# Patient Record
Sex: FEMALE | Race: BLACK OR AFRICAN AMERICAN
[De-identification: names, ages, dates, MRNs, and addresses within clinical notes are randomized per-mention and may not be internally consistent; named-entity substitution may affect disease eponyms.]

---

## 2021-06-28 ENCOUNTER — HOSPITAL ENCOUNTER (OUTPATIENT)
Dept: HOSPITAL 97 - ER | Age: 71
Setting detail: OBSERVATION
LOS: 1 days | Discharge: HOME | End: 2021-06-29
Attending: HOSPITALIST
Payer: COMMERCIAL

## 2021-06-28 VITALS — BODY MASS INDEX: 38.4 KG/M2

## 2021-06-28 DIAGNOSIS — G47.33: ICD-10-CM

## 2021-06-28 DIAGNOSIS — I10: ICD-10-CM

## 2021-06-28 DIAGNOSIS — J45.909: ICD-10-CM

## 2021-06-28 DIAGNOSIS — R07.9: Primary | ICD-10-CM

## 2021-06-28 DIAGNOSIS — R79.89: ICD-10-CM

## 2021-06-28 DIAGNOSIS — I12.9: ICD-10-CM

## 2021-06-28 DIAGNOSIS — D18.03: ICD-10-CM

## 2021-06-28 DIAGNOSIS — R06.02: ICD-10-CM

## 2021-06-28 DIAGNOSIS — E66.9: ICD-10-CM

## 2021-06-28 DIAGNOSIS — E78.5: ICD-10-CM

## 2021-06-28 DIAGNOSIS — N18.9: ICD-10-CM

## 2021-06-28 LAB
ALBUMIN SERPL BCP-MCNC: 3.3 G/DL (ref 3.4–5)
ALP SERPL-CCNC: 115 U/L (ref 45–117)
ALT SERPL W P-5'-P-CCNC: 41 U/L (ref 12–78)
AST SERPL W P-5'-P-CCNC: 22 U/L (ref 15–37)
BUN BLD-MCNC: 40 MG/DL (ref 7–18)
GLUCOSE SERPLBLD-MCNC: 105 MG/DL (ref 74–106)
HCT VFR BLD CALC: 29.1 % (ref 36–45)
LYMPHOCYTES # SPEC AUTO: 1.5 K/UL (ref 0.7–4.9)
NT-PROBNP SERPL-MCNC: 319 PG/ML (ref ?–125)
PMV BLD: 11 FL (ref 7.6–11.3)
POTASSIUM SERPL-SCNC: 3.8 MMOL/L (ref 3.5–5.1)
RBC # BLD: 3.13 M/UL (ref 3.86–4.86)
TROPONIN (EMERG DEPT USE ONLY): < 0.02 NG/ML (ref 0–0.04)

## 2021-06-28 PROCEDURE — 83880 ASSAY OF NATRIURETIC PEPTIDE: CPT

## 2021-06-28 PROCEDURE — 80076 HEPATIC FUNCTION PANEL: CPT

## 2021-06-28 PROCEDURE — 85652 RBC SED RATE AUTOMATED: CPT

## 2021-06-28 PROCEDURE — 84145 PROCALCITONIN (PCT): CPT

## 2021-06-28 PROCEDURE — 71045 X-RAY EXAM CHEST 1 VIEW: CPT

## 2021-06-28 PROCEDURE — 94760 N-INVAS EAR/PLS OXIMETRY 1: CPT

## 2021-06-28 PROCEDURE — 80048 BASIC METABOLIC PNL TOTAL CA: CPT

## 2021-06-28 PROCEDURE — 36415 COLL VENOUS BLD VENIPUNCTURE: CPT

## 2021-06-28 PROCEDURE — 71250 CT THORAX DX C-: CPT

## 2021-06-28 PROCEDURE — 94660 CPAP INITIATION&MGMT: CPT

## 2021-06-28 PROCEDURE — 99285 EMERGENCY DEPT VISIT HI MDM: CPT

## 2021-06-28 PROCEDURE — 93005 ELECTROCARDIOGRAM TRACING: CPT

## 2021-06-28 PROCEDURE — 80053 COMPREHEN METABOLIC PANEL: CPT

## 2021-06-28 PROCEDURE — 84100 ASSAY OF PHOSPHORUS: CPT

## 2021-06-28 PROCEDURE — 85379 FIBRIN DEGRADATION QUANT: CPT

## 2021-06-28 PROCEDURE — 83735 ASSAY OF MAGNESIUM: CPT

## 2021-06-28 PROCEDURE — 97140 MANUAL THERAPY 1/> REGIONS: CPT

## 2021-06-28 PROCEDURE — 81003 URINALYSIS AUTO W/O SCOPE: CPT

## 2021-06-28 PROCEDURE — 84484 ASSAY OF TROPONIN QUANT: CPT

## 2021-06-28 PROCEDURE — 82550 ASSAY OF CK (CPK): CPT

## 2021-06-28 PROCEDURE — 85025 COMPLETE CBC W/AUTO DIFF WBC: CPT

## 2021-06-28 PROCEDURE — 76705 ECHO EXAM OF ABDOMEN: CPT

## 2021-06-28 PROCEDURE — 86140 C-REACTIVE PROTEIN: CPT

## 2021-06-28 PROCEDURE — 78582 LUNG VENTILAT&PERFUS IMAGING: CPT

## 2021-06-28 PROCEDURE — 97161 PT EVAL LOW COMPLEX 20 MIN: CPT

## 2021-06-28 RX ADMIN — TRAMADOL HYDROCHLORIDE PRN MG: 50 TABLET, COATED ORAL at 22:34

## 2021-06-28 RX ADMIN — ENOXAPARIN SODIUM SCH MG: 100 INJECTION SUBCUTANEOUS at 21:37

## 2021-06-28 RX ADMIN — Medication SCH ML: at 21:37

## 2021-06-28 NOTE — P.HP
Certification for Inpatient


Patient admitted to: Observation


With expected LOS: <2 Midnights


Practitioner: I am a practitioner with admitting privileges, knowledge of 

patient current condition, hospital course, and medical plan of care.


Services: Services provided to patient in accordance with Admission requirements

found in Title 42 Section 412.3 of the Code of Federal Regulations





Patient History


Date of Service: 06/28/21


Reason for admission: R subscapular back pain, r/o PE


History of Present Illness: 





69yo F, PMH: HTN, Asthma, CHAVO, CKD, h/o shingles. Presents to ED due to 

worsening R thoracic back pain. Pain began ~4 weeks ago, saw pcp and diagnosed 

with muscle cramps. She continued to stay very active, using some ice on the 

area with some alleviation of her symptoms. This continued until yesterday when 

she began to have more severe pain in the same area. Severe enough to limit what

she could do around the house. Tylenol and muscle relaxers were not helping - 

only making her sleepy.  Massage helps as well. Deep breathing and exertion can 

worsen the pain. She reports some mild SOB and cough over the last week or so.


In the ED, CXR was unremarkable, labs notable for Cr: 1.8, elevated d-dimer, 

negative troponin, EKG WNL.


Patient to be placed in obs due to intractable pain, elevated d-dimer, unable to

obtain CTA to r/o PE.





Allergies





codeine [Codeine] Allergy (Intermediate, Verified 03/13/13 06:24)


   Itching


Tetracyclines Allergy (Intermediate, Verified 03/13/13 06:24)


   Nausea/Vomiting


No Known Allergies Allergy (Uncoded 11/26/17 16:02)


   Unknown





Home Medications: 








Aspirin 81 mg PO DAILY 11/26/17 


Budesonide/Formoterol Fumarate [Symbicort 160-4.5 Mcg Inhaler] 1 puff IH BID 

11/26/17 


Carvedilol [Coreg] 25 mg PO BID 11/26/17 


Chlorthalidone 25 mg PO DAILY 11/26/17 


Gabapentin [Neurontin*] 100 mg PO BID 11/26/17 


Hydrochlorothiazide 50 mg PO DAILY 11/26/17 


Spironolactone 25 mg PO DAILY 11/26/17 


Valsartan 160 mg PO DAILY 11/26/17 


Cefixime [Suprax] 200 mg PO BID #28 tab.chew 11/29/17 


Oseltamivir Phosphate [Tamiflu Suspension] 5 ml PO DAILY #10 ml 11/29/17 








- Past Medical/Surgical History


Diabetic: No


-: HTN


-: HYPERLIPIDEMIA


-: ASTHMA


-: NEUROPATHY


-: SLEEP APNEA


-: CKD


-: R ROTATOR CUFF


-: HYSTERECTOMY


-: R ANKLE





- Family History


  ** Father


-: Hypertension, Cancer


Notes: prostate cancer





  ** Mother


-: Kidney disease


Notes: dialysis pt





- Social History


Smoking Status: Never smoker


Alcohol use: No


CD- Drugs: No


Caffeine use: Yes


Place of Residence: Home





Review of Systems


10-point ROS is otherwise unremarkable





Physical Examination





- Physical Exam


General: Alert, Oriented x3, Mild distress


HEENT: Normocephalic, PERRLA, Mucous membr. moist/pink


Neck: Supple


Respiratory: Clear to auscultation bilaterally, Normal air movement


Cardiovascular: Edema (Trace-1+, bilateral to knee)


Capillary refill: <2 Seconds


Gastrointestinal: Soft and benign, Non-distended, No tenderness


Musculoskeletal: Other (Tenderness and swelling of posterior chest between spine

and scapula; no skin changes, no abscess or induration palpated)


Integumentary: No rashes, No breakdown


Neurological: Normal speech, Normal strength at 5/5 x4 extr, Normal affect





- Studies


Laboratory Data (last 24 hrs)





06/28/21 14:01: WBC 7.10, Hgb 9.4 L, Hct 29.1 L, Plt Count 195


06/28/21 14:01: Sodium 142, Potassium 3.8, BUN 40 H, Creatinine 1.81 H, Glucose 

105, Total Bilirubin 0.2, AST 22, ALT 41, Alkaline Phosphatase 115








Assessment and Plan





- Advance Directives


Does patient have a Living Will: No


Does patient have a Durable POA for Healthcare: No


Physician Review Additional Text: 





Problem List


R Back pain, intractable


elevated D-dimer, SOB


HTN


asthma


CHAVO on CPAP


CKD


h/o shingles





-area of tenderness with noticeable swelling compared to left


-will obtain CT w/o IV contrast due to renal function


-PRN pain control


-VQ scan in AM to r/o PE, lovenox 1mg/kg ordered


-likely muscle spasm, pt has not rested and continued to agitate the area


-possible PE, possible zoster. no skin lesions on back, very tender / out of 

proportion to exam 


-ESR, CRP, cpk ordered





VTE: lovenox 1mg/kg


Code: full


Dispo: anticipate dc home tomorrow, pain control, r/o DVT, further eval











Time Spent Managing Pts Care (In Minutes): 60

## 2021-06-28 NOTE — XMS REPORT
Continuity of Care Document

                            Created on:2021



Patient:RADHA CAMACHO

Sex:Female

:1950

External Reference #:[id number]





Demographics







                          Address                   [address] [address]

 

                          Home Phone                [phone]

 

                          Mobile Phone              [phone]

 

                          Email Address             [email or web address]

 

                          Preferred Language        en

 

                          Marital Status            Unknown

 

                          Tenriism Affiliation     Unknown

 

                          Race                      Unknown

 

                          Additional Race(s)        Unavailable



                                                    Black or 

 

                          Ethnic Group              Unknown









Author







                          Organization              [facility]

t

 

                          Address                   [address] [place] [address] [address]



                                                    [place] [address]

 

                          Phone                     [phone]









Support







                Name            Relationship    Address         Phone

 

                Xu          Child           [address] [phone]



                                                [place] [address] 









Care Team Providers







                    Name                Role                Phone

 

                    Flavio Leung MD Attending Clinician [phone]









Problems

This patient has no known problems.



Allergies, Adverse Reactions, Alerts

This patient has no known allergies or adverse reactions.



Medications

This patient has no known medications.



Procedures

This patient has no known procedures.



Encounters







        Start   End     Encounter Admission Attending Care    Care    Encounter 

Source



        Date/Time Date/Time Type    Type    Clinicians Facility Department ID   

   

 

        2021 Office          TABATHA Leung    [phone] [id number]

[id number] 



        08:30:27 09:00:27 Visit           [facility]  [id number]         



                                        Flavio Staley [id number]         



                                                Heather [phone]         



                                                nal     [address]             



                                                Office                  



                                                Building                 



                                                One                     







Results

This patient has no known results.

## 2021-06-28 NOTE — EDPHYS
Physician Documentation                                                                           

 [facility]                                                                 

Name: Rosio Mcnair                                                                               

Age: 70 yrs                                                                                       

Sex: Female                                                                                       

: 1950                                                                                   

MRN: [id number]                                                                                   

Arrival Date: 2021                                                                          

Time: 11:08                                                                                       

Account#: [id number]                                                                            

Bed 18                                                                                            

Private MD: Lucio Leung ED Physician Roberto Carlos Orozco                                                                         

HPI:                                                                                              

                                                                                             

14:33 This 70 yrs old Black Female presents to ER via Wheelchair with complaints of Chest     rn  

      Pain.                                                                                       

14:34 The pain does not radiate. Associated signs and symptoms: Pertinent negatives:          rn  

      abdominal pain, diaphoresis, palpitations, shortness of breath, syncope, vomiting. The      

      chest pain is described as sharp, stabbing. Duration: The patient or guardian reports       

      multiple episodes, that are intermittent. Modifying factors: The symptoms are               

      alleviated by nothing. the symptoms are aggravated by deep breath. Severity of pain: At     

      its worst the pain was moderate in the emergency department the pain is unchanged. The      

      patient has experienced similar episodes in the past. The patient has been recently         

      seen by a physician:. Reports approx 4 weeks of right scapular region pain, no trauma,      

      no fever or cough, reports pain with deep breath, laying on right side, and movement.       

      Seen by multiple providers and told most likely muscle but she feels is something more      

      serious. .                                                                                  

                                                                                                  

Historical:                                                                                       

- Allergies:                                                                                      

19:37 TETRACYCLINES;                                                                          jb4 

19:37 Suprax;                                                                                 jb4 

19:37 Azithromycin;                                                                           jb4 

19:37 Codeine;                                                                                jb4 

19:37 Adhesives;                                                                              jb4 

- PMHx:                                                                                           

19:37 Asthma; Hyperlipidemia; Hypertension;                                                   jb4 

                                                                                                  

- Immunization history:: Adult Immunizations up to date, Client reports receiving the             

  2nd dose of the Covid vaccine.                                                                  

- Social history:: Smoking status: unknown.                                                       

- Family history:: not pertinent.                                                                 

- Hospitalizations: : No recent hospitalization is reported.                                      

                                                                                                  

                                                                                                  

ROS:                                                                                              

14:34 Constitutional: Negative for fever, chills, and weight loss, Eyes: Negative for injury, rn  

      pain, redness, and discharge, Neck: Negative for injury, pain, and swelling,                

      Cardiovascular: Negative for palpitations, and edema, Respiratory: Negative for             

      shortness of breath, cough, wheezing, and pleuritic chest pain, Abdomen/GI: Negative        

      for abdominal pain, nausea, vomiting, diarrhea, and constipation, Back: Negative for        

      injury  : Negative for injury, bleeding, discharge, and swelling, MS/Extremity:           

      Negative for injury and deformity, Skin: Negative for injury, rash, and discoloration,      

      Neuro: Negative for headache, weakness, numbness, tingling, and seizure.                    

                                                                                                  

Exam:                                                                                             

14:34 Constitutional:  This is a well developed, well nourished patient who is awake, alert,  rn  

      and in no acute distress. Head/Face:  Normocephalic, atraumatic. Eyes:  Periorbital         

      areas with no swelling, redness, or edema. Cardiovascular:  Regular rate and rhythm.        

      No pulse deficits. Respiratory:  No increased work of breathing, no retractions or          

      nasal flaring. Abdomen/GI:  soft, non-tender Skin:  Warm, dry MS/ Extremity:  Pulses        

      equal, no cyanosis.   Neuro:  Awake and alert, GCS 15                                       

                                                                                                  

Vital Signs:                                                                                      

11:16  / 63; Pulse 88; Resp 17 S; Temp 98(TE); Pulse Ox 98% on R/A; Weight 95.25 kg     jd3 

      (R); Height 5 ft. 2 in. (157.48 cm) (R); Pain 10/10;                                        

13:00  / 60; Pulse 75; Resp 16; Pulse Ox 98% ;                                          bp  

14:00  / 64; Pulse 87; Resp 22; Pulse Ox 100% ;                                         bp  

16:00  / 63; Pulse 84; Resp 17; Pulse Ox 100% ;                                         bp  

18:00  / 55; Pulse 93; Resp 18; Pulse Ox 99% on R/A;                                    bp  

19:00  / 59; Pulse 90; Resp 18; Pulse Ox 100% on R/A;                                   jb4 

20:00  / 54; Pulse 85; Resp 16; Pulse Ox 100% on R/A;                                   jb4 

11:16 Body Mass Index 38.41 (95.25 kg, 157.48 cm)                                             jd3 

                                                                                                  

MDM:                                                                                              

11:25 Patient medically screened.                                                             rn  

15:18 Differential diagnosis: acute myocardial infarction, acute pericarditis, coronary       rn  

      artery disease cholecystitis, Cholelithiasis costochondritis, esophagitis, gastritis,       

      gastroesophageal reflux disease (GERD), pleurisy, pneumothorax, pulmonary embolus,          

      stable angina. Data reviewed: vital signs, nurses notes, lab test result(s), EKG,           

      radiologic studies, plain films, and as a result, I will admit patient. Counseling: I       

      had a detailed discussion with the patient and/or guardian regarding: the historical        

      points, exam findings, and any diagnostic results supporting the discharge/admit            

      diagnosis, lab results, radiology results, the need for further work-up and treatment       

      in the hospital. Response to treatment: the patient's symptoms have mildly improved         

      after treatment, and as a result, I will admit patient. Admission orders: after a           

      detailed discussion of the patient's condition and case, the admit orders are written       

      by me. ED course: Pt with neg trop and NSR on ECG without ischemia, still having            

      scapular pain/chest pain, elevated d-dimer, unable to get IV access at this time and        

      nursing has tried with u/s, attempting midline right now for admission. Creatinine too      

      high for CT PE anyway, will admit to hospitalist service for chest pain rule out and        

      V/Q scan in AM. .                                                                           

                                                                                                  

                                                                                             

11:33 Order name: Basic Metabolic Panel; Complete Time: 15:10                                 rn  

                                                                                             

11:33 Order name: CBC with Diff; Complete Time: 14:25                                         rn  

                                                                                             

11:33 Order name: LFT's; Complete Time: 15:10                                                 rn  

                                                                                             

11:33 Order name: NT PRO-BNP; Complete Time: 15:10                                            rn  

                                                                                             

11:33 Order name: Troponin (emerg Dept Use Only); Complete Time: 15:10                        rn  

                                                                                             

11:33 Order name: D-Dimer; Complete Time: 15:10                                               rn  

                                                                                             

11:33 Order name: XRAY Chest (1 view); Complete Time: 12:39                                   rn  

                                                                                             

11:33 Order name: EKG; Complete Time: 11:35                                                   rn  

                                                                                             

14:28 Order name: Urine Dipstick-Ancillary; Complete Time: 14:34                              [facility]

                                                                                             

16:46 Order name: CT-CHEST WITHOUT CONTRAST                                                   [facility]

                                                                                             

16:46 Order name: Creatine Phosphokinase                                                      [facility]

                                                                                             

17:34 Order name: CT                                                                          [facility]

                                                                                             

17:47 Order name: C-Reactive Protein                                                          [facility]

                                                                                             

18:11 Order name: Sedimentation Rate, [place]                                              [facility]

                                                                                             

11:33 Order name: Cardiac monitoring; Complete Time: 11:39                                    rn  

                                                                                             

11:33 Order name: EKG - Nurse/Tech; Complete Time: 11:39                                      rn  

                                                                                             

11:33 Order name: IV Saline Lock; Complete Time: 17:45                                        rn  

                                                                                             

11:33 Order name: Labs collected and sent; Complete Time: 14:01                               rn  

                                                                                             

11:33 Order name: O2 Per Protocol; Complete Time: 14:00                                       rn  

                                                                                             

11:33 Order name: O2 Sat Monitoring; Complete Time: 14:00                                     rn  

                                                                                             

16:46 Order name: Heart Healthy                                                               [facility]

                                                                                                  

Administered Medications:                                                                         

No medications were administered                                                                  

                                                                                                  

                                                                                                  

Disposition:                                                                                      

21 15:20 Hospitalization ordered by Homar Orozco for Observation. Diagnosis is Chest      

  pain, unspecified.                                                                              

- Bed requested for Telemetry/MedSurg (observation).                                              

- Status is Observation.                                                                      jb4 

- Condition is Stable.                                                                            

- Problem is new.                                                                                 

- Symptoms are unchanged.                                                                         

                                                                                                  

                                                                                                  

                                                                                                  

Signatures:                                                                                       

Dispatcher [employer]                           EDMS                                                 

Sumi Gill Diana, RN                        Roberto Carlos Helms MD MD rn Bryson, James, RN RN   jb4                                                  

Dima Carballo RN                    BRONSON   jd3                                                  

                                                                                                  

Corrections: (The following items were deleted from the chart)                                    

18:04 15:20 Hospitalization Ordered by Homar Orozco MD for Observation. Preliminary          bd  

      diagnosis is Chest pain, unspecified. Bed requested for Telemetry/MedSurg                   

      (observation). Status is Observation. Condition is Stable. Problem is new. Symptoms are     

      unchanged. rn                                                                               

18:06 18:04 2021 15:20 Hospitalization Ordered by Homar Orozco MD for Observation.     dw  

      Preliminary diagnosis is Chest pain, unspecified. Bed requested for Telemetry/MedSurg       

      (observation). Status is Observation. Condition is Stable. Problem is new. Symptoms are     

      unchanged. bd                                                                               

21:04 18:2021 15:20 Hospitalization Ordered by Homar Orozco MD for Observation.     jb4 

      Preliminary diagnosis is Chest pain, unspecified. Bed requested for Telemetry/MedSurg       

      (observation). Status is Observation. Condition is Stable. Problem is new. Symptoms are     

      unchanged. dw                                                                               

                                                                                                  

**************************************************************************************************

## 2021-06-28 NOTE — RAD REPORT
EXAM DESCRIPTION:  CT - Thorax Wo Con - 6/28/2021 5:12 pm

 

CLINICAL HISTORY:  R subscapular pain/swelling

 

COMPARISON:  No comparisons

 

TECHNIQUE:  Axial 5 mm thick images of the chest were obtained without IV contrast.

 

All CT scans are performed using dose optimization technique as appropriate and may include automated
 exposure control or mA/KV adjustment according to patient size.

 

FINDINGS:  Clustered airspace opacities are present in the posterior aspect mid right chest within th
e right lower lobe. This is most likely a mild pneumonia and is potentially the source for the patien
t's right thoracic pain. No other lung parenchymal process identifiable. No pleural thickening or ple
ural effusion. No pneumothorax.

 

No abnormal mediastinal or hilar masses or lymphadenopathy seen. No gross aortic or pulmonary artery 
finding suspected.  Assessment is limited in the absence of IV contrast. No cardiomegaly or pericardi
al effusion.

 

No chest wall mass for acute rib finding seen. No abnormality of the right scapula and adjacent muscu
lature. Patient does have bilateral shoulder joint degenerative change and possible rotator cuff repa
ir on the right.

 

Multiple small nonspecific bilateral axillary lymph nodes.

 

Limited upper abdomen imaging shows a large lobulated 9 centimeter hypodense mass filling most of marianna
er segments 4, 8 and 5. Within this hypodense mass are numerous variably sized irregular calcificatio
ns. There is no relevant history or prior imaging to explain this mass. A 2 centimeter right adrenal 
mass shows fat attenuation consistent with adrenal adenoma. There is nodularity of the left adrenal g
land. Small low-density mass lateral upper pole left kidney is probably a cyst but is not fully carolyn
cterized.

 

IMPRESSION:  A mild or early pneumonia in the upper segment right lower lobe is present and could be 
a source for the patient's right-sided chest or shoulder blade pain. Patient also has degenerative ch
griselda at the right shoulder joint with what appears to be a remote rotator cuff tear procedure.

 

More significantly the limited imaging of the liver shows a 9 centimeter low-density mass. Coarse osvaldo
cifications are present within the mass.

 

No comparison imaging or pertinent history available that might explain this mass. Primary liver fabián
gnancy would be a primary consideration and can be further evaluated with follow-up outpatient contra
st CT abdomen and pelvis or contrast-enhanced MRI of the liver.

## 2021-06-28 NOTE — ER
Nurse's Notes                                                                                     

 [facility]                                                                 

Name: Rosio Mcnair                                                                               

Age: 70 yrs                                                                                       

Sex: Female                                                                                       

: 1950                                                                                   

MRN: [id number]                                                                                   

Arrival Date: 2021                                                                          

Time: 11:08                                                                                       

Account#: [id number]                                                                            

Bed 18                                                                                            

Private MD: Lucio Leung                                                                        

Diagnosis: Chest pain, unspecified                                                                

                                                                                                  

Presentation:                                                                                     

                                                                                             

11:14 Chief complaint: Patient states: "I am having pain under my right shoulder blade in my  jd3 

      chest. I saw my doctor and he thinks it was a torn muscle and he gave me something over     

      the weekend and it help a little and it seems like it just keeps getting worse.".           

      Coronavirus screen: At this time, the client does not indicate any symptoms associated      

      with coronavirus-19. Ebola Screen: Patient negative for fever greater than or equal to      

      101.5 degrees Fahrenheit, and additional compatible Ebola Virus Disease symptoms.           

      Initial Sepsis Screen: Does the patient meet any 2 criteria? No. Patient's initial          

      sepsis screen is negative. Does the patient have a suspected source of infection? No.       

      Patient's initial sepsis screen is negative. Risk Assessment: Do you want to hurt           

      yourself or someone else? Patient reports no desire to harm self or others. Onset of        

      symptoms was 2021.                                                                 

11:14 Method Of Arrival: Wheelchair                                                           jd3 

11:14 Acuity: ROMI 3                                                                           jd3 

                                                                                                  

Triage Assessment:                                                                                

11:15 General: Appears in no apparent distress. uncomfortable, obese, Behavior is             bp  

      cooperative, appropriate for age, anxious. Pain: Complains of pain in chest. EENT: No       

      deficits noted. Neuro: No deficits noted. Cardiovascular: Rhythm is sinus rhythm.           

      Respiratory: No deficits noted. GI: No signs and/or symptoms were reported involving        

      the gastrointestinal system. : No signs and/or symptoms were reported regarding the       

      genitourinary system. Derm: No deficits noted. Musculoskeletal: No deficits noted.          

                                                                                                  

Historical:                                                                                       

- Allergies:                                                                                      

19:37 TETRACYCLINES;                                                                          jb4 

19:37 Suprax;                                                                                 jb4 

19:37 Azithromycin;                                                                           jb4 

19:37 Codeine;                                                                                jb4 

19:37 Adhesives;                                                                              jb4 

- PMHx:                                                                                           

19:37 Asthma; Hyperlipidemia; Hypertension;                                                   jb4 

                                                                                                  

- Immunization history:: Adult Immunizations up to date, Client reports receiving the             

  2nd dose of the Covid vaccine.                                                                  

- Social history:: Smoking status: unknown.                                                       

- Family history:: not pertinent.                                                                 

- Hospitalizations: : No recent hospitalization is reported.                                      

                                                                                                  

                                                                                                  

Screenin:30 Abuse screen: Denies threats or abuse. Denies injuries from another. Nutritional        bp  

      screening: No deficits noted. Tuberculosis screening: No symptoms or risk factors           

      identified. Fall Risk None identified.                                                      

                                                                                                  

Assessment:                                                                                       

13:30 Reassessment: UNABLE TO OBTAIN PIV OR BLOOD DESPITE MX NURSE INTERVENTION AND U/S. MD   bp  

      NOTIFIED.                                                                                   

14:01 Reassessment: PHLEBOTOMY AT B/S. PIV ON HOLD PENDING FINAL RESULTS.                     bp  

16:00 Reassessment: No changes from previously documented assessment. Patient and/or family   bp  

      updated on plan of care and expected duration. Pain level reassessed. Patient is alert,     

      oriented x 3, equal unlabored respirations, skin warm/dry/pink. ADMIT INITIATED.            

18:00 Reassessment: No changes from previously documented assessment. Patient and/or family   bp  

      updated on plan of care and expected duration. Pain level reassessed. Patient is alert,     

      oriented x 3, equal unlabored respirations, skin warm/dry/pink. COPY OF COVID VAXX ON       

      CHART.                                                                                      

19:00 Reassessment: Patient appears in no apparent distress at this time. Patient and/or      jb4 

      family updated on plan of care and expected duration. Pain level reassessed. Patient is     

      alert, oriented x 3, equal unlabored respirations, skin warm/dry/pink.                      

19:38 Reassessment: Patient appears in no apparent distress at this time. Patient and/or      jb4 

      family updated on plan of care and expected duration. Pain level reassessed. Patient is     

      alert, oriented x 3, equal unlabored respirations, skin warm/dry/pink.                      

19:41 Reassessment: attempted to call report twice, no answer each time.                      jb4 

21:04 Reassessment: Patient appears in no apparent distress at this time. Patient and/or      jb4 

      family updated on plan of care and expected duration. Pain level reassessed. Patient is     

      alert, oriented x 3, equal unlabored respirations, skin warm/dry/pink.                      

                                                                                                  

Vital Signs:                                                                                      

11:16  / 63; Pulse 88; Resp 17 S; Temp 98(TE); Pulse Ox 98% on R/A; Weight 95.25 kg     jd3 

      (R); Height 5 ft. 2 in. (157.48 cm) (R); Pain 10/10;                                        

13:00  / 60; Pulse 75; Resp 16; Pulse Ox 98% ;                                          bp  

14:00  / 64; Pulse 87; Resp 22; Pulse Ox 100% ;                                         bp  

16:00  / 63; Pulse 84; Resp 17; Pulse Ox 100% ;                                         bp  

18:00  / 55; Pulse 93; Resp 18; Pulse Ox 99% on R/A;                                    bp  

19:00  / 59; Pulse 90; Resp 18; Pulse Ox 100% on R/A;                                   jb4 

20:00  / 54; Pulse 85; Resp 16; Pulse Ox 100% on R/A;                                   jb4 

11:16 Body Mass Index 38.41 (95.25 kg, 157.48 cm)                                             jd3 

                                                                                                  

ED Course:                                                                                        

11:08 Patient arrived in ED.                                                                  ds1 

11:09 Lucio Leung MD is Private Physician.                                                ds1 

11:16 Triage completed.                                                                       jd3 

11:17 Arm band placed on.                                                                     jd3 

11:19 Patient has correct armband on for positive identification. Placed in gown. Bed in low  mh5 

      position. Call light in reach. Side rails up X 1. Adult w/ patient. Warm blanket given.     

      Pillow given. Cardiac monitor on. Pulse ox on. NIBP on.                                     

11:19 EKG done, by ED staff, reviewed by Roberto Carlos Orozco MD.                                      mh5 

11:25 Roberto Carlos Orozco MD is Attending Physician.                                                rn  

11:26 Cal Barrett, BRONSON is Primary Nurse.                                                    bp  

11:30 Patient maintains SpO2 saturation greater than 95% on room air.                         bp  

11:39 EKG done.                                                                               mh5 

12:23 XRAY Chest (1 view) In Process Unspecified.                                             EDMS

15:20 Homar Orozco MD is Hospitalizing Provider.                                           rn  

17:00 Inserted saline lock: 22 gauge in left antecubital area, using aseptic technique.       ss  

      ,using aseptic technique. Insertion VIA Ultrasound guided by me. Pt tolerated well.         

18:26 No provider procedures requiring assistance completed.                                  ss  

18:26 Patient admitted, IV remains in place.                                                  ss  

                                                                                                  

Administered Medications:                                                                         

No medications were administered                                                                  

                                                                                                  

                                                                                                  

Outcome:                                                                                          

15:20 Decision to Hospitalize by Provider.                                                    rn  

20:06 Admitted to Tele accompanied by nurse, via wheelchair, room 406, with chart, Report     jb4 

      called to  BRONSON Martel                                                                        

20:06 Condition: stable                                                                           

20:06 Discharge instructions given to patient, Instructed on the need for admit, Demonstrated     

      understanding of instructions.                                                              

21:04 Patient left the ED.                                                                    jb4 

                                                                                                  

Signatures:                                                                                       

Dispatcher [employer]                           EDMS                                                 

Danica Gore                                ds1                                                  

Roberto Carlos Orozco MD MD rn Smirch, Shelby, RN                      RN   ss                                                   

Lucio Jernigan RN                       RN   jb4                                                  

Emily Smith Jonathon, RN                    RN   jCal Redd RN                      RN   bp                                                   

                                                                                                  

**************************************************************************************************

## 2021-06-29 VITALS — TEMPERATURE: 97.1 F

## 2021-06-29 VITALS — DIASTOLIC BLOOD PRESSURE: 74 MMHG | SYSTOLIC BLOOD PRESSURE: 136 MMHG

## 2021-06-29 VITALS — OXYGEN SATURATION: 96 %

## 2021-06-29 LAB
ALBUMIN SERPL BCP-MCNC: 3.1 G/DL (ref 3.4–5)
ALP SERPL-CCNC: 110 U/L (ref 45–117)
ALT SERPL W P-5'-P-CCNC: 38 U/L (ref 12–78)
AST SERPL W P-5'-P-CCNC: 21 U/L (ref 15–37)
BUN BLD-MCNC: 33 MG/DL (ref 7–18)
GLUCOSE SERPLBLD-MCNC: 136 MG/DL (ref 74–106)
HCT VFR BLD CALC: 27.5 % (ref 36–45)
LYMPHOCYTES # SPEC AUTO: 1.8 K/UL (ref 0.7–4.9)
MAGNESIUM SERPL-MCNC: 2.3 MG/DL (ref 1.8–2.4)
PMV BLD: 11.5 FL (ref 7.6–11.3)
POTASSIUM SERPL-SCNC: 4 MMOL/L (ref 3.5–5.1)
RBC # BLD: 2.96 M/UL (ref 3.86–4.86)

## 2021-06-29 RX ADMIN — TRAMADOL HYDROCHLORIDE PRN MG: 50 TABLET, COATED ORAL at 12:47

## 2021-06-29 RX ADMIN — TRAMADOL HYDROCHLORIDE PRN MG: 50 TABLET, COATED ORAL at 07:42

## 2021-06-29 RX ADMIN — ENOXAPARIN SODIUM SCH MG: 100 INJECTION SUBCUTANEOUS at 07:39

## 2021-06-29 RX ADMIN — Medication SCH ML: at 07:39

## 2021-06-29 NOTE — RAD REPORT
EXAM DESCRIPTION:  NM - Vent Perfusion VQ Scan - 6/29/2021 9:42 am

 

CLINICAL HISTORY:  r/o PE, shortness of breath

 

COMPARISON:  None.

 

TECHNIQUE:  The patient was administered 20.5 mCi Xenon 133 gas with posterior projection inspiration
, equilibrium, and washout views obtained. The patient was then administered 7.1 mCi Tc-99m MAA label
ed RBCs followed by standard 8 view protocol.

 

FINDINGS:  There is good distribution of the Xenon with no ventilation defects identified. Mild to mo
derate diffuse bilateral air trapping pattern seen.

 

Mild patchy diminished activity scattered in the lung parenchyma. No lobar or segmental sized defects
 identifiable. A small subsegmental size defect is present posterior mid right lung field.

 

IMPRESSION:  Low probability V/Q scan for pulmonary embolism.

 

No ventilation defects. Mild to moderate diffuse air trapping pattern.

## 2021-06-29 NOTE — RAD REPORT
EXAM DESCRIPTION:  US - Abdomen Exam Limited - 6/29/2021 9:01 am

 

CLINICAL HISTORY:  RUQ evaluation, right upper quadrant pain

 

COMPARISON:  No comparisons

 

FINDINGS:  Gallbladder size is normal. No gallstones, wall thickening or pericholecystic fluid. Commo
n bile duct is normal with no common duct stone identified.

 

Liver size is normal and shows increased echogenicity likely a mild fatty infiltration. No focal live
r lesion. The pancreas is not visualized.

 

No hydronephrosis or solid mass of the right kidney. An 11 millimeter cyst is present in the lower po
le.

 

IMPRESSION:  No gallbladder or biliary tree abnormality.

 

Suspected mild fatty infiltration of the liver with no focal liver lesion identifiable.

## 2021-06-29 NOTE — P.DS
Admission Date: 06/28/21


Discharge Date: 06/29/21


Disposition: ROUTINE DISCHARGE


Discharge Condition: FAIR


Reason for Admission: R subscapular back pain, r/o PE





- Problems


(1) Chest pain


Status: Acute   





(2) Hypertension


Status: Acute   





(3) Hepatic hemangioma


Status: Acute   





(4) Obesity (BMI 30.0-34.9)


Status: Acute   


Brief History of Present Illness: 


70-year-old woman with a history of hypertension, chronic kidney disease, 

obstructive sleep apnea, prior history of singles presented to the emergency 

department due to right thoracic back pain of 4 weeks duration.  The pain did 

not respond to Tylenol and muscle relaxants prescribed by his PCP.  She 

presented to the emergency department due to worsening pain.  Her chest x-ray in

the ED was unremarkable, D-dimer elevated but could not perform CTA to rule out 

pulmonary embolism due to elevated creatinine up to 1.8.  Patient was placed 

under observation for further evaluation.





Hospital Course: 


Patient placed under observation on the medical floor.  Troponin trended 

negative.  V/Q scan done showed low probability for pulmonary embolus.  CT chest

without contrast reported 9 cm hypodense lesion in the liver with central 

calcification.  I discussed the CT finding with the patient. Patient stated the 

liver finding is old and it is a hemangioma.  Liver ultrasound performed which 

showed normal gallbladder.  Liver finding on the CT discussed with radiology was

stated the lesion may be high up and may not be identify by the ultrasound.  No 

abscess, or lesion noted in the right flank where she has point tenderness.  

Abdominal ultrasound showed no hydronephrosis, it identified 11 mm cyst in the 

lower pole of the right kidney.


PE has been ruled out.  Her pain was managed with Flexeril and tramadol.  ACS 

has also been ruled out.  Patient is discharged to follow with the PCP.





Vital Signs/Physical Exam: 














Temp Pulse Resp BP Pulse Ox


 


 97.1 F   79   18   136/74   94 


 


 06/29/21 08:00  06/29/21 12:00  06/29/21 12:47  06/29/21 12:00  06/29/21 12:47








General: Alert, In no apparent distress, Oriented x3


HEENT: Mucous membr. moist/pink


Neck: JVD not distended


Respiratory: Clear to auscultation bilaterally, Normal air movement


Cardiovascular: No edema, Regular rate/rhythm, Normal S1 S2


Gastrointestinal: Normal bowel sounds, Soft and benign, No tenderness


Musculoskeletal: Tenderness (Right flank.)


Neurological: Normal speech, Normal strength at 5/5 x4 extr, Cranial nerves 3-12

intact


Lymphatics: No axilla or inguinal lymphadenopathy


Laboratory Data at Discharge: 














WBC  7.30 K/uL (4.3-10.9)   06/29/21  03:31    


 


Hgb  9.1 g/dL (12.0-15.0)  L  06/29/21  03:31    


 


Hct  27.5 % (36.0-45.0)  L  06/29/21  03:31    


 


Plt Count  169 K/uL (152-406)   06/29/21  03:31    


 


Sodium  143 mmol/L (136-145)   06/29/21  03:31    


 


Potassium  4.0 mmol/L (3.5-5.1)   06/29/21  03:31    


 


BUN  33 mg/dL (7-18)  H  06/29/21  03:31    


 


Creatinine  1.77 mg/dL (0.55-1.3)  H  06/29/21  03:31    


 


Glucose  136 mg/dL ()  H  06/29/21  03:31    


 


Phosphorus  3.7 mg/dL (2.5-4.9)   06/29/21  03:31    


 


Magnesium  2.3 mg/dL (1.8-2.4)   06/29/21  03:31    


 


Total Bilirubin  0.2 mg/dL (0.2-1.0)   06/29/21  03:31    


 


AST  21 U/L (15-37)   06/29/21  03:31    


 


ALT  38 U/L (12-78)   06/29/21  03:31    


 


Alkaline Phosphatase  110 U/L ()   06/29/21  03:31    


 


Troponin I  < 0.02 ng/mL (0.0-0.045)   06/29/21  12:12    








Home Medications: 








Albuterol Inhaler [Ventolin Inhaler*] 2 puff IH Q6H PRN 06/28/21 


Allopurinol 300 mg PO DAILY 06/28/21 


Aspirin [Aspirin EC] 81 mg PO DAILY 06/28/21 


Budesonide/Formoterol Fumarate [Budesonide-Formoterol 80-4.5] 1 puff IH BID 

06/28/21 


Carvedilol [Coreg] 25 mg PO BID 06/28/21 


Colchicine 0.6 mg PO DAILY PRN 06/28/21 


Cyclobenzaprine HCl [Flexeril] 5 mg PO TID PRN 06/28/21 


Furosemide [Lasix] 20 mg PO DAILY 06/28/21 


Gabapentin 300 mg PO BID 06/28/21 


Hydralazine HCl [Apresoline] 50 mg PO TID 06/28/21 


Nystatin 100,000 unit PO QID 06/28/21 


traMADol HCL [Ultram*] 50 mg PO Q6H PRN #30 tab 06/29/21 





New Medications: 


traMADol HCL [Ultram*] 50 mg PO Q6H PRN #30 tab


 PRN Reason: Pain Scale 5-7 (Moderate)


Diet: AHA


Activity: Ad angie


Followup: 


Lucio Leung MD [Primary Care Provider] - 1-2 Weeks ([facility] to schedule an 

appointment)

## 2022-06-26 NOTE — RAD REPORT
EXAM DESCRIPTION:  RAD - Chest Single View - 6/28/2021 12:23 pm

 

CLINICAL HISTORY:  right scapular pain, chest pain

 

COMPARISON:  November 2017

 

TECHNIQUE:  AP portable chest image was obtained 6/28/2021 12:23 pm .

 

FINDINGS:  Lung volumes are much lower than the comparison study. This accentuates heart, vasculature
 and lung markings. No peripheral mass or consolidations seen. Mild failure or volume overload could 
be masked in this setting. Heart size magnified by shallow inspiration. No measurable pleural effusio
n and no pneumothorax. No acute bony abnormality seen. No acute aortic findings suspected.

 

IMPRESSION:  No peripheral mass or consolidation.

 

Heart, vasculature and lung markings are accentuated by shallow inspiration potentially masking mild 
failure or volume overload. Normal for race

## 2024-09-04 ENCOUNTER — HOSPITAL ENCOUNTER (EMERGENCY)
Dept: HOSPITAL 97 - ER | Age: 74
Discharge: ANOTHER HOSPITAL | End: 2024-09-04
Payer: COMMERCIAL

## 2024-09-04 VITALS — OXYGEN SATURATION: 100 %

## 2024-09-04 VITALS — TEMPERATURE: 98.8 F | SYSTOLIC BLOOD PRESSURE: 179 MMHG | DIASTOLIC BLOOD PRESSURE: 97 MMHG

## 2024-09-04 DIAGNOSIS — I10: ICD-10-CM

## 2024-09-04 DIAGNOSIS — R65.21: ICD-10-CM

## 2024-09-04 DIAGNOSIS — A41.9: ICD-10-CM

## 2024-09-04 DIAGNOSIS — E78.00: ICD-10-CM

## 2024-09-04 DIAGNOSIS — G93.89: Primary | ICD-10-CM

## 2024-09-04 DIAGNOSIS — G91.9: ICD-10-CM

## 2024-09-04 DIAGNOSIS — G93.6: ICD-10-CM

## 2024-09-04 LAB
ALBUMIN SERPL BCP-MCNC: 3.6 G/DL (ref 3.4–5)
ALBUMIN/GLOB SERPL: 1.3 {RATIO} (ref 1.1–1.8)
ALP SERPL-CCNC: 103 U/L (ref 45–117)
ALT SERPL W P-5'-P-CCNC: 38 U/L (ref 13–56)
ANION GAP SERPL CALC-SCNC: 19 MEQ/L (ref 5–15)
ANISOCYTOSIS BLD QL: (no result)
APTT BLD: 25.8 SECONDS (ref 24.3–36.9)
AST SERPL W P-5'-P-CCNC: 84 U/L (ref 15–37)
BLD SMEAR INTERP: (no result)
BUN BLD-MCNC: 54 MG/DL (ref 7–18)
GLOBULIN SER CALC-MCNC: 2.8 G/DL (ref 2.3–3.5)
GLUCOSE SERPLBLD-MCNC: 110 MG/DL (ref 74–106)
HCT VFR BLD CALC: 35.7 % (ref 36–45)
HGB BLD-MCNC: 11.5 G/DL (ref 12–15)
INR BLD: 1.12
LYMPHOCYTES # SPEC AUTO: 1.3 K/UL (ref 0.7–4.9)
MCH RBC QN AUTO: 30.1 PG (ref 27–35)
MCHC RBC AUTO-ENTMCNC: 32.2 G/DL (ref 32–36)
MCV RBC: 93.6 FL (ref 80–100)
METHAMPHET UR QL SCN: NEGATIVE
MORPHOLOGY BLD-IMP: (no result)
NRBC # BLD: 0 10*3/UL (ref 0–0)
NRBC BLD AUTO-RTO: 0.1 % (ref 0–0)
PMV BLD: 10.7 FL (ref 7.6–11.3)
POTASSIUM SERPL-SCNC: 4 MEQ/L (ref 3.5–5.1)
PROTHROMBIN TIME: 12.5 SECONDS (ref 9.4–12.5)
RBC # BLD: 3.82 M/UL (ref 3.86–4.86)
SQUAMOUS URNS QL MICRO: (no result) /HPF
THC SERPL-MCNC: NEGATIVE NG/ML
UA COMPLETE W REFLEX CULTURE PNL UR: (no result)
UA DIPSTICK W REFLEX MICRO PNL UR: (no result)
WBC # BLD AUTO: 20.5 THOU/UL (ref 4.3–10.9)

## 2024-09-04 PROCEDURE — 85025 COMPLETE CBC W/AUTO DIFF WBC: CPT

## 2024-09-04 PROCEDURE — 72125 CT NECK SPINE W/O DYE: CPT

## 2024-09-04 PROCEDURE — 36415 COLL VENOUS BLD VENIPUNCTURE: CPT

## 2024-09-04 PROCEDURE — 71250 CT THORAX DX C-: CPT

## 2024-09-04 PROCEDURE — 82947 ASSAY GLUCOSE BLOOD QUANT: CPT

## 2024-09-04 PROCEDURE — 84145 PROCALCITONIN (PCT): CPT

## 2024-09-04 PROCEDURE — 85610 PROTHROMBIN TIME: CPT

## 2024-09-04 PROCEDURE — 70450 CT HEAD/BRAIN W/O DYE: CPT

## 2024-09-04 PROCEDURE — 74176 CT ABD & PELVIS W/O CONTRAST: CPT

## 2024-09-04 PROCEDURE — 80307 DRUG TEST PRSMV CHEM ANLYZR: CPT

## 2024-09-04 PROCEDURE — 43753 TX GASTRO INTUB W/ASP: CPT

## 2024-09-04 PROCEDURE — 81001 URINALYSIS AUTO W/SCOPE: CPT

## 2024-09-04 PROCEDURE — 93005 ELECTROCARDIOGRAM TRACING: CPT

## 2024-09-04 PROCEDURE — 71045 X-RAY EXAM CHEST 1 VIEW: CPT

## 2024-09-04 PROCEDURE — 87040 BLOOD CULTURE FOR BACTERIA: CPT

## 2024-09-04 PROCEDURE — 83605 ASSAY OF LACTIC ACID: CPT

## 2024-09-04 PROCEDURE — 82550 ASSAY OF CK (CPK): CPT

## 2024-09-04 PROCEDURE — 94002 VENT MGMT INPAT INIT DAY: CPT

## 2024-09-04 PROCEDURE — 80053 COMPREHEN METABOLIC PANEL: CPT

## 2024-09-04 PROCEDURE — 99292 CRITICAL CARE ADDL 30 MIN: CPT

## 2024-09-04 PROCEDURE — 51702 INSERT TEMP BLADDER CATH: CPT

## 2024-09-04 PROCEDURE — 99291 CRITICAL CARE FIRST HOUR: CPT

## 2024-09-04 PROCEDURE — 85730 THROMBOPLASTIN TIME PARTIAL: CPT

## 2024-09-04 NOTE — RAD REPORT
EXAM DESCRIPTION:  CT - C Spine Wo Con - 9/4/2024 3:43 pm

 

CLINICAL HISTORY:  Neck swelling

 

COMPARISON:  None

 

TECHNIQUE:  Computed axial tomography of the cervical spine were obtained with sagittal and coronal r
econstruction images generated and reviewed.

 

All CT scans are performed using dose optimization technique as appropriate and may include automated
 exposure control or mA/KV adjustment according to patient size.

 

FINDINGS:  A cervical fracture is not seen.

 

No dislocation.

 

Mild anterior subluxation C4 on C5. Mild posterior subluxation of C6 on C7.

 

Spondylosis most marked C6-7 resulting in moderate right and mild-to-moderate left foraminal stenosis
. Mild central spinal stenosis present

 

IMPRESSION:  A cervical fracture is not seen.

 

Spondylosis most marked C6-7 resulting in moderate right foraminal stenosis

 

If the patient continues have symptoms to suggest spinal cord/spinal canal pathology then MRI would b
e recommended.

## 2024-09-04 NOTE — RAD REPORT
EXAM DESCRIPTION:  G[place]ino Single View9/4/2024 3:08 pm

 

CLINICAL HISTORY:  Intubation

 

COMPARISON:  2021

 

FINDINGS:  Endotracheal tube with its tip 1 centimeter the bev

 

Nasogastric tube within stomach

 

Mild right basilar lung opacity may represent atelectasis or pneumonia

 

Heart is mildly enlarged

 

 

Due to technical issues the exam could not be dictated until now. A preliminary report was given to [facility] emergency room

## 2024-09-04 NOTE — RAD REPORT
EXAM DESCRIPTION:  CT - Chest Abd Pelvis Wo Con - 9/4/2024 3:43 pm

 

CLINICAL HISTORY:   Chest and abdominal pain status post fall

 

COMPARISON:  CT chest 2021

 

TECHNIQUE:  Computed axial tomography of the chest, abdomen and pelvis was obtained. Oral contrast wa
s given. IV contrast was not requested.

 

All CT scans are performed using dose optimization technique as appropriate and may include automated
 exposure control or mA/KV adjustment according to patient size.

 

FINDINGS:   The evaluation of mediastinum, gwyn, vessels and solid organs is limited secondary to the
 lack of IV contrast administration

 

Right lower lobe opacity.

 

No mediastinal hematoma.

 

A pleural effusion is not present. A pericardial effusion is not seen.

 

7 centimeter low-density mass within the liver containing calcification has decreased size and may se
quela of a hematoma.

 

Spleen, pancreas, adrenals and kidneys do not demonstrate an acute traumatic injury.

 

3.3 centimeter right adrenal mass Hounsfield unit 21 has increased size since the prior exam. 1.2 john
timeter low-density lesion right kidney probably a cyst.

 

Pedersen catheter within bladder. Gross bladder abnormality noted. No evidence of diverticulitis.

 

Endotracheal tube with its tip 1 centimeter above bev. Nasogastric tube stomach

 

11 millimeter sclerosis within the sacrum

 

IMPRESSION:  Right lower lobe opacity could represent atelectasis or pneumonia

 

Endotracheal tube with its tip 1 centimeter above the bev

 

No acute traumatic injury involving chest, abdomen/ pelvis

 

3.3 centimeter right adrenal mass has enlarged. I suspect it represents an adenoma. However, it is re
commended that the patient have a nonemergent MRI of the adrenal gland for further evaluation.

 

11 millimeter area sclerosis within the sacrum nonspecific. Follow-up x-ray in 3 months is recommende
d for re-evaluation

## 2024-09-04 NOTE — ER
Nurse's Notes                                                                                     

 [facility]                                                                 

Name: Rosio Mcnair                                                                               

Age: 74 yrs                                                                                       

Sex: Female                                                                                       

: 1950                                                                                   

MRN: [id number]                                                                                   

Arrival Date: 2024                                                                          

Time: 13:57                                                                                       

Account#: [id number]                                                                            

Bed 3                                                                                             

Private MD:                                                                                       

Diagnosis: Brain Mass;Hydrocephalus;Vasogenic edema;Septic shock                                  

                                                                                                  

Presentation:                                                                                     

                                                                                             

14:15 Chief complaint: EMS states: Pt brought in via EMS, INTUBATED. Per EMS, pt's neighbor   dd2 

      found her lying on the floor having seizure like activity. EMS state several seizure        

      activity on scene. Pt was hypotensive 60/40, O2 SAT 80%. Pt was intubated with a 7.5,       

      23 cm at the lip. Pt has IO Rt LE patent. RT facial/eye swelling noted. Coronavirus         

      screen: At this time, the client does not indicate any symptoms associated with             

      coronavirus-19. Ebola Screen: No symptoms or risks identified at this time. Initial         

      Sepsis Screen: Does the patient meet any 2 criteria? Systolic BP < 90 mmHg. Altered         

      Mental Status. Yes Does the patient have a suspected source of infection? No. Patient's     

      initial sepsis screen is negative. Risk Assessment: Do you want to hurt yourself or         

      someone else? Unable to obtain. Onset of symptoms is unknown. Care prior to arrival:        

      Oral intubation, Medication(s) given: Versed 5 MG x1, Ativan 2 mg x1, Ketamine 150 mg       

      x1, Rocuronium 75 mg x2, Levophed drip started at 10 mcg/min, Epinephrine 3 push dose       

      20 meq Glucose check: 80 Oxygen administered. via AMBU bag.                                 

14:15 Method Of Arrival: EMS: [facility]                                                dd2 

14:15 Acuity: ROMI 2                                                                           dd2 

14:37 Care prior to arrival: Rt LE IO 25mm flushes, patent. Lt LE 45mm not patent, removed on dd2 

      arrival.                                                                                    

                                                                                                  

Triage Assessment:                                                                                

14:37 General: Appears ill, Behavior is unresponsive. Pain: Unable to use pain scale. Patient dd2 

      is intubated. Neuro: Level of Consciousness is unresponsive, pt is intubated. Seizure       

      activity reported prior to arrival.                                                         

                                                                                                  

Historical:                                                                                       

- Allergies:                                                                                      

15:17 TETRACYCLINES;                                                                          ph  

15:17 Codeine;                                                                                ph  

15:17 CLAVULANIC ACID;                                                                        ph  

15:17 Azithromycin;                                                                           ph  

15:17 cefepime;                                                                               ph  

15:17 Amoxicillin;                                                                            ph  

- Home Meds:                                                                                      

17:34 gabapentin 300 mg oral capsule 1 cap 3 times per day [Active]; hydralazine 25 mg Oral   ph  

      tablet 1 tab 3 times per day [Active]; methocarbamol 500 mg Oral tablet 4 times per day     

      [Active]; telmisartan 20 mg oral tablet nightly [Active]; carvedilol 25 mg oral tablet      

      2 tab 2 times per day [Active]; torsemide 20 mg oral tablet 1 tab daily [Active];           

      ezetimibe 10 mg oral tablet 1 tab daily [Active]; furosemide 20 mg Oral tablet 1 tab        

      daily [Active]; allopurinol 300 mg Oral tablet 1 tab daily [Active];                        

- PMHx:                                                                                           

17:34 Hypercholesterolemia; Hypertensive disorder;                                            ph  

                                                                                                  

- Immunization history:: Adult Immunizations unknown.                                             

- Infectious Disease History:: unable to obtain.                                                  

- Social history:: Smoking status: unknown.                                                       

- History obtained from: EMS.                                                                     

- Unable to obtain history due to: obtunded state, patient is on ventilator.                      

                                                                                                  

                                                                                                  

Screening:                                                                                        

15:16 [facility] ED Fall Risk Assessment (Adult) History of falling in the last 3 months,       ph  

      including since admission Yes- fall prone (multiple falls) (3 pts) Confusion or             

      Disorientation No (0 pts) Intoxicated or Sedated Yes (3 pts) Impaired Gait No (0 pts)       

      Mobility Assist Device Used No (0 pt) Altered Elimination No (0 pt) Score/Fall Risk         

      Level 3 or more points = High Risk Oriented to surroundings, Maintained a safe              

      environment, Hourly rounding (assess needs \T\ fall precautionary measures) done. Abuse     

      screen: Denies threats or abuse. Denies injuries from another. Nutritional screening:       

      No deficits noted. Tuberculosis screening: No symptoms or risk factors identified.          

                                                                                                  

Assessment:                                                                                       

15:00 General: Behavior is unresponsive. Pain: Unable to use pain scale. Patient is           ph  

      unresponsive. Neuro: Level of Consciousness is unresponsive, Oriented to none.              

      Cardiovascular: Capillary refill < 3 seconds in bilateral fingers Patient's skin is         

      warm and dry. Respiratory: Airway via oral intubation Respiratory pattern is                

      symmetrical, Ventilator assessment: ET Tube: 7.5 23 cm at lip. Tidal Volume: 450            

      Respiratory Rate: 16 FiO2: 40% PEEP: 5 HOB > 30 degrees. GI: Abdomen is non-distended.      

      Derm: Skin is normal.                                                                       

15:36 Reassessment: Pt taken for CT, accompanied by Josefina RN, and RT.                         ph  

16:22 Reassessment: Patient appears in no apparent distress at this time. No changes from     ph  

      previously documented assessment. Patient and/or family updated on plan of care and         

      expected duration. Pain level reassessed.                                                   

18:54 Reassessment: Patient appears in no apparent distress at this time. No changes from     ph  

      previously documented assessment.                                                           

19:10 General: Behavior is unresponsive. Pain: Unable to use pain scale. Patient is           lg3 

      unresponsive. Neuro: Level of Consciousness is unresponsive, Oriented to none.              

      Cardiovascular: Capillary refill < 3 seconds Clubbing of nail beds is absent Patient's      

      skin is warm and dry. Respiratory: Airway via oral intubation Respiratory pattern is        

      symmetrical, Ventilator assessment: Tidal Volume: 450 Respiratory Rate: 16 FiO2: 40%        

      PEEP: 5 HOB > 30 degrees. GI: Abdomen is round non-distended. : Pedersen in place to         

      gravity drainage. Derm: Skin is intact, Skin is dry, Skin is normal, Skin temperature       

      is warm.                                                                                    

                                                                                                  

Vital Signs:                                                                                      

14:15  / 94; Pulse 105; Resp 16; Temp 98(A); Pulse Ox 100% ; FiO2 60 %; Weight 73.03    dd2 

      kg; Height 5 ft. 2 in. ;                                                                    

15:14 Temp 99;                                                                                ph  

15:18  / 100; Pulse 102; Resp 16 A; Temp 99(Ca); Pulse Ox 100% on ETT vent; FiO2 40 %;  ph  

16:00  / 105; Pulse 92; Resp 16; Temp 98.8; Pulse Ox 100% on ETT vent; FiO2 40 %;       ph  

16:22  / 78; Pulse 111; Resp 16; Temp 98.1; Pulse Ox 100% on ETT vent; FiO2 40 %;       ph  

17:01  / 89; Pulse 106; Resp 16; Pulse Ox 100% on ETT vent; FiO2 40 %;                  ph  

17:42  / 105; Pulse 128; Resp 16 A; Temp 98.3(Ca); Pulse Ox 100% on ETT vent; FiO2 40 %;ph  

18:00  / 73; Pulse 98; Resp 16; Temp 98.6; Pulse Ox 100% on ETT vent; FiO2 40 %;        ph  

18:30  / 92; Pulse 91; Resp 16; Pulse Ox 100% on ETT vent; FiO2 40 %;                   ph  

18:55  / 109; Pulse 107; Resp 16; Temp 99(Ca); Pulse Ox 100% on ETT vent; FiO2 40 %;    ph  

19:15  / 101; Pulse 101; Resp 16 A; Pulse Ox 100% on ETT vent; FiO2 40 %;               lg3 

20:15  / 97; Pulse 104; Resp 16 A; Temp 98.8(Ca); Pulse Ox 100% on ETT vent; FiO2 40 %; lg3 

14:15 Body Mass Index 29.45 (73.03 kg, 157.48 cm)                                             dd2 

                                                                                                  

Kirstie Coma Score:                                                                               

14:37 Eye Response: none(1). Modifying Factors: Intubated. Motor Response: none(1). Verbal    dd2 

      Response: none(1). Total: 3.                                                                

19:10 Eye Response: none(1). Motor Response: none(1). Verbal Response: none(1). Total: 3.     lg3 

                                                                                                  

ED Course:                                                                                        

14:10 Patient arrived in ED.                                                                  dd2 

14:14 Yana Ambriz MD is Attending Physician.                                           sd2 

14:15 Initial lab(s) drawn, by ED staff, sent to lab. Urine collected: Pedersen catheter         ph  

      specimen, cloudy, EKG done, by ED staff, reviewed by Yana Ambriz MD.                   

14:20 NGT: inserted 16 Fr. other via oral route verified placement of air over stomach.       ko1 

14:33 Pedersen cath inserted, using sterile technique, 16 Fr., by me, balloon inflated, to       ph  

      gravity drainage, urine specimen collected.                                                 

14:36 Triage completed.                                                                       dd2 

14:37 Arm band placed on left wrist. Patient placed in an exam room, on a stretcher, on       dd2 

      oxygen, on cardiac monitor, on pulse oximetry, ventilator.                                  

14:45 Inserted saline lock: 20 gauge in left EJ, using aseptic technique. Blood collected.    ko1 

      Flushed with 10 mL NS.                                                                      

15:03 Notified ED physician of a critical lab result(s). lactate 2.6.                         ll1 

15:10 Chest Single View XRAY In Process Unspecified.                                          EDMS

15:14 Jackelin Jean, RN is Primary Nurse.                                                    ph  

15:17 Patient has correct armband on for positive identification. Placed in gown. Bed in low  ph  

      position. Call light in reach. Side rails up X2. Client placed on continuous cardiac        

      and pulse oximetry monitoring. NIBP monitoring applied. Cardiac monitor on. Warm            

      blanket given.                                                                              

15:41 CT Head Brain wo Cont In Process Unspecified.                                           EDMS

15:45 CT C Spine In Process Unspecified.                                                      EDMS

15:45 CT Chest Abdomen Pelvis W/O Contrast In Process Unspecified.                            EDMS

16:26 Seizure precautions initiated.                                                          ph  

16:35 Zachary Casas contacted to initiate transfer, on hold for 28 minutes, spoke with    nazia Hernandez.                                                                                     

17:08 No provider procedures requiring assistance completed. Patient transferred, IV remains  ph  

      in place.                                                                                   

17:16 Was asked to please hold during transfer initiation, on hold for 7 minutes advised at   ty  

      capacity.                                                                                   

17:35 Accessed peripheral vein via ultrasound, utilizing dynamic ultrasound technique Blood   cm10

      collected. Clean \T\ dry. Dressing intact. Good blood return. Flushes easily. 20G left      

      upper arm/.                                                                                 

[facility] on hold for 17 minutes, spoke     ty  

      with Kelli.                                                                                

17:59 Notified ED physician of a critical lab result(s). lactate 2.6.                         ll1 

18:42 Called by [facility] only available facility with neuro is [facility], family of patient   ty  

      asked if facility is okay, family advised they wish to be in [facility].                

18:46 Dds1Lkm with [facility].                                                             ty  

18:50 Family advised [facility] is acceptable as to not postpone care.                     ty  

19:10 IV is patent, with good blood return, Flushed.                                          lg3 

19:10 Bed in low position. Client placed on continuous cardiac and pulse oximetry monitoring. lg3 

      NIBP monitoring applied. Cardiac monitor on. Door closed. Noise minimized. Warm blanket     

      given. Linen changed. Family accompanied patient.                                           

19:22 MOT and FS faxed to [facility].                                                               ty  

                                                                                                  

Administered Medications:                                                                         

15:00 Drug: Keppra IV 20 mg/kg IV at bolus once; not to exceed 2,500 milligrams administer    ko1 

      over 15 minutes Route: IV; Rate: bolus; Site: left jugular;                                 

15:30 Follow up: Response: No adverse reaction; IV Status: Completed infusion; IV Intake:     ko1 

      100ml                                                                                       

15:59 Drug: NS 0.9% IV (30 ml/kg) 30 ml/kg IV at bolus once; Sepsis Protocol Route: IV; Rate: ko1 

      bolus; Site: left jugular;                                                                  

17:00 Follow up: Response: No adverse reaction; IV Status: Completed infusion; IV Intake:     ph  

      1000ml ; received 1L NS from EMS                                                            

16:25 Not Given (Other Intervention Used): ertapenem1 grams IVPB once over 30 mins; (mix in   ph  

      100 mL NS)                                                                                  

16:25 Drug: Meropenem IV 1 grams IV at calculated rate once; (mix in  mL) Route: IV;    ph  

      Rate: calculated rate; Site: left jugular;                                                  

17:00 Follow up: Response: No adverse reaction; IV Status: Completed infusion; IV Intake:     ph  

      100ml                                                                                       

16:50 Drug: Decadron - Dexamethasone IVP 10 mg IVP once Route: IVP; Site: left jugular;       ph  

17:59 Follow up: Response: No adverse reaction                                                ph  

17:00 Drug: vancoMYCIN IVPB 1.5 grams IVPB at calculated rate once Route: IVPB; Rate:         ph  

      calculated rate; Site: left jugular;                                                        

19:13 Follow up: Response: No adverse reaction; IV Status: Completed infusion; IV Intake:     ph  

      500ml                                                                                       

17:59 Drug: Labetalol IV 10 mg IV at bolus once Route: IV; Rate: bolus; Site: left            ph  

      antecubital;                                                                                

20:46 Follow up: Response: Blood pressure is lowered; IV Status: Completed infusion           lg3 

                                                                                                  

                                                                                                  

Medication:                                                                                       

15:17 VIS not applicable for this client.                                                     ph  

                                                                                                  

Point of Care Testing:                                                                            

      Blood Glucose:                                                                              

14:37 Blood Glucose: 71 mg/dL;                                                                dd2 

      Ranges:                                                                                     

                                                                                                  

Intake:                                                                                           

15:30 IV: 100ml; Total: 100ml.                                                                ko1 

17:00 IV: 1000ml; Total: 1100ml.                                                              ph  

17:00 IV: 100ml; Total: 1200ml.                                                               ph  

19:13 IV: 500ml; Total: 1700ml.                                                               ph  

                                                                                                  

Outcome:                                                                                          

16:29 ER care complete, transfer ordered by MD.                                               sd2 

20:44 Transferred by ground EMS to [facility], Transfer form completed.             lg3 

20:44 critical                                                                                    

20:44 Discharge instructions given to family, Instructed on the need for transfer,                

20:45 Patient left the ED.                                                                    lg3 

                                                                                                  

Signatures:                                                                                       

Dispatcher [employer]                           EDJackelin Bal RN RN ph Able, Lacie, RN RN   lg3                                                  

Shivam Valero RN RN ll1 Dunlop, Stephanie, MD MD sd2                                                  

Josefina Haro RN RN   ko1                                                  

Dianne Smith RN RN   cmJuwan Ashford DIANA, RN                        RN   dd2                                                  

                                                                                                  

Corrections: (The following items were deleted from the chart)                                    

14:40 14:36 Allergies: Adhesives; dd2                                                         dd2 

14:40 14:36 Allergies: Azithromycin; dd2                                                      dd2 

14:40 14:36 Allergies: Codeine; dd2                                                           dd2 

14:40 14:36 Allergies: Suprax; dd2                                                            dd2 

14:40 14:36 Allergies: TETRACYCLINES; dd2                                                     dd2 

14:40 14:36 PMHx: Asthma; dd2                                                                 dd2 

14:40 14:36 PMHx: Hyperlipidemia; dd2                                                         dd2 

14:40 14:36 PMHx: Hypertension; dd2                                                           dd2 

14:46 14:25 NGT: inserted 16 Fr. other via oral route verified placement of air over stomach, ko1 

      ph                                                                                          

17:06 15:00 Respiratory: Airway via oral intubation Respiratory pattern is symmetrical,       ph  

      Ventilator assessment: Tidal Volume: 450 Respiratory Rate: 16 FiO2: 40% PEEP: 5 HOB >       

      30 degrees. ph                                                                              

17:11 15:18  / 100; Pulse 102bpm; Resp 16bpm; Assisted; Pulse Ox 100% ET / Ventilator   ph  

      FiO2 50%; Temp 99F Catheter; ph                                                             

17:11 16:00  / 105; Pulse 92bpm; Resp 16bpm; Pulse Ox 100% ET / Ventilator FiO2 50%;    ph  

      Temp 98.8F; ph                                                                              

17:11 16:22  / 78; Pulse 111bpm; Resp 16bpm; Pulse Ox 100% ET / Ventilator FiO2 50%;    ph  

      Temp 98.1F; ph                                                                              

17:11 17:01  / 89; Pulse 106bpm; Resp 16bpm; Pulse Ox 100% ET / Ventilator; ph          ph  

18:03 18:00 Pulse 98bpm; Resp 16bpm; Pulse Ox 100% ET / Ventilator FiO2 40%; Temp 98.6F; ph   ph  

18:18 17:57 [facility] called to initiate patient transfer, on hold for XXXX minutes,   ty  

      spoke with XXXX ty                                                                          

20:37 20:30 General: Pt taken to CT accompanied by Lani DOBSON, and RT. lg3                      lg3 

                                                                                                  

**************************************************************************************************

## 2024-09-04 NOTE — XMS REPORT
Continuity of Care Document



                          Created on: 2024





ROSIO MCNAIR

External Reference #: [id number]

: 1950

Sex: Female



Demographics





                                        Address             [address]

[address]



[place]  [address]

 

                                        Home Phone          [phone]

 

                                        Mobile Phone        [phone]

 

                                        Email Address       [email or web address]

 

                                        Preferred Language  en

 

                                        Marital Status      Unknown

 

                                        Christianity Affiliation Unknown

 

                                        Race                Unknown

 

                                        Additional Race(s)  Black or  Chelsey

rican

 

                                        Ethnic Group        Unknown





Author





                                        Name                Unknown

 

                                        Address             [address]

[place]  [address]

 

                                        [facility]

[facility]

 

                                        Address             [address]

[place]  [address]

 

                                        Phone               [phone]





Support





                          Name         Relationship Address      Phone

 

                                Rosio Mcnair  Personal Relationship [address] 

[place]  [address]                 [phone]

 

                                FRANCISCO DEMPSEY               [place]  [address]                 [phone]

 

                                Sofia Mcnair Child           [address]  [address]                     [phone]





Care Team Providers





                                Care Team Member Name Role            Phone

 

                                RUPA AYERS Primary Care Physician 

Unavailable

 

                                Rupa Ayers Attending Clinician Unava

ilCORINA Greenberg  Attending Clinician Unavailable

 

                                CORINA ALEJO  Attending Clinician Unavailable

 

                                CHRIS MALDONADO Attending Clinician Unavailab

AMELIA Valentine Attending Clinician Unavailable

 

                                AMELIA DUKES Attending Clinician Unavailable

 

                                KUSHAL [address] Attending Clinician Unavailable

 

                                KUSHAL [address] Attending Clinician Unavailable

 

                                RUPA AYERS Attending Clinician Beth

Rupa Man MD Attending Clinician 

[phone]

 

                                Pob, Ad[facility] Lab Main Attending Clinician UnavailRosina Neely DO Attending Clinician [phone] 

 

                                ROSINA GOODRCIH Attending Clinician Unavailable

 

                                YONI SIMMONS Attending Clinician Unavailable

 

                                NERY MONTGOMERY    Attending Clinician Unavailable

 

                                Nery Montgomery MD Attending Clinician [phone]

[phone]

 

                                Unknown, Attending Attending Clinician UnavailBilly Sainz MD Attending Clinician [phone] 

 

                                EVA DISLA Attending Clinician UnavailEVA Amador Attending Clinician Unavailabl

e

 

                                Doctor Unassigned, No Name Attending Clinician U

navailable

 

                                Lab, Ang - Db   Attending Clinician Unavailable

 

                                Demetris Baker PT, Benita Attending Clinician Un

available

 

                                Eva Disla MD Attending Clinician +[phone]

 

                                Provider, Urgent Care Day Attending Clinician Un

available

 

                                June Kaplan Attending Clinician +[phone]



 

                                Kamlesh DOBSON, Mari Attending Clinician UnavailCHIARA Baez   Attending Clinician Unavailable

 

                                Mary Garcia Attending Clinician +



 

                                MARY MORA Attending Clinician Unavailable

 

                                Pooja DOBSON, Renetta SILVESTRE Attending Clinician Beth

NATALIE West   Attending Clinician Unavailable

 

                                NATALIE BANG   Attending Clinician Unavailable

 

                                GUNJAN BELLA Attending Clinician Unavailab

Gunjan Alexis Attending Clinician [phone]

 

                                Cindy Galloway OT Attending Clinician Un

available

 

                                BILLY PORTILLO Attending Clinician Unavailable

 

                                Suzie King LMSW Attending Clinician Unava

ilYoni Rodgers MD Attending Clinician [phone]

[phone]

 

                                [facility] Attending Clinician Unavailab

wilfrid Moore LCSWJennifer Attending Clinician +[phone]



 

                                [facility] Attending Clinician Unav

JOHANA Reyes    Attending Clinician Unavailable

 

                                Johana Marin PA-C Attending Clinician +

[phone]

 

                                HematRebeca ferrer MD Attending Clinician +

[phone]

 

                                RADIOLOGY       Attending Clinician Unavailable

 

                                Luigi Villasenor Attending Clinician +[phone]



 

                                Keagan Maciel Attending Clinician +

 

                                JUDSON ALEJO    Attending Clinician Unavailable

 

                                KEAGAN HARMAN  Attending Clinician Unavailable

 

                                ELIANA DUNCAN Attending Clinician Unavailab

ELIANA Steinberg Attending Clinician Unavailab

Judson Dennis MD Attending Clinician [phone]

[phone]

 

                                DEBI ROBLERO   Attending Clinician Unavailable

 

                                KORTNEY CAREY Attending Clinician Unavailable

 

                                Kortney Quick Attending Clinician +[phone]



 

                                [facility] Attending Clinicia

n Unavailable

 

                                Brain Escamilla MD Attending Clinicia

n [phone]

 

                                Britton PRUITT, Apryl Attending Clinician +[phone] 

 

                                APRYL LOPEZ Attending Clinician Unavailable

 

                                Nurse, [facility]  Attending Clinician Unavailable

 

                                NELLIE WILDE Attending Clinician Unavailable

 

                                Anita PRUITT, James Attending Clinician [phone]

[phone]

 

                                Yordy PRUITT, Nellie Gonzales Attending Clinician +[phone] 

 

                                Imtiaz Bill MD Attending Clinician [phone]

 

                                IMTIAZ BILL Attending Clinician Unavail

Kathrine Pang MD Attending Clinician +

[phone]

 

                                KATHRINE MCCLOUD Attending Clinician Mili bailey

 

                                Nurse, [facility] Immunization Attending Clinician 

Unavailable

 

                                Christiano Loredo DO Attending Clinician +

[phone]

 

                                CHRISTIANO LOREDO Attending Clinician Unavail

sujit Maldonado MD, Chris LLOYD Attending Clinician [phone]

 

                                Only, [facility]  Attending Clinician Unavailable

 

                                Lab, [facility] Pob I Attending Clinician Unavailab

Ashley Sage Attending Clinician [phone]

[phone]

 

                                CORRY ROBERTO Attending Clinician Unavailable

 

                                CORINA ALEJO  Admitting Clinician Unavailable

 

                                CHRIS MALDONADO Admitting Clinician Unavailab

BILLY Little Admitting Clinician Unavailable

 

                                KATHRINE MCCLOUD Admitting Clinician Mili Maldonado MD, Chris LLOYD Admitting Clinician [phone]







Payers





                    Payer Name Policy Type Policy Number Effective Date Expirati

on Date Source

 

                                                    [facility]                                     [id number]           2022 

00:00:00                                            

 

                          [employer] O                [id number]    2021 

00:00:00                                            

 

                                                    [employer] 

[employer]                      [id number]           2020 

00:00:00                                            







Problems





                                                    Condition 

Name                                    Condition 

Details                                 Condition 

Category                  Status                    Onset 

Date                                    Resolution 

Date                                    Last 

Treatment 

Date                                    Treating 

Clinician                 Comments                  Source

 

                                                    Encounter 

for 

screening 

colonoscop

y                                       Encounter 

for 

screening 

colonoscop

y                   Disease             Active              

5-20 

00:00:

00                                                               [facility]

 

                                                    Metabolic 

syndrome                                Metabolic 

syndrome            Disease             Active              

2-22 

00:00:

00                                                               [facility]

 

                                                    Impaired 

mobility                                Impaired 

mobility            Disease             Active              2023

1-15 

00:00:

00                                                               [facility]

[facility]

 

                                                    Noncomplia

nce with 

treatment 

plan                                    Noncomplia

nce with 

treatment 

plan                Disease             Active              0

9-19 

00:00:

00                                                               [facility]

[facility]

 

                                                    Primary 

osteoarthr

itis of 

both knees                              Primary 

osteoarthr

itis of 

both knees          Disease             Active              0

9-13 

00:00:

00                                                               [facility]

[facility]

 

                                                    Recurrent 

falls                                   Recurrent 

falls               Disease             Active              0

9- 

00:00:

00                                                               [facility]

[facility]

 

                                                    Sciatica 

of left 

side                                    Sciatica 

of left 

side                Disease             Active              0

9-06 

00:00:

00                                                               [facility]

[facility]

 

                                                    Injury of 

back, 

sequela                                 Injury of 

back, 

sequela             Disease             Active              0

9- 

00:00:

00                                                               [facility]

[facility]

 

                                                    Fall, 

subsequent 

encounter                               Fall, 

subsequent 

encounter           Disease             Active              0

9- 

00:00:

00                                                               [facility]

[facility]

 

                                                    Sciatica 

of left 

side                                    Sciatica 

of left 

side                Disease             Active              0

9- 

00:00:

00                                                               [facility]

[facility]

 

                                                    Contusion 

of left 

thigh, 

initial 

encounter                               Contusion 

of left 

thigh, 

initial 

encounter           Disease             Active              0

9- 

00:00:

00                                                               [facility]

[facility]

 

                                                    Gait 

instabilit

y                                       Gait 

instabilit

y                   Disease             Active              0

9-06 

00:00:

00                                                               [facility]

[facility]

 

                      Liver mass Liver mass Disease    Active     0

8-24 

00:00:

00                                                               [facility]

[facility]

 

                                                    Bilateral 

renal 

cysts                                   Bilateral 

renal 

cysts               Disease             Active              0

8-24 

00:00:

00                                                               [facility]

[facility]

 

                                                    Adrenal 

adenoma, 

right                                   Adrenal 

adenoma, 

right               Disease             Active              0

8-24 

00:00:

00                                                               [facility]

[facility]

 

                                                    Dyslipidem

ia                                      Dyslipidem

ia                  Disease             Active              0

8-04 

00:00:

00                                                               [facility]

[facility]

 

                                                    Elevated 

liver 

enzymes                                 Elevated 

liver 

enzymes             Disease             Active              0

7-07 

00:00:

00                                                               [facility]

[facility]

 

                                                    Right hand 

pain                                    Right hand 

pain                Disease             Active              0

7-07 

00:00:

00                                                               [facility]

[facility]

 

                                                    Complex 

regional 

pain 

syndrome 

type 1 

affecting 

right hand                              Complex 

regional 

pain 

syndrome 

type 1 

affecting 

right hand          Disease             Active              0

7-07 

00:00:

00                                                               [facility]

[facility]

 

                                                    Chronic 

pain of 

both knees                              Chronic 

pain of 

both knees          Disease             Active              0

6-29 

00:00:

00                                                               [facility]

 

                                                    Chronic 

pain of 

both lower 

extremitie

s                                       Chronic 

pain of 

both lower 

extremitie

s                   Disease             Active              0

6-29 

00:00:

00                                                               [facility]

 

                                                    Idiopathic 

chronic 

gout 

without 

tophus, 

unspecifie

d site                                  Idiopathic 

chronic 

gout 

without 

tophus, 

unspecifie

d site              Disease             Active              0

4-18 

00:00:

00                                                               [facility]

 

                                                    Abdominal 

gas pain                                Abdominal 

gas pain            Disease             Active              0

8-05 

00:00:

00                                                               [facility]

 

                                                    Lumbar 

radiculopa

thy, 

chronic                                 Lumbar 

radiculopa

thy, 

chronic             Disease             Active              0

8-05 

00:00:

00                                                               [facility]

 

                                                    Arthritis, 

multiple 

joint 

involvemen

t                                       Arthritis, 

multiple 

joint 

involvemen

t                   Disease             Active              0

8-05 

00:00:

00                                                               [facility]

 

                                                    Senile 

osteoporos

is                                      Senile 

osteoporos

is                  Disease             Active              

8-05 

00:00:

00                                                               [facility]

 

                                                    Chronic 

midline 

thoracic 

back pain                               Chronic 

midline 

thoracic 

back pain           Disease             Active              0

5-05 

00:00:

00                                                               [facility]

 

                                                    Chronic 

midline 

low back 

pain with 

bilateral 

sciatica                                Chronic 

midline 

low back 

pain with 

bilateral 

sciatica            Disease             Active              0

5-05 

00:00:

00                                                               [facility]

 

                                                    Medicare 

annual 

wellness 

visit, 

subsequent                              Medicare 

annual 

wellness 

visit, 

subsequent          Disease             Active              0

5-05 

00:00:

00                                                               [facility]

 

                                                    Hepatic 

hemangioma                              Hepatic 

hemangioma          Disease             Active              0

5-05 

00:00:

00                                                               [facility]

 

                                                    Prediabete

s                                       Prediabete

s                   Disease             Active              0

5-05 

00:00:

00                                                               [facility]

 

                                                    BMI 

37.0-37.9, 

adult                                   BMI 

37.0-37.9, 

adult               Disease             Active              0

5-05 

00:00:

00                                                               [facility]

 

                                                    Need for 

hepatitis 

C 

screening 

test                                    Need for 

hepatitis 

C 

screening 

test                Disease             Active              0

5-05 

00:00:

00                                                               [facility]

 

                                                    Stage 3a 

chronic 

kidney 

disease                                 Stage 3a 

chronic 

kidney 

disease             Disease             Active              0

5-05 

00:00:

00                                                               [facility]

 

                                                    Edema of 

both lower 

extremitie

s                                       Edema of 

both lower 

extremitie

s                   Disease             Active              

5-05 

00:00:

00                                                               [facility]

 

                                                    Spasm of 

paraspinal 

muscle                                  Spasm of 

paraspinal 

muscle              Disease             Active              

5-05 

00:00:

00                                                               [facility]

 

                                                    Chronic 

kidney 

disease-mi

neral and 

bone 

disorder                                Chronic 

kidney 

disease-mi

neral and 

bone 

disorder            Disease             Active              2021

2-30 

00:00:

00                                                               [facility]

 

                                                    Secondary 

hyperparat

hyroidism                               Secondary 

hyperparat

hyroidism           Disease             Active              

3-08 

00:00:

00                                                               [facility]

 

                                                    Insomnia 

due to 

medical 

condition                               Insomnia 

due to 

medical 

condition           Disease             Active              

6- 

00:00:

00                                                               [facility]

 

                                                    Anemia of 

chronic 

disease                                 Anemia of 

chronic 

disease             Disease             Active              2019 

00:00:

00                                                               [facility]

 

                                                    Chronic 

diastolic 

heart 

failure                                 Chronic 

diastolic 

heart 

failure             Disease             Active              2019 

00:00:

00                                                               [facility]

 

                                                    Chronic 

kidney 

disease, 

stage 4 

(severe)                                Chronic 

kidney 

disease, 

stage 4 

(severe)            Disease             Active              2019 

00:00:

00                                                              Overview: 

Formattin

g of this 

note 

might be 

different 

from the 

original.

Formattin

g of this 

note 

might be 

different 

from the 

original.

Update 

for 

Diagnosis 

Load                                    [facility]

 

                                                    Chronic 

metabolic 

acidosis                                Chronic 

metabolic 

acidosis            Disease             Active              2019 

00:00:

00                                                               [facility]

 

                                                    Inflammato

ry 

neuropathy                              Inflammato

ry 

neuropathy          Disease             Active              2019 

00:00:

00                                                               [facility]

 

                                                    Iron 

deficiency                              Iron 

deficiency          Disease             Active              2019 

00:00:

00                                                               [facility]

 

                                                    Mixed 

hyperlipid

emia                                    Mixed 

hyperlipid

emia                Disease             Active              2019 

00:00:

00                                                               [facility]

 

                                                    Nephrogeno

us 

proteinuri

a                                       Nephrogeno

us 

proteinuri

a                   Disease             Active              2019

0 

00:00:

00                                                               [facility]

 

                                                    Vitamin D3 

deficiency                              Vitamin D3 

deficiency          Disease             Active              2019 

00:00:

00                                                               [facility]

 

                                                    CHAVO on 

CPAP                                    CHAVO on 

CPAP                Disease             Active              2016 

00:00:

00                                                               [facility]

 

                      Asthma     Asthma     Disease    Active     2016

1-14 

00:00:

00                                                               [facility]

 

                                                    Essential 

hypertensi

on                                      Essential 

hypertensi

on                  Disease             Active              2015

0-05 

00:00:

00                                                               [facility]

 

                                                    Biceps 

tendinopat

hy                                      Biceps 

tendinopat

hy                  Disease             Active              2012

2- 

00:00:

00                                                              Overview: 

Formattin

g of this 

note 

might be 

different 

from the 

original.

Formattin

g of this 

note 

might be 

different 

from the 

original.

left                                    [facility]

 

                                                    Chronic 

pain 

syndrome                                Chronic 

pain 

syndrome Problem                                                 [facility]

 

                                        [id number]            Sacroiliit

is      Problem                                                 [facility]

 

                                                    Benign 

hypertensi

ve heart 

disease 

with 

chronic 

kidney 

disease                                 Benign 

hypertensi

ve heart 

disease 

with 

chronic 

kidney 

disease                   Disease                   Resolve

d                                       

5-05 

00:00:

00                                      2023-10-12 

00:00:00                                2023-10-12 

13:13:25                                                    [facility]

 

                                                    Oral 

thrush                                  Oral 

thrush                    Disease                   Resolve

d                                       

6-29 

00:00:

00                                      2023 

00:00:00                                2023 

15:35:49                                                    [facility]







Allergies, Adverse Reactions, Alerts





                                                    Allergy 

Name                                    Allergy 

Type            Status          Severity        Reaction(s)     Onset 

Date                                    Inactive 

Date                                    Treating 

Clinician                 Comments                  Source

 

                                                    IODINATE

D 

CONTRAST 

MEDIA                                   Drug 

Class           Active                          Other-Cmnt      2021-0

3-08 

00:00:

00                                                              [facility]

 

                                                    Iodinate

d 

Contrast 

Media                                   Drug 

Allergy             Active                                  Other - See 

comments                                2021-0

3-08 

00:00:

00                                                              [facility]

 

                                                    ERYTHROM

YCIN         DRUG         Active                    Other-Cmnt   2019 

00:00:

00                                                              [facility]

 

                                                    Erythrom

ycin                                    Drug 

Allergy             Active                                  Other - See 

comments                                2019 

00:00:

00                                                              [facility]

 

                                                    AZITHROM

YCIN                                    DRUG 

INGREDI         Active                          Other-Cmnt      2018 

00:00:

00                                                              [facility]

 

                                                    Azithrom

ycin                                    Propensi

ty to 

adverse 

reaction

s                   Active                                  Other - See 

comments                                2018 

00:00:

00                                                          Tingling 

of hands 

and 

fingers                                 [facility]

 

                                                    AMOXICIL

DINORA-POT 

CLAVULAN

ATE          DRUG         Active                    Other-Cmnt   201718 

00:00:

00                                                              [facility]

 

                                        CEFIXIME            DRUG 

INGREDI         Active          Low             Anxiety         2017 

00:00:

00                                                              [facility]

 

                                                    Amoxicil

dinora-Pot 

Clavulan

ate                                     Propensi

ty to 

adverse 

reaction

s                   Active                                  Other - See 

comments                                2017 

00:00:

00                                                          Severe 

headache                                [facility]

 

                                        Cefixime            Propensi

ty to 

adverse 

reaction

s               Active                          Anxiety         2017 

00:00:

00                                                              [facility]

 

                                        CODEINE             DRUG 

INGREDI         Active                          Hallucinates    2015

0 

00:00:

00                                                              [facility]

 

                                                    TETRACYC

LINE                                    DRUG 

INGREDI         Active                          N/V             2015

0 

00:00:

00                                                              [facility]

 

                                        Codeine             Propensi

ty to 

adverse 

reaction

s                   Active                                  Hallucinatio

ns                                      2015

0 

00:00:

00                                                              [facility]

 

                                                    Tetracyc

line                                    Propensi

ty to 

adverse 

reaction

s                   Active                                  Nausea 

and/or 

Vomiting                                2015

0 

00:00:

00                                                              [facility]

 

                                        CODEINE             DRUG 

INGREDI         Active                          Hallucinates    2012 

00:00:

00                                                              [facility]

 

                                        Codeine             Propensi

ty to 

adverse 

reaction

s                   Active                                  Other - See 

comments                                2012 

00:00:

00                                                              [facility]

 

            codeine codeine Active       Unknown                         [facility]

 

                                                    tetracai

ne                                      tetracai

ne      Active          Unknown                                 [facility]

 

                                                    [id number]

85                                      Drug 

allergy Active          Unknown                                 [facility]







Social History





                    Social Habit Start Date Stop Date Quantity  Comments  Source

 

                                                    Sex Assigned At 

Birth                                                            [facility]

 

                                                    History of Tobacco 

Use                                                              [facility]

 

                    Gender identity                                         [facility]

[facility]

 

                    [facility]ual [facility]                                         [facility]

 

                                                    Alcoholic beverage 

intake                                  2024 

00:00:00                                2024 

00:00:00            0 /d                                    [facility]

 

                                                    History of Social 

function                                2024 

00:00:00                                2024 

00:00:00                                                    [facility]

 

                                        Alcohol intake      2024 

00:00:00                                2024 

00:00:00            0 /d                                    [facility]

 

                                                    Exposure to 

SARS-CoV-2 (event)                      2023 

00:00:00                                2023 

14:41:00            Not sure                                [facility]

 

                                                    Tobacco use and 

exposure                                2022 

00:00:00                                2022 

00:00:00                                Smokeless 

tobacco non-user                                    [facility]







                          Smoking Status Start Date   Stop Date    Source

 

                          Never smoked tobacco                           [facility]







Medications





                                                    Ordered 

Medication 

Name                                    Filled 

Medication 

Name                                    Start 

Date                                    Stop 

Date                                    Current 

Medication?                             Ordering 

Clinician       Indication      Dosage          Frequency       Signature 

(SIG)               Comments            Components          Source

 

                                                    traMADol 

HCl 50 MG                               traMADol 

HCl 50 MG                                

00:00:

00                        No                                     1{table

t_as_ne

eded}                     QD                        traMADol 

HCl 50 MG                                                   

 

                                                    ofloxacin 

0.3 % 

ophthalmic 

solution                                             

00:00:

00                                       

04:59

:00                 Yes                                     [id number]

[id number]              1[drp]                                  Place 1 

Drop in 

both eyes 

4 (four) 

times 

daily for 

7 days.                                                     [facility]

 

                                                    tiZANidine 

2 mg tablet                                          

00:00:

00                  Yes                 [id number] 2mg                 Take 1 

tablet by 

mouth 

every 6 

(six) 

hours as 

needed for 

Pain 

(scale 

7-10) 

(muscle 

spasm).                                                     [facility]

 

                                                    HYDROcodone

-Acetaminop

hen 7.5-325 

MG                                      HYDROcodone

-Acetaminop

hen 7.5-325 

MG                                       

00:00:

00                        No                                     1{table

t_as_ne

eded}                     QD                        HYDROcodon

e-Acetamin

ophen 

7.5-325 MG                                                  

 

                                                    ciprofloxac

in HCl 500 

mg tablet                                            

00:00:

00                                       

00:00

:00        No                    [id number]   500mg                 Take 1 

tablet by 

mouth in 

the 

morning.                                                    [facility]

 

                                                    ergocalcife

rol, 

vitamin d2, 

1,250 mcg 

(50,000 

unit) 

capsule                                              

00:00:

00                  Yes                 [id number]  53230N              Take 1 

capsule by 

mouth 

weekly.                                                     [facility]

 

                                                    diclofenac 

dodium 

(VOLTAREN) 

1 % gel                                              

00:00:

00                  Yes                 [id number]  4g                  Apply 4 g 

to area(s) 

4 (four) 

times 

daily as 

needed for 

Pain. 

Apply 4 g 

qidApply 

to area(s)                                                  [facility]

 

                                                    water for 

injection, 

sterile 

injection 

10 mL                                                

00:00:

00                                       

22:55

:00        No                    [id number]  10mL                  10 mL, 

Intravenou

s, ONCE, 1 

dose, On 

24 at 

1900, 

Routine                                                     [facility]

 

                                                    lidocaine 

PF 2% 

(XYLOCAINE-

MPF) 

injection 

20 mL                                                

23:45:

00                                       

22:53

:00        No                    [id number]  20mL                  20 mL, 

Injection, 

ONCE NOW, 

1 dose, On 

24 at 

1845, 

Routine                                                     [facility]

 

                                                    sodium 

chloride 

(NS) 

injection 

10 mL                                                

23:45:

00                                       

22:55

:07     No              [id number] 10mL                                    [facility]

s

[facility]

 

                                                    bacteriosta

tic saline 

0.9 % 

injection 

10 mL                                                

23:45:

00                                       

19:00

:00        No                    [id number]  10mL                  10 mL, 

Injection, 

ONCE, 1 

dose, On 

24 at 

1845, 

Routine                                                     [facility]

 

                                                    clostridium 

botulinum 

toxin 

(BOTOX) 

injection 

100 Units                                            

22:45:

00                                       

21:46

:00        No                    [id number]   100U                  100 Units, 

Intramuscu

lar, ONCE, 

1 dose, On 

24 at 

1745, 

Routine, 

Faculty 

member 

approving 

Restricted 

medication

: KUSHAL HOLLINGSWORTH                                                      [facility]

 

                                                    lidocaine 

(XYLOCAINE) 

2 % jelly 

URO-JET 20 

mL                                                   

22:45:

00                                       

21:55

:00        No                    [id number]   20mL                  20 mL, 

Urethral, 

ONCE, 1 

dose, On 

24 at 

1745, 

Routine                                                     [facility]

 

                                                    peg-electro

lyte soln 

(GOLYTELY) 

236-22.74-6

.74 -5.86 

gram 

solution 

4,000 mL                                             

22:45:

00                                       

10:44

:00     No                      4000mL                                  [facility]

 

                                                    methylPREDN

ISolone 

(MEDROL, 

AGA,) 4 mg 

tablets                                              

00:00:

00                              Yes                             [id number]

9100                                                        Take by 

mouth 

SEE-INSTRU

CTIONS. 

follow 

package 

directions                                                  [facility]

 

                                                    cephALEXin 

500 mg 

capsule                                              

00:00:

00                                       

00:00

:00        No                    [id number]  500mg                 Take 1 

capsule by 

mouth at 

bedtime 

for 6 

days. 3 

days prior 

to 

procedure 

and 3 days 

after                                                       [facility]

 

                                                    cyclobenzap

rine 5 mg 

tablet                                              2024-0

3-27 

14:41:

12                                       

00:00

:00        No                               5mg                   Take 1 

tablet by 

mouth in 

the 

morning 

and 1 

tablet at 

noon and 1 

tablet in 

the 

evening.                                                    [facility]

 

                                                    KCL 20 mEq 

tablet                                              2024-0

3-27 

14:39:

38                                       

00:00

:00        No                               20meq                 Take 1 

tablet by 

mouth in 

the 

morning.                                                    [facility]

 

                                                    valsartan 

160 mg 

tablet                                              2024-0

3-27 

14:38:

45                                       

00:00

:00        No                               160mg                 Take 1 

tablet by 

mouth in 

the 

morning.                                                    [facility]

 

                                                    chlorthalid

one 25 mg 

tablet                                              2024-0

3-27 

14:36:

20                                       

00:00

:00        No                               25mg                  Take 1 

tablet by 

mouth in 

the 

morning.                                                    [facility]

 

                                                    furosemide 

80 mg 

tablet                                              2024-0

3-27 

14:36:

17                                       

00:00

:00        No                               80mg                  Take 1 

tablet by 

mouth 

every 

morning.                                                    [facility]

 

                                                    spironolact

one 50 mg 

tablet                                              2024-0

3-27 

14:36:

14                                       

00:00

:00        No                               50mg                  Take 1 

tablet by 

mouth in 

the 

morning.                                                    [facility]

 

                                                    tiZANidine 

2 mg tablet                                         2024-0

3-27 

00:00:

00                                       

00:00

:00        No                    [id number]  2mg                   Take 1 

tablet by 

mouth 

every 6 

(six) 

hours as 

needed for 

Pain 

(scale 

7-10) 

(muscle 

spasm).                                                     [facility]

 

                                                    ALLOPURINOL 

100 mg 

tablet                                              2024-0

3-27 

00:00:

00                                       

00:00

:00        No                    [id number]  100mg                 TAKE 1 

TABLET BY 

MOUTH 

EVERY 

MORNING                                                     [facility]

 

                                                    valsartan 

160 mg 

tablet                                              2024-0

3-25 

12:02:

41                  Yes                           160mg               Take 1 

tablet by 

mouth in 

the 

morning.                                                    [facility]

 

                                                    spironolact

one 50 mg 

tablet                                              2024-0

3-25 

12:02:

41                  Yes                           50mg                Take 1 

tablet by 

mouth in 

the 

morning.                                                    [facility]

 

                                                    KCL 20 mEq 

tablet                                              2024-0

3-25 

12:02:

41                  Yes                           20meq               Take 1 

tablet by 

mouth in 

the 

morning.                                                    [facility]

 

                                                    chlorthalid

one 25 mg 

tablet                                              2024-0

3-25 

12:02:

41                  Yes                           25mg                Take 1 

tablet by 

mouth in 

the 

morning.                                                    [facility]

 

                                                    cyclobenzap

rine 5 mg 

tablet                                               

12:12:

08                  Yes                           5mg                 Take 1 

tablet by 

mouth in 

the 

morning 

and 1 

tablet at 

noon and 1 

tablet in 

the 

evening.                                                    [facility]

 

                                                    Trospium 60 

mg capsule                                           

00:00:

00                                       

00:00

:00        No                    [id number]   60mg                  Take 1 

capsule by 

mouth in 

the 

morning.                                                    [facility]

 

                                                    Nitrofurant

oin&Nit. 

Macrocryst 

(MACROBID) 

100 mg 

capsule                                             2023 

00:00:

00                                       

05:59

:00        No                    [id number]  100mg                 Take 1 

capsule by 

mouth in 

the 

morning 

and 1 

capsule in 

the 

evening. 

Do all 

this for 5 

days.                                                       [facility]

 

                                                    traMADoL 50 

mg tablet                                           2023 

14:28:

58                                       

00:00

:00        No                               50mg                  Take 1 

tablet by 

mouth 

every 6 

(six) 

hours as 

needed.                                                     [facility]

 

                                                    methocarbam

oL 500 mg 

tablet                                              2023 

00:00:

00                                       

00:00

:00        No                    [id number]  500mg                 Take 1 

tablet by 

mouth 4 

(four) 

times 

daily.                                                      [facility]

 

                                                    tiZANidine 

2 mg tablet                                         2023 

00:00:

00                                       

00:00

:00        No                               2mg                   Take 1 

tablet by 

mouth 

every 6 

(six) 

hours as 

needed.                                                     [facility]

 

                                                    traMADoL 50 

mg tablet                                           2023 

13:45:

14                  Yes                           50mg                Take 1 

tablet by 

mouth 

every 6 

(six) 

hours as 

needed.                                                     [facility]

 

                                                    furosemide 

80 mg 

tablet                                              2023 

13:15:

48                  Yes                           80mg                Take 1 

tablet by 

mouth 

every 

morning.                                                    [facility]

 

                                                    allopurinoL 

100 mg 

tablet                                              2023 

00:00:

00                                       

00:00

:00        No                    [id number]  100mg                 Take 1 

tablet by 

mouth 

every 

morning.                                                    [facility]

 

                                                    furosemide 

80 mg 

tablet                                              2023 

08:46:

35                  Yes                           80mg                Take 1 

tablet by 

mouth 

every 

morning.                                                    [facility]

 

                                                    lidocaine-p

rilocaine 

2.5-2.5 % 

cream                                                

00:00:

00                  Yes                                               Apply 2 g 

to area(s) 

2 (two) 

times 

daily as 

needed for 

Pain 

(scale 

4-6)                                                        [facility]

 

                                                    lidocaine 

1.8 % PtMd                                           

00:00:

00                                      2023-

10-12 

00:00

:00             No                              [id number]        1{patch

}                                                   Apply 1 

Patch to 

area(s) 

every 48 

(forty-eig

ht) hours.                                                  [facility]

 

                                                    Walker 

(ULTRA-LIGH

T ROLLATOR) 

Misc                                                 

00:00:

00                  Yes                 [id number]                      R55/W19.XX

XS/M79.604

/R06.02/I5

0.32/I51.7

/E11.3511: 

Use daily 

for 

ambulation

/fall 

precaution 

(brand 

pending 

insurance 

approval)                                                   [facility]

 

                                                    methocarbam

oL 500 mg 

tablet                                               

00:00:

00                                       

00:00

:00        No                    [id number]   500mg                 Take 1 

tablet by 

mouth 4 

(four) 

times 

daily.                                                      [facility]

 

                                                    Capsaicin 

0.15 % Liqd                                          

00:00:

00                                      2023-

10-12 

00:00

:00                 No                                      [id number]

[id number]                                                      Apply to 

area(s) 2 

(two) 

times 

daily as 

needed for 

Pain 

(scale 

4-6).                                                       [facility]

 

                                                    methocarbam

oL 500 mg 

tablet                                               

00:00:

00                                       

00:00

:00                 No                                      [id number]

[id number]              500mg                                   Take 1 

tablet by 

mouth 3 

(three) 

times 

daily as 

needed 

(Chronic 

Pain and 

muscle 

spasms).                                                    [facility]

 

                                                    cyclobenzap

rine 5 mg 

tablet                                               

00:00:

00                                       

00:00

:00        No                    [id number]   5mg                   Take 1 

tablet by 

mouth in 

the 

morning 

and 1 

tablet at 

noon and 1 

tablet in 

the 

evening.                                                    [facility]

 

                                                    tens unit 

electrodes 

(TENS UNITS 

ELECTRODES) 

2X2 " Pads                                           

00:00:

00                                      2023-

10-12 

00:00

:00        No                    [id number]                        Use as 

directed                                                    [facility]

 

                                                    TENS unit 

and 

electrodes 

Cmpk                                                 

00:00:

00                                      2023-

10-12 

00:00

:00        No                    [id number]  1U                    Take 1 

Units in 

the 

morning. 

M54.41. 

use daily 

on 

affected 

areas. 

Brand as 

covered by 

insurance                                                   [facility]

 

                                                    tiZANidine 

2 mg tablet                                          

00:00:

00                                       

00:00

:00     No                                                              [facility]

 

                                                    traMADoL 50 

mg tablet                                            

00:00:

00                                      2023-

10-12 

00:00

:00        No                                                     Take 1 

tablet (50 

mg total) 

by mouth 

every 6 

(six) 

hours if 

needed for 

moderate 

pain                                                        [facility]

 

                                                    lidocaine-p

rilocaine 

5-2.5-2.5 % 

PaCr                                                 

00:00:

00                  Yes                 [id number] 2g                  Apply 2 g 

to area(s) 

2 (two) 

times 

daily as 

needed for 

Pain 

(scale 

4-6).                                                       [facility]

 

                                                    Diclofenac 

Sodium 

(VOLTAREN) 

1 % gel                                              

00:00:

00                                       

00:00

:00        No                    [id number]                         Apply to 

area(s) 4 

(four) 

times 

daily. 

Apply 4 g 

qid                                                         [facility]

 

                                                    lidocaine-p

rilocaine 

5-2.5-2.5 % 

PaCr                                                 

00:00:

00                                      2023-

10-09 

00:00

:00        No                    [id number]  2g                    Apply 2 g 

to area(s) 

2 (two) 

times 

daily as 

needed for 

Pain 

(scale 

4-6).                                                       [facility]

 

                                                    TENS unit 

and 

electrodes 

Cmpk                                                 

00:00:

00                                       

00:00

:00        No                    [id number]  1U                    Take 1 

Units in 

the 

morning. 

M54.41. 

use daily 

on 

affected 

areas. 

Brand as 

covered by 

insurance                                                   [facility]

 

                                                    methocarbam

oL 500 mg 

tablet                                               

00:00:

00                                       

00:00

:00        No                    [id number]   500mg                 Take 1 

tablet by 

mouth 4 

(four) 

times 

daily.                                                      [facility]

 

                                                    nystatin/ma

alox/diphen

hydrAMINE/l

idocaine 2 

% viscous 

1:1:1:1 

Susp 

suspension                                           

00:00:

00                                       

00:00

:00        No                    [id number]   5mL                   Take 5 mL 

by mouth 4 

(four) 

times 

daily as 

needed 

(Sore 

throat). 

Gargle and 

spit, do 

not 

swallow                                                     [facility]

 

                                                    TENS unit 

and 

electrodes 

[facility]                                                 

00:00:

00                                       

00:00

:00        No                    [id number]  1U                    Take 1 

Units in 

the 

morning. 

M54.41. 

use daily 

on 

affected 

areas. 

Brand as 

covered by 

insurance                                                   [facility]

 

                                                    tiZANidine 

2 mg tablet                                          

00:00:

00                                      2023-

10-12 

00:00

:00        No                               2mg                   Take 1 

tablet by 

mouth in 

the 

morning 

and 1 

tablet in 

the 

evening.                                                    [facility]

 

                                                    traMADoL 50 

mg tablet                                            

00:00:

00                                       

00:00

:00        No                    [id number]       50mg                  Take 1 

tablet by 

mouth 

every 4 

(four) 

hours as 

needed for 

Pain 

(scale 

7-10). 

Indication

s: acute 

pain                                                        [facility]

 

                                                    vibegron 

(GEMTESA) 

75 mg Tab                                            

00:00:

00                                       

00:00

:00        No                    [id number]   75mg                  Take 75 mg 

by mouth 

in the 

morning.                                                    [facility]

 

                                                    Trospium 60 

mg capsule                                           

00:00:

00                                      2023-

10-12 

00:00

:00        No                    [id number]   60mg                  Take 1 

capsule by 

mouth in 

the 

morning.                                                    [facility]

 

                                                    Trospium 60 

mg capsule                                          

6-16 

00:00:

00                                       

00:00

:00        No                    [id number]   60mg                  Take 1 

capsule by 

mouth in 

the 

morning.                                                    [facility]

 

                                                    methylPREDN

ISolone 

(MEDROL, 

AGA,) 4 mg 

tablets                                              

00:00:

00                                       

00:00

:00        No                    [id number]  84mg                  Take 21 

tablets by 

mouth 

SEE-INSTRU

CTIONS. 

follow 

package 

directions                                                  [facility]

 

                                                    diclofenac 

75 mg EC 

tablet                                               

00:00:

00                                       

00:00

:00        No                    [id number]  75mg                  Take 1 

tablet by 

mouth in 

the 

morning 

and 1 

tablet in 

the 

evening. 

Take with 

meals. Do 

all this 

for 30 

days.                                                       [facility]

 

                                                    ergocalcife

rol, 

vitamin d2, 

1,250 mcg 

(50,000 

unit) 

capsule                                              

00:00:

00                                       

00:00

:00        No                    [id number]   [id number]                Take 1 

capsule by 

mouth 

weekly.                                                     [facility]

 

                                                    hydrocortis

one 

(PROCTOSOL 

HC) 2.5 % 

rectal 

cream                                                

00:00:

00                  Yes                 [id number]                      Insert 

into 

rectum 2 

(two) 

times 

daily.                                                      [facility]

 

                                                    aspirin 81 

mg EC 

tablet                                              18 

16:47:

15                  Yes                 [id number]  81mg                Take 1 

tablet by 

mouth in 

the 

morning.                                                    [facility]

 

                                                    budesonide/

formoterol 

fumarate 

(BUDESONIDE

-FORMOTEROL 

INHALE)                                             18 

16:47:

15                  Yes                                               Inhale 2 

(two) 

times 

daily.                                                      [facility]

 

                                                    Diclofenac 

Sodium 

(VOLTAREN) 

1 % gel                                             18 

00:00:

00                                       

00:00

:00        No                    [id number]                        Apply to 

area(s) 4 

(four) 

times 

daily. 

Apply 4 g 

qid                                                         [facility]

 

                                                    tiZANidine 

2 mg tablet                                         18 

00:00:

00                                       

00:00

:00        No                    [id number]  1mg                   Take 0.5 

tablets by 

mouth 

every 8 

(eight) 

hours as 

needed 

(muscle 

spasms).                                                    [facility]

 

                                                    furosemide 

20 mg 

tablet                                               

00:00:

00                                       

00:00

:00                 No                                      [id number]

[id number]                20mg                                    Take 1 

tablet by 

mouth in 

the 

morning.                                                    [facility]

 

                                                    hydrALAZINE 

25 mg 

tablet                                              2023-0

3-10 

00:00:

00                  Yes                 [id number]  25mg                Take 1 

tablet by 

mouth in 

the 

morning 

and 1 

tablet at 

noon and 1 

tablet in 

the 

evening.                                                    [facility]

 

                                                    vibegron 

(GEMTESA) 

75 mg Tab                                           2023-0

3-03 

00:00:

00                                       

00:00

:00        No                    [id number]   75mg                  Take 75 mg 

by mouth 

in the 

morning.                                                    [facility]

 

                                                    hydrALAZINE 

25 mg 

tablet                                              2023-0

3-02 

16:19:

43                                       

00:00

:00        No                               25mg                  Take 1 

tablet by 

mouth in 

the 

morning 

and 1 

tablet in 

the 

evening.                                                    [facility]

 

                                                    hydrALAZINE 

25 mg 

tablet                                              2023-0

3-02 

00:00:

00                                      2023-

03-10 

00:00

:00        No                    [id number]   25mg                  Take 1 

tablet by 

mouth in 

the 

morning 

and 1 

tablet at 

noon and 1 

tablet in 

the 

evening.                                                    [facility]

 

                                                    vibegron 

(GEMTESA) 

75 mg Tab                                           

1- 

00:00:

00                                       

00:00

:00        No                    [id number]   75mg                  Take 75 mg 

by mouth 

in the 

morning.                                                    [facility]

 

                                                    estradioL 

(ESTRACE) 

0.01 % (0.1 

mg/gram) 

vaginal 

cream                                                

00:00:

00                  Yes                 [id number]                      Apply 1g 

vaginally 

at bedtime 

every 

night for 

2 weeks 

and then 

2-3 times 

per week                                                    [facility]

 

                                                    Iron-Vitami

n C 100-250 

mg Tab                                              

1-05 

00:00:

00                  Yes                           1{tbl}              Take 1 

tablet by 

mouth in 

the 

morning.                                                    [facility]

 

                                                    estradioL 

(ESTRACE) 

0.01 % (0.1 

mg/gram) 

vaginal 

cream                                               2022 

00:00:

00                                       

00:00

:00        No                    [id number]                         Apply 1g 

vaginally 

at bedtime 

every 

night for 

2 weeks 

and then 

apply 1g 

vaginally 

at bedtime 

2-3 times 

per week                                                    [facility]

 

                                                    estradioL 

(ESTRACE) 

0.01 % (0.1 

mg/gram) 

vaginal 

cream                                               2022 

00:00:

00                                       

00:00

:00        No                    [id number]                         Apply 1g 

vaginally 

at bedtime 

every 

night for 

2 weeks 

and then 

apply 1g 

vaginally 

at bedtime 

2-3 times 

per week                                                    [facility]

 

                                                    traMADoL 50 

mg tablet                                           2022 

00:00:

00                                       

00:00

:00        No                    [id number]       50mg                  Take 1 

tablet by 

mouth 

every 6 

(six) 

hours as 

needed.                                                     [facility]

 

                                                    methylPREDN

ISolone 

(MEDROL, 

AGA,) 4 mg 

tablets                                             2022 

00:00:

00                                       

00:00

:00        No                    [id number]  84mg                  Take 21 

tablets by 

mouth 

SEE-INSTRU

CTIONS. 

follow 

package 

directions                                                  [facility]

 

                                                    tiZANidine 

2 mg tablet                                         2022 

00:00:

00                                       

00:00

:00        No                    [id number]  1mg                   Take 0.5 

tablets by 

mouth 

every 8 

(eight) 

hours as 

needed 

(muscle 

spasms).                                                    [facility]

 

                                                    furosemide 

20 mg 

tablet                                              2022 

00:00:

00                                       

00:00

:00                 No                                      [id number]

[id number]                10mg                                    Take 0.5 

tablets by 

mouth in 

the 

morning.                                                    [facility]

 

                                                    vibegron 

(GEMTESA) 

75 mg Tab                                           2022 

00:00:

00                                       

00:00

:00        No                    [id number]   75mg                  Take 75 mg 

by mouth 

daily.                                                      [facility]

 

                                                    FUROSEMIDE 

20 mg 

tablet                                              2022 

00:00:

00                                       

00:00

:00                 No                                      [id number]

4102                                                        TAKE 1/2 

TABLET BY 

MOUTH 

DAILY                                                       [facility]

 

                                                    lidocaine-p

rilocaine 

5-2.5-2.5 % 

PaCr                                                2022

0- 

00:00:

00                                       

00:00

:00        No                    [id number]  2g                    Apply 2 g 

to area(s) 

2 (two) 

times 

daily as 

needed for 

Pain 

(scale 

4-6).                                                       [facility]

 

                                                    lidocaine-p

rilocaine 

5-2.5-2.5 % 

PaCr                                                2022 

00:00:

00                                       

00:00

:00        No                    [id number]  2g                    Apply 2 g 

to area(s) 

2 (two) 

times 

daily as 

needed for 

Pain 

(scale 

4-6).                                                       [facility]

 

                                                    ergocalcife

rol, 

vitamin d2, 

1,250 mcg 

(50,000 

unit) 

capsule                                             2022 

00:00:

00                                       

00:00

:00        No                    [id number]   [id number]                Take 1 

capsule by 

mouth 

weekly.                                                     [facility]

 

                                                    amitriptyli

ne 10 mg 

tablet                                              2022 

00:00:

00                                       

00:00

:00        No                    [id number]   10mg                  Take 1 

tablet by 

mouth at 

bedtime.                                                    [facility]

 

                                                    tuberculin 

ppd 

(TUBERSOL) 

injection 5 

Units                                               

9-14 

22:00:

00                                       

10:00

:00     No              [id number] 5U                                      [facility]

 

                                                    Simethicone 

(GAS-X) 125 

mg                                                  -05 

00:00:

00                                       

00:00

:00        No                    [id number]  125mg                 Take 1 

capsule by 

mouth 

after 

meals and 

at bedtime 

as needed 

for Gas.                                                    [facility]

 

                                                    Diclofenac 

Sodium 

(VOLTAREN) 

1 % gel                                              

00:00:

00                                       

00:00

:00                 No                                      [id number]

103                                                         Apply to 

area(s) 4 

(four) 

times 

daily. 

Apply 4 g 

qid                                                         [facility]

 

                                                    Lidocaine 5 

% cream                                             

8 

00:00:

00                                      2022-

10-17 

00:00

:00                 No                                      [id number]

103                                                         Apply to 

area(s) 2 

(two) 

times 

daily as 

needed for 

Pain 

(scale 

4-6). 

Apply 5g 

to 

affected 

areas BID 

PRN                                                         [facility]

 

                                                    acetaminoph

en 650 mg 

CR tablet                                            

00:00:

00                                       

00:00

:00        No                    [id number]  650mg                 Take 1 

tablet by 

mouth 

every 8 

(eight) 

hours as 

needed for 

Pain or 

Fever.                                                      [facility]

 

                                                    tiZANidine 

2 mg tablet                                          

00:00:

00                                       

00:00

:00        No                    [id number]  1mg                   Take 0.5 

tablets by 

mouth 

every 8 

(eight) 

hours as 

needed 

(muscle 

spasms).                                                    [facility]

 

                                                    furosemide 

(LASIX) 20 

mg tablet                                            

00:00:

00                                      2022-

10-26 

00:00

:00                 No                                      [id number]

4102                10mg                                    Take 0.5 

tablets by 

mouth 

daily.                                                      [facility]

 

                                                    hydrocortis

one 

(PROCTOSOL 

HC) 2.5 % 

rectal 

cream                                               2022-0

3-11 

00:00:

00                                       

00:00

:00        No                    [id number]                         Insert 

into 

rectum 2 

(two) 

times 

daily.                                                      [facility]

 

                                                    aspirin 

(ASPIRIN 

LOW DOSE) 

81 mg EC 

tablet                                              2021 

15:45:

28                  Yes                 [id number]  81mg                Take 81 mg

 

by mouth 

daily.                                                      [facility]

 

                                                    budesonide/

formoterol 

fumarate 

(BUDESONIDE

-FORMOTEROL 

INHALE)                                             2021 

15:45:

28                  Yes                                               Inhale 2 

(two) 

times 

daily.                                                      [facility]

 

                                                    hydrALAZINE 

25 mg 

tablet                                               

11:06:

45                  Yes                           25mg                Take 25 mg

 

by mouth 2 

(two) 

times 

daily.                                                      [facility]

 

                                                    albuterol 

90 

mcg/actuati

on inhaler                                          2018 

00:00:

00                  Yes                 [id number] 2{puff}             Inhale 2 

Puffs 

every 6 

(six) 

hours as 

needed for 

Wheezing 

or 

Shortness 

of Breath.                                                  [facility]

 

                                                    Colchicine 

0.6 MG                                  Colchicine 

0.6 MG                  No                                      Colchicine 

0.6 MG                                                      

 

                                                    predniSONE 

5 MG                                    predniSONE 

5 MG                    No                                      predniSONE 

5 MG                                                        

 

                                                    Gabapentin 

300 MG                                  Gabapentin 

300 MG                  No                                      Gabapentin 

300 MG                                                      

 

                                                    Ezetimibe 

10 MG                                   Ezetimibe 

10 MG                   No                                      Ezetimibe 

10 MG                                                       

 

                                                    Telmisartan 

20 MG                                   Telmisartan 

20 MG                   No                                      Telmisarta

n 20 MG                                                     

 

                                                    Torsemide 

20 MG                                   Torsemide 

20 MG                   No                                      Torsemide 

20 MG                                                       

 

                                                    Methocarbam

ol 500 MG                               Methocarbam

ol 500 MG                        No                               1{table

ts}                       6xD                       Methocarba

mol 500 MG                                                  

 

                                                    Allopurinol 

300 MG                                  Allopurinol 

300 MG                  No                                      Allopurino

l 300 MG                                                    

 

                                                    Carvedilol 

25 MG                                   Carvedilol 

25 MG                   No                                      Carvedilol 

25 MG                                                       

 

                                                    Trimethopri

m 100 MG                                Trimethopri

m 100 MG                 No                                      Trimethopr

im 100 MG                                                   







Immunizations





                                                    Ordered 

Immunization Name                       Filled 

Immunization Name Date            Status          Comments        Source

 

                                                    SARS-COV-2 COVID-19 

[facility] 

VACCINE 12 YRS+, 

BIVALENT 0.3ML, IM, 

(PFIZER GRAY TOP)                                   2022 

00:00:00            Completed                               [facility]

 

                                                    SARS-COV-2 COVID-19 

[facility]APARRO-SUCROSE 

VACCINE 12 YRS+, 

BIVALENT 0.3ML, IM, 

(PFIZER GRAY TOP)                                   2022 

00:00:00            Completed                               [facility]

 

                                                    SARS-COV-2 COVID-19 

[facility]ARRO-SUCROSE 

VACCINE 12 YRS+, 

BIVALENT 0.3ML, IM, 

(PFIZER GRAY TOP)                                   2022 

00:00:00            Completed                               [facility]

 

                                                    SARS-COV-2 COVID-19 

[facility]ARRO-SUCROSE 

VACCINE 12 YRS+, 

BIVALENT 0.3ML, IM, 

(PFIZER GRAY TOP)                                   2022 

00:00:00            Completed                               [facility]

 

                                                    SARS-COV-2 COVID-19 

[facility]O-SUCROSE 

VACCINE 12 YRS+, 

BIVALENT 0.3ML, IM, 

(PFIZER GRAY TOP)                                   2022 

00:00:00            Completed                               [facility]

 

                                                    SARS-COV-2 COVID-19 

[facility]O-SUCROSE 

VACCINE 12 YRS+, 

BIVALENT 0.3ML, IM, 

(PFIZER GRAY TOP)                                   2022 

00:00:00            Completed                               [facility]

 

                                                    SARS-COV-2 COVID-19 

[facility]O-SUCROSE 

VACCINE 12 YRS+, 

BIVALENT 0.3ML, IM, 

(PFIZER GRAY TOP)                                   2022 

00:00:00            Completed                               [facility]

 

                                                    SARS-COV-2 COVID-19 

[facility]O-SUCROSE 

VACCINE 12 YRS+, 

BIVALENT 0.3ML, IM, 

(PFIZER GRAY TOP)                                   2022 

00:00:00            Completed                               [facility]

 

                                                    SARS-COV-2 COVID-19 

[facility]ARRO-SUCROSE 

VACCINE 12 YRS+, 

BIVALENT 0.3ML, IM, 

(PFIZER GRAY TOP)                                   2022 

00:00:00            Completed                               [facility]

 

                                                    SARS-COV-2 COVID-19 

[facility]ARRO-SUCROSE 

VACCINE 12 YRS+, 

BIVALENT 0.3ML, IM, 

(PFIZER GRAY TOP)                                   2022 

00:00:00            Completed                               [facility]

 

                                                    SARS-COV-2 COVID-19 

[facility]O-SUCROSE 

VACCINE 12 YRS+, 

BIVALENT 0.3ML, IM, 

(PFIZER GRAY TOP)                                   2022 

00:00:00            Completed                               [facility]

 

                                                    SARS-COV-2 COVID-19 

[facility]O-SUCROSE 

VACCINE 12 YRS+, 

BIVALENT 0.3ML, IM, 

(PFIZER GRAY TOP)                                   2022 

00:00:00            Completed                               [facility]

 

                                                    SARS-COV-2 COVID-19 

[facility]O-SUCROSE 

VACCINE 12 YRS+, 

BIVALENT 0.3ML, IM, 

(PFIZER GRAY TOP)                                   2022 

00:00:00            Completed                               [facility]

 

                                                    SARS-COV-2 COVID-19 

[facility]O-SUCROSE 

VACCINE 12 YRS+, 

BIVALENT 0.3ML, IM, 

(PFIZER GRAY TOP)                                   2022 

00:00:00            Completed                               [facility]

 

                                                    SARS-COV-2 COVID-19 

[facility]O-SUCROSE 

VACCINE 12 YRS+, 

BIVALENT 0.3ML, IM, 

(PFIZER GRAY TOP)                                   2022 

00:00:00            Completed                               [facility]

 

                                                    SARS-COV-2 COVID-19 

[facility]O-SUCROSE 

VACCINE 12 YRS+, 

BIVALENT 0.3ML, IM, 

(PFIZER GRAY TOP)                                   2022 

00:00:00            Completed                               [facility]

 

                                                    SARS-COV-2 COVID-19 

[facility]O-SUCROSE 

VACCINE 12 YRS+, 

BIVALENT 0.3ML, IM, 

(PFIZER GRAY TOP)                                   2022 

00:00:00            Completed                               [facility]

 

                                                    SARS-COV-2 COVID-19 

[facility]O-SUCROSE 

VACCINE 12 YRS+, 

BIVALENT 0.3ML, IM, 

(PFIZER GRAY TOP)                                   2022 

00:00:00            Completed                               [facility]

 

                                                    SARS-COV-2 COVID-19 

[facility]O-SUCROSE 

VACCINE 12 YRS+, 

BIVALENT 0.3ML, IM, 

(PFIZER GRAY TOP 

BOOSTER)                                            2022 

00:00:00            Completed                               [facility]

 

                                                    SARS-COV-2 COVID-19 

[facility]O-SUCROSE 

VACCINE 12 YRS+, 

BIVALENT 0.3ML, IM, 

(PFIZER GRAY TOP 

BOOSTER)                                            2022 

00:00:00            Completed                               [facility]

 

                                                    SARS-COV-2 COVID-19 

[facility]ARRO-SUCROSE 

VACCINE 12 YRS+, 

BIVALENT 0.3ML, IM, 

(PFIZER GRAY TOP 

BOOSTER)                                            2022 

00:00:00            Completed                               [facility]

 

                                                    SARS-COV-2 COVID-19 

[facility]O-SUCROSE 

VACCINE 12 YRS+, 

BIVALENT 0.3ML, IM, 

(PFIZER GRAY TOP 

BOOSTER)                                            2022 

00:00:00            Completed                               [facility]

 

                                                    SARS-COV-2 COVID-19 

[facility]O-SUCROSE 

VACCINE 12 YRS+, 

BIVALENT 0.3ML, IM, 

(PFIZER GRAY TOP 

BOOSTER)                                            2022 

00:00:00            Completed                               [facility]

 

                                                    SARS-COV-2 COVID-19 

[facility]O-SUCROSE 

VACCINE 12 YRS+, 

BIVALENT 0.3ML, IM, 

(PFIZER GRAY TOP)                                   2022 

00:00:00            Completed                               [facility]

 

                                                    SARS-COV-2 COVID-19 

[facility]O-SUCROSE 

VACCINE 12 YRS+, 

BIVALENT 0.3ML, IM, 

(PFIZER GRAY TOP)                                   2022 

00:00:00            Completed                               [facility]

 

                                                    SARS-COV-2 COVID-19 

[facility]O-SUCROSE 

VACCINE 12 YRS+, 

BIVALENT 0.3ML, IM, 

(PFIZER GRAY TOP)                                   2022 

00:00:00            Completed                               [facility]

 

                                                    SARS-COV-2 COVID-19 

[facility]O-SUCROSE 

VACCINE 12 YRS+, 

BIVALENT 0.3ML, IM, 

(PFIZER GRAY TOP)                                   2022 

00:00:00            Completed                               [facility]

 

                                                    SARS-COV-2 COVID-19 

[facility]ARRO-SUCROSE 

VACCINE 12 YRS+, 

BIVALENT 0.3ML, IM, 

(PFIZER GRAY TOP)                                   2022 

00:00:00            Completed                               [facility]

 

                                                    SARS-COV-2 COVID-19 

[facility]APARRO-SUCROSE 

VACCINE 12 YRS+, 

BIVALENT 0.3ML, IM, 

(PFIZER GRAY TOP)                                   2022 

00:00:00            Completed                               [facility]

 

                                                    SARS-COV-2 COVID-19 

CHAPARRO-SUCROSE 

VACCINE 12 YRS+, 

BIVALENT 0.3ML, IM, 

(PFIZER GRAY TOP)                                   2022 

00:00:00            Completed                               [facility]

 

                                                    SARS-COV-2 COVID-19 

[facility]APARRO-SUCROSE 

VACCINE 12 YRS+, 

BIVALENT 0.3ML, IM, 

(PFIZER GRAY TOP)                                   2022 

00:00:00            Completed                               [facility]

 

                                                    SARS-COV-2 COVID-19 

[facility]ARRO-SUCROSE 

VACCINE 12 YRS+, 

BIVALENT 0.3ML, IM, 

(PFIZER GRAY TOP)                                   2022 

00:00:00            Completed                               [facility]

 

                                                    SARS-COV-2 COVID-19 

[facility]ARRO-SUCROSE 

VACCINE 12 YRS+, 

BIVALENT 0.3ML, IM, 

(PFIZER GRAY TOP)                                   2022 

00:00:00            Completed                               [facility]

 

                                                    SARS-COV-2 COVID-19 

[facility]ARRO-SUCROSE 

VACCINE 12 YRS+, 

BIVALENT 0.3ML, IM, 

(PFIZER GRAY TOP)                                   2022 

00:00:00            Completed                               [facility]

 

                                                    SARS-COV-2 COVID-19 

[facility]ARRO-SUCROSE 

VACCINE 12 YRS+, 

BIVALENT 0.3ML, IM, 

(PFIZER GRAY TOP)                                   2022 

00:00:00            Completed                               [facility]

 

                                                    SARS-COV-2 COVID-19 

[facility]ARRO-SUCROSE 

VACCINE 12 YRS+, 

BIVALENT 0.3ML, IM, 

(PFIZER GRAY TOP)                                   2022 

00:00:00            Completed                               [facility]

 

                                                    SARS-COV-2 COVID-19 

[facility]APARRO-SUCROSE 

VACCINE 12 YRS+, 

BIVALENT 0.3ML, IM, 

(PFIZER GRAY TOP)                                   2022 

00:00:00            Completed                               [facility]

 

                                                    SARS-COV-2 COVID-19 

[facility]ARRO-SUCROSE 

VACCINE 12 YRS+, 

BIVALENT 0.3ML, IM, 

(PFIZER GRAY TOP)                                   2022 

00:00:00            Completed                               [facility]

 

                                                    SARS-COV-2 COVID-19 

CHAPARRO-SUCROSE 

VACCINE 12 YRS+, 

BIVALENT 0.3ML, IM, 

(PFIZER GRAY TOP)                                   2022 

00:00:00            Completed                               [facility]

 

                                                    SARS-COV-2 COVID-19 

CHAPARRO-SUCROSE 

VACCINE 12 YRS+, 

BIVALENT 0.3ML, IM, 

(PFIZER GRAY TOP)                                   2022 

00:00:00            Completed                               [facility]

 

                                                    SARS-COV-2 COVID-19 

CHAPARRO-SUCROSE 

VACCINE 12 YRS+, 

BIVALENT 0.3ML, IM, 

(PFIZER GRAY TOP)                                   2022 

00:00:00            Completed                               [facility]

 

                                                    SARS-COV-2 COVID-19 

CHAPARRO-SUCROSE 

VACCINE 12 YRS+, 

BIVALENT 0.3ML, IM, 

(PFIZER GRAY TOP)                                   2022 

00:00:00            Completed                               [facility]

 

                                                    SARS-COV-2 COVID-19 

CHAPARRO-SUCROSE 

VACCINE 12 YRS+, 

BIVALENT 0.3ML, IM, 

(PFIZER GRAY TOP)                                   2022 

00:00:00            Completed                               [facility]

 

                                                    SARS-COV-2 COVID-19 

CHAPARRO-SUCROSE 

VACCINE 12 YRS+, 

BIVALENT 0.3ML, IM, 

(PFIZER GRAY TOP)                                   2022 

00:00:00            Completed                               [facility]

 

                                                    SARS-COV-2 COVID-19 

[facility]APARRO-SUCROSE 

VACCINE 12 YRS+, 

BIVALENT 0.3ML, IM, 

(PFIZER GRAY TOP)                                   2022 

00:00:00            Completed                               [facility]

 

                                                    SARS-COV-2 COVID-19 

[facility]APARRO-SUCROSE 

VACCINE 12 YRS+, 

BIVALENT 0.3ML, IM, 

(PFIZER GRAY TOP)                                   2022 

00:00:00            Completed                               [facility]

 

                                                    SARS-COV-2 COVID-19 

[facility]ARRO-SUCROSE 

VACCINE 12 YRS+, 

BIVALENT 0.3ML, IM, 

(PFIZER GRAY TOP)                                   2022 

00:00:00            Completed                               [facility]

 

                                                    SARS-COV-2 COVID-19 

[facility]ARRO-SUCROSE 

VACCINE 12 YRS+, 

BIVALENT 0.3ML, IM, 

(PFIZER GRAY TOP)                                   2022 

00:00:00            Completed                               [facility]

 

                                                    SARS-COV-2 COVID-19 

CHAPARRO-SUCROSE 

VACCINE 12 YRS+, 

BIVALENT 0.3ML, IM, 

(PFIZER GRAY TOP)                                   2022 

00:00:00            Completed                               [facility]

 

                                                    SARS-COV-2 COVID-19 

CHAPARRO-SUCROSE 

VACCINE 12 YRS+, 

BIVALENT 0.3ML, IM, 

(PFIZER GRAY TOP)                                   2022 

00:00:00            Completed                               [facility]

 

                                                    SARS-COV-2 COVID-19 

CHAPARRO-SUCROSE 

VACCINE 12 YRS+, 

BIVALENT 0.3ML, IM, 

(PFIZER GRAY TOP)                                   2022 

00:00:00            Completed                               [facility]

 

                                                    SARS-COV-2 COVID-19 

CHAPARRO-SUCROSE 

VACCINE 12 YRS+, 

BIVALENT 0.3ML, IM, 

(PFIZER GRAY TOP)                                   2022 

00:00:00            Completed                               [facility]

 

                                                    SARS-COV-2 COVID-19 

CHAPARRO-SUCROSE 

VACCINE 12 YRS+, 

BIVALENT 0.3ML, IM, 

(PFIZER GRAY TOP)                                   2022 

00:00:00            Completed                               [facility]

 

                                                    SARS-COV-2 COVID-19 

CHAPARRO-SUCROSE 

VACCINE 12 YRS+, 

BIVALENT 0.3ML, IM, 

(PFIZER GRAY TOP)                                   2022 

00:00:00            Completed                               [facility]

 

                                                    SARS-COV-2 COVID-19 

CHAPARRO-SUCROSE 

VACCINE 12 YRS+, 

BIVALENT 0.3ML, IM, 

(PFIZER GRAY TOP)                                   2022 

00:00:00            Completed                               [facility]

 

                                                    SARS-COV-2 COVID-19 

CHAPARRO-SUCROSE 

VACCINE 12 YRS+, 

BIVALENT 0.3ML, IM, 

(PFIZER GRAY TOP)                                   2022 

00:00:00            Completed                               [facility]

 

                                                    SARS-COV-2 COVID-19 

CHAPARRO-SUCROSE 

VACCINE 12 YRS+, 

BIVALENT 0.3ML, IM, 

(PFIZER GRAY TOP)                                   2022 

00:00:00            Completed                               [facility]

 

                                                    SARS-COV-2 COVID-19 

CHAPARRO-SUCROSE 

VACCINE 12 YRS+, 

BIVALENT 0.3ML, IM, 

(PFIZER GRAY TOP)                                   2022 

00:00:00            Completed                               [facility]

 

                                                    SARS-COV-2 COVID-19 

CHAPARRO-SUCROSE 

VACCINE 12 YRS+, 

BIVALENT 0.3ML, IM, 

(PFIZER GRAY TOP)                                   2022 

00:00:00            Completed                               [facility]

 

                                                    SARS-COV-2 COVID-19 

CHAPARRO-SUCROSE 

VACCINE 12 YRS+, 

BIVALENT 0.3ML, IM, 

(PFIZER GRAY TOP)                                   2022 

00:00:00            Completed                               [facility]

 

                                                    SARS-COV-2 COVID-19 

CHAPARRO-SUCROSE 

VACCINE 12 YRS+, 

BIVALENT 0.3ML, IM, 

(PFIZER GRAY TOP)                                   2022 

00:00:00            Completed                               [facility]

 

                                                    SARS-COV-2 COVID-19 

CHAPARRO-SUCROSE 

VACCINE 12 YRS+, 

BIVALENT 0.3ML, IM, 

(PFIZER GRAY TOP)                                   2022 

00:00:00            Completed                               [facility]

 

                                                    SARS-COV-2 COVID-19 

CHAPARRO-SUCROSE 

VACCINE 12 YRS+, 

BIVALENT 0.3ML, IM, 

(PFIZER GRAY TOP)                                   2022 

00:00:00            Completed                               [facility]

 

                                                    SARS-COV-2 COVID-19 

CHAPARRO-SUCROSE 

VACCINE 12 YRS+, 

BIVALENT 0.3ML, IM, 

(PFIZER GRAY TOP)                                   2022 

00:00:00            Completed                               [facility]

 

                                                    SARS-COV-2 COVID-19 

CHAPARRO-SUCROSE 

VACCINE 12 YRS+, 

BIVALENT 0.3ML, IM, 

(PFIZER GRAY TOP)                                   2022 

00:00:00            Completed                               [facility]

 

                                                    SARS-COV-2 COVID-19 

CHAPARRO-SUCROSE 

VACCINE 12 YRS+, 

BIVALENT 0.3ML, IM, 

(PFIZER GRAY TOP)                                   2022 

00:00:00            Completed                               [facility]

 

                                                    SARS-COV-2 COVID-19 

CHAPARRO-SUCROSE 

VACCINE 12 YRS+, 

BIVALENT 0.3ML, IM, 

(PFIZER GRAY TOP)                                   2022 

00:00:00            Completed                               [facility]

 

                                                    SARS-COV-2 COVID-19 

CHAPARRO-SUCROSE 

VACCINE 12 YRS+, 

BIVALENT 0.3ML, IM, 

(PFIZER GRAY TOP)                                   2022 

00:00:00            Completed                               [facility]

 

                                                    SARS-COV-2 COVID-19 

CHAPARRO-SUCROSE 

VACCINE 12 YRS+, 

BIVALENT 0.3ML, IM, 

(PFIZER GRAY TOP)                                   2022 

00:00:00            Completed                               [facility]

 

                                                    SARS-COV-2 COVID-19 

CHAPARRO-SUCROSE 

VACCINE 12 YRS+, 

BIVALENT 0.3ML, IM, 

(PFIZER GRAY TOP)                                   2022 

00:00:00            Completed                               [facility]

 

                                                    SARS-COV-2 COVID-19 

CHAPARRO-SUCROSE 

VACCINE 12 YRS+, 

BIVALENT 0.3ML, IM, 

(PFIZER GRAY TOP)                                   2022 

00:00:00            Completed                               [facility]

 

                                                    SARS-COV-2 COVID-19 

CHAPARRO-SUCROSE 

VACCINE 12 YRS+, 

BIVALENT 0.3ML, IM, 

(PFIZER GRAY TOP)                                   2022 

00:00:00            Completed                               [facility]

 

                                                    SARS-COV-2 COVID-19 

CHAPARRO-SUCROSE 

VACCINE 12 YRS+, 

BIVALENT 0.3ML, IM, 

(PFIZER GRAY TOP)                                   2022 

00:00:00            Completed                               [facility]

 

                                                    SARS-COV-2 COVID-19 

CHAPARRO-SUCROSE 

VACCINE 12 YRS+, 

BIVALENT 0.3ML, IM, 

(PFIZER GRAY TOP)                                   2022 

00:00:00            Completed                               [facility]

 

                                                    SARS-COV-2 COVID-19 

CHAPARRO-SUCROSE 

VACCINE 12 YRS+, 

BIVALENT 0.3ML, IM, 

(PFIZER GRAY TOP)                                   2022 

00:00:00            Completed                               [facility]

 

                                                    SARS-COV-2 COVID-19 

CHAPARRO-SUCROSE 

VACCINE 12 YRS+, 

BIVALENT 0.3ML, IM, 

(PFIZER GRAY TOP)                                   2022 

00:00:00            Completed                               [facility]

 

                                                    SARS-COV-2 COVID-19 

CHAPARRO-SUCROSE 

VACCINE 12 YRS+, 

BIVALENT 0.3ML, IM, 

(PFIZER GRAY TOP)                                   2022 

00:00:00            Completed                               [facility]

 

                                                    SARS-COV-2 COVID-19 

CHAPARRO-SUCROSE 

VACCINE 12 YRS+, 

BIVALENT 0.3ML, IM, 

(PFIZER GRAY TOP)                                   2022 

00:00:00            Completed                               [facility]

 

                                                    SARS-COV-2 COVID-19 

CHAPARRO-SUCROSE 

VACCINE 12 YRS+, 

BIVALENT 0.3ML, IM, 

(PFIZER GRAY TOP)                                   2022 

00:00:00            Completed                               [facility]

 

                                                    SARS-COV-2 COVID-19 

CHAPARRO-SUCROSE 

VACCINE 12 YRS+, 

BIVALENT 0.3ML, IM, 

(PFIZER GRAY TOP)                                   2022 

00:00:00            Completed                               [facility]

 

                                                    SARS-COV-2 COVID-19 

CHAPARRO-SUCROSE 

VACCINE 12 YRS+, 

BIVALENT 0.3ML, IM, 

(PFIZER GRAY TOP)                                   2022 

00:00:00            Completed                               [facility]

 

                                                    SARS-COV-2 COVID-19 

CHAPARRO-SUCROSE 

VACCINE 12 YRS+, 

BIVALENT 0.3ML, IM, 

(PFIZER GRAY TOP)                                   2022 

00:00:00            Completed                               [facility]

 

                                                    SARS-COV-2 COVID-19 

CHAPARRO-SUCROSE 

VACCINE 12 YRS+, 

BIVALENT 0.3ML, IM, 

(PFIZER GRAY TOP)                                   2022 

00:00:00            Completed                               [facility]

 

                                                    SARS-COV-2 COVID-19 

CHAPARRO-SUCROSE 

VACCINE 12 YRS+, 

BIVALENT 0.3ML, IM, 

(PFIZER GRAY TOP)                                   2022 

00:00:00            Completed                               [facility]

 

                                                    SARS-COV-2 COVID-19 

CHAPARRO-SUCROSE 

VACCINE 12 YRS+, 

BIVALENT 0.3ML, IM, 

(PFIZER GRAY TOP)                                   2022 

00:00:00            Completed                               [facility]

 

                                                    SARS-COV-2 COVID-19 

CHAPARRO-SUCROSE 

VACCINE 12 YRS+, 

BIVALENT 0.3ML, IM, 

(PFIZER GRAY TOP)                                   2022 

00:00:00            Completed                               [facility]

 

                                                    SARS-COV-2 COVID-19 

CHAPARRO-SUCROSE 

VACCINE 12 YRS+, 

BIVALENT 0.3ML, IM, 

(PFIZER GRAY TOP)                                   2022 

00:00:00            Completed                               [facility]

 

                                                    SARS-COV-2 COVID-19 

CHAPARRO-SUCROSE 

VACCINE 12 YRS+, 

BIVALENT 0.3ML, IM, 

(PFIZER GRAY TOP)                                   2022 

00:00:00            Completed                               [facility]

 

                                                    SARS-COV-2 COVID-19 

CHAPARRO-SUCROSE 

VACCINE 12 YRS+, 

BIVALENT 0.3ML, IM, 

(PFIZER GRAY TOP)                                   2022 

00:00:00            Completed                               [facility]

 

                                                    SARS-COV-2 COVID-19 

CHAPARRO-SUCROSE 

VACCINE 12 YRS+, 

BIVALENT 0.3ML, IM, 

(PFIZER GRAY TOP)                                   2022 

00:00:00            Completed                               [facility]

 

                                                    SARS-COV-2 COVID-19 

CHAPARRO-SUCROSE 

VACCINE 12 YRS+, 

BIVALENT 0.3ML, IM, 

(PFIZER GRAY TOP)                                   2022 

00:00:00            Completed                               [facility]

 

                                                    SARS-COV-2 COVID-19 

CHAPARRO-SUCROSE 

VACCINE 12 YRS+, 

BIVALENT 0.3ML, IM, 

(PFIZER GRAY TOP)                                   2022 

00:00:00            Completed                               [facility]

 

                                                    SARS-COV-2 COVID-19 

CHAPARRO-SUCROSE 

VACCINE 12 YRS+, 

BIVALENT 0.3ML, IM, 

(PFIZER GRAY TOP)                                   2022 

00:00:00            Completed                               [facility]

 

                                                    SARS-COV-2 COVID-19 

CHAPARRO-SUCROSE 

VACCINE 12 YRS+, 

BIVALENT 0.3ML, IM, 

(PFIZER GRAY TOP)                                   2022 

00:00:00            Completed                               [facility]

 

                                                    SARS-COV-2 COVID-19 

CHAPARRO-SUCROSE 

VACCINE 12 YRS+, 

BIVALENT 0.3ML, IM, 

(PFIZER GRAY TOP)                                   2022 

00:00:00            Completed                               [facility]

 

                                                    SARS-COV-2 COVID-19 

CHAPARRO-SUCROSE 

VACCINE 12 YRS+, 

BIVALENT 0.3ML, IM, 

(PFIZER GRAY TOP)                                   2022 

00:00:00            Completed                               [facility]

 

                                                    SARS-COV-2 COVID-19 

CHAPARRO-SUCROSE 

VACCINE 12 YRS+, 

BIVALENT 0.3ML, IM, 

(PFIZER GRAY TOP)                                   2022 

00:00:00            Completed                               [facility]

 

                                                    SARS-COV-2 COVID-19 

CHAPARRO-SUCROSE 

VACCINE 12 YRS+, 

BIVALENT 0.3ML, IM, 

(PFIZER GRAY TOP)                                   2022 

00:00:00            Completed                               [facility]

 

                                                    SARS-COV-2 COVID-19 

CHAPARRO-SUCROSE 

VACCINE 12 YRS+, 

BIVALENT 0.3ML, IM, 

(PFIZER GRAY TOP)                                   2022 

00:00:00            Completed                               [facility]

 

                                                    SARS-COV-2 COVID-19 

CHAPARRO-SUCROSE 

VACCINE 12 YRS+, 

BIVALENT 0.3ML, IM, 

(PFIZER GRAY TOP)                                   2022 

00:00:00            Completed                               [facility]

 

                                                    SARS-COV-2 COVID-19 

CHAPARRO-SUCROSE 

VACCINE 12 YRS+, 

BIVALENT 0.3ML, IM, 

(PFIZER GRAY TOP)                                   2022 

00:00:00            Completed                               [facility]

 

                                                    SARS-COV-2 COVID-19 

CHAPARRO-SUCROSE 

VACCINE 12 YRS+, 

BIVALENT 0.3ML, IM, 

(PFIZER GRAY TOP)                                   2022 

00:00:00            Completed                               [facility]

 

                                                    SARS-COV-2 COVID-19 

CHAPARRO-SUCROSE 

VACCINE 12 YRS+, 

BIVALENT 0.3ML, IM, 

(PFIZER GRAY TOP)                                   2022 

00:00:00            Completed                               [facility]

 

                                                    SARS-COV-2 COVID-19 

CHAPARRO-SUCROSE 

VACCINE 12 YRS+, 

BIVALENT 0.3ML, IM, 

(PFIZER GRAY TOP)                                   2022 

00:00:00            Completed                               [facility]

 

                                                    SARS-COV-2 COVID-19 

CHAPARRO-SUCROSE 

VACCINE 12 YRS+, 

BIVALENT 0.3ML, IM, 

(PFIZER GRAY TOP)                                   2022 

00:00:00            Completed                               [facility]

 

                                                    SARS-COV-2 COVID-19 

CHAPARRO-SUCROSE 

VACCINE 12 YRS+, 

BIVALENT 0.3ML, IM, 

(PFIZER GRAY TOP)                                   2022 

00:00:00            Completed                               [facility]

 

                                                    SARS-COV-2 COVID-19 

CHAPARRO-SUCROSE 

VACCINE 12 YRS+, 

BIVALENT 0.3ML, IM, 

(PFIZER GRAY TOP)                                   2022 

00:00:00            Completed                               [facility]

 

                                                    SARS-COV-2 COVID-19 

CHAPARRO-SUCROSE 

VACCINE 12 YRS+, 

BIVALENT 0.3ML, IM, 

(PFIZER GRAY TOP)                                   2022 

00:00:00            Completed                               [facility]

 

                                                    SARS-COV-2 COVID-19 

CHAPARRO-SUCROSE 

VACCINE 12 YRS+, 

BIVALENT 0.3ML, IM, 

(PFIZER GRAY TOP)                                   2022 

00:00:00            Completed                               [facility]

 

                                                    SARS-COV-2 COVID-19 

CHAPARRO-SUCROSE 

VACCINE 12 YRS+, 

BIVALENT 0.3ML, IM, 

(PFIZER GRAY TOP)                                   2022 

00:00:00            Completed                               [facility]

 

                                                    SARS-COV-2 COVID-19 

CHAPARRO-SUCROSE 

VACCINE 12 YRS+, 

BIVALENT 0.3ML, IM, 

(PFIZER GRAY TOP)                                   2022 

00:00:00            Completed                               [facility]

 

                                                    SARS-COV-2 COVID-19 

CHAPARRO-SUCROSE 

VACCINE 12 YRS+, 

BIVALENT 0.3ML, IM, 

(PFIZER GRAY TOP)                                   2022 

00:00:00            Completed                               [facility]

 

                                                    SARS-COV-2 COVID-19 

CHAPARRO-SUCROSE 

VACCINE 12 YRS+, 

BIVALENT 0.3ML, IM, 

(PFIZER GRAY TOP)                                   2022 

00:00:00            Completed                               [facility]

 

                                                    SARS-COV-2 COVID-19 

CHAPARRO-SUCROSE 

VACCINE 12 YRS+, 

BIVALENT 0.3ML, IM, 

(PFIZER GRAY TOP)                                   2022 

00:00:00            Completed                               [facility]

 

                                                    SARS-COV-2 COVID-19 

CHAPARRO-SUCROSE 

VACCINE 12 YRS+, 

BIVALENT 0.3ML, IM, 

(PFIZER GRAY TOP)                                   2022 

00:00:00            Completed                               [facility]

 

                                                    SARS-COV-2 COVID-19 

CHAPARRO-SUCROSE 

VACCINE 12 YRS+, 

BIVALENT 0.3ML, IM, 

(PFIZER GRAY TOP)                                   2022 

00:00:00            Completed                               [facility]

 

                                                    SARS-COV-2 COVID-19 

CHAPARRO-SUCROSE 

VACCINE 12 YRS+, 

BIVALENT 0.3ML, IM, 

(PFIZER GRAY TOP)                                   2022 

00:00:00            Completed                               [facility]

 

                                                    SARS-COV-2 COVID-19 

CHAPARRO-SUCROSE 

VACCINE 12 YRS+, 

BIVALENT 0.3ML, IM, 

(PFIZER GRAY TOP)                                   2022 

00:00:00            Completed                               [facility]

 

                                                    SARS-COV-2 COVID-19 

CHAPARRO-SUCROSE 

VACCINE 12 YRS+, 

BIVALENT 0.3ML, IM, 

(PFIZER GRAY TOP)                                   2022 

00:00:00            Completed                               [facility]

 

                                                    SARS-COV-2 COVID-19 

CHAPARRO-SUCROSE 

VACCINE 12 YRS+, 

BIVALENT 0.3ML, IM, 

(PFIZER GRAY TOP)                                   2022 

00:00:00            Completed                               [facility]

 

                                                    SARS-COV-2 COVID-19 

CHAPARRO-SUCROSE 

VACCINE 12 YRS+, 

BIVALENT 0.3ML, IM, 

(PFIZER GRAY TOP)                                   2022 

00:00:00            Completed                               [facility]

 

                                                    SARS-COV-2 COVID-19 

CHAPARRO-SUCROSE 

VACCINE 12 YRS+, 

BIVALENT 0.3ML, IM, 

(PFIZER GRAY TOP)                                   2022 

00:00:00            Completed                               [facility]

 

                                                    SARS-COV-2 COVID-19 

CHAPARRO-SUCROSE 

VACCINE 12 YRS+, 

BIVALENT 0.3ML, IM, 

(PFIZER GRAY TOP)                                   2022 

00:00:00            Completed                               [facility]

 

                                                    SARS-COV-2 COVID-19 

CHAPARRO-SUCROSE 

VACCINE 12 YRS+, 

BIVALENT 0.3ML, IM, 

(PFIZER GRAY TOP)                                   2022 

00:00:00            Completed                               [facility]

 

                                                    SARS-COV-2 COVID-19 

CHAPARRO-SUCROSE 

VACCINE 12 YRS+, 

BIVALENT 0.3ML, IM, 

(PFIZER GRAY TOP)                                   2022 

00:00:00            Completed                               [facility]

 

                                                    SARS-COV-2 COVID-19 

CHAPARRO-SUCROSE 

VACCINE 12 YRS+, 

BIVALENT 0.3ML, IM, 

(PFIZER GRAY TOP)                                   2022 

00:00:00            Completed                               [facility]

 

                                PPD (TB)                        2022 

00:00:00            Completed                               [facility]

 

                                PPD (TB)                        2022 

00:00:00            Completed                               [facility]

 

                                PPD (TB)                        2022 

00:00:00            Completed                               [facility]

 

                                PPD (TB)                        2022 

00:00:00            Completed                               [facility]

 

                                PPD (TB)                        2022 

00:00:00            Completed                               [facility]

 

                                PPD (TB)                        2022 

00:00:00            Completed                               [facility] [place]

 

                                PPD (TB)                        2022 

00:00:00            Completed                               [facility] [place]

 

                                PPD (TB)                        2022 

00:00:00            Completed                               [facility] [place]

 

                                PPD (TB)                        2022 

00:00:00            Completed                               [facility] [place]

 

                                PPD (TB)                        2022 

00:00:00            Completed                               [facility] [place]

 

                                PPD (TB)                        2022 

00:00:00            Completed                               [facility] [place]

 

                                PPD (TB)                        2022 

00:00:00            Completed                               [facility] [place]

 

                                PPD (TB)                        2022 

00:00:00            Completed                               [facility] [place]

 

                                PPD (TB)                        2022 

00:00:00            Completed                               [facility]

 

                                PPD (TB)                        2022 

00:00:00            Completed                               [facility] [place]

 

                                PPD (TB)                        2022 

00:00:00            Completed                               [facility] [place]

 

                                PPD (TB)                        2022 

00:00:00            Completed                               [facility] [place]

 

                                PPD (TB)                        2022 

00:00:00            Completed                               [facility] [place]

 

                                PPD (TB)                        2022 

00:00:00            Completed                               [facility] [place]

 

                                PPD (TB)                        2022 

00:00:00            Completed                               [facility] [place]

 

                                PPD (TB)                        2022 

00:00:00            Completed                               [facility] [place]

 

                                PPD (TB)                        2022 

00:00:00            Completed                               [facility] [place]

 

                                PPD (TB)                        2022 

00:00:00            Completed                               [facility] [place]

 

                                PPD (TB)                        2022 

00:00:00            Completed                               [facility] [place]

 

                                PPD (TB)                        2022 

00:00:00            Completed                               [facility] [place]

 

                                PPD (TB)                        2022 

00:00:00            Completed                               [facility] [place]

 

                                PPD (TB)                        2022 

00:00:00            Completed                               [facility] [place]

 

                                PPD (TB)                        2022 

00:00:00            Completed                               [facility] [place]

 

                                PPD (TB)                        2022 

00:00:00            Completed                               [facility] [place]

 

                                PPD (TB)                        2022 

00:00:00            Completed                               [facility] [place]

 

                                PPD (TB)                        2022 

00:00:00            Completed                               [facility] [place]

 

                                PPD (TB)                        2022 

00:00:00            Completed                               [facility] [place]

 

                                PPD (TB)                        2022 

00:00:00            Completed                               [facility] [place]

 

                                PPD (TB)                        2022 

00:00:00            Completed                               [facility] [place]

 

                                PPD (TB)                        2022 

00:00:00            Completed                               [facility] [place]

 

                                PPD (TB)                        2022 

00:00:00            Completed                               [facility] [place]

 

                                PPD (TB)                        2022 

00:00:00            Completed                               [facility] [place]

 

                                PPD (TB)                        2022 

00:00:00            Completed                               [facility] [place]

 

                                PPD (TB)                        2022 

00:00:00            Completed                               [facility] [place]

 

                                PPD (TB)                        2022 

00:00:00            Completed                               [facility] [place]

 

                                PPD (TB)                        2022 

00:00:00            Completed                               [facility] [place]

 

                                PPD (TB)                        2022 

00:00:00            Completed                               [facility] [place]

 

                                PPD (TB)                        2022 

00:00:00            Completed                               [facility]

 

                                PPD (TB)                        2022 

00:00:00            Completed                               [facility] [place]

 

                                PPD (TB)                        2022 

00:00:00            Completed                               [facility] [place]

 

                                PPD (TB)                        2022 

00:00:00            Completed                               [facility] [place]

 

                                PPD (TB)                        2022 

00:00:00            Completed                               [facility] [place]

 

                                PPD (TB)                        2022 

00:00:00            Completed                               [facility] [place]

 

                                PPD (TB)                        2022 

00:00:00            Completed                               [facility] [place]

 

                                PPD (TB)                        2022 

00:00:00            Completed                               [facility] [place]

 

                                PPD (TB)                        2022 

00:00:00            Completed                               [facility] [place]

 

                                PPD (TB)                        2022 

00:00:00            Completed                               [facility] [place]

 

                                PPD (TB)                        2022 

00:00:00            Completed                               [facility] [place]

 

                                PPD (TB)                        2022 

00:00:00            Completed                               [facility] [place]

 

                                PPD (TB)                        2022 

00:00:00            Completed                               [facility] [place]

 

                                PPD (TB)                        2022 

00:00:00            Completed                               [facility] [place]

 

                                PPD (TB)                        2022 

00:00:00            Completed                               [facility] [place]

 

                                PPD (TB)                        2022 

00:00:00            Completed                               [facility] [place]

 

                                PPD (TB)                        2022 

00:00:00            Completed                               [facility] [place]

 

                                PPD (TB)                        2022 

00:00:00            Completed                               [facility] [place]

 

                                PPD (TB)                        2022 

00:00:00            Completed                               [facility] [place]

 

                                PPD (TB)                        2022 

00:00:00            Completed                               [facility] [place]

 

                                PPD (TB)                        2022 

00:00:00            Completed                               [facility] [place]

 

                                PPD (TB)                        2022 

00:00:00            Completed                               [facility] [place]

 

                                PPD (TB)                        2022 

00:00:00            Completed                               [facility] [place]

 

                                PPD (TB)                        2022 

00:00:00            Completed                               [facility] [place]

 

                                PPD (TB)                        2022 

00:00:00            Completed                               [facility] [place]

 

                                PPD (TB)                        2022 

00:00:00            Completed                               [facility] [place]

 

                                PPD (TB)                        2022 

00:00:00            Completed                               [facility] [place]

 

                                PPD (TB)                        2022 

00:00:00            Completed                               [facility] [place]

 

                                PPD (TB)                        2022 

00:00:00            Completed                               [facility] [place]

 

                                PPD (TB)                        2022 

00:00:00            Completed                               [facility] [place]

 

                                PPD (TB)                        2022 

00:00:00            Completed                               [facility]

 

                                PPD (TB)                        2022 

00:00:00            Completed                               [facility] [place]

 

                                PPD (TB)                        2022 

00:00:00            Completed                               [facility] [place]

 

                                PPD (TB)                        2022 

00:00:00            Completed                               [facility] [place]

 

                                PPD (TB)                        2022 

00:00:00            Completed                               [facility]

 

                                PPD (TB)                        2022 

00:00:00            Completed                               [facility] [place]

 

                                PPD (TB)                        2022 

00:00:00            Completed                               [facility]

 

                                PPD (TB)                        2022 

00:00:00            Completed                               [facility] [place]

 

                                PPD (TB)                        2022 

00:00:00            Completed                               [facility]

 

                                PPD (TB)                        2022 

00:00:00            Completed                               [facility] [place]

 

                                PPD (TB)                        2022 

00:00:00            Completed                               [facility] [place]

 

                                PPD (TB)                        2022 

00:00:00            Completed                               [facility] [place]

 

                                PPD (TB)                        2022 

00:00:00            Completed                               [facility]

 

                                PPD (TB)                        2022 

00:00:00            Completed                               [facility] [place]

 

                                PPD (TB)                        2022 

00:00:00            Completed                               [facility] [place]

 

                                PPD (TB)                        2022 

00:00:00            Completed                               [facility] [place]

 

                                PPD (TB)                        2022 

00:00:00            Completed                               [facility] [place]

 

                                PPD (TB)                        2022 

00:00:00            Completed                               [facility]

 

                                PPD (TB)                        2022 

00:00:00            Completed                               [facility]

 

                                PPD (TB)                        2022 

00:00:00            Completed                               [facility] [place]

 

                                PPD (TB)                        2022 

00:00:00            Completed                               [facility] [place]

 

                                PPD (TB)                        2022 

00:00:00            Completed                               [facility] [place]

 

                                PPD (TB)                        2022 

00:00:00            Completed                               [facility]

 

                                PPD (TB)                        2022 

00:00:00            Completed                               [facility]

 

                                PPD (TB)                        2022 

00:00:00            Completed                               [facility]

 

                                PPD (TB)                        2022 

00:00:00            Completed                               [facility] [place]

 

                                PPD (TB)                        2022 

00:00:00            Completed                               [facility]

 

                                PPD (TB)                        2022 

00:00:00            Completed                               [facility] [place]

 

                                PPD (TB)                        2022 

00:00:00            Completed                               [facility] [place]

 

                                PPD (TB)                        2022 

00:00:00            Completed                               [facility]

 

                                PPD (TB)                        2022 

00:00:00            Completed                               [facility]

 

                                PPD (TB)                        2022 

00:00:00            Completed                               [facility]

 

                                PPD (TB)                        2022 

00:00:00            Completed                               [facility]

 

                                PPD (TB)                        2022 

00:00:00            Completed                               [facility]

 

                                PPD (TB)                        2022 

00:00:00            Completed                               [facility]

 

                                PPD (TB)                        2022 

00:00:00            Completed                               [facility]

 

                                PPD (TB)                        2022 

00:00:00            Completed                               [facility]

 

                                PPD (TB)                        2022 

00:00:00            Completed                               [facility]

 

                                PPD (TB)                        2022 

00:00:00            Completed                               [facility]

 

                                PPD (TB)                        2022 

00:00:00            Completed                               [facility]

 

                                PPD (TB)                        2022 

00:00:00            Completed                               [facility]

 

                                PPD (TB)                        2022 

00:00:00            Completed                               [facility]

 

                                PPD (TB)                        2022 

00:00:00            Completed                               [facility]

 

                                PPD (TB)                        2022 

00:00:00            Completed                               [facility]

 

                                PPD (TB)                        2022 

00:00:00            Completed                               [facility]

 

                                PPD (TB)                        2022 

00:00:00            Completed                               [facility]

 

                                PPD (TB)                        2022 

00:00:00            Completed                               [facility]

 

                                PPD (TB)                        2022 

00:00:00            Completed                               [facility]

 

                                PPD (TB)                        2022 

00:00:00            Completed                               [facility]

 

                                PPD (TB)                        2022 

00:00:00            Completed                               [facility]

 

                                PPD (TB)                        2022 

00:00:00            Completed                               [facility]

 

                                PPD (TB)                        2022 

00:00:00            Completed                               [facility]

 

                                PPD (TB)                        2022 

00:00:00            Completed                               [facility]

 

                                PPD (TB)                        2022 

00:00:00            Completed                               [facility]

 

                                PPD (TB)                        2022 

00:00:00            Completed                               [facility]

 

                                PPD (TB)                        2022 

00:00:00            Completed                               [facility]

 

                                PPD (TB)                        2022 

00:00:00            Completed                               [facility]

 

                                PPD (TB)                        2022 

00:00:00            Completed                               [facility]

 

                                PPD (TB)                        2022 

00:00:00            Completed                               [facility]

 

                                PPD (TB)                        2022 

00:00:00            Completed                               [facility]

 

                                PPD (TB)                        2022 

00:00:00            Completed                               [facility]

 

                                PPD (TB)                        2022 

00:00:00            Completed                               [facility] [place]

 

                                PPD (TB)                        2022 

00:00:00            Completed                               [facility] [place]

 

                                PPD (TB)                        2022 

00:00:00            Completed                               [facility] [place]

 

                                PPD (TB)                        2022 

00:00:00            Completed                               [facility] [place]

 

                                PPD (TB)                        2022 

00:00:00            Completed                               [facility] [place]

 

                                PPD (TB)                        2022 

00:00:00            Completed                               [facility] [place]

 

                                PPD (TB)                        2022 

00:00:00            Completed                               [facility] [place]

 

                                PPD (TB)                        2022 

00:00:00            Completed                               [facility] [place]

 

                                PPD (TB)                        2022 

00:00:00            Completed                               [facility] [place]

 

                                PPD (TB)                        2022 

00:00:00            Completed                               [facility] [place]

 

                                PPD (TB)                        2022 

00:00:00            Completed                               [facility] [place]

 

                                PPD (TB)                        2022 

00:00:00            Completed                               [facility] [place]

 

                                PPD (TB)                        2022 

00:00:00            Completed                               [facility]

 

                                PPD (TB)                        2022 

00:00:00            Completed                               [facility]

 

                                PPD (TB)                        2022 

00:00:00            Completed                               [facility]

 

                                PPD (TB)                        2022 

00:00:00            Completed                               [facility]

 

                                PPD (TB)                        2022 

00:00:00            Completed                               [facility]

 

                                PPD (TB)                        2022 

00:00:00            Completed                               [facility]

 

                                PPD (TB)                        2022 

00:00:00            Completed                               [facility]

 

                                PPD (TB)                        2022 

00:00:00            Completed                               [facility]

 

                                PPD (TB)                        2022 

00:00:00            Completed                               [facility]

 

                                PPD (TB)                        2022 

00:00:00            Completed                               [facility]

 

                                PPD (TB)                        2022 

00:00:00            Completed                               [facility]

 

                                PPD (TB)                        2022 

00:00:00            Completed                               [facility]

 

                                PPD (TB)                        2022 

00:00:00            Completed                               [facility]

 

                                PPD (TB)                        2022 

00:00:00            Completed                               [facility]

 

                                PPD (TB)                        2022 

00:00:00            Completed                               [facility]

 

                                PPD (TB)                        2022 

00:00:00            Completed                               [facility]

 

                                PPD (TB)                        2022 

00:00:00            Completed                               [facility]

 

                                PPD (TB)                        2022 

00:00:00            Completed                               [facility]

 

                                PPD (TB)                        2022 

00:00:00            Completed                               [facility]

 

                                PPD (TB)                        2022 

00:00:00            Completed                               [facility]

 

                                PPD (TB)                        2022 

00:00:00            Completed                               [facility]

 

                                PPD (TB)                        2022 

00:00:00            Completed                               [facility]

 

                                PPD (TB)                        2022 

00:00:00            Completed                               [facility]

 

                                PPD (TB)                        2022 

00:00:00            Completed                               [facility]

 

                                PPD (TB)                        2022 

00:00:00            Completed                               [facility]

 

                                PPD (TB)                        2022 

00:00:00            Completed                               [facility]

 

                                PPD (TB)                        2022 

00:00:00            Completed                               [facility]

 

                                PPD (TB)                        2022 

00:00:00            Completed                               [facility]

 

                                PPD (TB)                        2022 

00:00:00            Completed                               [facility]

 

                                PPD (TB)                        2022 

00:00:00            Completed                               [facility]

 

                                PPD (TB)                        2022 

00:00:00            Completed                               [facility]

 

                                PPD (TB)                        2022 

00:00:00            Completed                               [facility]

 

                                PPD (TB)                        2022 

00:00:00            Completed                               [facility]

 

                                PPD (TB)                        2022 

00:00:00            Completed                               [facility]

 

                                PPD (TB)                        2022 

00:00:00            Completed                               [facility]

 

                                PPD (TB)                        2022 

00:00:00            Completed                               [facility]

 

                                PPD (TB)                        2022 

00:00:00            Completed                               [facility]

 

                                PPD (TB)                        2022 

00:00:00            Completed                               [facility]

 

                                PPD (TB)                        2022 

00:00:00            Completed                               [facility]

 

                                PPD (TB)                        2022 

00:00:00            Completed                               [facility]

 

                                PPD (TB)                        2022 

00:00:00            Completed                               [facility]

 

                                PPD (TB)                        2022 

00:00:00            Completed                               [facility]

 

                                PPD (TB)                        2022 

00:00:00            Completed                               [facility]

 

                                PPD (TB)                        2022 

00:00:00            Completed                               [facility]

 

                                PPD (TB)                        2022 

00:00:00            Completed                               [facility]

 

                                PPD (TB)                        2022 

00:00:00            Completed                               [facility]

 

                                PPD (TB)                        2022 

00:00:00            Completed                               [facility]

 

                                PPD (TB)                        2022 

00:00:00            Completed                               [facility]

 

                                PPD (TB)                        2022 

00:00:00            Completed                               [facility]

 

                                PPD (TB)                        2022 

00:00:00            Completed                               [facility]

 

                                PPD (TB)                        2022 

00:00:00            Completed                               [facility]

 

                                PPD (TB)                        2022 

00:00:00            Completed                               [facility]

 

                                PPD (TB)                        2022 

00:00:00            Completed                               [facility]

 

                                PPD (TB)                        2022 

00:00:00            Completed                               [facility]

 

                                PPD (TB)                        2022 

00:00:00            Completed                               [facility]

 

                                PPD (TB)                        2022 

00:00:00            Completed                               [facility]

 

                                PPD (TB)                        2022 

00:00:00            Completed                               [facility]

 

                                                    SARS-COV-2 COVID-19 

PFIZER VACCINE                                      2022 

00:00:00            Completed                               [facility]

 

                                                    SARS-COV-2 COVID-19 

PFIZER VACCINE                                      2022 

00:00:00            Completed                               [facility]

 

                                                    SARS-COV-2 COVID-19 

PFIZER VACCINE                                      2022 

00:00:00            Completed                               [facility]

 

                                                    SARS-COV-2 COVID-19 

PFIZER VACCINE                                      2022 

00:00:00            Completed                               [facility]

 

                                                    SARS-COV-2 COVID-19 

PFIZER VACCINE                                      2022 

00:00:00            Completed                               [facility]

 

                                                    SARS-COV-2 COVID-19 

PFIZER VACCINE                                      2022 

00:00:00            Completed                               [facility]

 

                                                    SARS-COV-2 COVID-19 

PFIZER VACCINE                                      2022 

00:00:00            Completed                               [facility]

 

                                                    SARS-COV-2 COVID-19 

PFIZER VACCINE                                      2022 

00:00:00            Completed                               [facility]

 

                                                    SARS-COV-2 COVID-19 

PFIZER VACCINE                                      2022 

00:00:00            Completed                               [facility]

 

                                                    SARS-COV-2 COVID-19 

PFIZER VACCINE                                      2022 

00:00:00            Completed                               [facility]

 

                                                    SARS-COV-2 COVID-19 

PFIZER VACCINE                                      2022 

00:00:00            Completed                               [facility]

 

                                                    SARS-COV-2 COVID-19 

PFIZER VACCINE                                      2022 

00:00:00            Completed                               [facility]

 

                                                    SARS-COV-2 COVID-19 

PFIZER VACCINE                                      2022 

00:00:00            Completed                               [facility]

 

                                                    SARS-COV-2 COVID-19 

PFIZER VACCINE                                      2022 

00:00:00            Completed                               [facility]

 

                                                    SARS-COV-2 COVID-19 

PFIZER VACCINE                                      2022 

00:00:00            Completed                               [facility]

 

                                                    SARS-COV-2 COVID-19 

PFIZER VACCINE                                      2022 

00:00:00            Completed                               [facility]

 

                                                    SARS-COV-2 COVID-19 

PFIZER VACCINE                                      2022 

00:00:00            Completed                               [facility]

 

                                                    SARS-COV-2 COVID-19 

PFIZER VACCINE                                      2022 

00:00:00            Completed                               [facility]

 

                                                    SARS-COV-2 COVID-19 

PFIZER VACCINE                                      2022 

00:00:00            Completed                               [facility]

 

                                                    SARS-COV-2 COVID-19 

PFIZER VACCINE                                      2022 

00:00:00            Completed                               [facility]

 

                                                    SARS-COV-2 COVID-19 

PFIZER VACCINE                                      2022 

00:00:00            Completed                               [facility]

 

                                                    SARS-COV-2 COVID-19 

PFIZER VACCINE                                      2022 

00:00:00            Completed                               [facility]

 

                                                    SARS-COV-2 COVID-19 

PFIZER VACCINE                                      2022 

00:00:00            Completed                               [facility]

 

                                                    SARS-COV-2 COVID-19 

PFIZER VACCINE                                      2022 

00:00:00            Completed                               [facility]

 

                                                    SARS-COV-2 COVID-19 

PFIZER VACCINE                                      2022 

00:00:00            Completed                               [facility]

 

                                                    SARS-COV-2 COVID-19 

PFIZER VACCINE                                      2022 

00:00:00            Completed                               [facility]

 

                                                    SARS-COV-2 COVID-19 

PFIZER VACCINE                                      2022 

00:00:00            Completed                               [facility]

 

                                                    SARS-COV-2 COVID-19 

PFIZER VACCINE                                      2022 

00:00:00            Completed                               [facility]

 

                                                    SARS-COV-2 COVID-19 

PFIZER VACCINE                                      2022 

00:00:00            Completed                               [facility]

 

                                                    SARS-COV-2 COVID-19 

PFIZER VACCINE                                      2022 

00:00:00            Completed                               [facility]

 

                                                    SARS-COV-2 COVID-19 

PFIZER VACCINE                                      2022 

00:00:00            Completed                               [facility]

 

                                                    SARS-COV-2 COVID-19 

PFIZER VACCINE                                      2022 

00:00:00            Completed                               [facility]

 

                                                    SARS-COV-2 COVID-19 

PFIZER VACCINE                                      2022 

00:00:00            Completed                               [facility]

 

                                                    SARS-COV-2 COVID-19 

PFIZER VACCINE                                      2022 

00:00:00            Completed                               [facility]

 

                                                    SARS-COV-2 COVID-19 

PFIZER VACCINE                                      2022 

00:00:00            Completed                               [facility]

 

                                                    SARS-COV-2 COVID-19 

PFIZER VACCINE                                      2022 

00:00:00            Completed                               [facility]

 

                                                    SARS-COV-2 COVID-19 

PFIZER VACCINE                                      2022 

00:00:00            Completed                               [facility]

 

                                                    SARS-COV-2 COVID-19 

PFIZER VACCINE                                      2022 

00:00:00            Completed                               [facility]

 

                                                    SARS-COV-2 COVID-19 

PFIZER VACCINE                                      2022 

00:00:00            Completed                               [facility]

 

                                                    SARS-COV-2 COVID-19 

PFIZER VACCINE                                      2022 

00:00:00            Completed                               [facility]

 

                                                    SARS-COV-2 COVID-19 

PFIZER VACCINE                                      2022 

00:00:00            Completed                               [facility]

 

                                                    SARS-COV-2 COVID-19 

PFIZER VACCINE                                      2022 

00:00:00            Completed                               [facility]

 

                                                    SARS-COV-2 COVID-19 

PFIZER VACCINE                                      2022 

00:00:00            Completed                               [facility]

 

                                                    SARS-COV-2 COVID-19 

PFIZER VACCINE                                      2022 

00:00:00            Completed                               [facility]

 

                                                    SARS-COV-2 COVID-19 

PFIZER VACCINE                                      2022 

00:00:00            Completed                               [facility]

 

                                                    SARS-COV-2 COVID-19 

PFIZER VACCINE                                      2022 

00:00:00            Completed                               [facility]

 

                                                    SARS-COV-2 COVID-19 

PFIZER VACCINE                                      2022 

00:00:00            Completed                               [facility]

 

                                                    SARS-COV-2 COVID-19 

PFIZER VACCINE                                      2022 

00:00:00            Completed                               [facility]

 

                                                    SARS-COV-2 COVID-19 

PFIZER VACCINE                                      2022 

00:00:00            Completed                               [facility]

 

                                                    SARS-COV-2 COVID-19 

PFIZER VACCINE                                      2022 

00:00:00            Completed                               [facility]

 

                                                    SARS-COV-2 COVID-19 

PFIZER VACCINE                                      2022 

00:00:00            Completed                               [facility]

 

                                                    SARS-COV-2 COVID-19 

PFIZER VACCINE                                      2022 

00:00:00            Completed                               [facility]

 

                                                    SARS-COV-2 COVID-19 

PFIZER VACCINE                                      2022 

00:00:00            Completed                               [facility]

 

                                                    SARS-COV-2 COVID-19 

PFIZER VACCINE                                      2022 

00:00:00            Completed                               [facility]

 

                                                    SARS-COV-2 COVID-19 

PFIZER VACCINE                                      2022 

00:00:00            Completed                               [facility]

 

                                                    SARS-COV-2 COVID-19 

PFIZER VACCINE                                      2022 

00:00:00            Completed                               [facility]

 

                                                    SARS-COV-2 COVID-19 

PFIZER VACCINE                                      2022 

00:00:00            Completed                               [facility]

 

                                                    SARS-COV-2 COVID-19 

PFIZER VACCINE                                      2022 

00:00:00            Completed                               [facility]

 

                                                    SARS-COV-2 COVID-19 

PFIZER VACCINE                                      2022 

00:00:00            Completed                               [facility]

 

                                                    SARS-COV-2 COVID-19 

PFIZER VACCINE                                      2022 

00:00:00            Completed                               [facility]

 

                                                    SARS-COV-2 COVID-19 

PFIZER VACCINE                                      2022 

00:00:00            Completed                               [facility] [facility]

 

                                                    SARS-COV-2 COVID-19 

PFIZER VACCINE                                      2022 

00:00:00            Completed                               [facility]

 

                                                    SARS-COV-2 COVID-19 

PFIZER VACCINE                                      2022 

00:00:00            Completed                               [facility]

 

                                                    SARS-COV-2 COVID-19 

PFIZER VACCINE                                      2022 

00:00:00            Completed                               [facility]

 

                                                    SARS-COV-2 COVID-19 

PFIZER VACCINE                                      2022 

00:00:00            Completed                               [facility]

 

                                                    SARS-COV-2 COVID-19 

PFIZER VACCINE                                      2022 

00:00:00            Completed                               [facility]

 

                                                    SARS-COV-2 COVID-19 

PFIZER VACCINE                                      2022 

00:00:00            Completed                               [facility]

 

                                                    SARS-COV-2 COVID-19 

PFIZER VACCINE                                      2022 

00:00:00            Completed                               [facility]

 

                                                    SARS-COV-2 COVID-19 

PFIZER VACCINE                                      2022 

00:00:00            Completed                               [facility]

 

                                                    SARS-COV-2 COVID-19 

PFIZER VACCINE                                      2022 

00:00:00            Completed                               [facility]

 

                                                    SARS-COV-2 COVID-19 

PFIZER VACCINE                                      2022 

00:00:00            Completed                               [facility]

 

                                                    SARS-COV-2 COVID-19 

PFIZER VACCINE                                      2022 

00:00:00            Completed                               [facility]

 

                                                    SARS-COV-2 COVID-19 

PFIZER VACCINE                                      2022 

00:00:00            Completed                               [facility]

 

                                                    SARS-COV-2 COVID-19 

PFIZER VACCINE                                      2022 

00:00:00            Completed                               [facility]

 

                                                    SARS-COV-2 COVID-19 

PFIZER VACCINE                                      2022 

00:00:00            Completed                               [facility]

 

                                                    SARS-COV-2 COVID-19 

PFIZER VACCINE                                      2022 

00:00:00            Completed                               [facility]

 

                                                    SARS-COV-2 COVID-19 

PFIZER VACCINE                                      2022 

00:00:00            Completed                               [facility]

 

                                                    SARS-COV-2 COVID-19 

PFIZER VACCINE                                      2022 

00:00:00            Completed                               [facility]

 

                                                    SARS-COV-2 COVID-19 

PFIZER VACCINE                                      2022 

00:00:00            Completed                               [facility]

 

                                                    SARS-COV-2 COVID-19 

PFIZER VACCINE                                      2022 

00:00:00            Completed                               [facility]

 

                                                    SARS-COV-2 COVID-19 

PFIZER VACCINE                                      2022 

00:00:00            Completed                               [facility]

 

                                                    SARS-COV-2 COVID-19 

PFIZER VACCINE                                      2022 

00:00:00            Completed                               [facility]

 

                                                    SARS-COV-2 COVID-19 

PFIZER VACCINE                                      2022 

00:00:00            Completed                               [facility]

 

                                                    SARS-COV-2 COVID-19 

PFIZER VACCINE                                      2022 

00:00:00            Completed                               [facility]

 

                                                    SARS-COV-2 COVID-19 

PFIZER VACCINE                                      2022 

00:00:00            Completed                               [facility]

 

                                                    SARS-COV-2 COVID-19 

PFIZER VACCINE                                      2022 

00:00:00            Completed                               [facility]

 

                                                    SARS-COV-2 COVID-19 

PFIZER VACCINE                                      2022 

00:00:00            Completed                               [facility]

 

                                                    SARS-COV-2 COVID-19 

PFIZER VACCINE                                      2022 

00:00:00            Completed                               [facility]

 

                                                    SARS-COV-2 COVID-19 

PFIZER VACCINE                                      2022 

00:00:00            Completed                               [facility]

 

                                                    SARS-COV-2 COVID-19 

PFIZER VACCINE                                      2022 

00:00:00            Completed                               [facility]

 

                                                    SARS-COV-2 COVID-19 

PFIZER VACCINE                                      2022 

00:00:00            Completed                               [facility]

 

                                                    SARS-COV-2 COVID-19 

PFIZER VACCINE                                      2022 

00:00:00            Completed                               [facility]

 

                                                    SARS-COV-2 COVID-19 

PFIZER VACCINE                                      2022 

00:00:00            Completed                               [facility]

 

                                                    SARS-COV-2 COVID-19 

PFIZER VACCINE                                      2022 

00:00:00            Completed                               [facility]

 

                                                    SARS-COV-2 COVID-19 

PFIZER VACCINE                                      2022 

00:00:00            Completed                               [facility]

 

                                                    SARS-COV-2 COVID-19 

PFIZER VACCINE                                      2022 

00:00:00            Completed                               [facility]

 

                                                    SARS-COV-2 COVID-19 

PFIZER VACCINE                                      2022 

00:00:00            Completed                               [facility]

 

                                                    SARS-COV-2 COVID-19 

PFIZER VACCINE                                      2022 

00:00:00            Completed                               [facility]

 

                                                    SARS-COV-2 COVID-19 

PFIZER VACCINE                                      2022 

00:00:00            Completed                               [facility]

 

                                                    SARS-COV-2 COVID-19 

PFIZER VACCINE                                      2022 

00:00:00            Completed                               [facility]

 

                                                    SARS-COV-2 COVID-19 

PFIZER VACCINE                                      2022 

00:00:00            Completed                               [facility]

 

                                                    SARS-COV-2 COVID-19 

PFIZER VACCINE                                      2022 

00:00:00            Completed                               [facility]

 

                                                    SARS-COV-2 COVID-19 

PFIZER VACCINE                                      2022 

00:00:00            Completed                               [facility]

 

                                                    SARS-COV-2 COVID-19 

PFIZER VACCINE                                      2022 

00:00:00            Completed                               [facility]

 

                                                    SARS-COV-2 COVID-19 

PFIZER VACCINE                                      2022 

00:00:00            Completed                               [facility]

 

                                                    SARS-COV-2 COVID-19 

PFIZER VACCINE                                      2022 

00:00:00            Completed                               [facility]

 

                                                    SARS-COV-2 COVID-19 

PFIZER VACCINE                                      2022 

00:00:00            Completed                               [facility]

 

                                                    SARS-COV-2 COVID-19 

PFIZER VACCINE                                      2022 

00:00:00            Completed                               [facility]

 

                                                    SARS-COV-2 COVID-19 

PFIZER VACCINE                                      2022 

00:00:00            Completed                               [facility]

 

                                                    SARS-COV-2 COVID-19 

PFIZER VACCINE                                      2022 

00:00:00            Completed                               [facility]

 

                                                    SARS-COV-2 COVID-19 

PFIZER VACCINE                                      2022 

00:00:00            Completed                               [facility]

 

                                                    SARS-COV-2 COVID-19 

PFIZER VACCINE                                      2022 

00:00:00            Completed                               [facility]

 

                                                    SARS-COV-2 COVID-19 

PFIZER VACCINE                                      2022 

00:00:00            Completed                               [facility]

 

                                                    SARS-COV-2 COVID-19 

PFIZER VACCINE                                      2022 

00:00:00            Completed                               [facility]

 

                                                    SARS-COV-2 COVID-19 

PFIZER VACCINE                                      2022 

00:00:00            Completed                               [facility]

 

                                                    SARS-COV-2 COVID-19 

PFIZER VACCINE                                      2022 

00:00:00            Completed                               [facility]

 

                                                    SARS-COV-2 COVID-19 

PFIZER VACCINE                                      2022 

00:00:00            Completed                               [facility]

 

                                                    SARS-COV-2 COVID-19 

PFIZER VACCINE                                      2022 

00:00:00            Completed                               [facility]

 

                                                    SARS-COV-2 COVID-19 

PFIZER VACCINE                                      2022 

00:00:00            Completed                               [facility]

 

                                                    SARS-COV-2 COVID-19 

PFIZER VACCINE                                      2022 

00:00:00            Completed                               [facility]

 

                                                    SARS-COV-2 COVID-19 

PFIZER VACCINE                                      2022 

00:00:00            Completed                               [facility]

 

                                                    SARS-COV-2 COVID-19 

PFIZER VACCINE                                      2022 

00:00:00            Completed                               [facility]

 

                                                    SARS-COV-2 COVID-19 

PFIZER VACCINE                                      2022 

00:00:00            Completed                               [facility]

 

                                                    SARS-COV-2 COVID-19 

PFIZER VACCINE                                      2022 

00:00:00            Completed                               [facility]

 

                                                    SARS-COV-2 COVID-19 

PFIZER VACCINE                                      2022 

00:00:00            Completed                               [facility]

 

                                                    SARS-COV-2 COVID-19 

PFIZER VACCINE                                      2022 

00:00:00            Completed                               [facility]

 

                                                    SARS-COV-2 COVID-19 

PFIZER VACCINE                                      2022 

00:00:00            Completed                               [facility]

 

                                                    SARS-COV-2 COVID-19 

PFIZER VACCINE                                      2022 

00:00:00            Completed                               [facility]

 

                                                    SARS-COV-2 COVID-19 

PFIZER VACCINE                                      2022 

00:00:00            Completed                               [facility]

 

                                                    SARS-COV-2 COVID-19 

PFIZER VACCINE                                      2022 

00:00:00            Completed                               [facility]

 

                                                    SARS-COV-2 COVID-19 

PFIZER VACCINE                                      2022 

00:00:00            Completed                               [facility]

 

                                                    SARS-COV-2 COVID-19 

PFIZER VACCINE                                      2022 

00:00:00            Completed                               [facility]

 

                                                    SARS-COV-2 COVID-19 

PFIZER VACCINE                                      2022 

00:00:00            Completed                               [facility]

 

                                                    SARS-COV-2 COVID-19 

PFIZER VACCINE                                      2022 

00:00:00            Completed                               [facility]

 

                                                    SARS-COV-2 COVID-19 

PFIZER VACCINE                                      2022 

00:00:00            Completed                               [facility]

 

                                                    SARS-COV-2 COVID-19 

PFIZER VACCINE                                      2022 

00:00:00            Completed                               [facility]

 

                                                    SARS-COV-2 COVID-19 

PFIZER VACCINE                                      2022 

00:00:00            Completed                               [facility]

 

                                                    SARS-COV-2 COVID-19 

PFIZER VACCINE                                      2022 

00:00:00            Completed                               [facility]

 

                                                    SARS-COV-2 COVID-19 

PFIZER VACCINE                                      2022 

00:00:00            Completed                               [facility]

 

                                                    SARS-COV-2 COVID-19 

PFIZER VACCINE                                      2022 

00:00:00            Completed                               [facility]

 

                                                    SARS-COV-2 COVID-19 

PFIZER VACCINE                                      2022 

00:00:00            Completed                               [facility]

 

                                                    SARS-COV-2 COVID-19 

PFIZER VACCINE                                      2022 

00:00:00            Completed                               [facility]

 

                                                    SARS-COV-2 COVID-19 

PFIZER VACCINE                                      2022 

00:00:00            Completed                               [facility]

 

                                                    SARS-COV-2 COVID-19 

PFIZER VACCINE                                      2022 

00:00:00            Completed                               [facility]

 

                                                    SARS-COV-2 COVID-19 

PFIZER VACCINE                                      2022 

00:00:00            Completed                               [facility]

 

                                                    SARS-COV-2 COVID-19 

PFIZER VACCINE                                      2022 

00:00:00            Completed                               [facility]

 

                                                    SARS-COV-2 COVID-19 

PFIZER VACCINE                                      2022 

00:00:00            Completed                               [facility]

 

                                                    SARS-COV-2 COVID-19 

PFIZER VACCINE                                      2022 

00:00:00            Completed                               [facility]

 

                                                    SARS-COV-2 COVID-19 

PFIZER VACCINE                                      2022 

00:00:00            Completed                               [facility]

 

                                                    SARS-COV-2 COVID-19 

PFIZER VACCINE                                      2022 

00:00:00            Completed                               [facility]

 

                                                    SARS-COV-2 COVID-19 

PFIZER VACCINE                                      2022 

00:00:00            Completed                               [facility]

 

                                                    SARS-COV-2 COVID-19 

PFIZER VACCINE                                      2022 

00:00:00            Completed                               [facility]

 

                                                    SARS-COV-2 COVID-19 

PFIZER VACCINE                                      2022 

00:00:00            Completed                               [facility]

 

                                                    SARS-COV-2 COVID-19 

PFIZER VACCINE                                      2022 

00:00:00            Completed                               [facility]

 

                                                    SARS-COV-2 COVID-19 

PFIZER VACCINE                                      2022 

00:00:00            Completed                               [facility]

 

                                                    SARS-COV-2 COVID-19 

PFIZER VACCINE                                      2022 

00:00:00            Completed                               [facility]

 

                                                    SARS-COV-2 COVID-19 

PFIZER VACCINE                                      2022 

00:00:00            Completed                               [facility]

 

                                                    SARS-COV-2 COVID-19 

PFIZER VACCINE                                      2022 

00:00:00            Completed                               [facility]

 

                                                    SARS-COV-2 COVID-19 

PFIZER VACCINE                                      2022 

00:00:00            Completed                               [facility]

 

                                                    SARS-COV-2 COVID-19 

PFIZER VACCINE                                      2022 

00:00:00            Completed                               [facility]

 

                                                    SARS-COV-2 COVID-19 

PFIZER VACCINE                                      2022 

00:00:00            Completed                               [facility]

 

                                                    SARS-COV-2 COVID-19 

PFIZER VACCINE                                      2022 

00:00:00            Completed                               [facility]

 

                                                    SARS-COV-2 COVID-19 

PFIZER VACCINE                                      2022 

00:00:00            Completed                               [facility]

 

                                                    SARS-COV-2 COVID-19 

PFIZER VACCINE                                      2022 

00:00:00            Completed                               [facility]

 

                                                    SARS-COV-2 COVID-19 

PFIZER VACCINE                                      2022 

00:00:00            Completed                               [facility]

 

                                                    SARS-COV-2 COVID-19 

PFIZER VACCINE                                      2022 

00:00:00            Completed                               [facility]

 

                                                    SARS-COV-2 COVID-19 

PFIZER VACCINE                                      2022 

00:00:00            Completed                               [facility]

 

                                                    SARS-COV-2 COVID-19 

PFIZER VACCINE                                      2022 

00:00:00            Completed                               [facility]

 

                                                    SARS-COV-2 COVID-19 

PFIZER VACCINE                                      2022 

00:00:00            Completed                               [facility]

 

                                                    SARS-COV-2 COVID-19 

PFIZER VACCINE                                      2022 

00:00:00            Completed                               [facility]

 

                                                    SARS-COV-2 COVID-19 

PFIZER VACCINE                                      2022 

00:00:00            Completed                               [facility]

 

                                                    SARS-COV-2 COVID-19 

PFIZER VACCINE                                      2022 

00:00:00            Completed                               [facility]

 

                                                    SARS-COV-2 COVID-19 

PFIZER VACCINE                                      2022 

00:00:00            Completed                               [facility]

 

                                                    SARS-COV-2 COVID-19 

PFIZER VACCINE                                      2022 

00:00:00            Completed                               [facility]

 

                                                    SARS-COV-2 COVID-19 

PFIZER VACCINE                                      2022 

00:00:00            Completed                               [facility]

 

                                                    SARS-COV-2 COVID-19 

PFIZER VACCINE                                      2022 

00:00:00            Completed                               [facility]

 

                                                    SARS-COV-2 COVID-19 

PFIZER VACCINE                                      2022 

00:00:00            Completed                               [facility]

 

                                                    SARS-COV-2 COVID-19 

PFIZER VACCINE                                      2022 

00:00:00            Completed                               [facility]

 

                                                    SARS-COV-2 COVID-19 

PFIZER VACCINE                                      2022 

00:00:00            Completed                               [facility]

 

                                                    SARS-COV-2 COVID-19 

PFIZER VACCINE                                      2022 

00:00:00            Completed                               [facility]

 

                                                    SARS-COV-2 COVID-19 

PFIZER VACCINE                                      2022 

00:00:00            Completed                               [facility]

 

                                                    SARS-COV-2 COVID-19 

PFIZER VACCINE                                      2022 

00:00:00            Completed                               [facility]

 

                                                    SARS-COV-2 COVID-19 

PFIZER VACCINE                                      2022 

00:00:00            Completed                               [facility]

 

                                                    SARS-COV-2 COVID-19 

PFIZER VACCINE                                      2022 

00:00:00            Completed                               [facility]

 

                                                    SARS-COV-2 COVID-19 

PFIZER VACCINE                                      2022 

00:00:00            Completed                               [facility]

 

                                                    SARS-COV-2 COVID-19 

PFIZER VACCINE                                      2022 

00:00:00            Completed                               [facility]

 

                                                    SARS-COV-2 COVID-19 

PFIZER VACCINE                                      2022 

00:00:00            Completed                               [facility]

 

                                                    SARS-COV-2 COVID-19 

PFIZER VACCINE                                      2022 

00:00:00            Completed                               [facility]

 

                                                    SARS-COV-2 COVID-19 

PFIZER VACCINE                                      2022 

00:00:00            Completed                               [facility]

 

                                                    SARS-COV-2 COVID-19 

PFIZER VACCINE                                      2022 

00:00:00            Completed                               [facility]

 

                                                    SARS-COV-2 COVID-19 

PFIZER VACCINE                                      2022 

00:00:00            Completed                               [facility]

 

                                                    SARS-COV-2 COVID-19 

PFIZER VACCINE                                      2022 

00:00:00            Completed                               [facility]

 

                                                    SARS-COV-2 COVID-19 

PFIZER VACCINE                                      2022 

00:00:00            Completed                               [facility]

 

                                                    SARS-COV-2 COVID-19 

PFIZER VACCINE                                      2022 

00:00:00            Completed                               [facility]

 

                                                    SARS-COV-2 COVID-19 

PFIZER VACCINE                                      2022 

00:00:00            Completed                               [facility]

 

                                                    SARS-COV-2 COVID-19 

PFIZER VACCINE                                      2022 

00:00:00            Completed                               [facility]

 

                                                    SARS-COV-2 COVID-19 

PFIZER VACCINE                                      2022 

00:00:00            Completed                               [facility]

 

                                                    SARS-COV-2 COVID-19 

PFIZER VACCINE                                      2022 

00:00:00            Completed                               [facility]

 

                                                    SARS-COV-2 COVID-19 

PFIZER VACCINE                                      2022 

00:00:00            Completed                               [facility]

 

                                                    SARS-COV-2 COVID-19 

PFIZER VACCINE                                      2022 

00:00:00            Completed                               [facility]

 

                                                    SARS-COV-2 COVID-19 

PFIZER VACCINE                                      2022 

00:00:00            Completed                               [facility]

 

                                                    SARS-COV-2 COVID-19 

PFIZER VACCINE                                      2022 

00:00:00            Completed                               [facility]

 

                                                    Pneumococcal 

Polysaccharide, 

PPSV23 (PNEUMOVAX)                                  2021 

00:00:00            Completed                               [facility]

 

                                                    Pneumococcal 

Polysaccharide, 

PPSV23 (PNEUMOVAX)                                  2021 

00:00:00            Completed                               [facility]

 

                                                    Pneumococcal 

Polysaccharide, 

PPSV23 (PNEUMOVAX)                                  2021 

00:00:00            Completed                               [facility]

 

                                                    Pneumococcal 

Polysaccharide, 

PPSV23 (PNEUMOVAX)                                  2021 

00:00:00            Completed                               [facility]

 

                                                    Pneumococcal 

Polysaccharide, 

PPSV23 (PNEUMOVAX)                                  2021 

00:00:00            Completed                               [facility]

 

                                                    Pneumococcal 

Polysaccharide, 

PPSV23 (PNEUMOVAX)                                  2021 

00:00:00            Completed                               [facility]

 

                                                    Pneumococcal 

Polysaccharide, 

PPSV23 (PNEUMOVAX)                                  2021 

00:00:00            Completed                               [facility]

 

                                                    Pneumococcal 

Polysaccharide, 

PPSV23 (PNEUMOVAX)                                  2021 

00:00:00            Completed                               [facility]

 

                                                    Pneumococcal 

Polysaccharide, 

PPSV23 (PNEUMOVAX)                                  2021 

00:00:00            Completed                               [facility]

 

                                                    Pneumococcal 

Polysaccharide, 

PPSV23 (PNEUMOVAX)                                  2021 

00:00:00            Completed                               [facility]

 

                                                    Pneumococcal 

Polysaccharide, 

PPSV23 (PNEUMOVAX)                                  2021 

00:00:00            Completed                               [facility]

 

                                                    Pneumococcal 

Polysaccharide, 

PPSV23 (PNEUMOVAX)                                  2021 

00:00:00            Completed                               [facility]

 

                                                    Pneumococcal 

Polysaccharide, 

PPSV23 (PNEUMOVAX)                                  2021 

00:00:00            Completed                               [facility]

 

                                                    Pneumococcal 

Polysaccharide, 

PPSV23 (PNEUMOVAX)                                  2021 

00:00:00            Completed                               [facility]

 

                                                    Pneumococcal 

Polysaccharide, 

PPSV23 (PNEUMOVAX)                                  2021 

00:00:00            Completed                               [facility]

 

                                                    Pneumococcal 

Polysaccharide, 

PPSV23 (PNEUMOVAX)                                  2021 

00:00:00            Completed                               [facility]

 

                                                    Pneumococcal 

Polysaccharide, 

PPSV23 (PNEUMOVAX)                                  2021 

00:00:00            Completed                               [facility]

 

                                                    Pneumococcal 

Polysaccharide, 

PPSV23 (PNEUMOVAX)                                  2021 

00:00:00            Completed                               [facility]

 

                                                    Pneumococcal 

Polysaccharide, 

PPSV23 (PNEUMOVAX)                                  2021 

00:00:00            Completed                               [facility]

 

                                                    Pneumococcal 

Polysaccharide, 

PPSV23 (PNEUMOVAX)                                  2021 

00:00:00            Completed                               [facility]

 

                                                    Pneumococcal 

Polysaccharide, 

PPSV23 (PNEUMOVAX)                                  2021 

00:00:00            Completed                               [facility]

 

                                                    Pneumococcal 

Polysaccharide, 

PPSV23 (PNEUMOVAX)                                  2021 

00:00:00            Completed                               [facility]

 

                                                    Pneumococcal 

Polysaccharide, 

PPSV23 (PNEUMOVAX)                                  2021 

00:00:00            Completed                               [facility]

 

                                                    Pneumococcal 

Polysaccharide, 

PPSV23 (PNEUMOVAX)                                  2021 

00:00:00            Completed                               [facility]

 

                                                    Pneumococcal 

Polysaccharide, 

PPSV23 (PNEUMOVAX)                                  2021 

00:00:00            Completed                               [facility]

 

                                                    Pneumococcal 

Polysaccharide, 

PPSV23 (PNEUMOVAX)                                  2021 

00:00:00            Completed                               [facility]

 

                                                    Pneumococcal 

Polysaccharide, 

PPSV23 (PNEUMOVAX)                                  2021 

00:00:00            Completed                               [facility]

 

                                                    Pneumococcal 

Polysaccharide, 

PPSV23 (PNEUMOVAX)                                  2021 

00:00:00            Completed                               [facility]

 

                                                    Pneumococcal 

Polysaccharide, 

PPSV23 (PNEUMOVAX)                                  2021 

00:00:00            Completed                               [facility]

 

                                                    Pneumococcal 

Polysaccharide, 

PPSV23 (PNEUMOVAX)                                  2021 

00:00:00            Completed                               [facility]

 

                                                    Pneumococcal 

Polysaccharide, 

PPSV23 (PNEUMOVAX)                                  2021 

00:00:00            Completed                               [facility]

 

                                                    Pneumococcal 

Polysaccharide, 

PPSV23 (PNEUMOVAX)                                  2021 

00:00:00            Completed                               [facility]

 

                                                    Pneumococcal 

Polysaccharide, 

PPSV23 (PNEUMOVAX)                                  2021 

00:00:00            Completed                               [facility]

 

                                                    Pneumococcal 

Polysaccharide, 

PPSV23 (PNEUMOVAX)                                  2021 

00:00:00            Completed                               [facility]

 

                                                    Pneumococcal 

Polysaccharide, 

PPSV23 (PNEUMOVAX)                                  2021 

00:00:00            Completed                               [facility]

 

                                                    Pneumococcal 

Polysaccharide, 

PPSV23 (PNEUMOVAX)                                  2021 

00:00:00            Completed                               [facility]

 

                                                    Pneumococcal 

Polysaccharide, 

PPSV23 (PNEUMOVAX)                                  2021 

00:00:00            Completed                               [facility]

 

                                                    Pneumococcal 

Polysaccharide, 

PPSV23 (PNEUMOVAX)                                  2021 

00:00:00            Completed                               [facility]

 

                                                    Pneumococcal 

Polysaccharide, 

PPSV23 (PNEUMOVAX)                                  2021 

00:00:00            Completed                               [facility]

 

                                                    Pneumococcal 

Polysaccharide, 

PPSV23 (PNEUMOVAX)                                  2021 

00:00:00            Completed                               [facility]

 

                                                    Pneumococcal 

Polysaccharide, 

PPSV23 (PNEUMOVAX)                                  2021 

00:00:00            Completed                               [facility]

 

                                                    Pneumococcal 

Polysaccharide, 

PPSV23 (PNEUMOVAX)                                  2021 

00:00:00            Completed                               [facility]

 

                                                    Pneumococcal 

Polysaccharide, 

PPSV23 (PNEUMOVAX)                                  2021 

00:00:00            Completed                               [facility]

 

                                                    Pneumococcal 

Polysaccharide, 

PPSV23 (PNEUMOVAX)                                  2021 

00:00:00            Completed                               [facility]

 

                                                    Pneumococcal 

Polysaccharide, 

PPSV23 (PNEUMOVAX)                                  2021 

00:00:00            Completed                               [facility]

 

                                                    Pneumococcal 

Polysaccharide, 

PPSV23 (PNEUMOVAX)                                  2021 

00:00:00            Completed                               [facility]

 

                                                    Pneumococcal 

Polysaccharide, 

PPSV23 (PNEUMOVAX)                                  2021 

00:00:00            Completed                               [facility]

 

                                                    Pneumococcal 

Polysaccharide, 

PPSV23 (PNEUMOVAX)                                  2021 

00:00:00            Completed                               [facility]

 

                                                    Pneumococcal 

Polysaccharide, 

PPSV23 (PNEUMOVAX)                                  2021 

00:00:00            Completed                               [facility]

 

                                                    Pneumococcal 

Polysaccharide, 

PPSV23 (PNEUMOVAX)                                  2021 

00:00:00            Completed                               [facility]

 

                                                    Pneumococcal 

Polysaccharide, 

PPSV23 (PNEUMOVAX)                                  2021 

00:00:00            Completed                               [facility]

 

                                                    Pneumococcal 

Polysaccharide, 

PPSV23 (PNEUMOVAX)                                  2021 

00:00:00            Completed                               [facility]

 

                                                    Pneumococcal 

Polysaccharide, 

PPSV23 (PNEUMOVAX)                                  2021 

00:00:00            Completed                               [facility]

 

                                                    Pneumococcal 

Polysaccharide, 

PPSV23 (PNEUMOVAX)                                  2021 

00:00:00            Completed                               [facility]

 

                                                    Pneumococcal 

Polysaccharide, 

PPSV23 (PNEUMOVAX)                                  2021 

00:00:00            Completed                               [facility]

 

                                                    Pneumococcal 

Polysaccharide, 

PPSV23 (PNEUMOVAX)                                  2021 

00:00:00            Completed                               [facility]

 

                                                    Pneumococcal 

Polysaccharide, 

PPSV23 (PNEUMOVAX)                                  2021 

00:00:00            Completed                               [facility]

 

                                                    Pneumococcal 

Polysaccharide, 

PPSV23 (PNEUMOVAX)                                  2021 

00:00:00            Completed                               [facility]

 

                                                    Pneumococcal 

Polysaccharide, 

PPSV23 (PNEUMOVAX)                                  2021 

00:00:00            Completed                               [facility]

 

                                                    Pneumococcal 

Polysaccharide, 

PPSV23 (PNEUMOVAX)                                  2021 

00:00:00            Completed                               [facility]

 

                                                    Pneumococcal 

Polysaccharide, 

PPSV23 (PNEUMOVAX)                                  2021 

00:00:00            Completed                               [facility]

 

                                                    Pneumococcal 

Polysaccharide, 

PPSV23 (PNEUMOVAX)                                  2021 

00:00:00            Completed                               [facility]

 

                                                    Pneumococcal 

Polysaccharide, 

PPSV23 (PNEUMOVAX)                                  2021 

00:00:00            Completed                               [facility]

 

                                                    Pneumococcal 

Polysaccharide, 

PPSV23 (PNEUMOVAX)                                  2021 

00:00:00            Completed                               [facility]

 

                                                    Pneumococcal 

Polysaccharide, 

PPSV23 (PNEUMOVAX)                                  2021 

00:00:00            Completed                               [facility]

 

                                                    Pneumococcal 

Polysaccharide, 

PPSV23 (PNEUMOVAX)                                  2021 

00:00:00            Completed                               [facility]

 

                                                    Pneumococcal 

Polysaccharide, 

PPSV23 (PNEUMOVAX)                                  2021 

00:00:00            Completed                               [facility]

 

                                                    Pneumococcal 

Polysaccharide, 

PPSV23 (PNEUMOVAX)                                  2021 

00:00:00            Completed                               [facility]

 

                                                    Pneumococcal 

Polysaccharide, 

PPSV23 (PNEUMOVAX)                                  2021 

00:00:00            Completed                               [facility]

 

                                                    Pneumococcal 

Polysaccharide, 

PPSV23 (PNEUMOVAX)                                  2021 

00:00:00            Completed                               [facility]

 

                                                    Pneumococcal 

Polysaccharide, 

PPSV23 (PNEUMOVAX)                                  2021 

00:00:00            Completed                               [facility]

 

                                                    Pneumococcal 

Polysaccharide, 

PPSV23 (PNEUMOVAX)                                  2021 

00:00:00            Completed                               [facility]

 

                                                    Pneumococcal 

Polysaccharide, 

PPSV23 (PNEUMOVAX)                                  2021 

00:00:00            Completed                               [facility]

 

                                                    Pneumococcal 

Polysaccharide, 

PPSV23 (PNEUMOVAX)                                  2021 

00:00:00            Completed                               [facility]

 

                                                    Pneumococcal 

Polysaccharide, 

PPSV23 (PNEUMOVAX)                                  2021 

00:00:00            Completed                               [facility]

 

                                                    Pneumococcal 

Polysaccharide, 

PPSV23 (PNEUMOVAX)                                  2021 

00:00:00            Completed                               [facility]

 

                                                    Pneumococcal 

Polysaccharide, 

PPSV23 (PNEUMOVAX)                                  2021 

00:00:00            Completed                               [facility]

 

                                                    Pneumococcal 

Polysaccharide, 

PPSV23 (PNEUMOVAX)                                  2021 

00:00:00            Completed                               [facility]

 

                                                    Pneumococcal 

Polysaccharide, 

PPSV23 (PNEUMOVAX)                                  2021 

00:00:00            Completed                               [facility]

 

                                                    Pneumococcal 

Polysaccharide, 

PPSV23 (PNEUMOVAX)                                  2021 

00:00:00            Completed                               [facility]

 

                                                    Pneumococcal 

Polysaccharide, 

PPSV23 (PNEUMOVAX)                                  2021 

00:00:00            Completed                               [facility]

 

                                                    Pneumococcal 

Polysaccharide, 

PPSV23 (PNEUMOVAX)                                  2021 

00:00:00            Completed                               [facility]

 

                                                    Pneumococcal 

Polysaccharide, 

PPSV23 (PNEUMOVAX)                                  2021 

00:00:00            Completed                               [facility]

 

                                                    Pneumococcal 

Polysaccharide, 

PPSV23 (PNEUMOVAX)                                  2021 

00:00:00            Completed                               [facility]

 

                                                    Pneumococcal 

Polysaccharide, 

PPSV23 (PNEUMOVAX)                                  2021 

00:00:00            Completed                               [facility]

 

                                                    Pneumococcal 

Polysaccharide, 

PPSV23 (PNEUMOVAX)                                  2021 

00:00:00            Completed                               [facility]

 

                                                    Pneumococcal 

Polysaccharide, 

PPSV23 (PNEUMOVAX)                                  2021 

00:00:00            Completed                               [facility]

 

                                                    Pneumococcal 

Polysaccharide, 

PPSV23 (PNEUMOVAX)                                  2021 

00:00:00            Completed                               [facility]

 

                                                    Pneumococcal 

Polysaccharide, 

PPSV23 (PNEUMOVAX)                                  2021 

00:00:00            Completed                               [facility]

 

                                                    Pneumococcal 

Polysaccharide, 

PPSV23 (PNEUMOVAX)                                  2021 

00:00:00            Completed                               [facility]

 

                                                    Pneumococcal 

Polysaccharide, 

PPSV23 (PNEUMOVAX)                                  2021 

00:00:00            Completed                               [facility]

 

                                                    Pneumococcal 

Polysaccharide, 

PPSV23 (PNEUMOVAX)                                  2021 

00:00:00            Completed                               [facility]

 

                                                    Pneumococcal 

Polysaccharide, 

PPSV23 (PNEUMOVAX)                                  2021 

00:00:00            Completed                               [facility]

 

                                                    Pneumococcal 

Polysaccharide, 

PPSV23 (PNEUMOVAX)                                  2021 

00:00:00            Completed                               [facility]

 

                                                    Pneumococcal 

Polysaccharide, 

PPSV23 (PNEUMOVAX)                                  2021 

00:00:00            Completed                               [facility]

 

                                                    Pneumococcal 

Polysaccharide, 

PPSV23 (PNEUMOVAX)                                  2021 

00:00:00            Completed                               [facility]

 

                                                    Pneumococcal 

Polysaccharide, 

PPSV23 (PNEUMOVAX)                                  2021 

00:00:00            Completed                               [facility]

 

                                                    Pneumococcal 

Polysaccharide, 

PPSV23 (PNEUMOVAX)                                  2021 

00:00:00            Completed                               [facility]

 

                                                    Pneumococcal 

Polysaccharide, 

PPSV23 (PNEUMOVAX)                                  2021 

00:00:00            Completed                               [facility]

 

                                                    Pneumococcal 

Polysaccharide, 

PPSV23 (PNEUMOVAX)                                  2021 

00:00:00            Completed                               [facility]

 

                                                    Pneumococcal 

Polysaccharide, 

PPSV23 (PNEUMOVAX)                                  2021 

00:00:00            Completed                               [facility]

 

                                                    Pneumococcal 

Polysaccharide, 

PPSV23 (PNEUMOVAX)                                  2021 

00:00:00            Completed                               [facility]

 

                                                    Pneumococcal 

Polysaccharide, 

PPSV23 (PNEUMOVAX)                                  2021 

00:00:00            Completed                               [facility]

 

                                                    Pneumococcal 

Polysaccharide, 

PPSV23 (PNEUMOVAX)                                  2021 

00:00:00            Completed                               [facility]

 

                                                    Pneumococcal 

Polysaccharide, 

PPSV23 (PNEUMOVAX)                                  2021 

00:00:00            Completed                               [facility]

 

                                                    Pneumococcal 

Polysaccharide, 

PPSV23 (PNEUMOVAX)                                  2021 

00:00:00            Completed                               [facility]

 

                                                    Pneumococcal 

Polysaccharide, 

PPSV23 (PNEUMOVAX)                                  2021 

00:00:00            Completed                               [facility]

 

                                                    Pneumococcal 

Polysaccharide, 

PPSV23 (PNEUMOVAX)                                  2021 

00:00:00            Completed                               [facility]

 

                                                    Pneumococcal 

Polysaccharide, 

PPSV23 (PNEUMOVAX)                                  2021 

00:00:00            Completed                               [facility]

 

                                                    Pneumococcal 

Polysaccharide, 

PPSV23 (PNEUMOVAX)                                  2021 

00:00:00            Completed                               [facility]

 

                                                    Pneumococcal 

Polysaccharide, 

PPSV23 (PNEUMOVAX)                                  2021 

00:00:00            Completed                               [facility]

 

                                                    Pneumococcal 

Polysaccharide, 

PPSV23 (PNEUMOVAX)                                  2021 

00:00:00            Completed                               [facility]

 

                                                    Pneumococcal 

Polysaccharide, 

PPSV23 (PNEUMOVAX)                                  2021 

00:00:00            Completed                               [facility]

 

                                                    Pneumococcal 

Polysaccharide, 

PPSV23 (PNEUMOVAX)                                  2021 

00:00:00            Completed                               [facility]

 

                                                    Pneumococcal 

Polysaccharide, 

PPSV23 (PNEUMOVAX)                                  2021 

00:00:00            Completed                               [facility]

 

                                                    Pneumococcal 

Polysaccharide, 

PPSV23 (PNEUMOVAX)                                  2021 

00:00:00            Completed                               [facility]

 

                                                    Pneumococcal 

Polysaccharide, 

PPSV23 (PNEUMOVAX)                                  2021 

00:00:00            Completed                               [facility]

 

                                                    Pneumococcal 

Polysaccharide, 

PPSV23 (PNEUMOVAX)                                  2021 

00:00:00            Completed                               [facility]

 

                                                    Pneumococcal 

Polysaccharide, 

PPSV23 (PNEUMOVAX)                                  2021 

00:00:00            Completed                               [facility]

 

                                                    Pneumococcal 

Polysaccharide, 

PPSV23 (PNEUMOVAX)                                  2021 

00:00:00            Completed                               [facility]

 

                                                    Pneumococcal 

Polysaccharide, 

PPSV23 (PNEUMOVAX)                                  2021 

00:00:00            Completed                               [facility]

 

                                                    Pneumococcal 

Polysaccharide, 

PPSV23 (PNEUMOVAX)                                  2021 

00:00:00            Completed                               [facility]

 

                                                    Pneumococcal 

Polysaccharide, 

PPSV23 (PNEUMOVAX)                                  2021 

00:00:00            Completed                               [facility]

 

                                                    Pneumococcal 

Polysaccharide, 

PPSV23 (PNEUMOVAX)                                  2021 

00:00:00            Completed                               [facility]

 

                                                    Pneumococcal 

Polysaccharide, 

PPSV23 (PNEUMOVAX)                                  2021 

00:00:00            Completed                               [facility]

 

                                                    Pneumococcal 

Polysaccharide, 

PPSV23 (PNEUMOVAX)                                  2021 

00:00:00            Completed                               [facility]

 

                                                    Pneumococcal 

Polysaccharide, 

PPSV23 (PNEUMOVAX)                                  2021 

00:00:00            Completed                               [facility]

 

                                                    Pneumococcal 

Polysaccharide, 

PPSV23 (PNEUMOVAX)                                  2021 

00:00:00            Completed                               [facility]

 

                                                    Pneumococcal 

Polysaccharide, 

PPSV23 (PNEUMOVAX)                                  2021 

00:00:00            Completed                               [facility]

 

                                                    Pneumococcal 

Polysaccharide, 

PPSV23 (PNEUMOVAX)                                  2021 

00:00:00            Completed                               [facility]

 

                                                    Pneumococcal 

Polysaccharide, 

PPSV23 (PNEUMOVAX)                                  2021 

00:00:00            Completed                               [facility]

 

                                                    Pneumococcal 

Polysaccharide, 

PPSV23 (PNEUMOVAX)                                  2021 

00:00:00            Completed                               [facility]

 

                                                    Pneumococcal 

Polysaccharide, 

PPSV23 (PNEUMOVAX)                                  2021 

00:00:00            Completed                               [facility]

 

                                                    Pneumococcal 

Polysaccharide, 

PPSV23 (PNEUMOVAX)                                  2021 

00:00:00            Completed                               [facility]

 

                                                    Pneumococcal 

Polysaccharide, 

PPSV23 (PNEUMOVAX)                                  2021 

00:00:00            Completed                               [facility]

 

                                                    Pneumococcal 

Polysaccharide, 

PPSV23 (PNEUMOVAX)                                  2021 

00:00:00            Completed                               [facility]

 

                                                    Pneumococcal 

Polysaccharide, 

PPSV23 (PNEUMOVAX)                                  2021 

00:00:00            Completed                               [facility]

 

                                                    Pneumococcal 

Polysaccharide, 

PPSV23 (PNEUMOVAX)                                  2021 

00:00:00            Completed                               [facility]

 

                                                    Pneumococcal 

Polysaccharide, 

PPSV23 (PNEUMOVAX)                                  2021 

00:00:00            Completed                               [facility]

 

                                                    Pneumococcal 

Polysaccharide, 

PPSV23 (PNEUMOVAX)                                  2021 

00:00:00            Completed                               [facility]

 

                                                    Pneumococcal 

Polysaccharide, 

PPSV23 (PNEUMOVAX)                                  2021 

00:00:00            Completed                               [facility]

 

                                                    Pneumococcal 

Polysaccharide, 

PPSV23 (PNEUMOVAX)                                  2021 

00:00:00            Completed                               [facility]

 

                                                    Pneumococcal 

Polysaccharide, 

PPSV23 (PNEUMOVAX)                                  2021 

00:00:00            Completed                               [facility]

 

                                                    Pneumococcal 

Polysaccharide, 

PPSV23 (PNEUMOVAX)                                  2021 

00:00:00            Completed                               [facility]

 

                                                    Pneumococcal 

Polysaccharide, 

PPSV23 (PNEUMOVAX)                                  2021 

00:00:00            Completed                               [facility]

 

                                                    Pneumococcal 

Polysaccharide, 

PPSV23 (PNEUMOVAX)                                  2021 

00:00:00            Completed                               [facility]

 

                                                    Pneumococcal 

Polysaccharide, 

PPSV23 (PNEUMOVAX)                                  2021 

00:00:00            Completed                               [facility]

 

                                                    Pneumococcal 

Polysaccharide, 

PPSV23 (PNEUMOVAX)                                  2021 

00:00:00            Completed                               [facility]

 

                                                    Pneumococcal 

Polysaccharide, 

PPSV23 (PNEUMOVAX)                                  2021 

00:00:00            Completed                               [facility]

 

                                                    Pneumococcal 

Polysaccharide, 

PPSV23 (PNEUMOVAX)                                  2021 

00:00:00            Completed                               [facility]

 

                                                    Pneumococcal 

Polysaccharide, 

PPSV23 (PNEUMOVAX)                                  2021 

00:00:00            Completed                               [facility]

 

                                                    Pneumococcal 

Polysaccharide, 

PPSV23 (PNEUMOVAX)                                  2021 

00:00:00            Completed                               [facility]

 

                                                    Pneumococcal 

Polysaccharide, 

PPSV23 (PNEUMOVAX)                                  2021 

00:00:00            Completed                               [facility]

 

                                                    Pneumococcal 

Polysaccharide, 

PPSV23 (PNEUMOVAX)                                  2021 

00:00:00            Completed                               [facility]

 

                                                    Pneumococcal 

Polysaccharide, 

PPSV23 (PNEUMOVAX)                                  2021 

00:00:00            Completed                               [facility]

 

                                                    Pneumococcal 

Polysaccharide, 

PPSV23 (PNEUMOVAX)                                  2021 

00:00:00            Completed                               [facility]

 

                                                    Pneumococcal 

Polysaccharide, 

PPSV23 (PNEUMOVAX)                                  2021 

00:00:00            Completed                               [facility]

 

                                                    Pneumococcal 

Polysaccharide, 

PPSV23 (PNEUMOVAX)                                  2021 

00:00:00            Completed                               [facility]

 

                                                    Pneumococcal 

Polysaccharide, 

PPSV23 (PNEUMOVAX)                                  2021 

00:00:00            Completed                               [facility]

 

                                                    Pneumococcal 

Polysaccharide, 

PPSV23 (PNEUMOVAX)                                  2021 

00:00:00            Completed                               [facility]

 

                                                    Pneumococcal 

Polysaccharide, 

PPSV23 (PNEUMOVAX)                                  2021 

00:00:00            Completed                               [facility]

 

                                                    Pneumococcal 

Polysaccharide, 

PPSV23 (PNEUMOVAX)                                  2021 

00:00:00            Completed                               [facility]

 

                                                    Pneumococcal 

Polysaccharide, 

PPSV23 (PNEUMOVAX)                                  2021 

00:00:00            Completed                               [facility]

 

                                                    Pneumococcal 

Polysaccharide, 

PPSV23 (PNEUMOVAX)                                  2021 

00:00:00            Completed                               [facility]

 

                                                    Pneumococcal 

Polysaccharide, 

PPSV23 (PNEUMOVAX)                                  2021 

00:00:00            Completed                               [facility]

 

                                                    Pneumococcal 

Polysaccharide, 

PPSV23 (PNEUMOVAX)                                  2021 

00:00:00            Completed                               [facility]

 

                                                    Pneumococcal 

Polysaccharide, 

PPSV23 (PNEUMOVAX)                                  2021 

00:00:00            Completed                               [facility]

 

                                                    Pneumococcal 

Polysaccharide, 

PPSV23 (PNEUMOVAX)                                  2021 

00:00:00            Completed                               [facility]

 

                                                    Pneumococcal 

Polysaccharide, 

PPSV23 (PNEUMOVAX)                                  2021 

00:00:00            Completed                               [facility]

 

                                                    Pneumococcal 

Polysaccharide, 

PPSV23 (PNEUMOVAX)                                  2021 

00:00:00            Completed                               [facility]

 

                                                    Pneumococcal 

Polysaccharide, 

PPSV23 (PNEUMOVAX)                                  2021 

00:00:00            Completed                               [facility]

 

                                                    Pneumococcal 

Polysaccharide, 

PPSV23 (PNEUMOVAX)                                  2021 

00:00:00            Completed                               [facility]

 

                                                    Pneumococcal 

Polysaccharide, 

PPSV23 (PNEUMOVAX)                                  2021 

00:00:00            Completed                               [facility]

 

                                                    Pneumococcal 

Polysaccharide, 

PPSV23 (PNEUMOVAX)                                  2021 

00:00:00            Completed                               [facility]

 

                                                    Pneumococcal 

Polysaccharide, 

PPSV23 (PNEUMOVAX)                                  2021 

00:00:00            Completed                               [facility]

 

                                                    Pneumococcal 

Polysaccharide, 

PPSV23 (PNEUMOVAX)                                  2021 

00:00:00            Completed                               [facility]

 

                                                    Pneumococcal 

Polysaccharide, 

PPSV23 (PNEUMOVAX)                                  2021 

00:00:00            Completed                               [facility]

 

                                                    Pneumococcal 

Polysaccharide, 

PPSV23 (PNEUMOVAX)                                  2021 

00:00:00            Completed                               [facility]

 

                                                    Pneumococcal 

Polysaccharide, 

PPSV23 (PNEUMOVAX)                                  2021 

00:00:00            Completed                               [facility]

 

                                                    Pneumococcal 

Polysaccharide, 

PPSV23 (PNEUMOVAX)                                  2021 

00:00:00            Completed                               [facility]

 

                                                    Pneumococcal 

Polysaccharide, 

PPSV23 (PNEUMOVAX)                                  2021 

00:00:00            Completed                               [facility]

 

                                                    Pneumococcal 

Polysaccharide, 

PPSV23 (PNEUMOVAX)                                  2021 

00:00:00            Completed                               [facility]

 

                                                    Pneumococcal 

Polysaccharide, 

PPSV23 (PNEUMOVAX)                                  2021 

00:00:00            Completed                               [facility]

 

                                                    Pneumococcal 

Polysaccharide, 

PPSV23 (PNEUMOVAX)                                  2021 

00:00:00            Completed                               [facility]

 

                                                    Pneumococcal 

Polysaccharide, 

PPSV23 (PNEUMOVAX)                                  2021 

00:00:00            Completed                               [facility]

 

                                                    Pneumococcal 

Polysaccharide, 

PPSV23 (PNEUMOVAX)                                  2021 

00:00:00            Completed                               [facility]

 

                                                    Pneumococcal 

Polysaccharide, 

PPSV23 (PNEUMOVAX)                                  2021 

00:00:00            Completed                               [facility]

 

                                                    Pneumococcal 

Polysaccharide, 

PPSV23 (PNEUMOVAX)                                  2021 

00:00:00            Completed                               [facility]

 

                                                    Pneumococcal 

Polysaccharide, 

PPSV23 (PNEUMOVAX)                                  2021 

00:00:00            Completed                               [facility]

 

                                                    Pneumococcal 

Polysaccharide, 

PPSV23 (PNEUMOVAX)                                  2021 

00:00:00            Completed                               [facility]

 

                                                    Pneumococcal 

Polysaccharide, 

PPSV23 (PNEUMOVAX)                                  2021 

00:00:00            Completed                               [facility]

 

                                                    Pneumococcal 

Polysaccharide, 

PPSV23 (PNEUMOVAX)                                  2021 

00:00:00            Completed                               [facility]

 

                                                    Pneumococcal 

Polysaccharide, 

PPSV23 (PNEUMOVAX)                                  2021 

00:00:00            Completed                               [facility]

 

                                                    Pneumococcal 

Polysaccharide, 

PPSV23 (PNEUMOVAX)                                  2021 

00:00:00            Completed                               [facility]

 

                                                    Pneumococcal 

Polysaccharide, 

PPSV23 (PNEUMOVAX)                                  2021 

00:00:00            Completed                               [facility]

 

                                                    Pneumococcal 

Polysaccharide, 

PPSV23 (PNEUMOVAX)                                  2021 

00:00:00            Completed                               [facility]

 

                                                    Pneumococcal 

Polysaccharide, 

PPSV23 (PNEUMOVAX)                                  2021 

00:00:00            Completed                               [facility]

 

                                                    Pneumococcal 

Polysaccharide, 

PPSV23 (PNEUMOVAX)                                  2021 

00:00:00            Completed                               [facility]

 

                                                    Pneumococcal 

Polysaccharide, 

PPSV23 (PNEUMOVAX)                                  2021 

00:00:00            Completed                               [facility]

 

                                                    Pneumococcal 

Polysaccharide, 

PPSV23 (PNEUMOVAX)                                  2021 

00:00:00            Completed                               [facility]

 

                                                    Pneumococcal 

Polysaccharide, 

PPSV23 (PNEUMOVAX)                                  2021 

00:00:00            Completed                               [facility]

 

                                                    Pneumococcal 

Polysaccharide, 

PPSV23 (PNEUMOVAX)                                  2021 

00:00:00            Completed                               [facility]

 

                                Influenza High Dose                 2021 

00:00:00            Completed                               [facility]

 

                                Influenza High Dose                 2021 

00:00:00            Completed                               [facility]

 

                                Influenza High Dose                 2021 

00:00:00            Completed                               [facility]

 

                                Influenza High Dose                 2021 

00:00:00            Completed                               [facility]

 

                                Influenza High Dose                 2021 

00:00:00            Completed                               [facility]

 

                                Influenza High Dose                 2021 

00:00:00            Completed                               [facility]

 

                                Influenza High Dose                 2021 

00:00:00            Completed                               [facility]

 

                                Influenza High Dose                 2021 

00:00:00            Completed                               [facility]

 

                                Influenza High Dose                 2021 

00:00:00            Completed                               [facility]

 

                                Influenza High Dose                 2021 

00:00:00            Completed                               [facility]

 

                                Influenza High Dose                 2021 

00:00:00            Completed                               [facility]

 

                                Influenza High Dose                 2021 

00:00:00            Completed                               [facility]

 

                                Influenza High Dose                 2021 

00:00:00            Completed                               [facility]

 

                                Influenza High Dose                 2021 

00:00:00            Completed                               [facility]

 

                                Influenza High Dose                 2021 

00:00:00            Completed                               [facility]

 

                                Influenza High Dose                 2021 

00:00:00            Completed                               [facility]

 

                                Influenza High Dose                 2021 

00:00:00            Completed                               [facility]

 

                                Influenza High Dose                 2021 

00:00:00            Completed                               [facility]

 

                                Influenza High Dose                 2021 

00:00:00            Completed                               [facility]

 

                                Influenza High Dose                 2021 

00:00:00            Completed                               [facility]

 

                                Influenza High Dose                 2021 

00:00:00            Completed                               [facility]

 

                                Influenza High Dose                 2021 

00:00:00            Completed                               [facility]

 

                                Influenza High Dose                 2021 

00:00:00            Completed                               [facility]

 

                                Influenza High Dose                 2021 

00:00:00            Completed                               [facility]

 

                                Influenza High Dose                 2021 

00:00:00            Completed                               [facility]

 

                                Influenza High Dose                 2021 

00:00:00            Completed                               [facility]

 

                                Influenza High Dose                 2021 

00:00:00            Completed                               [facility]

 

                                Influenza High Dose                 2021 

00:00:00            Completed                               [facility]

 

                                Influenza High Dose                 2021 

00:00:00            Completed                               [facility]

 

                                Influenza High Dose                 2021 

00:00:00            Completed                               [facility]

 

                                Influenza High Dose                 2021 

00:00:00            Completed                               [facility]

 

                                Influenza High Dose                 2021 

00:00:00            Completed                               [facility]

 

                                Influenza High Dose                 2021 

00:00:00            Completed                               [facility]

 

                                Influenza High Dose                 2021 

00:00:00            Completed                               [facility]

 

                                Influenza High Dose                 2021 

00:00:00            Completed                               [facility]

 

                                Influenza High Dose                 2021 

00:00:00            Completed                               [facility]

 

                                Influenza High Dose                 2021 

00:00:00            Completed                               [facility]

 

                                Influenza High Dose                 2021 

00:00:00            Completed                               [facility]

 

                                Influenza High Dose                 2021 

00:00:00            Completed                               [facility]

 

                                Influenza High Dose                 2021 

00:00:00            Completed                               [facility]

 

                                Influenza High Dose                 2021 

00:00:00            Completed                               [facility]

 

                                Influenza High Dose                 2021 

00:00:00            Completed                               [facility]

 

                                Influenza High Dose                 2021 

00:00:00            Completed                               [facility]

 

                                Influenza High Dose                 2021 

00:00:00            Completed                               [facility]

 

                                Influenza High Dose                 2021 

00:00:00            Completed                               [facility]

 

                                Influenza High Dose                 2021 

00:00:00            Completed                               [facility]

 

                                Influenza High Dose                 2021 

00:00:00            Completed                               [facility]

 

                                Influenza High Dose                 2021 

00:00:00            Completed                               [facility]

 

                                Influenza High Dose                 2021 

00:00:00            Completed                               [facility]

 

                                Influenza High Dose                 2021 

00:00:00            Completed                               [facility]

 

                                Influenza High Dose                 2021 

00:00:00            Completed                               [facility]

 

                                Influenza High Dose                 2021 

00:00:00            Completed                               [facility]

 

                                Influenza High Dose                 2021 

00:00:00            Completed                               [facility]

 

                                Influenza High Dose                 2021 

00:00:00            Completed                               [facility]

 

                                Influenza High Dose                 2021 

00:00:00            Completed                               [facility]

 

                                Influenza High Dose                 2021 

00:00:00            Completed                               [facility]

 

                                Influenza High Dose                 2021 

00:00:00            Completed                               [facility]

 

                                Influenza High Dose                 2021 

00:00:00            Completed                               [facility]

 

                                Influenza High Dose                 2021 

00:00:00            Completed                               [facility]

 

                                Influenza High Dose                 2021 

00:00:00            Completed                               [facility]

 

                                Influenza High Dose                 2021 

00:00:00            Completed                               [facility]

 

                                Influenza High Dose                 2021 

00:00:00            Completed                               [facility]

 

                                Influenza High Dose                 2021 

00:00:00            Completed                               [facility]

 

                                Influenza High Dose                 2021 

00:00:00            Completed                               [facility]

 

                                Influenza High Dose                 2021 

00:00:00            Completed                               [facility]

 

                                Influenza High Dose                 2021 

00:00:00            Completed                               [facility]

 

                                Influenza High Dose                 2021 

00:00:00            Completed                               [facility]

 

                                Influenza High Dose                 2021 

00:00:00            Completed                               [facility]

 

                                Influenza High Dose                 2021 

00:00:00            Completed                               [facility]

 

                                Influenza High Dose                 2021 

00:00:00            Completed                               [facility]

 

                                Influenza High Dose                 2021 

00:00:00            Completed                               [facility]

 

                                Influenza High Dose                 2021 

00:00:00            Completed                               [facility]

 

                                Influenza High Dose                 2021 

00:00:00            Completed                               [facility]

 

                                Influenza High Dose                 2021 

00:00:00            Completed                               [facility]

 

                                Influenza High Dose                 2021 

00:00:00            Completed                               [facility]

 

                                Influenza High Dose                 2021 

00:00:00            Completed                               [facility]

 

                                Influenza High Dose                 2021 

00:00:00            Completed                               [facility]

 

                                Influenza High Dose                 2021 

00:00:00            Completed                               [facility]

 

                                Influenza High Dose                 2021 

00:00:00            Completed                               [facility]

 

                                Influenza High Dose                 2021 

00:00:00            Completed                               [facility]

 

                                Influenza High Dose                 2021 

00:00:00            Completed                               [facility]

 

                                Influenza High Dose                 2021 

00:00:00            Completed                               [facility]

 

                                Influenza High Dose                 2021 

00:00:00            Completed                               [facility]

 

                                Influenza High Dose                 2021 

00:00:00            Completed                               [facility]

 

                                Influenza High Dose                 2021 

00:00:00            Completed                               [facility]

 

                                Influenza High Dose                 2021 

00:00:00            Completed                               [facility]

 

                                Influenza High Dose                 2021 

00:00:00            Completed                               [facility]

 

                                Influenza High Dose                 2021 

00:00:00            Completed                               [facility]

 

                                Influenza High Dose                 2021 

00:00:00            Completed                               [facility]

 

                                Influenza High Dose                 2021 

00:00:00            Completed                               [facility]

 

                                Influenza High Dose                 2021 

00:00:00            Completed                               [facility]

 

                                Influenza High Dose                 2021 

00:00:00            Completed                               [facility]

 

                                Influenza High Dose                 2021 

00:00:00            Completed                               [facility]

 

                                Influenza High Dose                 2021 

00:00:00            Completed                               [facility]

 

                                Influenza High Dose                 2021 

00:00:00            Completed                               [facility]

 

                                Influenza High Dose                 2021 

00:00:00            Completed                               [facility]

 

                                Influenza High Dose                 2021 

00:00:00            Completed                               [facility]

 

                                Influenza High Dose                 2021 

00:00:00            Completed                               [facility]

 

                                Influenza High Dose                 2021 

00:00:00            Completed                               [facility]

 

                                Influenza High Dose                 2021 

00:00:00            Completed                               [facility]

 

                                Influenza High Dose                 2021 

00:00:00            Completed                               [facility]

 

                                Influenza High Dose                 2021 

00:00:00            Completed                               [facility]

 

                                Influenza High Dose                 2021 

00:00:00            Completed                               [facility]

 

                                Influenza High Dose                 2021 

00:00:00            Completed                               [facility]

 

                                Influenza High Dose                 2021 

00:00:00            Completed                               [facility]

 

                                Influenza High Dose                 2021 

00:00:00            Completed                               [facility]

 

                                Influenza High Dose                 2021 

00:00:00            Completed                               [facility]

 

                                Influenza High Dose                 2021 

00:00:00            Completed                               [facility]

 

                                Influenza High Dose                 2021 

00:00:00            Completed                               [facility]

 

                                Influenza High Dose                 2021 

00:00:00            Completed                               [facility]

 

                                Influenza High Dose                 2021 

00:00:00            Completed                               [facility]

 

                                Influenza High Dose                 2021 

00:00:00            Completed                               [facility]

 

                                Influenza High Dose                 2021 

00:00:00            Completed                               [facility]

 

                                Influenza High Dose                 2021 

00:00:00            Completed                               [facility]

 

                                Influenza High Dose                 2021 

00:00:00            Completed                               [facility]

 

                                Influenza High Dose                 2021 

00:00:00            Completed                               [facility]

 

                                Influenza High Dose                 2021 

00:00:00            Completed                               [facility]

 

                                Influenza High Dose                 2021 

00:00:00            Completed                               [facility]

 

                                Influenza High Dose                 2021 

00:00:00            Completed                               [facility]

 

                                Influenza High Dose                 2021 

00:00:00            Completed                               [facility]

 

                                Influenza High Dose                 2021 

00:00:00            Completed                               [facility]

 

                                Influenza High Dose                 2021 

00:00:00            Completed                               [facility]

 

                                Influenza High Dose                 2021 

00:00:00            Completed                               [facility]

 

                                Influenza High Dose                 2021 

00:00:00            Completed                               [facility]

 

                                Influenza High Dose                 2021 

00:00:00            Completed                               [facility]

 

                                Influenza High Dose                 2021 

00:00:00            Completed                               [facility]

 

                                Influenza High Dose                 2021 

00:00:00            Completed                               [facility]

 

                                Influenza High Dose                 2021 

00:00:00            Completed                               [facility]

 

                                Influenza High Dose                 2021 

00:00:00            Completed                               [facility]

 

                                Influenza High Dose                 2021 

00:00:00            Completed                               [facility]

 

                                Influenza High Dose                 2021 

00:00:00            Completed                               [facility]

 

                                Influenza High Dose                 2021 

00:00:00            Completed                               [facility]

 

                                Influenza High Dose                 2021 

00:00:00            Completed                               [facility]

 

                                Influenza High Dose                 2021 

00:00:00            Completed                               [facility]

 

                                Influenza High Dose                 2021 

00:00:00            Completed                               [facility]

 

                                Influenza High Dose                 2021 

00:00:00            Completed                               [facility]

 

                                Influenza High Dose                 2021 

00:00:00            Completed                               [facility]

 

                                Influenza High Dose                 2021 

00:00:00            Completed                               [facility]

 

                                Influenza High Dose                 2021 

00:00:00            Completed                               [facility]

 

                                Influenza High Dose                 2021 

00:00:00            Completed                               [facility]

 

                                Influenza High Dose                 2021 

00:00:00            Completed                               [facility]

 

                                Influenza High Dose                 2021 

00:00:00            Completed                               [facility]

 

                                Influenza High Dose                 2021 

00:00:00            Completed                               [facility]

 

                                Influenza High Dose                 2021 

00:00:00            Completed                               [facility]

 

                                Influenza High Dose                 2021 

00:00:00            Completed                               [facility]

 

                                Influenza High Dose                 2021 

00:00:00            Completed                               [facility]

 

                                Influenza High Dose                 2021 

00:00:00            Completed                               [facility]

 

                                Influenza High Dose                 2021 

00:00:00            Completed                               [facility]

 

                                Influenza High Dose                 2021 

00:00:00            Completed                               [facility]

 

                                Influenza High Dose                 2021 

00:00:00            Completed                               [facility]

 

                                Influenza High Dose                 2021 

00:00:00            Completed                               [facility]

 

                                Influenza High Dose                 2021 

00:00:00            Completed                               [facility]

 

                                Influenza High Dose                 2021 

00:00:00            Completed                               [facility]

 

                                Influenza High Dose                 2021 

00:00:00            Completed                               [facility]

 

                                Influenza High Dose                 2021 

00:00:00            Completed                               [facility]

 

                                Influenza High Dose                 2021 

00:00:00            Completed                               [facility]

 

                                Influenza High Dose                 2021 

00:00:00            Completed                               [facility]

 

                                Influenza High Dose                 2021 

00:00:00            Completed                               [facility]

 

                                Influenza High Dose                 2021 

00:00:00            Completed                               [facility]

 

                                Influenza High Dose                 2021 

00:00:00            Completed                               [facility]

 

                                Influenza High Dose                 2021 

00:00:00            Completed                               [facility]

 

                                Influenza High Dose                 2021 

00:00:00            Completed                               [facility]

 

                                Influenza High Dose                 2021 

00:00:00            Completed                               [facility]

 

                                Influenza High Dose                 2021 

00:00:00            Completed                               [facility]

 

                                Influenza High Dose                 2021 

00:00:00            Completed                               [facility]

 

                                Influenza High Dose                 2021 

00:00:00            Completed                               [facility]

 

                                Influenza High Dose                 2021 

00:00:00            Completed                               [facility]

 

                                Influenza High Dose                 2021 

00:00:00            Completed                               [facility]

 

                                Influenza High Dose                 2021 

00:00:00            Completed                               [facility]

 

                                Influenza High Dose                 2021 

00:00:00            Completed                               [facility]

 

                                Influenza High Dose                 2021 

00:00:00            Completed                               [facility]

 

                                Influenza High Dose                 2021 

00:00:00            Completed                               [facility]

 

                                Influenza High Dose                 2021 

00:00:00            Completed                               [facility]

 

                                Influenza High Dose                 2021 

00:00:00            Completed                               [facility]

 

                                Influenza High Dose                 2021 

00:00:00            Completed                               [facility]

 

                                Influenza High Dose                 2021 

00:00:00            Completed                               [facility]

 

                                Influenza High Dose                 2021 

00:00:00            Completed                               [facility]

 

                                Influenza High Dose                 2021 

00:00:00            Completed                               [facility]

 

                                Influenza High Dose                 2021 

00:00:00            Completed                               [facility]

 

                                Influenza High Dose                 2021 

00:00:00            Completed                               [facility]

 

                                Influenza High Dose                 2021 

00:00:00            Completed                               [facility]

 

                                Influenza High Dose                 2021 

00:00:00            Completed                               [facility]

 

                                Influenza High Dose                 2021 

00:00:00            Completed                               [facility]

 

                                Influenza High Dose                 2021 

00:00:00            Completed                               [facility]

 

                                Influenza High Dose                 2021 

00:00:00            Completed                               [facility]

 

                                Influenza High Dose                 2021 

00:00:00            Completed                               [facility]

 

                                Influenza High Dose                 2021 

00:00:00            Completed                               [facility]

 

                                Influenza High Dose                 2021 

00:00:00            Completed                               [facility]

 

                                Influenza High Dose                 2021 

00:00:00            Completed                               [facility]

 

                                Influenza High Dose                 2021 

00:00:00            Completed                               [facility]

 

                                Influenza High Dose                 2021 

00:00:00            Completed                               [facility]

 

                                Influenza High Dose                 2021 

00:00:00            Completed                               [facility]

 

                                Influenza High Dose                 2021 

00:00:00            Completed                               [facility]

 

                                Influenza High Dose                 2021 

00:00:00            Completed                               [facility]

 

                                Influenza High Dose                 2021 

00:00:00            Completed                               [facility]

 

                                Influenza High Dose                 2021 

00:00:00            Completed                               [facility]

 

                                Influenza High Dose                 2021 

00:00:00            Completed                               [facility]

 

                                Influenza High Dose                 2021 

00:00:00            Completed                               [facility]

 

                                Influenza High Dose                 2021 

00:00:00            Completed                               [facility]

 

                                Influenza High Dose                 2021 

00:00:00            Completed                               [facility]

 

                                Influenza High Dose                 2021 

00:00:00            Completed                               [facility]

 

                                Influenza High Dose                 2021 

00:00:00            Completed                               [facility]

 

                                                    SARS-COV-2 COVID-19 

PFIZER VACCINE                                      2021-10-04 

00:00:00            Completed                               [facility]

 

                                                    SARS-COV-2 COVID-19 

PFIZER VACCINE                                      2021-10-04 

00:00:00            Completed                               [facility]

 

                                                    SARS-COV-2 COVID-19 

PFIZER VACCINE                                      2021-10-04 

00:00:00            Completed                               [facility]

 

                                                    SARS-COV-2 COVID-19 

PFIZER VACCINE                                      2021-10-04 

00:00:00            Completed                               [facility]

 

                                                    SARS-COV-2 COVID-19 

PFIZER VACCINE                                      2021-10-04 

00:00:00            Completed                               [facility]

 

                                                    SARS-COV-2 COVID-19 

PFIZER VACCINE                                      2021-10-04 

00:00:00            Completed                               [facility]

 

                                                    SARS-COV-2 COVID-19 

PFIZER VACCINE                                      2021-10-04 

00:00:00            Completed                               [facility]

 

                                                    SARS-COV-2 COVID-19 

PFIZER VACCINE                                      2021-10-04 

00:00:00            Completed                               [facility]

 

                                                    SARS-COV-2 COVID-19 

PFIZER VACCINE                                      2021-10-04 

00:00:00            Completed                               [facility]

 

                                                    SARS-COV-2 COVID-19 

PFIZER VACCINE                                      2021-10-04 

00:00:00            Completed                               [facility]

 

                                                    SARS-COV-2 COVID-19 

PFIZER VACCINE                                      2021-10-04 

00:00:00            Completed                               [facility]

 

                                                    SARS-COV-2 COVID-19 

PFIZER VACCINE                                      2021-10-04 

00:00:00            Completed                               [facility]

 

                                                    SARS-COV-2 COVID-19 

PFIZER VACCINE                                      2021-10-04 

00:00:00            Completed                               [facility]

 

                                                    SARS-COV-2 COVID-19 

PFIZER VACCINE                                      2021-10-04 

00:00:00            Completed                               [facility]

 

                                                    SARS-COV-2 COVID-19 

PFIZER VACCINE                                      2021-10-04 

00:00:00            Completed                               [facility]

 

                                                    SARS-COV-2 COVID-19 

PFIZER VACCINE                                      2021-10-04 

00:00:00            Completed                               [facility]

 

                                                    SARS-COV-2 COVID-19 

PFIZER VACCINE                                      2021-10-04 

00:00:00            Completed                               [facility]

 

                                                    SARS-COV-2 COVID-19 

PFIZER VACCINE                                      2021-10-04 

00:00:00            Completed                               [facility]

 

                                                    SARS-COV-2 COVID-19 

PFIZER VACCINE                                      2021-10-04 

00:00:00            Completed                               [facility]

 

                                                    SARS-COV-2 COVID-19 

PFIZER VACCINE                                      2021-10-04 

00:00:00            Completed                               [facility]

 

                                                    SARS-COV-2 COVID-19 

PFIZER VACCINE                                      2021-10-04 

00:00:00            Completed                               [facility]

 

                                                    SARS-COV-2 COVID-19 

PFIZER VACCINE                                      2021-10-04 

00:00:00            Completed                               [facility]

 

                                                    SARS-COV-2 COVID-19 

PFIZER VACCINE                                      2021-10-04 

00:00:00            Completed                               [facility]

 

                                                    SARS-COV-2 COVID-19 

PFIZER VACCINE                                      2021-10-04 

00:00:00            Completed                               [facility]

 

                                                    SARS-COV-2 COVID-19 

PFIZER VACCINE                                      2021-10-04 

00:00:00            Completed                               [facility]

 

                                                    SARS-COV-2 COVID-19 

PFIZER VACCINE                                      2021-10-04 

00:00:00            Completed                               [facility]

 

                                                    SARS-COV-2 COVID-19 

PFIZER VACCINE                                      2021-10-04 

00:00:00            Completed                               [facility]

 

                                                    SARS-COV-2 COVID-19 

PFIZER VACCINE                                      2021-10-04 

00:00:00            Completed                               [facility]

 

                                                    SARS-COV-2 COVID-19 

PFIZER VACCINE                                      2021-10-04 

00:00:00            Completed                               [facility]

 

                                                    SARS-COV-2 COVID-19 

PFIZER VACCINE                                      2021-10-04 

00:00:00            Completed                               [facility]

 

                                                    SARS-COV-2 COVID-19 

PFIZER VACCINE                                      2021-10-04 

00:00:00            Completed                               [facility]

 

                                                    SARS-COV-2 COVID-19 

PFIZER VACCINE                                      2021-10-04 

00:00:00            Completed                               [facility]

 

                                                    SARS-COV-2 COVID-19 

PFIZER VACCINE                                      2021-10-04 

00:00:00            Completed                               [facility]

 

                                                    SARS-COV-2 COVID-19 

PFIZER VACCINE                                      2021-10-04 

00:00:00            Completed                               [facility]

 

                                                    SARS-COV-2 COVID-19 

PFIZER VACCINE                                      2021-10-04 

00:00:00            Completed                               [facility]

 

                                                    SARS-COV-2 COVID-19 

PFIZER VACCINE                                      2021-10-04 

00:00:00            Completed                               [facility]

 

                                                    SARS-COV-2 COVID-19 

PFIZER VACCINE                                      2021-10-04 

00:00:00            Completed                               [facility]

 

                                                    SARS-COV-2 COVID-19 

PFIZER VACCINE                                      2021-10-04 

00:00:00            Completed                               [facility]

 

                                                    SARS-COV-2 COVID-19 

PFIZER VACCINE                                      2021-10-04 

00:00:00            Completed                               [facility]

 

                                                    SARS-COV-2 COVID-19 

PFIZER VACCINE                                      2021-10-04 

00:00:00            Completed                               [facility]

 

                                                    SARS-COV-2 COVID-19 

PFIZER VACCINE                                      2021-10-04 

00:00:00            Completed                               [facility]

 

                                                    SARS-COV-2 COVID-19 

PFIZER VACCINE                                      2021-10-04 

00:00:00            Completed                               [facility]

 

                                                    SARS-COV-2 COVID-19 

PFIZER VACCINE                                      2021-10-04 

00:00:00            Completed                               [facility]

 

                                                    SARS-COV-2 COVID-19 

PFIZER VACCINE                                      2021-10-04 

00:00:00            Completed                               [facility]

 

                                                    SARS-COV-2 COVID-19 

PFIZER VACCINE                                      2021-10-04 

00:00:00            Completed                               [facility]

 

                                                    SARS-COV-2 COVID-19 

PFIZER VACCINE                                      2021-10-04 

00:00:00            Completed                               [facility]

 

                                                    SARS-COV-2 COVID-19 

PFIZER VACCINE                                      2021-10-04 

00:00:00            Completed                               [facility]

 

                                                    SARS-COV-2 COVID-19 

PFIZER VACCINE                                      2021-10-04 

00:00:00            Completed                               [facility]

 

                                                    SARS-COV-2 COVID-19 

PFIZER VACCINE                                      2021-10-04 

00:00:00            Completed                               [facility]

 

                                                    SARS-COV-2 COVID-19 

PFIZER VACCINE                                      2021-10-04 

00:00:00            Completed                               [facility]

 

                                                    SARS-COV-2 COVID-19 

PFIZER VACCINE                                      2021-10-04 

00:00:00            Completed                               [facility]

 

                                                    SARS-COV-2 COVID-19 

PFIZER VACCINE                                      2021-10-04 

00:00:00            Completed                               [facility]

 

                                                    SARS-COV-2 COVID-19 

PFIZER VACCINE                                      2021-10-04 

00:00:00            Completed                               [facility]

 

                                                    SARS-COV-2 COVID-19 

PFIZER VACCINE                                      2021-10-04 

00:00:00            Completed                               [facility]

 

                                                    SARS-COV-2 COVID-19 

PFIZER VACCINE                                      2021-10-04 

00:00:00            Completed                               [facility]

 

                                                    SARS-COV-2 COVID-19 

PFIZER VACCINE                                      2021-10-04 

00:00:00            Completed                               [facility]

 

                                                    SARS-COV-2 COVID-19 

PFIZER VACCINE                                      2021-10-04 

00:00:00            Completed                               [facility]

 

                                                    SARS-COV-2 COVID-19 

PFIZER VACCINE                                      2021-10-04 

00:00:00            Completed                               [facility]

 

                                                    SARS-COV-2 COVID-19 

PFIZER VACCINE                                      2021-10-04 

00:00:00            Completed                               [facility]

 

                                                    SARS-COV-2 COVID-19 

PFIZER VACCINE                                      2021-10-04 

00:00:00            Completed                               [facility]

 

                                                    SARS-COV-2 COVID-19 

PFIZER VACCINE                                      2021-10-04 

00:00:00            Completed                               [facility]

 

                                                    SARS-COV-2 COVID-19 

PFIZER VACCINE                                      2021-10-04 

00:00:00            Completed                               [facility]

 

                                                    SARS-COV-2 COVID-19 

PFIZER VACCINE                                      2021-10-04 

00:00:00            Completed                               [facility]

 

                                                    SARS-COV-2 COVID-19 

PFIZER VACCINE                                      2021-10-04 

00:00:00            Completed                               [facility]

 

                                                    SARS-COV-2 COVID-19 

PFIZER VACCINE                                      2021-10-04 

00:00:00            Completed                               [facility]

 

                                                    SARS-COV-2 COVID-19 

PFIZER VACCINE                                      2021-10-04 

00:00:00            Completed                               [facility]

 

                                                    SARS-COV-2 COVID-19 

PFIZER VACCINE                                      2021-10-04 

00:00:00            Completed                               [facility]

 

                                                    SARS-COV-2 COVID-19 

PFIZER VACCINE                                      2021-10-04 

00:00:00            Completed                               [facility]

 

                                                    SARS-COV-2 COVID-19 

PFIZER VACCINE                                      2021-10-04 

00:00:00            Completed                               [facility]

 

                                                    SARS-COV-2 COVID-19 

PFIZER VACCINE                                      2021-10-04 

00:00:00            Completed                               [facility]

 

                                                    SARS-COV-2 COVID-19 

PFIZER VACCINE                                      2021-10-04 

00:00:00            Completed                               [facility]

 

                                                    SARS-COV-2 COVID-19 

PFIZER VACCINE                                      2021-10-04 

00:00:00            Completed                               [facility]

 

                                                    SARS-COV-2 COVID-19 

PFIZER VACCINE                                      2021-10-04 

00:00:00            Completed                               [facility]

 

                                                    SARS-COV-2 COVID-19 

PFIZER VACCINE                                      2021-10-04 

00:00:00            Completed                               [facility]

 

                                                    SARS-COV-2 COVID-19 

PFIZER VACCINE                                      2021-10-04 

00:00:00            Completed                               [facility]

 

                                                    SARS-COV-2 COVID-19 

PFIZER VACCINE                                      2021-10-04 

00:00:00            Completed                               [facility]

 

                                                    SARS-COV-2 COVID-19 

PFIZER VACCINE                                      2021-10-04 

00:00:00            Completed                               [facility]

 

                                                    SARS-COV-2 COVID-19 

PFIZER VACCINE                                      2021-10-04 

00:00:00            Completed                               [facility]

 

                                                    SARS-COV-2 COVID-19 

PFIZER VACCINE                                      2021-10-04 

00:00:00            Completed                               [facility]

 

                                                    SARS-COV-2 COVID-19 

PFIZER VACCINE                                      2021-10-04 

00:00:00            Completed                               [facility]

 

                                                    SARS-COV-2 COVID-19 

PFIZER VACCINE                                      2021-10-04 

00:00:00            Completed                               [facility]

 

                                                    SARS-COV-2 COVID-19 

PFIZER VACCINE                                      2021-10-04 

00:00:00            Completed                               [facility]

 

                                                    SARS-COV-2 COVID-19 

PFIZER VACCINE                                      2021-10-04 

00:00:00            Completed                               [facility]

 

                                                    SARS-COV-2 COVID-19 

PFIZER VACCINE                                      2021-10-04 

00:00:00            Completed                               [facility]

 

                                                    SARS-COV-2 COVID-19 

PFIZER VACCINE                                      2021-10-04 

00:00:00            Completed                               [facility]

 

                                                    SARS-COV-2 COVID-19 

PFIZER VACCINE                                      2021-10-04 

00:00:00            Completed                               [facility]

 

                                                    SARS-COV-2 COVID-19 

PFIZER VACCINE                                      2021-10-04 

00:00:00            Completed                               [facility]

 

                                                    SARS-COV-2 COVID-19 

PFIZER VACCINE                                      2021-10-04 

00:00:00            Completed                               [facility]

 

                                                    SARS-COV-2 COVID-19 

PFIZER VACCINE                                      2021-10-04 

00:00:00            Completed                               [facility]

 

                                                    SARS-COV-2 COVID-19 

PFIZER VACCINE                                      2021-10-04 

00:00:00            Completed                               [facility]

 

                                                    SARS-COV-2 COVID-19 

PFIZER VACCINE                                      2021-10-04 

00:00:00            Completed                               [facility]

 

                                                    SARS-COV-2 COVID-19 

PFIZER VACCINE                                      2021-10-04 

00:00:00            Completed                               [facility]

 

                                                    SARS-COV-2 COVID-19 

PFIZER VACCINE                                      2021-10-04 

00:00:00            Completed                               [facility]

 

                                                    SARS-COV-2 COVID-19 

PFIZER VACCINE                                      2021-10-04 

00:00:00            Completed                               [facility]

 

                                                    SARS-COV-2 COVID-19 

PFIZER VACCINE                                      2021-10-04 

00:00:00            Completed                               [facility]

 

                                                    SARS-COV-2 COVID-19 

PFIZER VACCINE                                      2021-10-04 

00:00:00            Completed                               [facility]

 

                                                    SARS-COV-2 COVID-19 

PFIZER VACCINE                                      2021-10-04 

00:00:00            Completed                               [facility]

 

                                                    SARS-COV-2 COVID-19 

PFIZER VACCINE                                      2021-10-04 

00:00:00            Completed                               [facility]

 

                                                    SARS-COV-2 COVID-19 

PFIZER VACCINE                                      2021-10-04 

00:00:00            Completed                               [facility]

 

                                                    SARS-COV-2 COVID-19 

PFIZER VACCINE                                      2021-10-04 

00:00:00            Completed                               [facility]

 

                                                    SARS-COV-2 COVID-19 

PFIZER VACCINE                                      2021-10-04 

00:00:00            Completed                               [facility]

 

                                                    SARS-COV-2 COVID-19 

PFIZER VACCINE                                      2021-10-04 

00:00:00            Completed                               [facility]

 

                                                    SARS-COV-2 COVID-19 

PFIZER VACCINE                                      2021-10-04 

00:00:00            Completed                               [facility]

 

                                                    SARS-COV-2 COVID-19 

PFIZER VACCINE                                      2021-10-04 

00:00:00            Completed                               [facility]

 

                                                    SARS-COV-2 COVID-19 

PFIZER VACCINE                                      2021-10-04 

00:00:00            Completed                               [facility]

 

                                                    SARS-COV-2 COVID-19 

PFIZER VACCINE                                      2021-10-04 

00:00:00            Completed                               [facility]

 

                                                    SARS-COV-2 COVID-19 

PFIZER VACCINE                                      2021-10-04 

00:00:00            Completed                               [facility]

 

                                                    SARS-COV-2 COVID-19 

PFIZER VACCINE                                      2021-10-04 

00:00:00            Completed                               [facility]

 

                                                    SARS-COV-2 COVID-19 

PFIZER VACCINE                                      2021-10-04 

00:00:00            Completed                               [facility]

 

                                                    SARS-COV-2 COVID-19 

PFIZER VACCINE                                      2021-10-04 

00:00:00            Completed                               [facility]

 

                                                    SARS-COV-2 COVID-19 

PFIZER VACCINE                                      2021-10-04 

00:00:00            Completed                               [facility]

 

                                                    SARS-COV-2 COVID-19 

PFIZER VACCINE                                      2021-10-04 

00:00:00            Completed                               [facility]

 

                                                    SARS-COV-2 COVID-19 

PFIZER VACCINE                                      2021-10-04 

00:00:00            Completed                               [facility]

 

                                                    SARS-COV-2 COVID-19 

PFIZER VACCINE                                      2021-10-04 

00:00:00            Completed                               [facility]

 

                                                    SARS-COV-2 COVID-19 

PFIZER VACCINE                                      2021-10-04 

00:00:00            Completed                               [facility]

 

                                                    SARS-COV-2 COVID-19 

PFIZER VACCINE                                      2021-10-04 

00:00:00            Completed                               [facility]

 

                                                    SARS-COV-2 COVID-19 

PFIZER VACCINE                                      2021-10-04 

00:00:00            Completed                               [facility]

 

                                                    SARS-COV-2 COVID-19 

PFIZER VACCINE                                      2021-10-04 

00:00:00            Completed                               [facility]

 

                                                    SARS-COV-2 COVID-19 

PFIZER VACCINE                                      2021-10-04 

00:00:00            Completed                               [facility]

 

                                                    SARS-COV-2 COVID-19 

PFIZER VACCINE                                      2021-10-04 

00:00:00            Completed                               [facility]

 

                                                    SARS-COV-2 COVID-19 

PFIZER VACCINE                                      2021-10-04 

00:00:00            Completed                               [facility]

 

                                                    SARS-COV-2 COVID-19 

PFIZER VACCINE                                      2021-10-04 

00:00:00            Completed                               [facility]

 

                                                    SARS-COV-2 COVID-19 

PFIZER VACCINE                                      2021-10-04 

00:00:00            Completed                               [facility]

 

                                                    SARS-COV-2 COVID-19 

PFIZER VACCINE                                      2021-10-04 

00:00:00            Completed                               [facility]

 

                                                    SARS-COV-2 COVID-19 

PFIZER VACCINE                                      2021-10-04 

00:00:00            Completed                               [facility]

 

                                                    SARS-COV-2 COVID-19 

PFIZER VACCINE                                      2021-10-04 

00:00:00            Completed                               [facility]

 

                                                    SARS-COV-2 COVID-19 

PFIZER VACCINE                                      2021-10-04 

00:00:00            Completed                               [facility]

 

                                                    SARS-COV-2 COVID-19 

PFIZER VACCINE                                      2021-10-04 

00:00:00            Completed                               [facility]

 

                                                    SARS-COV-2 COVID-19 

PFIZER VACCINE                                      2021-10-04 

00:00:00            Completed                               [facility]

 

                                                    SARS-COV-2 COVID-19 

PFIZER VACCINE                                      2021-10-04 

00:00:00            Completed                               [facility]

 

                                                    SARS-COV-2 COVID-19 

PFIZER VACCINE                                      2021-10-04 

00:00:00            Completed                               [facility]

 

                                                    SARS-COV-2 COVID-19 

PFIZER VACCINE                                      2021-10-04 

00:00:00            Completed                               [facility]

 

                                                    SARS-COV-2 COVID-19 

PFIZER VACCINE                                      2021-10-04 

00:00:00            Completed                               [facility]

 

                                                    SARS-COV-2 COVID-19 

PFIZER VACCINE                                      2021-10-04 

00:00:00            Completed                               [facility]

 

                                                    SARS-COV-2 COVID-19 

PFIZER VACCINE                                      2021-10-04 

00:00:00            Completed                               [facility]

 

                                                    SARS-COV-2 COVID-19 

PFIZER VACCINE                                      2021-10-04 

00:00:00            Completed                               [facility]

 

                                                    SARS-COV-2 COVID-19 

PFIZER VACCINE                                      2021-10-04 

00:00:00            Completed                               [facility]

 

                                                    SARS-COV-2 COVID-19 

PFIZER VACCINE                                      2021-10-04 

00:00:00            Completed                               [facility]

 

                                                    SARS-COV-2 COVID-19 

PFIZER VACCINE                                      2021-10-04 

00:00:00            Completed                               [facility]

 

                                                    SARS-COV-2 COVID-19 

PFIZER VACCINE                                      2021-10-04 

00:00:00            Completed                               [facility]

 

                                                    SARS-COV-2 COVID-19 

PFIZER VACCINE                                      2021-10-04 

00:00:00            Completed                               [facility]

 

                                                    SARS-COV-2 COVID-19 

PFIZER VACCINE                                      2021-10-04 

00:00:00            Completed                               [facility]

 

                                                    SARS-COV-2 COVID-19 

PFIZER VACCINE                                      2021-10-04 

00:00:00            Completed                               [facility]

 

                                                    SARS-COV-2 COVID-19 

PFIZER VACCINE                                      2021-10-04 

00:00:00            Completed                               [facility]

 

                                                    SARS-COV-2 COVID-19 

PFIZER VACCINE                                      2021-10-04 

00:00:00            Completed                               [facility]

 

                                                    SARS-COV-2 COVID-19 

PFIZER VACCINE                                      2021-10-04 

00:00:00            Completed                               [facility]

 

                                                    SARS-COV-2 COVID-19 

PFIZER VACCINE                                      2021-10-04 

00:00:00            Completed                               [facility]

 

                                                    SARS-COV-2 COVID-19 

PFIZER VACCINE                                      2021-10-04 

00:00:00            Completed                               [facility]

 

                                                    SARS-COV-2 COVID-19 

PFIZER VACCINE                                      2021-10-04 

00:00:00            Completed                               [facility]

 

                                                    SARS-COV-2 COVID-19 

PFIZER VACCINE                                      2021-10-04 

00:00:00            Completed                               [facility]

 

                                                    SARS-COV-2 COVID-19 

PFIZER VACCINE                                      2021-10-04 

00:00:00            Completed                               [facility]

 

                                                    SARS-COV-2 COVID-19 

PFIZER VACCINE                                      2021-10-04 

00:00:00            Completed                               [facility]

 

                                                    SARS-COV-2 COVID-19 

PFIZER VACCINE                                      2021-10-04 

00:00:00            Completed                               [facility]

 

                                                    SARS-COV-2 COVID-19 

PFIZER VACCINE                                      2021-10-04 

00:00:00            Completed                               [facility]

 

                                                    SARS-COV-2 COVID-19 

PFIZER VACCINE                                      2021-10-04 

00:00:00            Completed                               [facility]

 

                                                    SARS-COV-2 COVID-19 

PFIZER VACCINE                                      2021-10-04 

00:00:00            Completed                               [facility]

 

                                                    SARS-COV-2 COVID-19 

PFIZER VACCINE                                      2021-10-04 

00:00:00            Completed                               [facility]

 

                                                    SARS-COV-2 COVID-19 

PFIZER VACCINE                                      2021-10-04 

00:00:00            Completed                               [facility]

 

                                                    SARS-COV-2 COVID-19 

PFIZER VACCINE                                      2021-10-04 

00:00:00            Completed                               [facility]

 

                                                    SARS-COV-2 COVID-19 

PFIZER VACCINE                                      2021-10-04 

00:00:00            Completed                               [facility]

 

                                                    SARS-COV-2 COVID-19 

PFIZER VACCINE                                      2021-10-04 

00:00:00            Completed                               [facility]

 

                                                    SARS-COV-2 COVID-19 

PFIZER VACCINE                                      2021-10-04 

00:00:00            Completed                               [facility]

 

                                                    SARS-COV-2 COVID-19 

PFIZER VACCINE                                      2021-10-04 

00:00:00            Completed                               [facility]

 

                                                    SARS-COV-2 COVID-19 

PFIZER VACCINE                                      2021-10-04 

00:00:00            Completed                               [facility]

 

                                                    SARS-COV-2 COVID-19 

PFIZER VACCINE                                      2021-10-04 

00:00:00            Completed                               [facility]

 

                                                    SARS-COV-2 COVID-19 

PFIZER VACCINE                                      2021-10-04 

00:00:00            Completed                               [facility]

 

                                                    SARS-COV-2 COVID-19 

PFIZER VACCINE                                      2021-10-04 

00:00:00            Completed                               [facility]

 

                                                    SARS-COV-2 COVID-19 

PFIZER VACCINE                                      2021-10-04 

00:00:00            Completed                               [facility]

 

                                                    SARS-COV-2 COVID-19 

PFIZER VACCINE                                      2021-10-04 

00:00:00            Completed                               [facility]

 

                                                    SARS-COV-2 COVID-19 

PFIZER VACCINE                                      2021-10-04 

00:00:00            Completed                               [facility]

 

                                                    SARS-COV-2 COVID-19 

PFIZER VACCINE                                      2021-10-04 

00:00:00            Completed                               [facility]

 

                                                    SARS-COV-2 COVID-19 

PFIZER VACCINE                                      2021-10-04 

00:00:00            Completed                               [facility]

 

                                                    SARS-COV-2 COVID-19 

PFIZER VACCINE                                      2021-10-04 

00:00:00            Completed                               [facility]

 

                                                    SARS-COV-2 COVID-19 

PFIZER VACCINE                                      2021-10-04 

00:00:00            Completed                               [facility]

 

                                                    SARS-COV-2 COVID-19 

PFIZER VACCINE                                      2021-10-04 

00:00:00            Completed                               [facility]

 

                                                    SARS-COV-2 COVID-19 

PFIZER VACCINE                                      2021-10-04 

00:00:00            Completed                               [facility]

 

                                                    SARS-COV-2 COVID-19 

PFIZER VACCINE                                      2021-10-04 

00:00:00            Completed                               [facility]

 

                                                    SARS-COV-2 COVID-19 

PFIZER VACCINE                                      2021-10-04 

00:00:00            Completed                               [facility]

 

                                                    SARS-COV-2 COVID-19 

PFIZER VACCINE                                      2021-10-04 

00:00:00            Completed                               [facility]

 

                                                    SARS-COV-2 COVID-19 

PFIZER VACCINE                                      2021-10-04 

00:00:00            Completed                               [facility]

 

                                                    SARS-COV-2 COVID-19 

PFIZER VACCINE                                      2021-10-04 

00:00:00            Completed                               [facility]

 

                                                    SARS-COV-2 COVID-19 

PFIZER VACCINE                                      2021-10-04 

00:00:00            Completed                               [facility]

 

                                                    SARS-COV-2 COVID-19 

PFIZER VACCINE                                      2021-10-04 

00:00:00            Completed                               [facility]

 

                                                    SARS-COV-2 COVID-19 

PFIZER VACCINE                                      2021-10-04 

00:00:00            Completed                               [facility]

 

                                                    SARS-COV-2 COVID-19 

PFIZER VACCINE                                      2021-10-04 

00:00:00            Completed                               [facility]

 

                                                    SARS-COV-2 COVID-19 

PFIZER VACCINE                                      2021-10-04 

00:00:00            Completed                               [facility]

 

                                                    SARS-COV-2 COVID-19 

PFIZER VACCINE                                      2021-10-04 

00:00:00            Completed                               [facility]

 

                                                    SARS-COV-2 COVID-19 

PFIZER VACCINE                                      2021-10-04 

00:00:00            Completed                               [facility]

 

                                                    SARS-COV-2 COVID-19 

PFIZER VACCINE                                      2021-10-04 

00:00:00            Completed                               [facility]

 

                                                    SARS-COV-2 COVID-19 

PFIZER VACCINE                                      2021-10-04 

00:00:00            Completed                               [facility]

 

                                                    SARS-COV-2 COVID-19 

PFIZER VACCINE                                      2021-10-04 

00:00:00            Completed                               [facility]

 

                                                    SARS-COV-2 COVID-19 

PFIZER VACCINE                                      2021-10-04 

00:00:00            Completed                               [facility]

 

                                                    SARS-COV-2 COVID-19 

PFIZER VACCINE                                      2021-10-04 

00:00:00            Completed                               [facility]

 

                                                    SARS-COV-2 COVID-19 

PFIZER VACCINE                                      2021-10-04 

00:00:00            Completed                               [facility]

 

                                                    SARS-COV-2 COVID-19 

PFIZER VACCINE                                      2021-10-04 

00:00:00            Completed                               [facility]

 

                                                    SARS-COV-2 COVID-19 

PFIZER VACCINE                                      2021-10-04 

00:00:00            Completed                               [facility]

 

                                                    SARS-COV-2 COVID-19 

PFIZER VACCINE                                      2021-10-04 

00:00:00            Completed                               [facility]

 

                                                    SARS-COV-2 COVID-19 

PFIZER VACCINE                                      2021-10-04 

00:00:00            Completed                               [facility]

 

                                                    SARS-COV-2 COVID-19 

PFIZER VACCINE                                      2021-10-04 

00:00:00            Completed                               [facility]

 

                                                    SARS-COV-2 COVID-19 

PFIZER VACCINE                                      2021-10-04 

00:00:00            Completed                               [facility]

 

                                                    SARS-COV-2 COVID-19 

PFIZER VACCINE                                      2021-10-04 

00:00:00            Completed                               [facility]

 

                                                    SARS-COV-2 COVID-19 

PFIZER VACCINE                                      2021-10-04 

00:00:00            Completed                               [facility]

 

                                                    SARS-COV-2 COVID-19 

PFIZER VACCINE                                      2021 

00:00:00            Completed                               [facility]

 

                                                    SARS-COV-2 COVID-19 

PFIZER VACCINE                                      2021 

00:00:00            Completed                               [facility]

 

                                                    SARS-COV-2 COVID-19 

PFIZER VACCINE                                      2021 

00:00:00            Completed                               [facility]

 

                                                    SARS-COV-2 COVID-19 

PFIZER VACCINE                                      2021 

00:00:00            Completed                               [facility]

 

                                                    SARS-COV-2 COVID-19 

PFIZER VACCINE                                      2021 

00:00:00            Completed                               [facility]

 

                                                    SARS-COV-2 COVID-19 

PFIZER VACCINE                                      2021 

00:00:00            Completed                               [facility]

 

                                                    SARS-COV-2 COVID-19 

PFIZER VACCINE                                      2021 

00:00:00            Completed                               [facility]

 

                                                    SARS-COV-2 COVID-19 

PFIZER VACCINE                                      2021 

00:00:00            Completed                               [facility]

 

                                                    SARS-COV-2 COVID-19 

PFIZER VACCINE                                      2021 

00:00:00            Completed                               [facility]

 

                                                    SARS-COV-2 COVID-19 

PFIZER VACCINE                                      2021 

00:00:00            Completed                               [facility]

 

                                                    SARS-COV-2 COVID-19 

PFIZER VACCINE                                      2021 

00:00:00            Completed                               [facility]

 

                                                    SARS-COV-2 COVID-19 

PFIZER VACCINE                                      2021 

00:00:00            Completed                               [facility]

 

                                                    SARS-COV-2 COVID-19 

PFIZER VACCINE                                      2021 

00:00:00            Completed                               [facility]

 

                                                    SARS-COV-2 COVID-19 

PFIZER VACCINE                                      2021 

00:00:00            Completed                               [facility]

 

                                                    SARS-COV-2 COVID-19 

PFIZER VACCINE                                      2021 

00:00:00            Completed                               [facility]

 

                                                    SARS-COV-2 COVID-19 

PFIZER VACCINE                                      2021 

00:00:00            Completed                               [facility]

 

                                                    SARS-COV-2 COVID-19 

PFIZER VACCINE                                      2021 

00:00:00            Completed                               [facility]

 

                                                    SARS-COV-2 COVID-19 

PFIZER VACCINE                                      2021 

00:00:00            Completed                               [facility]

 

                                                    SARS-COV-2 COVID-19 

PFIZER VACCINE                                      2021 

00:00:00            Completed                               [facility]

 

                                                    SARS-COV-2 COVID-19 

PFIZER VACCINE                                      2021 

00:00:00            Completed                               [facility]

 

                                                    SARS-COV-2 COVID-19 

PFIZER VACCINE                                      2021 

00:00:00            Completed                               [facility]

 

                                                    SARS-COV-2 COVID-19 

PFIZER VACCINE                                      2021 

00:00:00            Completed                               [facility]

 

                                                    SARS-COV-2 COVID-19 

PFIZER VACCINE                                      2021 

00:00:00            Completed                               [facility]

 

                                                    SARS-COV-2 COVID-19 

PFIZER VACCINE                                      2021 

00:00:00            Completed                               [facility]

 

                                                    SARS-COV-2 COVID-19 

PFIZER VACCINE                                      2021 

00:00:00            Completed                               [facility]

 

                                                    SARS-COV-2 COVID-19 

PFIZER VACCINE                                      2021 

00:00:00            Completed                               [facility]

 

                                                    SARS-COV-2 COVID-19 

PFIZER VACCINE                                      2021 

00:00:00            Completed                               [facility]

 

                                                    SARS-COV-2 COVID-19 

PFIZER VACCINE                                      2021 

00:00:00            Completed                               [facility]

 

                                                    SARS-COV-2 COVID-19 

PFIZER VACCINE                                      2021 

00:00:00            Completed                               [facility]

 

                                                    SARS-COV-2 COVID-19 

PFIZER VACCINE                                      2021 

00:00:00            Completed                               [facility]

 

                                                    SARS-COV-2 COVID-19 

PFIZER VACCINE                                      2021 

00:00:00            Completed                               [facility]

 

                                                    SARS-COV-2 COVID-19 

PFIZER VACCINE                                      2021 

00:00:00            Completed                               [facility]

 

                                                    SARS-COV-2 COVID-19 

PFIZER VACCINE                                      2021 

00:00:00            Completed                               [facility]

 

                                                    SARS-COV-2 COVID-19 

PFIZER VACCINE                                      2021 

00:00:00            Completed                               [facility]

 

                                                    SARS-COV-2 COVID-19 

PFIZER VACCINE                                      2021 

00:00:00            Completed                               [facility]

 

                                                    SARS-COV-2 COVID-19 

PFIZER VACCINE                                      2021 

00:00:00            Completed                               [facility]

 

                                                    SARS-COV-2 COVID-19 

PFIZER VACCINE                                      2021 

00:00:00            Completed                               [facility]

 

                                                    SARS-COV-2 COVID-19 

PFIZER VACCINE                                      2021 

00:00:00            Completed                               [facility]

 

                                                    SARS-COV-2 COVID-19 

PFIZER VACCINE                                      2021 

00:00:00            Completed                               [facility]

 

                                                    SARS-COV-2 COVID-19 

PFIZER VACCINE                                      2021 

00:00:00            Completed                               [facility]

 

                                                    SARS-COV-2 COVID-19 

PFIZER VACCINE                                      2021 

00:00:00            Completed                               [facility]

 

                                                    SARS-COV-2 COVID-19 

PFIZER VACCINE                                      2021 

00:00:00            Completed                               [facility]

 

                                                    SARS-COV-2 COVID-19 

PFIZER VACCINE                                      2021 

00:00:00            Completed                               [facility]

 

                                                    SARS-COV-2 COVID-19 

PFIZER VACCINE                                      2021 

00:00:00            Completed                               [facility]

 

                                                    SARS-COV-2 COVID-19 

PFIZER VACCINE                                      2021 

00:00:00            Completed                               [facility]

 

                                                    SARS-COV-2 COVID-19 

PFIZER VACCINE                                      2021 

00:00:00            Completed                               [facility]

 

                                                    SARS-COV-2 COVID-19 

PFIZER VACCINE                                      2021 

00:00:00            Completed                               [facility]

 

                                                    SARS-COV-2 COVID-19 

PFIZER VACCINE                                      2021 

00:00:00            Completed                               [facility]

 

                                                    SARS-COV-2 COVID-19 

PFIZER VACCINE                                      2021 

00:00:00            Completed                               [facility]

 

                                                    SARS-COV-2 COVID-19 

PFIZER VACCINE                                      2021 

00:00:00            Completed                               [facility]

 

                                                    SARS-COV-2 COVID-19 

PFIZER VACCINE                                      2021 

00:00:00            Completed                               [facility]

 

                                                    SARS-COV-2 COVID-19 

PFIZER VACCINE                                      2021 

00:00:00            Completed                               [facility]

 

                                                    SARS-COV-2 COVID-19 

PFIZER VACCINE                                      2021 

00:00:00            Completed                               [facility]

 

                                                    SARS-COV-2 COVID-19 

PFIZER VACCINE                                      2021 

00:00:00            Completed                               [facility]

 

                                                    SARS-COV-2 COVID-19 

PFIZER VACCINE                                      2021 

00:00:00            Completed                               [facility]

 

                                                    SARS-COV-2 COVID-19 

PFIZER VACCINE                                      2021 

00:00:00            Completed                               [facility]

 

                                                    SARS-COV-2 COVID-19 

PFIZER VACCINE                                      2021 

00:00:00            Completed                               [facility]

 

                                                    SARS-COV-2 COVID-19 

PFIZER VACCINE                                      2021 

00:00:00            Completed                               [facility]

 

                                                    SARS-COV-2 COVID-19 

PFIZER VACCINE                                      2021 

00:00:00            Completed                               [facility]

 

                                                    SARS-COV-2 COVID-19 

PFIZER VACCINE                                      2021 

00:00:00            Completed                               [facility]

 

                                                    SARS-COV-2 COVID-19 

PFIZER VACCINE                                      2021 

00:00:00            Completed                               [facility]

 

                                                    SARS-COV-2 COVID-19 

PFIZER VACCINE                                      2021 

00:00:00            Completed                               [facility]

 

                                                    SARS-COV-2 COVID-19 

PFIZER VACCINE                                      2021 

00:00:00            Completed                               [facility]

 

                                                    SARS-COV-2 COVID-19 

PFIZER VACCINE                                      2021 

00:00:00            Completed                               [facility]

 

                                                    SARS-COV-2 COVID-19 

PFIZER VACCINE                                      2021 

00:00:00            Completed                               [facility]

 

                                                    SARS-COV-2 COVID-19 

PFIZER VACCINE                                      2021 

00:00:00            Completed                               [facility]

 

                                                    SARS-COV-2 COVID-19 

PFIZER VACCINE                                      2021 

00:00:00            Completed                               [facility]

 

                                                    SARS-COV-2 COVID-19 

PFIZER VACCINE                                      2021 

00:00:00            Completed                               [facility]

 

                                                    SARS-COV-2 COVID-19 

PFIZER VACCINE                                      2021 

00:00:00            Completed                               [facility]

 

                                                    SARS-COV-2 COVID-19 

PFIZER VACCINE                                      2021 

00:00:00            Completed                               [facility]

 

                                                    SARS-COV-2 COVID-19 

PFIZER VACCINE                                      2021 

00:00:00            Completed                               [facility]

 

                                                    SARS-COV-2 COVID-19 

PFIZER VACCINE                                      2021 

00:00:00            Completed                               [facility]

 

                                                    SARS-COV-2 COVID-19 

PFIZER VACCINE                                      2021 

00:00:00            Completed                               [facility]

 

                                                    SARS-COV-2 COVID-19 

PFIZER VACCINE                                      2021 

00:00:00            Completed                               [facility]

 

                                                    SARS-COV-2 COVID-19 

PFIZER VACCINE                                      2021 

00:00:00            Completed                               [facility]

 

                                                    SARS-COV-2 COVID-19 

PFIZER VACCINE                                      2021 

00:00:00            Completed                               [facility]

 

                                                    SARS-COV-2 COVID-19 

PFIZER VACCINE                                      2021 

00:00:00            Completed                               [facility]

 

                                                    SARS-COV-2 COVID-19 

PFIZER VACCINE                                      2021 

00:00:00            Completed                               [facility]

 

                                                    SARS-COV-2 COVID-19 

PFIZER VACCINE                                      2021 

00:00:00            Completed                               [facility]

 

                                                    SARS-COV-2 COVID-19 

PFIZER VACCINE                                      2021 

00:00:00            Completed                               [facility]

 

                                                    SARS-COV-2 COVID-19 

PFIZER VACCINE                                      2021 

00:00:00            Completed                               [facility]

 

                                                    SARS-COV-2 COVID-19 

PFIZER VACCINE                                      2021 

00:00:00            Completed                               [facility]

 

                                                    SARS-COV-2 COVID-19 

PFIZER VACCINE                                      2021 

00:00:00            Completed                               [facility]

 

                                                    SARS-COV-2 COVID-19 

PFIZER VACCINE                                      2021 

00:00:00            Completed                               [facility]

 

                                                    SARS-COV-2 COVID-19 

PFIZER VACCINE                                      2021 

00:00:00            Completed                               [facility]

 

                                                    SARS-COV-2 COVID-19 

PFIZER VACCINE                                      2021 

00:00:00            Completed                               [facility]

 

                                                    SARS-COV-2 COVID-19 

PFIZER VACCINE                                      2021 

00:00:00            Completed                               [facility]

 

                                                    SARS-COV-2 COVID-19 

PFIZER VACCINE                                      2021 

00:00:00            Completed                               [facility]

 

                                                    SARS-COV-2 COVID-19 

PFIZER VACCINE                                      2021 

00:00:00            Completed                               [facility]

 

                                                    SARS-COV-2 COVID-19 

PFIZER VACCINE                                      2021 

00:00:00            Completed                               [facility]

 

                                                    SARS-COV-2 COVID-19 

PFIZER VACCINE                                      2021 

00:00:00            Completed                               [facility]

 

                                                    SARS-COV-2 COVID-19 

PFIZER VACCINE                                      2021 

00:00:00            Completed                               [facility]

 

                                                    SARS-COV-2 COVID-19 

PFIZER VACCINE                                      2021 

00:00:00            Completed                               [facility]

 

                                                    SARS-COV-2 COVID-19 

PFIZER VACCINE                                      2021 

00:00:00            Completed                               [facility]

 

                                                    SARS-COV-2 COVID-19 

PFIZER VACCINE                                      2021 

00:00:00            Completed                               [facility]

 

                                                    SARS-COV-2 COVID-19 

PFIZER VACCINE                                      2021 

00:00:00            Completed                               [facility]

 

                                                    SARS-COV-2 COVID-19 

PFIZER VACCINE                                      2021 

00:00:00            Completed                               [facility]

 

                                                    SARS-COV-2 COVID-19 

PFIZER VACCINE                                      2021 

00:00:00            Completed                               [facility]

 

                                                    SARS-COV-2 COVID-19 

PFIZER VACCINE                                      2021 

00:00:00            Completed                               [facility]

 

                                                    SARS-COV-2 COVID-19 

PFIZER VACCINE                                      2021 

00:00:00            Completed                               [facility]

 

                                                    SARS-COV-2 COVID-19 

PFIZER VACCINE                                      2021 

00:00:00            Completed                               [facility]

 

                                                    SARS-COV-2 COVID-19 

PFIZER VACCINE                                      2021 

00:00:00            Completed                               [facility]

 

                                                    SARS-COV-2 COVID-19 

PFIZER VACCINE                                      2021 

00:00:00            Completed                               [facility]

 

                                                    SARS-COV-2 COVID-19 

PFIZER VACCINE                                      2021 

00:00:00            Completed                               [facility]

 

                                                    SARS-COV-2 COVID-19 

PFIZER VACCINE                                      2021 

00:00:00            Completed                               [facility]

 

                                                    SARS-COV-2 COVID-19 

PFIZER VACCINE                                      2021 

00:00:00            Completed                               [facility]

 

                                                    SARS-COV-2 COVID-19 

PFIZER VACCINE                                      2021 

00:00:00            Completed                               [facility]

 

                                                    SARS-COV-2 COVID-19 

PFIZER VACCINE                                      2021 

00:00:00            Completed                               [facility]

 

                                                    SARS-COV-2 COVID-19 

PFIZER VACCINE                                      2021 

00:00:00            Completed                               [facility]

 

                                                    SARS-COV-2 COVID-19 

PFIZER VACCINE                                      2021 

00:00:00            Completed                               [facility]

 

                                                    SARS-COV-2 COVID-19 

PFIZER VACCINE                                      2021 

00:00:00            Completed                               [facility]

 

                                                    SARS-COV-2 COVID-19 

PFIZER VACCINE                                      2021 

00:00:00            Completed                               [facility]

 

                                                    SARS-COV-2 COVID-19 

PFIZER VACCINE                                      2021 

00:00:00            Completed                               [facility]

 

                                                    SARS-COV-2 COVID-19 

PFIZER VACCINE                                      2021 

00:00:00            Completed                               [facility]

 

                                                    SARS-COV-2 COVID-19 

PFIZER VACCINE                                      2021 

00:00:00            Completed                               [facility]

 

                                                    SARS-COV-2 COVID-19 

PFIZER VACCINE                                      2021 

00:00:00            Completed                               [facility]

 

                                                    SARS-COV-2 COVID-19 

PFIZER VACCINE                                      2021 

00:00:00            Completed                               [facility]

 

                                                    SARS-COV-2 COVID-19 

PFIZER VACCINE                                      2021 

00:00:00            Completed                               [facility]

 

                                                    SARS-COV-2 COVID-19 

PFIZER VACCINE                                      2021 

00:00:00            Completed                               [facility]

 

                                                    SARS-COV-2 COVID-19 

PFIZER VACCINE                                      2021 

00:00:00            Completed                               [facility]

 

                                                    SARS-COV-2 COVID-19 

PFIZER VACCINE                                      2021 

00:00:00            Completed                               [facility]

 

                                                    SARS-COV-2 COVID-19 

PFIZER VACCINE                                      2021 

00:00:00            Completed                               [facility]

 

                                                    SARS-COV-2 COVID-19 

PFIZER VACCINE                                      2021 

00:00:00            Completed                               [facility]

 

                                                    SARS-COV-2 COVID-19 

PFIZER VACCINE                                      2021 

00:00:00            Completed                               [facility]

 

                                                    SARS-COV-2 COVID-19 

PFIZER VACCINE                                      2021 

00:00:00            Completed                               [facility]

 

                                                    SARS-COV-2 COVID-19 

PFIZER VACCINE                                      2021 

00:00:00            Completed                               [facility]

 

                                                    SARS-COV-2 COVID-19 

PFIZER VACCINE                                      2021 

00:00:00            Completed                               [facility]

 

                                                    SARS-COV-2 COVID-19 

PFIZER VACCINE                                      2021 

00:00:00            Completed                               [facility]

 

                                                    SARS-COV-2 COVID-19 

PFIZER VACCINE                                      2021 

00:00:00            Completed                               [facility]

 

                                                    SARS-COV-2 COVID-19 

PFIZER VACCINE                                      2021 

00:00:00            Completed                               [facility]

 

                                                    SARS-COV-2 COVID-19 

PFIZER VACCINE                                      2021 

00:00:00            Completed                               [facility]

 

                                                    SARS-COV-2 COVID-19 

PFIZER VACCINE                                      2021 

00:00:00            Completed                               [facility]

 

                                                    SARS-COV-2 COVID-19 

PFIZER VACCINE                                      2021 

00:00:00            Completed                               [facility]

 

                                                    SARS-COV-2 COVID-19 

PFIZER VACCINE                                      2021 

00:00:00            Completed                               [facility]

 

                                                    SARS-COV-2 COVID-19 

PFIZER VACCINE                                      2021 

00:00:00            Completed                               [facility]

 

                                                    SARS-COV-2 COVID-19 

PFIZER VACCINE                                      2021 

00:00:00            Completed                               [facility]

 

                                                    SARS-COV-2 COVID-19 

PFIZER VACCINE                                      2021 

00:00:00            Completed                               [facility]

 

                                                    SARS-COV-2 COVID-19 

PFIZER VACCINE                                      2021 

00:00:00            Completed                               [facility]

 

                                                    SARS-COV-2 COVID-19 

PFIZER VACCINE                                      2021 

00:00:00            Completed                               [facility]

 

                                                    SARS-COV-2 COVID-19 

PFIZER VACCINE                                      2021 

00:00:00            Completed                               [facility]

 

                                                    SARS-COV-2 COVID-19 

PFIZER VACCINE                                      2021 

00:00:00            Completed                               [facility]

 

                                                    SARS-COV-2 COVID-19 

PFIZER VACCINE                                      2021 

00:00:00            Completed                               [facility]

 

                                                    SARS-COV-2 COVID-19 

PFIZER VACCINE                                      2021 

00:00:00            Completed                               [facility]

 

                                                    SARS-COV-2 COVID-19 

PFIZER VACCINE                                      2021 

00:00:00            Completed                               [facility]

 

                                                    SARS-COV-2 COVID-19 

PFIZER VACCINE                                      2021 

00:00:00            Completed                               [facility]

 

                                                    SARS-COV-2 COVID-19 

PFIZER VACCINE                                      2021 

00:00:00            Completed                               [facility]

 

                                                    SARS-COV-2 COVID-19 

PFIZER VACCINE                                      2021 

00:00:00            Completed                               [facility]

 

                                                    SARS-COV-2 COVID-19 

PFIZER VACCINE                                      2021 

00:00:00            Completed                               [facility]

 

                                                    SARS-COV-2 COVID-19 

PFIZER VACCINE                                      2021 

00:00:00            Completed                               [facility]

 

                                                    SARS-COV-2 COVID-19 

PFIZER VACCINE                                      2021 

00:00:00            Completed                               [facility]

 

                                                    SARS-COV-2 COVID-19 

PFIZER VACCINE                                      2021 

00:00:00            Completed                               [facility]

 

                                                    SARS-COV-2 COVID-19 

PFIZER VACCINE                                      2021 

00:00:00            Completed                               [facility]

 

                                                    SARS-COV-2 COVID-19 

PFIZER VACCINE                                      2021 

00:00:00            Completed                               [facility]

 

                                                    SARS-COV-2 COVID-19 

PFIZER VACCINE                                      2021 

00:00:00            Completed                               [facility]

 

                                                    SARS-COV-2 COVID-19 

PFIZER VACCINE                                      2021 

00:00:00            Completed                               [facility]

 

                                                    SARS-COV-2 COVID-19 

PFIZER VACCINE                                      2021 

00:00:00            Completed                               [facility]

 

                                                    SARS-COV-2 COVID-19 

PFIZER VACCINE                                      2021 

00:00:00            Completed                               [facility]

 

                                                    SARS-COV-2 COVID-19 

PFIZER VACCINE                                      2021 

00:00:00            Completed                               [facility]

 

                                                    SARS-COV-2 COVID-19 

PFIZER VACCINE                                      2021 

00:00:00            Completed                               [facility]

 

                                                    SARS-COV-2 COVID-19 

PFIZER VACCINE                                      2021 

00:00:00            Completed                               [facility]

 

                                                    SARS-COV-2 COVID-19 

PFIZER VACCINE                                      2021 

00:00:00            Completed                               [facility]

 

                                                    SARS-COV-2 COVID-19 

PFIZER VACCINE                                      2021 

00:00:00            Completed                               [facility]

 

                                                    SARS-COV-2 COVID-19 

PFIZER VACCINE                                      2021 

00:00:00            Completed                               [facility]

 

                                                    SARS-COV-2 COVID-19 

PFIZER VACCINE                                      2021 

00:00:00            Completed                               [facility]

 

                                                    SARS-COV-2 COVID-19 

PFIZER VACCINE                                      2021 

00:00:00            Completed                               [facility]

 

                                                    SARS-COV-2 COVID-19 

PFIZER VACCINE                                      2021 

00:00:00            Completed                               [facility]

 

                                                    SARS-COV-2 COVID-19 

PFIZER VACCINE                                      2021 

00:00:00            Completed                               [facility]

 

                                                    SARS-COV-2 COVID-19 

PFIZER VACCINE                                      2021 

00:00:00            Completed                               [facility]

 

                                                    SARS-COV-2 COVID-19 

PFIZER VACCINE                                      2021 

00:00:00            Completed                               [facility]

 

                                                    SARS-COV-2 COVID-19 

PFIZER VACCINE                                      2021 

00:00:00            Completed                               [facility]

 

                                                    SARS-COV-2 COVID-19 

PFIZER VACCINE                                      2021 

00:00:00            Completed                               [facility]

 

                                                    SARS-COV-2 COVID-19 

PFIZER VACCINE                                      2021 

00:00:00            Completed                               [facility]

 

                                                    SARS-COV-2 COVID-19 

PFIZER VACCINE                                      2021 

00:00:00            Completed                               [facility]

 

                                                    SARS-COV-2 COVID-19 

PFIZER VACCINE                                      2021 

00:00:00            Completed                               [facility]

 

                                                    SARS-COV-2 COVID-19 

PFIZER VACCINE                                      2021 

00:00:00            Completed                               [facility]

 

                                                    SARS-COV-2 COVID-19 

PFIZER VACCINE                                      2021 

00:00:00            Completed                               [facility]

 

                                                    SARS-COV-2 COVID-19 

PFIZER VACCINE                                      2021 

00:00:00            Completed                               [facility]

 

                                                    SARS-COV-2 COVID-19 

PFIZER VACCINE                                      2021 

00:00:00            Completed                               [facility]

 

                                                    SARS-COV-2 COVID-19 

PFIZER VACCINE                                      2021 

00:00:00            Completed                               [facility]

 

                                                    SARS-COV-2 COVID-19 

PFIZER VACCINE                                      2021 

00:00:00            Completed                               [facility]

 

                                                    SARS-COV-2 COVID-19 

PFIZER VACCINE                                      2021 

00:00:00            Completed                               [facility]

 

                                                    SARS-COV-2 COVID-19 

PFIZER VACCINE                                      2021 

00:00:00            Completed                               [facility]

 

                                                    SARS-COV-2 COVID-19 

PFIZER VACCINE                                      2021 

00:00:00            Completed                               [facility]

 

                                                    SARS-COV-2 COVID-19 

PFIZER VACCINE                                      2021 

00:00:00            Completed                               [facility]

 

                                                    SARS-COV-2 COVID-19 

PFIZER VACCINE                                      2021 

00:00:00            Completed                               [facility]

 

                                                    SARS-COV-2 COVID-19 

PFIZER VACCINE                                      2021 

00:00:00            Completed                               [facility]

 

                                                    SARS-COV-2 COVID-19 

PFIZER VACCINE                                      2021 

00:00:00            Completed                               [facility]

 

                                                    SARS-COV-2 COVID-19 

PFIZER VACCINE                                      2021 

00:00:00            Completed                               [facility]

 

                                                    SARS-COV-2 COVID-19 

PFIZER VACCINE                                      2021 

00:00:00            Completed                               [facility]

 

                                                    SARS-COV-2 COVID-19 

PFIZER VACCINE                                      2021 

00:00:00            Completed                               [facility]

 

                                                    SARS-COV-2 COVID-19 

PFIZER VACCINE                                      2021 

00:00:00            Completed                               [facility]

 

                                                    SARS-COV-2 COVID-19 

PFIZER VACCINE                                      2021 

00:00:00            Completed                               [facility]

 

                                                    SARS-COV-2 COVID-19 

PFIZER VACCINE                                      2021 

00:00:00            Completed                               [facility]

 

                                                    SARS-COV-2 COVID-19 

PFIZER VACCINE                                      2021 

00:00:00            Completed                               [facility]

 

                                                    SARS-COV-2 COVID-19 

PFIZER VACCINE                                      2021 

00:00:00            Completed                               [facility]

 

                                                    SARS-COV-2 COVID-19 

PFIZER VACCINE                                      2021 

00:00:00            Completed                               [facility]

 

                                                    SARS-COV-2 COVID-19 

PFIZER VACCINE                                      2021 

00:00:00            Completed                               [facility]

 

                                                    SARS-COV-2 COVID-19 

PFIZER VACCINE                                      2021 

00:00:00            Completed                               [facility]

 

                                                    SARS-COV-2 COVID-19 

PFIZER VACCINE                                      2021 

00:00:00            Completed                               [facility]

 

                                                    SARS-COV-2 COVID-19 

PFIZER VACCINE                                      2021 

00:00:00            Completed                               [facility]

 

                                                    SARS-COV-2 COVID-19 

PFIZER VACCINE                                      2021 

00:00:00            Completed                               [facility]

 

                                                    SARS-COV-2 COVID-19 

PFIZER VACCINE                                      2021 

00:00:00            Completed                               [facility]

 

                                                    SARS-COV-2 COVID-19 

PFIZER VACCINE                                      2021 

00:00:00            Completed                               [facility]

 

                                                    SARS-COV-2 COVID-19 

PFIZER VACCINE                                      2021 

00:00:00            Completed                               [facility]

 

                                                    SARS-COV-2 COVID-19 

PFIZER VACCINE                                      2021 

00:00:00            Completed                               [facility]

 

                                                    SARS-COV-2 COVID-19 

PFIZER VACCINE                                      2021 

00:00:00            Completed                               [facility]

 

                                                    SARS-COV-2 COVID-19 

PFIZER VACCINE                                      2021 

00:00:00            Completed                               [facility]

 

                                                    SARS-COV-2 COVID-19 

PFIZER VACCINE                                      2021 

00:00:00            Completed                               [facility]

 

                                                    SARS-COV-2 COVID-19 

PFIZER VACCINE                                      2021 

00:00:00            Completed                               [facility]

 

                                                    SARS-COV-2 COVID-19 

PFIZER VACCINE                                      2021 

00:00:00            Completed                               [facility]

 

                                                    SARS-COV-2 COVID-19 

PFIZER VACCINE                                      2021 

00:00:00            Completed                               [facility]

 

                                                    SARS-COV-2 COVID-19 

PFIZER VACCINE                                      2021 

00:00:00            Completed                               [facility]

 

                                                    SARS-COV-2 COVID-19 

PFIZER VACCINE                                      2021 

00:00:00            Completed                               [facility]

 

                                                    SARS-COV-2 COVID-19 

PFIZER VACCINE                                      2021 

00:00:00            Completed                               [facility]

 

                                                    SARS-COV-2 COVID-19 

PFIZER VACCINE                                      2021 

00:00:00            Completed                               [facility]

 

                                                    SARS-COV-2 COVID-19 

PFIZER VACCINE                                      2021 

00:00:00            Completed                               [facility]

 

                                                    SARS-COV-2 COVID-19 

PFIZER VACCINE                                      2021 

00:00:00            Completed                               [facility]

 

                                                    SARS-COV-2 COVID-19 

PFIZER VACCINE                                      2021 

00:00:00            Completed                               [facility]

 

                                                    SARS-COV-2 COVID-19 

PFIZER VACCINE                                      2021 

00:00:00            Completed                               [facility]

 

                                                    SARS-COV-2 COVID-19 

PFIZER VACCINE                                      2021 

00:00:00            Completed                               [facility]

 

                                                    SARS-COV-2 COVID-19 

PFIZER VACCINE                                      2021 

00:00:00            Completed                               [facility]

 

                                                    SARS-COV-2 COVID-19 

PFIZER VACCINE                                      2021 

00:00:00            Completed                               [facility]

 

                                                    SARS-COV-2 COVID-19 

PFIZER VACCINE                                      2021 

00:00:00            Completed                               [facility]

 

                                                    SARS-COV-2 COVID-19 

PFIZER VACCINE                                      2021 

00:00:00            Completed                               [facility]

 

                                                    SARS-COV-2 COVID-19 

PFIZER VACCINE                                      2021 

00:00:00            Completed                               [facility]

 

                                                    SARS-COV-2 COVID-19 

PFIZER VACCINE                                      2021 

00:00:00            Completed                               [facility]

 

                                                    SARS-COV-2 COVID-19 

PFIZER VACCINE                                      2021 

00:00:00            Completed                               [facility]

 

                                                    SARS-COV-2 COVID-19 

PFIZER VACCINE                                      2021 

00:00:00            Completed                               [facility]

 

                                                    SARS-COV-2 COVID-19 

PFIZER VACCINE                                      2021 

00:00:00            Completed                               [facility]

 

                                                    SARS-COV-2 COVID-19 

PFIZER VACCINE                                      2021 

00:00:00            Completed                               [facility]

 

                                                    SARS-COV-2 COVID-19 

PFIZER VACCINE                                      2021 

00:00:00            Completed                               [facility]

 

                                                    SARS-COV-2 COVID-19 

PFIZER VACCINE                                      2021 

00:00:00            Completed                               [facility]

 

                                                    SARS-COV-2 COVID-19 

PFIZER VACCINE                                      2021 

00:00:00            Completed                               [facility]

 

                                                    SARS-COV-2 COVID-19 

PFIZER VACCINE                                      2021 

00:00:00            Completed                               [facility]

 

                                                    SARS-COV-2 COVID-19 

PFIZER VACCINE                                      2021 

00:00:00            Completed                               [facility]

 

                                                    SARS-COV-2 COVID-19 

PFIZER VACCINE                                      2021 

00:00:00            Completed                               [facility]

 

                                                    SARS-COV-2 COVID-19 

PFIZER VACCINE                                      2021 

00:00:00            Completed                               [facility]

 

                                                    SARS-COV-2 COVID-19 

PFIZER VACCINE                                      2021 

00:00:00            Completed                               [facility]

 

                                                    SARS-COV-2 COVID-19 

PFIZER VACCINE                                      2021 

00:00:00            Completed                               [facility]

 

                                                    SARS-COV-2 COVID-19 

PFIZER VACCINE                                      2021 

00:00:00            Completed                               [facility]

 

                                                    SARS-COV-2 COVID-19 

PFIZER VACCINE                                      2021 

00:00:00            Completed                               [facility]

 

                                                    SARS-COV-2 COVID-19 

PFIZER VACCINE                                      2021 

00:00:00            Completed                               [facility]

 

                                                    SARS-COV-2 COVID-19 

PFIZER VACCINE                                      2021 

00:00:00            Completed                               [facility]

 

                                                    SARS-COV-2 COVID-19 

PFIZER VACCINE                                      2021 

00:00:00            Completed                               [facility]

 

                                                    SARS-COV-2 COVID-19 

PFIZER VACCINE                                      2021 

00:00:00            Completed                               [facility]

 

                                                    SARS-COV-2 COVID-19 

PFIZER VACCINE                                      2021 

00:00:00            Completed                               [facility]

 

                                                    SARS-COV-2 COVID-19 

PFIZER VACCINE                                      2021 

00:00:00            Completed                               [facility]

 

                                                    SARS-COV-2 COVID-19 

PFIZER VACCINE                                      2021 

00:00:00            Completed                               [facility]

 

                                                    SARS-COV-2 COVID-19 

PFIZER VACCINE                                      2021 

00:00:00            Completed                               [facility]

 

                                                    SARS-COV-2 COVID-19 

PFIZER VACCINE                                      2021 

00:00:00            Completed                               [facility]

 

                                                    SARS-COV-2 COVID-19 

PFIZER VACCINE                                      2021 

00:00:00            Completed                               [facility]

 

                                                    SARS-COV-2 COVID-19 

PFIZER VACCINE                                      2021 

00:00:00            Completed                               [facility]

 

                                                    SARS-COV-2 COVID-19 

PFIZER VACCINE                                      2021 

00:00:00            Completed                               [facility]

 

                                                    SARS-COV-2 COVID-19 

PFIZER VACCINE                                      2021 

00:00:00            Completed                               [facility]

 

                                                    SARS-COV-2 COVID-19 

PFIZER VACCINE                                      2021 

00:00:00            Completed                               [facility]

 

                                                    SARS-COV-2 COVID-19 

PFIZER VACCINE                                      2021 

00:00:00            Completed                               [facility]

 

                                                    SARS-COV-2 COVID-19 

PFIZER VACCINE                                      2021 

00:00:00            Completed                               [facility]

 

                                                    SARS-COV-2 COVID-19 

PFIZER VACCINE                                      2021 

00:00:00            Completed                               [facility]

 

                                                    SARS-COV-2 COVID-19 

PFIZER VACCINE                                      2021 

00:00:00            Completed                               [facility]

 

                                                    SARS-COV-2 COVID-19 

PFIZER VACCINE                                      2021 

00:00:00            Completed                               [facility]

 

                                                    SARS-COV-2 COVID-19 

PFIZER VACCINE                                      2021 

00:00:00            Completed                               [facility]

 

                                                    SARS-COV-2 COVID-19 

PFIZER VACCINE                                      2021 

00:00:00            Completed                               [facility]

 

                                                    SARS-COV-2 COVID-19 

PFIZER VACCINE                                      2021 

00:00:00            Completed                               [facility]

 

                                                    SARS-COV-2 COVID-19 

PFIZER VACCINE                                      2021 

00:00:00            Completed                               [facility]

 

                                                    SARS-COV-2 COVID-19 

PFIZER VACCINE                                      2021 

00:00:00            Completed                               [facility]

 

                                                    SARS-COV-2 COVID-19 

PFIZER VACCINE                                      2021 

00:00:00            Completed                               [facility]

 

                                                    SARS-COV-2 COVID-19 

PFIZER VACCINE                                      2021 

00:00:00            Completed                               [facility]

 

                                                    SARS-COV-2 COVID-19 

PFIZER VACCINE                                      2021 

00:00:00            Completed                               [facility]

 

                                                    SARS-COV-2 COVID-19 

PFIZER VACCINE                                      2021 

00:00:00            Completed                               [facility]

 

                                                    SARS-COV-2 COVID-19 

PFIZER VACCINE                                      2021 

00:00:00            Completed                               [facility]

 

                                                    SARS-COV-2 COVID-19 

PFIZER VACCINE                                      2021 

00:00:00            Completed                               [facility]

 

                                                    SARS-COV-2 COVID-19 

PFIZER VACCINE                                      2021 

00:00:00            Completed                               [facility]

 

                                                    SARS-COV-2 COVID-19 

PFIZER VACCINE                                      2021 

00:00:00            Completed                               [facility]

 

                                                    SARS-COV-2 COVID-19 

PFIZER VACCINE                                      2021 

00:00:00            Completed                               [facility]

 

                                                    SARS-COV-2 COVID-19 

PFIZER VACCINE                                      2021 

00:00:00            Completed                               [facility]

 

                                                    SARS-COV-2 COVID-19 

PFIZER VACCINE                                      2021 

00:00:00            Completed                               [facility]

 

                                                    SARS-COV-2 COVID-19 

PFIZER VACCINE                                      2021 

00:00:00            Completed                               [facility]

 

                                                    SARS-COV-2 COVID-19 

PFIZER VACCINE                                      2021 

00:00:00            Completed                               [facility]

 

                                                    SARS-COV-2 COVID-19 

PFIZER VACCINE                                      2021 

00:00:00            Completed                               [facility]

 

                                                    SARS-COV-2 COVID-19 

PFIZER VACCINE                                      2021 

00:00:00            Completed                               [facility]

 

                                                    SARS-COV-2 COVID-19 

PFIZER VACCINE                                      2021 

00:00:00            Completed                               [facility]

 

                                                    SARS-COV-2 COVID-19 

PFIZER VACCINE                                      2021 

00:00:00            Completed                               [facility]

 

                                                    SARS-COV-2 COVID-19 

PFIZER VACCINE                                      2021 

00:00:00            Completed                               [facility]

 

                                                    SARS-COV-2 COVID-19 

PFIZER VACCINE                                      2021 

00:00:00            Completed                               [facility]

 

                                                    SARS-COV-2 COVID-19 

PFIZER VACCINE                                      2021 

00:00:00            Completed                               [facility]

 

                                                    SARS-COV-2 COVID-19 

PFIZER VACCINE                                      2021 

00:00:00            Completed                               [facility]

 

                                                    SARS-COV-2 COVID-19 

PFIZER VACCINE                                      2021 

00:00:00            Completed                               [facility]

 

                                                    SARS-COV-2 COVID-19 

PFIZER VACCINE                                      2021 

00:00:00            Completed                               [facility]

 

                                                    SARS-COV-2 COVID-19 

PFIZER VACCINE                                      2021 

00:00:00            Completed                               [facility]

 

                                                    SARS-COV-2 COVID-19 

PFIZER VACCINE                                      2021 

00:00:00            Completed                               [facility]

 

                                                    SARS-COV-2 COVID-19 

PFIZER VACCINE                                      2021 

00:00:00            Completed                               [facility]

 

                                                    SARS-COV-2 COVID-19 

PFIZER VACCINE                                      2021 

00:00:00            Completed                               [facility]

 

                                                    SARS-COV-2 COVID-19 

PFIZER VACCINE                                      2021 

00:00:00            Completed                               [facility]

 

                                                    SARS-COV-2 COVID-19 

PFIZER VACCINE                                      2021 

00:00:00            Completed                               [facility]

 

                                                    SARS-COV-2 COVID-19 

PFIZER VACCINE                                      2021 

00:00:00            Completed                               [facility]

 

                                                    SARS-COV-2 COVID-19 

PFIZER VACCINE                                      2021 

00:00:00            Completed                               [facility]

 

                                                    SARS-COV-2 COVID-19 

PFIZER VACCINE                                      2021 

00:00:00            Completed                               [facility]

 

                                                    SARS-COV-2 COVID-19 

PFIZER VACCINE                                      2021 

00:00:00            Completed                               [facility]

 

                                                    SARS-COV-2 COVID-19 

PFIZER VACCINE                                      2021 

00:00:00            Completed                               [facility]

 

                                                    SARS-COV-2 COVID-19 

PFIZER VACCINE                                      2021 

00:00:00            Completed                               [facility]

 

                                                    SARS-COV-2 COVID-19 

PFIZER VACCINE                                      2021 

00:00:00            Completed                               [facility]

 

                                                    SARS-COV-2 COVID-19 

PFIZER VACCINE                                      2021 

00:00:00            Completed                               [facility]

 

                                                    SARS-COV-2 COVID-19 

PFIZER VACCINE                                      2021 

00:00:00            Completed                               [facility]

 

                                                    SARS-COV-2 COVID-19 

PFIZER VACCINE                                      2021 

00:00:00            Completed                               [facility]

 

                                                    SARS-COV-2 COVID-19 

PFIZER VACCINE                                      2021 

00:00:00            Completed                               [facility]

 

                                                    SARS-COV-2 COVID-19 

PFIZER VACCINE                                      2021 

00:00:00            Completed                               [facility]

 

                                                    SARS-COV-2 COVID-19 

PFIZER VACCINE                                      2021 

00:00:00            Completed                               [facility]

 

                                                    SARS-COV-2 COVID-19 

PFIZER VACCINE                                      2021 

00:00:00            Completed                               [facility]

 

                                                    SARS-COV-2 COVID-19 

PFIZER VACCINE                                      2021 

00:00:00            Completed                               [facility]

 

                                                    SARS-COV-2 COVID-19 

PFIZER VACCINE                                      2021 

00:00:00            Completed                               [facility]

 

                                                    SARS-COV-2 COVID-19 

PFIZER VACCINE                                      2021 

00:00:00            Completed                               [facility]

 

                                                    SARS-COV-2 COVID-19 

PFIZER VACCINE                                      2021 

00:00:00            Completed                               [facility]

 

                                                    SARS-COV-2 COVID-19 

PFIZER VACCINE                                      2021 

00:00:00            Completed                               [facility]

 

                                                    SARS-COV-2 COVID-19 

PFIZER VACCINE                                      2021 

00:00:00            Completed                               [facility] [facility]

 

                                                    SARS-COV-2 COVID-19 

PFIZER VACCINE                                      2021 

00:00:00            Completed                               [facility]

 

                                                    SARS-COV-2 COVID-19 

PFIZER VACCINE                                      2021 

00:00:00            Completed                               [facility]

 

                                                    SARS-COV-2 COVID-19 

PFIZER VACCINE                                      2021 

00:00:00            Completed                               [facility]

 

                                                    SARS-COV-2 COVID-19 

PFIZER VACCINE                                      2021 

00:00:00            Completed                               [facility]

 

                                                    SARS-COV-2 COVID-19 

PFIZER VACCINE                                      2021 

00:00:00            Completed                               [facility]

 

                                                    SARS-COV-2 COVID-19 

PFIZER VACCINE                                      2021 

00:00:00            Completed                               [facility]

 

                                                    SARS-COV-2 COVID-19 

PFIZER VACCINE                                      2021 

00:00:00            Completed                               [facility]

 

                                                    SARS-COV-2 COVID-19 

PFIZER VACCINE                                      2021 

00:00:00            Completed                               [facility]

 

                                                    SARS-COV-2 COVID-19 

PFIZER VACCINE                                      2021 

00:00:00            Completed                               [facility]

 

                                                    SARS-COV-2 COVID-19 

PFIZER VACCINE                                      2021 

00:00:00            Completed                               [facility]

 

                                                    SARS-COV-2 COVID-19 

PFIZER VACCINE                                      2021 

00:00:00            Completed                               [facility]

 

                                                    SARS-COV-2 COVID-19 

PFIZER VACCINE                                      2021 

00:00:00            Completed                               [facility]

 

                                                    SARS-COV-2 COVID-19 

PFIZER VACCINE                                      2021 

00:00:00            Completed                               [facility]

 

                                                    SARS-COV-2 COVID-19 

PFIZER VACCINE                                      2021 

00:00:00            Completed                               [facility]

 

                                                    SARS-COV-2 COVID-19 

PFIZER VACCINE                                      2021 

00:00:00            Completed                               [facility]

 

                                                    SARS-COV-2 COVID-19 

PFIZER VACCINE                                      2021 

00:00:00            Completed                               [facility]

 

                                                    SARS-COV-2 COVID-19 

PFIZER VACCINE                                      2021 

00:00:00            Completed                               [facility]

 

                                                    SARS-COV-2 COVID-19 

PFIZER VACCINE                                      2021 

00:00:00            Completed                               [facility]

 

                                                    SARS-COV-2 COVID-19 

PFIZER VACCINE                                      2021 

00:00:00            Completed                               [facility]

 

                                                    SARS-COV-2 COVID-19 

PFIZER VACCINE                                      2021 

00:00:00            Completed                               [facility]

 

                                                    SARS-COV-2 COVID-19 

PFIZER VACCINE                                      2021 

00:00:00            Completed                               [facility]

 

                                                    SARS-COV-2 COVID-19 

PFIZER VACCINE                                      2021 

00:00:00            Completed                               [facility]

 

                                                    SARS-COV-2 COVID-19 

PFIZER VACCINE                                      2021 

00:00:00            Completed                               [facility]

 

                                                    SARS-COV-2 COVID-19 

PFIZER VACCINE                                      2021 

00:00:00            Completed                               [facility]

 

                                                    SARS-COV-2 COVID-19 

PFIZER VACCINE                                      2021 

00:00:00            Completed                               [facility]

 

                                                    SARS-COV-2 COVID-19 

PFIZER VACCINE                                      2021 

00:00:00            Completed                               [facility]

 

                                                    SARS-COV-2 COVID-19 

PFIZER VACCINE                                      2021 

00:00:00            Completed                               [facility]

 

                                                    SARS-COV-2 COVID-19 

PFIZER VACCINE                                      2021 

00:00:00            Completed                               [facility]

 

                                                    SARS-COV-2 COVID-19 

PFIZER VACCINE                                      2021 

00:00:00            Completed                               [facility]

 

                                                    SARS-COV-2 COVID-19 

PFIZER VACCINE                                      2021 

00:00:00            Completed                               [facility]

 

                                                    SARS-COV-2 COVID-19 

PFIZER VACCINE                                      2021 

00:00:00            Completed                               [facility]

 

                                                    SARS-COV-2 COVID-19 

PFIZER VACCINE                                      2021 

00:00:00            Completed                               [facility]

 

                                                    SARS-COV-2 COVID-19 

PFIZER VACCINE                                      2021 

00:00:00            Completed                               [facility]

 

                                                    SARS-COV-2 COVID-19 

PFIZER VACCINE                                      2021 

00:00:00            Completed                               [facility]

 

                                                    SARS-COV-2 COVID-19 

PFIZER VACCINE                                      2021 

00:00:00            Completed                               [facility]

 

                                                    SARS-COV-2 COVID-19 

PFIZER VACCINE                                      2021 

00:00:00            Completed                               [facility]

 

                                                    SARS-COV-2 COVID-19 

PFIZER VACCINE                                      2021 

00:00:00            Completed                               [facility]

 

                                                    SARS-COV-2 COVID-19 

PFIZER VACCINE                                      2021 

00:00:00            Completed                               [facility]

 

                                                    SARS-COV-2 COVID-19 

PFIZER VACCINE                                      2021 

00:00:00            Completed                               [facility]

 

                                                    SARS-COV-2 COVID-19 

PFIZER VACCINE                                      2021 

00:00:00            Completed                               [facility]

 

                                                    SARS-COV-2 COVID-19 

PFIZER VACCINE                                      2021 

00:00:00            Completed                               [facility]

 

                                                    SARS-COV-2 COVID-19 

PFIZER VACCINE                                      2021 

00:00:00            Completed                               [facility]

 

                                                    SARS-COV-2 COVID-19 

PFIZER VACCINE                                      2021 

00:00:00            Completed                               [facility]

 

                                                    SARS-COV-2 COVID-19 

PFIZER VACCINE                                      2021 

00:00:00            Completed                               [facility]

 

                                                    SARS-COV-2 COVID-19 

PFIZER VACCINE                                      2021 

00:00:00            Completed                               [facility]

 

                                                    SARS-COV-2 COVID-19 

PFIZER VACCINE                                      2021 

00:00:00            Completed                               [facility]

 

                                                    SARS-COV-2 COVID-19 

PFIZER VACCINE                                      2021 

00:00:00            Completed                               [facility]

 

                                                    SARS-COV-2 COVID-19 

PFIZER VACCINE                                      2021 

00:00:00            Completed                               [facility]

 

                                                    SARS-COV-2 COVID-19 

PFIZER VACCINE                                      2021 

00:00:00            Completed                               [facility]

 

                                                    SARS-COV-2 COVID-19 

PFIZER VACCINE                                      2021 

00:00:00            Completed                               [facility]

 

                                                    SARS-COV-2 COVID-19 

PFIZER VACCINE                                      2021 

00:00:00            Completed                               [facility]

 

                                                    SARS-COV-2 COVID-19 

PFIZER VACCINE                                      2021 

00:00:00            Completed                               [facility]

 

                                                    SARS-COV-2 COVID-19 

PFIZER VACCINE                                      2021 

00:00:00            Completed                               [facility]

 

                                                    SARS-COV-2 COVID-19 

PFIZER VACCINE                                      2021 

00:00:00            Completed                               [facility]

 

                                                    SARS-COV-2 COVID-19 

PFIZER VACCINE                                      2021 

00:00:00            Completed                               [facility]

 

                                                    SARS-COV-2 COVID-19 

PFIZER VACCINE                                      2021 

00:00:00            Completed                               [facility]

 

                                                    SARS-COV-2 COVID-19 

PFIZER VACCINE                                      2021 

00:00:00            Completed                               [facility]

 

                                                    SARS-COV-2 COVID-19 

PFIZER VACCINE                                      2021 

00:00:00            Completed                               [facility]

 

                                                    SARS-COV-2 COVID-19 

PFIZER VACCINE                                      2021 

00:00:00            Completed                               [facility]

 

                                                    SARS-COV-2 COVID-19 

PFIZER VACCINE                                      2021 

00:00:00            Completed                               [facility]

 

                                                    SARS-COV-2 COVID-19 

PFIZER VACCINE                                      2021 

00:00:00            Completed                               [facility]

 

                                                    SARS-COV-2 COVID-19 

PFIZER VACCINE                                      2021 

00:00:00            Completed                               [facility]

 

                                                    SARS-COV-2 COVID-19 

PFIZER VACCINE                                      2021 

00:00:00            Completed                               [facility]

 

                                                    SARS-COV-2 COVID-19 

PFIZER VACCINE                                      2021 

00:00:00            Completed                               [facility]

 

                                                    SARS-COV-2 COVID-19 

PFIZER VACCINE                                      2021 

00:00:00            Completed                               [facility]

 

                                                    SARS-COV-2 COVID-19 

PFIZER VACCINE                                      2021 

00:00:00            Completed                               [facility]

 

                                                    SARS-COV-2 COVID-19 

PFIZER VACCINE                                      2021 

00:00:00            Completed                               [facility]

 

                                                    SARS-COV-2 COVID-19 

PFIZER VACCINE                                      2021 

00:00:00            Completed                               [facility]

 

                                                    SARS-COV-2 COVID-19 

PFIZER VACCINE                                      2021 

00:00:00            Completed                               [facility]

 

                                                    SARS-COV-2 COVID-19 

PFIZER VACCINE                                      2021 

00:00:00            Completed                               [facility]

 

                                                    SARS-COV-2 COVID-19 

PFIZER VACCINE                                      2021 

00:00:00            Completed                               [facility]

 

                                                    SARS-COV-2 COVID-19 

PFIZER VACCINE                                      2021 

00:00:00            Completed                               [facility]

 

                                                    SARS-COV-2 COVID-19 

PFIZER VACCINE                                      2021 

00:00:00            Completed                               [facility]

 

                                                    SARS-COV-2 COVID-19 

PFIZER VACCINE                                      2021 

00:00:00            Completed                               [facility]

 

                                                    SARS-COV-2 COVID-19 

PFIZER VACCINE                                      2021 

00:00:00            Completed                               [facility]

 

                                                    SARS-COV-2 COVID-19 

PFIZER VACCINE                                      2021 

00:00:00            Completed                               [facility]

 

                                                    SARS-COV-2 COVID-19 

PFIZER VACCINE                                      2021 

00:00:00            Completed                               [facility]

 

                                                    SARS-COV-2 COVID-19 

PFIZER VACCINE                                      2021 

00:00:00            Completed                               [facility]

 

                                                    SARS-COV-2 COVID-19 

PFIZER VACCINE                                      2021 

00:00:00            Completed                               [facility]

 

                                                    SARS-COV-2 COVID-19 

PFIZER VACCINE                                      2021 

00:00:00            Completed                               [facility]

 

                                                    SARS-COV-2 COVID-19 

PFIZER VACCINE                                      2021 

00:00:00            Completed                               [facility]

 

                                                    SARS-COV-2 COVID-19 

PFIZER VACCINE                                      2021 

00:00:00            Completed                               [facility]

 

                                                    SARS-COV-2 COVID-19 

PFIZER VACCINE                                      2021 

00:00:00            Completed                               [facility]

 

                                                    SARS-COV-2 COVID-19 

PFIZER VACCINE                                      2021 

00:00:00            Completed                               [facility]

 

                                                    SARS-COV-2 COVID-19 

PFIZER VACCINE                                      2021 

00:00:00            Completed                               [facility]

 

                                                    SARS-COV-2 COVID-19 

PFIZER VACCINE                                      2021 

00:00:00            Completed                               [facility]

 

                                                    SARS-COV-2 COVID-19 

PFIZER VACCINE                                      2021 

00:00:00            Completed                               [facility]

 

                                                    SARS-COV-2 COVID-19 

PFIZER VACCINE                                      2021 

00:00:00            Completed                               [facility]

 

                                                    SARS-COV-2 COVID-19 

PFIZER VACCINE                                      2021 

00:00:00            Completed                               [facility]

 

                                                    SARS-COV-2 COVID-19 

PFIZER VACCINE                                      2021 

00:00:00            Completed                               [facility]

 

                                                    Influenza High Dose 

Quad                                                2020 

00:00:00            Completed                               [facility]

 

                                                    Influenza High Dose 

Quad                                                2020 

00:00:00            Completed                               [facility]

 

                                                    Influenza High Dose 

Quad                                                2020 

00:00:00            Completed                               [facility]

 

                                                    Influenza High Dose 

Quad                                                2020 

00:00:00            Completed                               [facility]

 

                                                    Influenza High Dose 

Quad                                                2020 

00:00:00            Completed                               [facility]

 

                                                    Influenza High Dose 

Quad                                                2020 

00:00:00            Completed                               [facility]

 

                                                    Influenza High Dose 

Quad                                                2020 

00:00:00            Completed                               [facility]

 

                                                    Influenza High Dose 

Quad                                                2020 

00:00:00            Completed                               [facility]

 

                                                    Influenza High Dose 

Quad                                                2020 

00:00:00            Completed                               [facility]

 

                                                    Influenza High Dose 

Quad                                                2020 

00:00:00            Completed                               [facility]

 

                                                    Influenza High Dose 

Quad                                                2020 

00:00:00            Completed                               [facility]

 

                                                    Influenza High Dose 

Quad                                                2020 

00:00:00            Completed                               [facility]

 

                                                    Influenza High Dose 

Quad                                                2020 

00:00:00            Completed                               [facility]

 

                                                    Influenza High Dose 

Quad                                                2020 

00:00:00            Completed                               [facility]

 

                                                    Influenza High Dose 

Quad                                                2020 

00:00:00            Completed                               [facility]

 

                                                    Influenza High Dose 

Quad                                                2020 

00:00:00            Completed                               [facility]

 

                                                    Influenza High Dose 

Quad                                                2020 

00:00:00            Completed                               [facility]

 

                                                    Influenza High Dose 

Quad                                                2020 

00:00:00            Completed                               [facility]

 

                                                    Influenza High Dose 

Quad                                                2020 

00:00:00            Completed                               [facility]

 

                                                    Influenza High Dose 

Quad                                                2020 

00:00:00            Completed                               [facility]

 

                                                    Influenza High Dose 

Quad                                                2020 

00:00:00            Completed                               [facility]

 

                                                    Influenza High Dose 

Quad                                                2020 

00:00:00            Completed                               [facility]

 

                                                    Influenza High Dose 

Quad                                                2020 

00:00:00            Completed                               [facility]

 

                                                    Influenza High Dose 

Quad                                                2020 

00:00:00            Completed                               [facility]

 

                                                    Influenza High Dose 

Quad                                                2020 

00:00:00            Completed                               [facility]

 

                                                    Influenza High Dose 

Quad                                                2020 

00:00:00            Completed                               [facility]

 

                                                    Influenza High Dose 

Quad                                                2020 

00:00:00            Completed                               [facility]

 

                                                    Influenza High Dose 

Quad                                                2020 

00:00:00            Completed                               [facility]

 

                                                    Influenza High Dose 

Quad                                                2020 

00:00:00            Completed                               [facility]

 

                                                    Influenza High Dose 

Quad                                                2020 

00:00:00            Completed                               [facility]

 

                                                    Influenza High Dose 

Quad                                                2020 

00:00:00            Completed                               [facility]

 

                                                    Influenza High Dose 

Quad                                                2020 

00:00:00            Completed                               [facility]

 

                                                    Influenza High Dose 

Quad                                                2020 

00:00:00            Completed                               [facility]

 

                                                    Influenza High Dose 

Quad                                                2020 

00:00:00            Completed                               [facility]

 

                                                    Influenza High Dose 

Quad                                                2020 

00:00:00            Completed                               [facility]

 

                                                    Influenza High Dose 

Quad                                                2020 

00:00:00            Completed                               [facility]

 

                                                    Influenza High Dose 

Quad                                                2020 

00:00:00            Completed                               [facility]

 

                                                    Influenza High Dose 

Quad                                                2020 

00:00:00            Completed                               [facility]

 

                                                    Influenza High Dose 

Quad                                                2020 

00:00:00            Completed                               [facility]

 

                                                    Influenza High Dose 

Quad                                                2020 

00:00:00            Completed                               [facility]

 

                                                    Influenza High Dose 

Quad                                                2020 

00:00:00            Completed                               [facility]

 

                                                    Influenza High Dose 

Quad                                                2020 

00:00:00            Completed                               [facility]

 

                                                    Influenza High Dose 

Quad                                                2020 

00:00:00            Completed                               [facility]

 

                                                    Influenza High Dose 

Quad                                                2020 

00:00:00            Completed                               [facility]

 

                                                    Influenza High Dose 

Quad                                                2020 

00:00:00            Completed                               [facility]

 

                                                    Influenza High Dose 

Quad                                                2020 

00:00:00            Completed                               [facility]

 

                                                    Influenza High Dose 

Quad                                                2020 

00:00:00            Completed                               [facility]

 

                                                    Influenza High Dose 

Quad                                                2020 

00:00:00            Completed                               [facility]

 

                                                    Influenza High Dose 

Quad                                                2020 

00:00:00            Completed                               [facility]

 

                                                    Influenza High Dose 

Quad                                                2020 

00:00:00            Completed                               [facility]

 

                                                    Influenza High Dose 

Quad                                                2020 

00:00:00            Completed                               [facility]

 

                                                    Influenza High Dose 

Quad                                                2020 

00:00:00            Completed                               [facility]

 

                                                    Influenza High Dose 

Quad                                                2020 

00:00:00            Completed                               [facility]

 

                                                    Influenza High Dose 

Quad                                                2020 

00:00:00            Completed                               [facility]

 

                                                    Influenza High Dose 

Quad                                                2020 

00:00:00            Completed                               [facility]

 

                                                    Influenza High Dose 

Quad                                                2020 

00:00:00            Completed                               [facility]

 

                                                    Influenza High Dose 

Quad                                                2020 

00:00:00            Completed                               [facility]

 

                                                    Influenza High Dose 

Quad                                                2020 

00:00:00            Completed                               [facility]

 

                                                    Influenza High Dose 

Quad                                                2020 

00:00:00            Completed                               [facility]

 

                                                    Influenza High Dose 

Quad                                                2020 

00:00:00            Completed                               [facility]

 

                                                    Influenza High Dose 

Quad                                                2020 

00:00:00            Completed                               [facility]

 

                                                    Influenza High Dose 

Quad                                                2020 

00:00:00            Completed                               [facility]

 

                                                    Influenza High Dose 

Quad                                                2020 

00:00:00            Completed                               [facility]

 

                                                    Influenza High Dose 

Quad                                                2020 

00:00:00            Completed                               [facility]

 

                                                    Influenza High Dose 

Quad                                                2020 

00:00:00            Completed                               [facility]

 

                                                    Influenza High Dose 

Quad                                                2020 

00:00:00            Completed                               [facility]

 

                                                    Influenza High Dose 

Quad                                                2020 

00:00:00            Completed                               [facility]

 

                                                    Influenza High Dose 

Quad                                                2020 

00:00:00            Completed                               [facility]

 

                                                    Influenza High Dose 

Quad                                                2020 

00:00:00            Completed                               [facility]

 

                                                    Influenza High Dose 

Quad                                                2020 

00:00:00            Completed                               [facility]

 

                                                    Influenza High Dose 

Quad                                                2020 

00:00:00            Completed                               [facility]

 

                                                    Influenza High Dose 

Quad                                                2020 

00:00:00            Completed                               [facility]

 

                                                    Influenza High Dose 

Quad                                                2020 

00:00:00            Completed                               [facility]

 

                                                    Influenza High Dose 

Quad                                                2020 

00:00:00            Completed                               [facility]

 

                                                    Influenza High Dose 

Quad                                                2020 

00:00:00            Completed                               [facility]

 

                                                    Influenza High Dose 

Quad                                                2020 

00:00:00            Completed                               [facility]

 

                                                    Influenza High Dose 

Quad                                                2020 

00:00:00            Completed                               [facility]

 

                                                    Influenza High Dose 

Quad                                                2020 

00:00:00            Completed                               [facility]

 

                                                    Influenza High Dose 

Quad                                                2020 

00:00:00            Completed                               [facility]

 

                                                    Influenza High Dose 

Quad                                                2020 

00:00:00            Completed                               [facility]

 

                                                    Influenza High Dose 

Quad                                                2020 

00:00:00            Completed                               [facility]

 

                                                    Influenza High Dose 

Quad                                                2020 

00:00:00            Completed                               [facility]

 

                                                    Influenza High Dose 

Quad                                                2020 

00:00:00            Completed                               [facility]

 

                                                    Influenza High Dose 

Quad                                                2020 

00:00:00            Completed                               [facility]

 

                                                    Influenza High Dose 

Quad                                                2020 

00:00:00            Completed                               [facility]

 

                                                    Influenza High Dose 

Quad                                                2020 

00:00:00            Completed                               [facility]

 

                                                    Influenza High Dose 

Quad                                                2020 

00:00:00            Completed                               [facility]

 

                                                    Influenza High Dose 

Quad                                                2020 

00:00:00            Completed                               [facility]

 

                                                    Influenza High Dose 

Quad                                                2020 

00:00:00            Completed                               [facility]

 

                                                    Influenza High Dose 

Quad                                                2020 

00:00:00            Completed                               [facility]

 

                                                    Influenza High Dose 

Quad                                                2020 

00:00:00            Completed                               [facility]

 

                                                    Influenza High Dose 

Quad                                                2020 

00:00:00            Completed                               [facility]

 

                                                    Influenza High Dose 

Quad                                                2020 

00:00:00            Completed                               [facility]

 

                                                    Influenza High Dose 

Quad                                                2020 

00:00:00            Completed                               [facility]

 

                                                    Influenza High Dose 

Quad                                                2020 

00:00:00            Completed                               [facility]

 

                                                    Influenza High Dose 

Quad                                                2020 

00:00:00            Completed                               [facility]

 

                                                    Influenza High Dose 

Quad                                                2020 

00:00:00            Completed                               [facility]

 

                                                    Influenza High Dose 

Quad                                                2020 

00:00:00            Completed                               [facility]

 

                                                    Influenza High Dose 

Quad                                                2020 

00:00:00            Completed                               [facility]

 

                                                    Influenza High Dose 

Quad                                                2020 

00:00:00            Completed                               [facility]

 

                                                    Influenza High Dose 

Quad                                                2020 

00:00:00            Completed                               [facility]

 

                                                    Influenza High Dose 

Quad                                                2020 

00:00:00            Completed                               [facility]

 

                                                    Influenza High Dose 

Quad                                                2020 

00:00:00            Completed                               [facility]

 

                                                    Influenza High Dose 

Quad                                                2020 

00:00:00            Completed                               [facility]

 

                                                    Influenza High Dose 

Quad                                                2020 

00:00:00            Completed                               [facility]

 

                                                    Influenza High Dose 

Quad                                                2020 

00:00:00            Completed                               [facility]

 

                                                    Influenza High Dose 

Quad                                                2020 

00:00:00            Completed                               [facility]

 

                                                    Influenza High Dose 

Quad                                                2020 

00:00:00            Completed                               [facility]

 

                                                    Influenza High Dose 

Quad                                                2020 

00:00:00            Completed                               [facility]

 

                                                    Influenza High Dose 

Quad                                                2020 

00:00:00            Completed                               [facility]

 

                                                    Influenza High Dose 

Quad                                                2020 

00:00:00            Completed                               [facility]

 

                                                    Influenza High Dose 

Quad                                                2020 

00:00:00            Completed                               [facility]

 

                                                    Influenza High Dose 

Quad                                                2020 

00:00:00            Completed                               [facility]

 

                                                    Influenza High Dose 

Quad                                                2020 

00:00:00            Completed                               [facility]

 

                                                    Influenza High Dose 

Quad                                                2020 

00:00:00            Completed                               [facility] [place]

 

                                                    Influenza High Dose 

Quad                                                2020 

00:00:00            Completed                               [facility]

 

                                                    Influenza High Dose 

Quad                                                2020 

00:00:00            Completed                               [facility]

 

                                                    Influenza High Dose 

Quad                                                2020 

00:00:00            Completed                               [facility]

 

                                                    Influenza High Dose 

Quad                                                2020 

00:00:00            Completed                               [facility] [place]

 

                                                    Influenza High Dose 

Quad                                                2020 

00:00:00            Completed                               [facility]

 

                                                    Influenza High Dose 

Quad                                                2020 

00:00:00            Completed                               [facility]

 

                                                    Influenza High Dose 

Quad                                                2020 

00:00:00            Completed                               [facility]

 

                                                    Influenza High Dose 

Quad                                                2020 

00:00:00            Completed                               [facility]

 

                                                    Influenza High Dose 

Quad                                                2020 

00:00:00            Completed                               [facility]

 

                                                    Influenza High Dose 

Quad                                                2020 

00:00:00            Completed                               [facility] [place]

 

                                                    Influenza High Dose 

Quad                                                2020 

00:00:00            Completed                               [facility] [place]

 

                                                    Influenza High Dose 

Quad                                                2020 

00:00:00            Completed                               [facility] [place]

 

                                                    Influenza High Dose 

Quad                                                2020 

00:00:00            Completed                               [facility]

 

                                                    Influenza High Dose 

Quad                                                2020 

00:00:00            Completed                               [facility]

 

                                                    Influenza High Dose 

Quad                                                2020 

00:00:00            Completed                               [facility]

 

                                                    Influenza High Dose 

Quad                                                2020 

00:00:00            Completed                               [facility]

 

                                                    Influenza High Dose 

Quad                                                2020 

00:00:00            Completed                               [facility]

 

                                                    Influenza High Dose 

Quad                                                2020 

00:00:00            Completed                               [facility]

 

                                                    Influenza High Dose 

Quad                                                2020 

00:00:00            Completed                               [facility]

 

                                                    Influenza High Dose 

Quad                                                2020 

00:00:00            Completed                               [facility]

 

                                                    Influenza High Dose 

Quad                                                2020 

00:00:00            Completed                               [facility]

 

                                                    Influenza High Dose 

Quad                                                2020 

00:00:00            Completed                               [facility]

 

                                                    Influenza High Dose 

Quad                                                2020 

00:00:00            Completed                               [facility]

 

                                                    Influenza High Dose 

Quad                                                2020 

00:00:00            Completed                               [facility]

 

                                                    Influenza High Dose 

Quad                                                2020 

00:00:00            Completed                               [facility]

 

                                                    Influenza High Dose 

Quad                                                2020 

00:00:00            Completed                               [facility] [place]

 

                                                    Influenza Virus 

Vaccine Quad IM 3+ 

YRS                                                 2019 

00:00:00            Completed                               [facility] [place]

 

                                                    Influenza Virus 

Vaccine Quad IM 3+ 

YRS                                                 2019 

00:00:00            Completed                               [facility]

 

                                                    Influenza Virus 

Vaccine Quad IM 3+ 

YRS                                                 2019 

00:00:00            Completed                               [facility]

 

                                                    Influenza Virus 

Vaccine Quad IM 3+ 

YRS                                                 2019 

00:00:00            Completed                               [facility]

 

                                                    Influenza Virus 

Vaccine Quad IM 3+ 

YRS                                                 2019 

00:00:00            Completed                               [facility] [place]

 

                                                    Influenza Virus 

Vaccine Quad IM 3+ 

YRS                                                 2019 

00:00:00            Completed                               [facility] [place]

 

                                                    Influenza Virus 

Vaccine Quad IM 3+ 

YRS                                                 2019 

00:00:00            Completed                               [facility]

 

                                                    Influenza Virus 

Vaccine Quad IM 3+ 

YRS                                                 2019 

00:00:00            Completed                               [facility]

 

                                                    Influenza Virus 

Vaccine Quad IM 3+ 

YRS                                                 2019 

00:00:00            Completed                               [facility]

 

                                                    Influenza Virus 

Vaccine Quad IM 3+ 

YRS                                                 2019 

00:00:00            Completed                               [facility] [place]

 

                                                    Influenza Virus 

Vaccine Quad IM 3+ 

YRS                                                 2019 

00:00:00            Completed                               [facility]

 

                                                    Influenza Virus 

Vaccine Quad IM 3+ 

YRS                                                 2019 

00:00:00            Completed                               [facility]

 

                                                    Influenza Virus 

Vaccine Quad IM 3+ 

YRS                                                 2019 

00:00:00            Completed                               [facility]

 

                                                    Influenza Virus 

Vaccine Quad IM 3+ 

YRS                                                 2019 

00:00:00            Completed                               [facility]

 

                                                    Influenza Virus 

Vaccine Quad IM 3+ 

YRS                                                 2019 

00:00:00            Completed                               [facility]

 

                                                    Influenza Virus 

Vaccine Quad IM 3+ 

YRS                                                 2019 

00:00:00            Completed                               [facility]

 

                                                    Influenza Virus 

Vaccine Quad IM 3+ 

YRS                                                 2019 

00:00:00            Completed                               [facility]

 

                                                    Influenza Virus 

Vaccine Quad IM 3+ 

YRS                                                 2019 

00:00:00            Completed                               [facility]

 

                                                    Influenza Virus 

Vaccine Quad IM 3+ 

YRS                                                 2019 

00:00:00            Completed                               [facility]

 

                                                    Influenza Virus 

Vaccine Quad IM 3+ 

YRS                                                 2019 

00:00:00            Completed                               [facility]

 

                                                    Influenza Virus 

Vaccine Quad IM 3+ 

YRS                                                 2019 

00:00:00            Completed                               [facility]

 

                                                    Influenza Virus 

Vaccine Quad IM 3+ 

YRS                                                 2019 

00:00:00            Completed                               [facility]

 

                                                    Influenza Virus 

Vaccine Quad IM 3+ 

YRS                                                 2019 

00:00:00            Completed                               [facility]

 

                                                    Influenza Virus 

Vaccine Quad IM 3+ 

YRS                                                 2019 

00:00:00            Completed                               [facility] [place]

 

                                                    Influenza Virus 

Vaccine Quad IM 3+ 

YRS                                                 2019 

00:00:00            Completed                               [facility]

 

                                                    Influenza Virus 

Vaccine Quad IM 3+ 

YRS                                                 2019 

00:00:00            Completed                               [facility]

 

                                                    Influenza Virus 

Vaccine Quad IM 3+ 

YRS                                                 2019 

00:00:00            Completed                               [facility]

 

                                                    Influenza Virus 

Vaccine Quad IM 3+ 

YRS                                                 2019 

00:00:00            Completed                               [facility]

 

                                                    Influenza Virus 

Vaccine Quad IM 3+ 

YRS                                                 2019 

00:00:00            Completed                               [facility]

 

                                                    Influenza Virus 

Vaccine Quad IM 3+ 

YRS                                                 2019 

00:00:00            Completed                               [facility]

 

                                                    Influenza Virus 

Vaccine Quad IM 3+ 

YRS                                                 2019 

00:00:00            Completed                               [facility]

 

                                                    Influenza Virus 

Vaccine Quad IM 3+ 

YRS                                                 2019 

00:00:00            Completed                               [facility]

 

                                                    Influenza Virus 

Vaccine Quad IM 3+ 

YRS                                                 2019 

00:00:00            Completed                               [facility]

 

                                                    Influenza Virus 

Vaccine Quad IM 3+ 

YRS                                                 2019 

00:00:00            Completed                               [facility]

 

                                                    Influenza Virus 

Vaccine Quad IM 3+ 

YRS                                                 2019 

00:00:00            Completed                               [facility]

 

                                                    Influenza Virus 

Vaccine Quad IM 3+ 

YRS                                                 2019 

00:00:00            Completed                               [facility]

 

                                                    Influenza Virus 

Vaccine Quad IM 3+ 

YRS                                                 2019 

00:00:00            Completed                               [facility]

 

                                                    Influenza Virus 

Vaccine Quad IM 3+ 

YRS                                                 2019 

00:00:00            Completed                               [facility]

 

                                                    Influenza Virus 

Vaccine Quad IM 3+ 

YRS                                                 2019 

00:00:00            Completed                               [facility]

 

                                                    Influenza Virus 

Vaccine Quad IM 3+ 

YRS                                                 2019 

00:00:00            Completed                               [facility]

 

                                                    Influenza Virus 

Vaccine Quad IM 3+ 

YRS                                                 2019 

00:00:00            Completed                               [facility]

 

                                                    Influenza Virus 

Vaccine Quad IM 3+ 

YRS                                                 2019 

00:00:00            Completed                               [facility]

 

                                                    Influenza Virus 

Vaccine Quad IM 3+ 

YRS                                                 2019 

00:00:00            Completed                               [facility]

 

                                                    Influenza Virus 

Vaccine Quad IM 3+ 

YRS                                                 2019 

00:00:00            Completed                               [facility]

 

                                                    Influenza Virus 

Vaccine Quad IM 3+ 

YRS                                                 2019 

00:00:00            Completed                               [facility]

 

                                                    Influenza Virus 

Vaccine Quad IM 3+ 

YRS                                                 2019 

00:00:00            Completed                               [facility]

 

                                                    Influenza Virus 

Vaccine Quad IM 3+ 

YRS                                                 2019 

00:00:00            Completed                               [facility]

 

                                                    Influenza Virus 

Vaccine Quad IM 3+ 

YRS                                                 2019 

00:00:00            Completed                               [facility]

 

                                                    Influenza Virus 

Vaccine Quad IM 3+ 

YRS                                                 2019 

00:00:00            Completed                               [facility]

 

                                                    Influenza Virus 

Vaccine Quad IM 3+ 

YRS                                                 2019 

00:00:00            Completed                               [facility]

 

                                                    Influenza Virus 

Vaccine Quad IM 3+ 

YRS                                                 2019 

00:00:00            Completed                               [facility]

 

                                                    Influenza Virus 

Vaccine Quad IM 3+ 

YRS                                                 2019 

00:00:00            Completed                               [facility]

 

                                                    Influenza Virus 

Vaccine Quad IM 3+ 

YRS                                                 2019 

00:00:00            Completed                               [facility]

 

                                                    Influenza Virus 

Vaccine Quad IM 3+ 

YRS                                                 2019 

00:00:00            Completed                               [facility]

 

                                                    Influenza Virus 

Vaccine Quad IM 3+ 

YRS                                                 2019 

00:00:00            Completed                               [facility]

 

                                                    Influenza Virus 

Vaccine Quad IM 3+ 

YRS                                                 2019 

00:00:00            Completed                               [facility]

 

                                                    Influenza Virus 

Vaccine Quad IM 3+ 

YRS                                                 2019 

00:00:00            Completed                               [facility]

 

                                                    Influenza Virus 

Vaccine Quad IM 3+ 

YRS                                                 2019 

00:00:00            Completed                               [facility]

 

                                                    Influenza Virus 

Vaccine Quad IM 3+ 

YRS                                                 2019 

00:00:00            Completed                               [facility]

 

                                                    Influenza Virus 

Vaccine Quad IM 3+ 

YRS                                                 2019 

00:00:00            Completed                               [facility]

 

                                                    Influenza Virus 

Vaccine Quad IM 3+ 

YRS                                                 2019 

00:00:00            Completed                               [facility]

 

                                                    Influenza Virus 

Vaccine Quad IM 3+ 

YRS                                                 2019 

00:00:00            Completed                               [facility]

 

                                                    Influenza Virus 

Vaccine Quad IM 3+ 

YRS                                                 2019 

00:00:00            Completed                               [facility]

 

                                                    Influenza Virus 

Vaccine Quad IM 3+ 

YRS                                                 2019 

00:00:00            Completed                               [facility]

 

                                                    Influenza Virus 

Vaccine Quad IM 3+ 

YRS                                                 2019 

00:00:00            Completed                               [facility]

 

                                                    Influenza Virus 

Vaccine Quad IM 3+ 

YRS                                                 2019 

00:00:00            Completed                               [facility]

 

                                                    Influenza Virus 

Vaccine Quad IM 3+ 

YRS                                                 2019 

00:00:00            Completed                               [facility]

 

                                                    Influenza Virus 

Vaccine Quad IM 3+ 

YRS                                                 2019 

00:00:00            Completed                               [facility]

 

                                                    Influenza Virus 

Vaccine Quad IM 3+ 

YRS                                                 2019 

00:00:00            Completed                               [facility]

 

                                                    Influenza Virus 

Vaccine Quad IM 3+ 

YRS                                                 2019 

00:00:00            Completed                               [facility]

 

                                                    Influenza Virus 

Vaccine Quad IM 3+ 

YRS                                                 2019 

00:00:00            Completed                               [facility]

 

                                                    Influenza Virus 

Vaccine Quad IM 3+ 

YRS                                                 2019 

00:00:00            Completed                               [facility]

 

                                                    Influenza Virus 

Vaccine Quad IM 3+ 

YRS                                                 2019 

00:00:00            Completed                               [facility]

 

                                                    Influenza Virus 

Vaccine Quad IM 3+ 

YRS                                                 2019 

00:00:00            Completed                               [facility]

 

                                                    Influenza Virus 

Vaccine Quad IM 3+ 

YRS                                                 2019 

00:00:00            Completed                               [facility]

 

                                                    Influenza Virus 

Vaccine Quad IM 3+ 

YRS                                                 2019 

00:00:00            Completed                               [facility]

 

                                                    Influenza Virus 

Vaccine Quad IM 3+ 

YRS                                                 2019 

00:00:00            Completed                               [facility]

 

                                                    Influenza Virus 

Vaccine Quad IM 3+ 

YRS                                                 2019 

00:00:00            Completed                               [facility]

 

                                                    Influenza Virus 

Vaccine Quad IM 3+ 

YRS                                                 2019 

00:00:00            Completed                               [facility]

 

                                                    Influenza Virus 

Vaccine Quad IM 3+ 

YRS                                                 2019 

00:00:00            Completed                               [facility]

 

                                                    Influenza Virus 

Vaccine Quad IM 3+ 

YRS                                                 2019 

00:00:00            Completed                               [facility]

 

                                                    Influenza Virus 

Vaccine Quad IM 3+ 

YRS                                                 2019 

00:00:00            Completed                               [facility]

 

                                                    Influenza Virus 

Vaccine Quad IM 3+ 

YRS                                                 2019 

00:00:00            Completed                               [facility]

 

                                                    Influenza Virus 

Vaccine Quad IM 3+ 

YRS                                                 2019 

00:00:00            Completed                               [facility]

 

                                                    Influenza Virus 

Vaccine Quad IM 3+ 

YRS                                                 2019 

00:00:00            Completed                               [facility]

 

                                                    Influenza Virus 

Vaccine Quad IM 3+ 

YRS                                                 2019 

00:00:00            Completed                               [facility]

 

                                                    Influenza Virus 

Vaccine Quad IM 3+ 

YRS                                                 2019 

00:00:00            Completed                               [facility]

 

                                                    Influenza Virus 

Vaccine Quad IM 3+ 

YRS                                                 2019 

00:00:00            Completed                               [facility]

 

                                                    Influenza Virus 

Vaccine Quad IM 3+ 

YRS                                                 2019 

00:00:00            Completed                               [facility]

 

                                                    Influenza Virus 

Vaccine Quad IM 3+ 

YRS                                                 2019 

00:00:00            Completed                               [facility]

 

                                                    Influenza Virus 

Vaccine Quad IM 3+ 

YRS                                                 2019 

00:00:00            Completed                               [facility]

 

                                                    Influenza Virus 

Vaccine Quad IM 3+ 

YRS                                                 2019 

00:00:00            Completed                               [facility]

 

                                                    Influenza Virus 

Vaccine Quad IM 3+ 

YRS                                                 2019 

00:00:00            Completed                               [facility]

 

                                                    Influenza Virus 

Vaccine Quad IM 3+ 

YRS                                                 2019 

00:00:00            Completed                               [facility]

 

                                                    Influenza Virus 

Vaccine Quad IM 3+ 

YRS                                                 2019 

00:00:00            Completed                               [facility]

 

                                                    Influenza Virus 

Vaccine Quad IM 3+ 

YRS                                                 2019 

00:00:00            Completed                               [facility]

 

                                                    Influenza Virus 

Vaccine Quad IM 3+ 

YRS                                                 2019 

00:00:00            Completed                               [facility]

 

                                                    Influenza Virus 

Vaccine Quad IM 3+ 

YRS                                                 2019 

00:00:00            Completed                               [facility]

 

                                                    Influenza Virus 

Vaccine Quad IM 3+ 

YRS                                                 2019 

00:00:00            Completed                               [facility]

 

                                                    Influenza Virus 

Vaccine Quad IM 3+ 

YRS                                                 2019 

00:00:00            Completed                               [facility]

 

                                                    Influenza Virus 

Vaccine Quad IM 3+ 

YRS                                                 2019 

00:00:00            Completed                               [facility]

 

                                                    Influenza Virus 

Vaccine Quad IM 3+ 

YRS                                                 2019 

00:00:00            Completed                               [facility]

 

                                                    Influenza Virus 

Vaccine Quad IM 3+ 

YRS                                                 2019 

00:00:00            Completed                               [facility]

 

                                                    Influenza Virus 

Vaccine Quad IM 3+ 

YRS                                                 2019 

00:00:00            Completed                               [facility]

 

                                                    Influenza Virus 

Vaccine Quad IM 3+ 

YRS                                                 2019 

00:00:00            Completed                               [facility]

 

                                                    Influenza Virus 

Vaccine Quad IM 3+ 

YRS                                                 2019 

00:00:00            Completed                               [facility]

 

                                                    Influenza Virus 

Vaccine Quad IM 3+ 

YRS                                                 2019 

00:00:00            Completed                               [facility]

 

                                                    Influenza Virus 

Vaccine Quad IM 3+ 

YRS                                                 2019 

00:00:00            Completed                               [facility]

 

                                                    Influenza Virus 

Vaccine Quad IM 3+ 

YRS                                                 2019 

00:00:00            Completed                               [facility]

 

                                                    Influenza Virus 

Vaccine Quad IM 3+ 

YRS                                                 2019 

00:00:00            Completed                               [facility]

 

                                                    Influenza Virus 

Vaccine Quad IM 3+ 

YRS                                                 2019 

00:00:00            Completed                               [facility]

 

                                                    Influenza Virus 

Vaccine Quad IM 3+ 

YRS                                                 2019 

00:00:00            Completed                               [facility]

 

                                                    Influenza Virus 

Vaccine Quad IM 3+ 

YRS                                                 2019 

00:00:00            Completed                               [facility]

 

                                                    Influenza Virus 

Vaccine Quad IM 3+ 

YRS                                                 2019 

00:00:00            Completed                               [facility]

 

                                                    Influenza Virus 

Vaccine Quad IM 3+ 

YRS                                                 2019 

00:00:00            Completed                               [facility]

 

                                                    Influenza Virus 

Vaccine Quad IM 3+ 

YRS                                                 2019 

00:00:00            Completed                               [facility]

 

                                                    Influenza Virus 

Vaccine Quad IM 3+ 

YRS                                                 2019 

00:00:00            Completed                               [facility]

 

                                                    Influenza Virus 

Vaccine Quad IM 3+ 

YRS                                                 2019 

00:00:00            Completed                               [facility]

 

                                                    Influenza Virus 

Vaccine Quad IM 3+ 

YRS                                                 2019 

00:00:00            Completed                               [facility]

 

                                                    Influenza Virus 

Vaccine Quad IM 3+ 

YRS                                                 2019 

00:00:00            Completed                               [facility]

 

                                                    Influenza Virus 

Vaccine Quad IM 3+ 

YRS                                                 2019 

00:00:00            Completed                               [facility]

 

                                                    Influenza Virus 

Vaccine Quad IM 3+ 

YRS                                                 2019 

00:00:00            Completed                               [facility]

 

                                                    Influenza Virus 

Vaccine Quad IM 3+ 

YRS                                                 2019 

00:00:00            Completed                               [facility]

 

                                                    Influenza Virus 

Vaccine Quad IM 3+ 

YRS                                                 2019 

00:00:00            Completed                               [facility]

 

                                                    Influenza Virus 

Vaccine Quad IM 3+ 

YRS                                                 2019 

00:00:00            Completed                               [facility]

 

                                                    Influenza Virus 

Vaccine Quad IM 3+ 

YRS                                                 2019 

00:00:00            Completed                               [facility]

 

                                                    Influenza Virus 

Vaccine Quad IM 3+ 

YRS                                                 2019 

00:00:00            Completed                               [facility]

 

                                                    Influenza Virus 

Vaccine Quad IM 3+ 

YRS                                                 2019 

00:00:00            Completed                               [facility]

 

                                                    Influenza Virus 

Vaccine Quad IM 3+ 

YRS                                                 2019 

00:00:00            Completed                               [facility]

 

                                                    Influenza Virus 

Vaccine Quad IM 3+ 

YRS                                                 2019 

00:00:00            Completed                               [facility]

 

                                                    Influenza Virus 

Vaccine Quad IM 3+ 

YRS                                                 2019 

00:00:00            Completed                               [facility]

 

                                                    Influenza Virus 

Vaccine Quad IM 3+ 

YRS                                                 2019 

00:00:00            Completed                               [facility]

 

                                                    Influenza Virus 

Vaccine Quad IM 3+ 

YRS                                                 2019 

00:00:00            Completed                               [facility]

 

                                                    Influenza Virus 

Vaccine Quad IM 3+ 

YRS                                                 2019 

00:00:00            Completed                               [facility]

 

                                                    Influenza Virus 

Vaccine Quad IM 3+ 

YRS                                                 2019 

00:00:00            Completed                               [facility]

 

                                                    Influenza Virus 

Vaccine Quad IM 3+ 

YRS                                                 2019 

00:00:00            Completed                               [facility]

 

                                                    Influenza Virus 

Vaccine Quad IM 3+ 

YRS                                                 2019 

00:00:00            Completed                               [facility]

 

                                                    Influenza Virus 

Vaccine Quad IM 3+ 

YRS                                                 2019 

00:00:00            Completed                               [facility]

 

                                                    Influenza Virus 

Vaccine Quad IM 3+ 

YRS                                                 2019 

00:00:00            Completed                               [facility]

 

                                                    Influenza Virus 

Vaccine Quad IM 3+ 

YRS                                                 2019 

00:00:00            Completed                               [facility]

 

                                                    Influenza Virus 

Vaccine Quad IM 3+ 

YRS                                                 2019 

00:00:00            Completed                               [facility]

 

                                                    Pneumococcal 13 

Conjugate, PCV13 

(Prevnar 13)                                        2018 

00:00:00            Completed                               [facility]

 

                                                    Influenza Virus 

Vaccine                                             2018 

00:00:00            Completed                               [facility]

 

                                                    Pneumococcal 13 

Conjugate, PCV13 

(Prevnar 13)                                        2018 

00:00:00            Completed                               [facility]

 

                                                    Influenza Virus 

Vaccine                                             2018 

00:00:00            Completed                               [facility]

 

                                                    Pneumococcal 13 

Conjugate, PCV13 

(Prevnar 13)                                        2018 

00:00:00            Completed                               [facility]

 

                                                    Influenza Virus 

Vaccine                                             2018 

00:00:00            Completed                               [facility]

 

                                                    Pneumococcal 13 

Conjugate, PCV13 

(Prevnar 13)                                        2018 

00:00:00            Completed                               [facility]

 

                                                    Influenza Virus 

Vaccine                                             2018 

00:00:00            Completed                               [facility]

 

                                                    Pneumococcal 13 

Conjugate, PCV13 

(Prevnar 13)                                        2018 

00:00:00            Completed                               [facility]

 

                                                    Influenza Virus 

Vaccine                                             2018 

00:00:00            Completed                               [facility]

 

                                                    Pneumococcal 13 

Conjugate, PCV13 

(Prevnar 13)                                        2018 

00:00:00            Completed                               [facility]

 

                                                    Influenza Virus 

Vaccine                                             2018 

00:00:00            Completed                               [facility]

 

                                                    Pneumococcal 13 

Conjugate, PCV13 

(Prevnar 13)                                        2018 

00:00:00            Completed                               [facility]

 

                                                    Influenza Virus 

Vaccine                                             2018 

00:00:00            Completed                               [facility]

 

                                                    Pneumococcal 13 

Conjugate, PCV13 

(Prevnar 13)                                        2018 

00:00:00            Completed                               [facility]

 

                                                    Influenza Virus 

Vaccine                                             2018 

00:00:00            Completed                               [facility]

 

                                                    Pneumococcal 13 

Conjugate, PCV13 

(Prevnar 13)                                        2018 

00:00:00            Completed                               [facility]

 

                                                    Influenza Virus 

Vaccine                                             2018 

00:00:00            Completed                               [facility]

 

                                                    Pneumococcal 13 

Conjugate, PCV13 

(Prevnar 13)                                        2018 

00:00:00            Completed                               [facility]

 

                                                    Influenza Virus 

Vaccine                                             2018 

00:00:00            Completed                               [facility]

 

                                                    Pneumococcal 13 

Conjugate, PCV13 

(Prevnar 13)                                        2018 

00:00:00            Completed                               [facility]

 

                                                    Influenza Virus 

Vaccine                                             2018 

00:00:00            Completed                               [facility]

 

                                                    Pneumococcal 13 

Conjugate, PCV13 

(Prevnar 13)                                        2018 

00:00:00            Completed                               [facility]

 

                                                    Influenza Virus 

Vaccine                                             2018 

00:00:00            Completed                               [facility]

 

                                                    Pneumococcal 13 

Conjugate, PCV13 

(Prevnar 13)                                        2018 

00:00:00            Completed                               [facility]

 

                                                    Influenza Virus 

Vaccine                                             2018 

00:00:00            Completed                               [facility]

 

                                                    Pneumococcal 13 

Conjugate, PCV13 

(Prevnar 13)                                        2018 

00:00:00            Completed                               [facility]

 

                                                    Influenza Virus 

Vaccine                                             2018 

00:00:00            Completed                               [facility]

 

                                                    Pneumococcal 13 

Conjugate, PCV13 

(Prevnar 13)                                        2018 

00:00:00            Completed                               [facility]

 

                                                    Influenza Virus 

Vaccine                                             2018 

00:00:00            Completed                               [facility]

 

                                                    Pneumococcal 13 

Conjugate, PCV13 

(Prevnar 13)                                        2018 

00:00:00            Completed                               [facility]

 

                                                    Influenza Virus 

Vaccine                                             2018 

00:00:00            Completed                               [facility]

 

                                                    Pneumococcal 13 

Conjugate, PCV13 

(Prevnar 13)                                        2018 

00:00:00            Completed                               [facility]

 

                                                    Influenza Virus 

Vaccine                                             2018 

00:00:00            Completed                               [facility]

 

                                                    Pneumococcal 13 

Conjugate, PCV13 

(Prevnar 13)                                        2018 

00:00:00            Completed                               [facility]

 

                                                    Influenza Virus 

Vaccine                                             2018 

00:00:00            Completed                               [facility]

 

                                                    Pneumococcal 13 

Conjugate, PCV13 

(Prevnar 13)                                        2018 

00:00:00            Completed                               [facility]

 

                                                    Influenza Virus 

Vaccine                                             2018 

00:00:00            Completed                               [facility]

 

                                                    Pneumococcal 13 

Conjugate, PCV13 

(Prevnar 13)                                        2018 

00:00:00            Completed                               [facility]

 

                                                    Influenza Virus 

Vaccine                                             2018 

00:00:00            Completed                               [facility]

 

                                                    Pneumococcal 13 

Conjugate, PCV13 

(Prevnar 13)                                        2018 

00:00:00            Completed                               [facility]

 

                                                    Influenza Virus 

Vaccine                                             2018 

00:00:00            Completed                               [facility]

 

                                                    Pneumococcal 13 

Conjugate, PCV13 

(Prevnar 13)                                        2018 

00:00:00            Completed                               [facility]

 

                                                    Influenza Virus 

Vaccine                                             2018 

00:00:00            Completed                               [facility]

 

                                                    Pneumococcal 13 

Conjugate, PCV13 

(Prevnar 13)                                        2018 

00:00:00            Completed                               [facility]

 

                                                    Influenza Virus 

Vaccine                                             2018 

00:00:00            Completed                               [facility]

 

                                                    Pneumococcal 13 

Conjugate, PCV13 

(Prevnar 13)                                        2018 

00:00:00            Completed                               [facility]

 

                                                    Influenza Virus 

Vaccine                                             2018 

00:00:00            Completed                               [facility]

 

                                                    Pneumococcal 13 

Conjugate, PCV13 

(Prevnar 13)                                        2018 

00:00:00            Completed                               [facility]

 

                                                    Influenza Virus 

Vaccine                                             2018 

00:00:00            Completed                               [facility]

 

                                                    Pneumococcal 13 

Conjugate, PCV13 

(Prevnar 13)                                        2018 

00:00:00            Completed                               [facility]

 

                                                    Influenza Virus 

Vaccine                                             2018 

00:00:00            Completed                               [facility]

 

                                                    Pneumococcal 13 

Conjugate, PCV13 

(Prevnar 13)                                        2018 

00:00:00            Completed                               [facility]

 

                                                    Influenza Virus 

Vaccine                                             2018 

00:00:00            Completed                               [facility]

 

                                                    Pneumococcal 13 

Conjugate, PCV13 

(Prevnar 13)                                        2018 

00:00:00            Completed                               [facility]

 

                                                    Influenza Virus 

Vaccine                                             2018 

00:00:00            Completed                               [facility]

 

                                                    Pneumococcal 13 

Conjugate, PCV13 

(Prevnar 13)                                        2018 

00:00:00            Completed                               [facility]

 

                                                    Influenza Virus 

Vaccine                                             2018 

00:00:00            Completed                               [facility]

 

                                                    Pneumococcal 13 

Conjugate, PCV13 

(Prevnar 13)                                        2018 

00:00:00            Completed                               [facility]

 

                                                    Influenza Virus 

Vaccine                                             2018 

00:00:00            Completed                               [facility]

 

                                                    Pneumococcal 13 

Conjugate, PCV13 

(Prevnar 13)                                        2018 

00:00:00            Completed                               [facility]

 

                                                    Influenza Virus 

Vaccine                                             2018 

00:00:00            Completed                               [facility]

 

                                                    Pneumococcal 13 

Conjugate, PCV13 

(Prevnar 13)                                        2018 

00:00:00            Completed                               [facility]

 

                                                    Influenza Virus 

Vaccine                                             2018 

00:00:00            Completed                               [facility]

 

                                                    Pneumococcal 13 

Conjugate, PCV13 

(Prevnar 13)                                        2018 

00:00:00            Completed                               [facility]

 

                                                    Influenza Virus 

Vaccine                                             2018 

00:00:00            Completed                               [facility]

 

                                                    Pneumococcal 13 

Conjugate, PCV13 

(Prevnar 13)                                        2018 

00:00:00            Completed                               [facility]

 

                                                    Influenza Virus 

Vaccine                                             2018 

00:00:00            Completed                               [facility]

 

                                                    Pneumococcal 13 

Conjugate, PCV13 

(Prevnar 13)                                        2018 

00:00:00            Completed                               [facility]

 

                                                    Influenza Virus 

Vaccine                                             2018 

00:00:00            Completed                               [facility]

 

                                                    Pneumococcal 13 

Conjugate, PCV13 

(Prevnar 13)                                        2018 

00:00:00            Completed                               [facility]

 

                                                    Influenza Virus 

Vaccine                                             2018 

00:00:00            Completed                               [facility]

 

                                                    Pneumococcal 13 

Conjugate, PCV13 

(Prevnar 13)                                        2018 

00:00:00            Completed                               [facility]

 

                                                    Influenza Virus 

Vaccine                                             2018 

00:00:00            Completed                               [facility]

 

                                                    Pneumococcal 13 

Conjugate, PCV13 

(Prevnar 13)                                        2018 

00:00:00            Completed                               [facility]

 

                                                    Influenza Virus 

Vaccine                                             2018 

00:00:00            Completed                               [facility]

 

                                                    Pneumococcal 13 

Conjugate, PCV13 

(Prevnar 13)                                        2018 

00:00:00            Completed                               [facility]

 

                                                    Influenza Virus 

Vaccine                                             2018 

00:00:00            Completed                               [facility]

 

                                                    Pneumococcal 13 

Conjugate, PCV13 

(Prevnar 13)                                        2018 

00:00:00            Completed                               [facility]

 

                                                    Influenza Virus 

Vaccine                                             2018 

00:00:00            Completed                               [facility]

 

                                                    Pneumococcal 13 

Conjugate, PCV13 

(Prevnar 13)                                        2018 

00:00:00            Completed                               [facility]

 

                                                    Influenza Virus 

Vaccine                                             2018 

00:00:00            Completed                               [facility]

 

                                                    Pneumococcal 13 

Conjugate, PCV13 

(Prevnar 13)                                        2018 

00:00:00            Completed                               [facility]

 

                                                    Influenza Virus 

Vaccine                                             2018 

00:00:00            Completed                               [facility]

 

                                                    Pneumococcal 13 

Conjugate, PCV13 

(Prevnar 13)                                        2018 

00:00:00            Completed                               [facility]

 

                                                    Influenza Virus 

Vaccine                                             2018 

00:00:00            Completed                               [facility]

 

                                                    Pneumococcal 13 

Conjugate, PCV13 

(Prevnar 13)                                        2018 

00:00:00            Completed                               [facility]

 

                                                    Influenza Virus 

Vaccine                                             2018 

00:00:00            Completed                               [facility]

 

                                                    Pneumococcal 13 

Conjugate, PCV13 

(Prevnar 13)                                        2018 

00:00:00            Completed                               [facility]

 

                                                    Influenza Virus 

Vaccine                                             2018 

00:00:00            Completed                               [facility]

 

                                                    Pneumococcal 13 

Conjugate, PCV13 

(Prevnar 13)                                        2018 

00:00:00            Completed                               [facility]

 

                                                    Influenza Virus 

Vaccine                                             2018 

00:00:00            Completed                               [facility]

 

                                                    Pneumococcal 13 

Conjugate, PCV13 

(Prevnar 13)                                        2018 

00:00:00            Completed                               [facility]

 

                                                    Influenza Virus 

Vaccine                                             2018 

00:00:00            Completed                               [facility]

 

                                                    Pneumococcal 13 

Conjugate, PCV13 

(Prevnar 13)                                        2018 

00:00:00            Completed                               [facility]

 

                                                    Influenza Virus 

Vaccine                                             2018 

00:00:00            Completed                               [facility]

 

                                                    Pneumococcal 13 

Conjugate, PCV13 

(Prevnar 13)                                        2018 

00:00:00            Completed                               [facility]

 

                                                    Influenza Virus 

Vaccine                                             2018 

00:00:00            Completed                               [facility]

 

                                                    Pneumococcal 13 

Conjugate, PCV13 

(Prevnar 13)                                        2018 

00:00:00            Completed                               [facility]

 

                                                    Influenza Virus 

Vaccine                                             2018 

00:00:00            Completed                               [facility]

 

                                                    Pneumococcal 13 

Conjugate, PCV13 

(Prevnar 13)                                        2018 

00:00:00            Completed                               [facility]

 

                                                    Influenza Virus 

Vaccine                                             2018 

00:00:00            Completed                               [facility]

 

                                                    Pneumococcal 13 

Conjugate, PCV13 

(Prevnar 13)                                        2018 

00:00:00            Completed                               [facility]

 

                                                    Influenza Virus 

Vaccine                                             2018 

00:00:00            Completed                               [facility]

 

                                                    Pneumococcal 13 

Conjugate, PCV13 

(Prevnar 13)                                        2018 

00:00:00            Completed                               [facility]

 

                                                    Influenza Virus 

Vaccine                                             2018 

00:00:00            Completed                               [facility]

 

                                                    Pneumococcal 13 

Conjugate, PCV13 

(Prevnar 13)                                        2018 

00:00:00            Completed                               [facility]

 

                                                    Influenza Virus 

Vaccine                                             2018 

00:00:00            Completed                               [facility]

 

                                                    Pneumococcal 13 

Conjugate, PCV13 

(Prevnar 13)                                        2018 

00:00:00            Completed                               [facility]

 

                                                    Influenza Virus 

Vaccine                                             2018 

00:00:00            Completed                               [facility]

 

                                                    Pneumococcal 13 

Conjugate, PCV13 

(Prevnar 13)                                        2018 

00:00:00            Completed                               [facility]

 

                                                    Influenza Virus 

Vaccine                                             2018 

00:00:00            Completed                               [facility]

 

                                                    Pneumococcal 13 

Conjugate, PCV13 

(Prevnar 13)                                        2018 

00:00:00            Completed                               [facility]

 

                                                    Influenza Virus 

Vaccine                                             2018 

00:00:00            Completed                               [facility]

 

                                                    Pneumococcal 13 

Conjugate, PCV13 

(Prevnar 13)                                        2018 

00:00:00            Completed                               [facility]

 

                                                    Influenza Virus 

Vaccine                                             2018 

00:00:00            Completed                               [facility]

 

                                                    Pneumococcal 13 

Conjugate, PCV13 

(Prevnar 13)                                        2018 

00:00:00            Completed                               [facility]

 

                                                    Influenza Virus 

Vaccine                                             2018 

00:00:00            Completed                               [facility]

 

                                                    Pneumococcal 13 

Conjugate, PCV13 

(Prevnar 13)                                        2018 

00:00:00            Completed                               [facility]

 

                                                    Influenza Virus 

Vaccine                                             2018 

00:00:00            Completed                               [facility]

 

                                                    Pneumococcal 13 

Conjugate, PCV13 

(Prevnar 13)                                        2018 

00:00:00            Completed                               [facility]

 

                                                    Influenza Virus 

Vaccine                                             2018 

00:00:00            Completed                               [facility]

 

                                                    Pneumococcal 13 

Conjugate, PCV13 

(Prevnar 13)                                        2018 

00:00:00            Completed                               [facility]

 

                                                    Influenza Virus 

Vaccine                                             2018 

00:00:00            Completed                               [facility]

 

                                                    Pneumococcal 13 

Conjugate, PCV13 

(Prevnar 13)                                        2018 

00:00:00            Completed                               [facility]

 

                                                    Influenza Virus 

Vaccine                                             2018 

00:00:00            Completed                               [facility]

 

                                                    Pneumococcal 13 

Conjugate, PCV13 

(Prevnar 13)                                        2018 

00:00:00            Completed                               [facility]

 

                                                    Influenza Virus 

Vaccine                                             2018 

00:00:00            Completed                               [facility]

 

                                                    Pneumococcal 13 

Conjugate, PCV13 

(Prevnar 13)                                        2018 

00:00:00            Completed                               [facility]

 

                                                    Influenza Virus 

Vaccine                                             2018 

00:00:00            Completed                               [facility]

 

                                                    Pneumococcal 13 

Conjugate, PCV13 

(Prevnar 13)                                        2018 

00:00:00            Completed                               [facility]

 

                                                    Influenza Virus 

Vaccine                                             2018 

00:00:00            Completed                               [facility]

 

                                                    Pneumococcal 13 

Conjugate, PCV13 

(Prevnar 13)                                        2018 

00:00:00            Completed                               [facility]

 

                                                    Influenza Virus 

Vaccine                                             2018 

00:00:00            Completed                               [facility]

 

                                                    Pneumococcal 13 

Conjugate, PCV13 

(Prevnar 13)                                        2018 

00:00:00            Completed                               [facility]

 

                                                    Influenza Virus 

Vaccine                                             2018 

00:00:00            Completed                               [facility]

 

                                                    Pneumococcal 13 

Conjugate, PCV13 

(Prevnar 13)                                        2018 

00:00:00            Completed                               [facility]

 

                                                    Influenza Virus 

Vaccine                                             2018 

00:00:00            Completed                               [facility]

 

                                                    Pneumococcal 13 

Conjugate, PCV13 

(Prevnar 13)                                        2018 

00:00:00            Completed                               [facility]

 

                                                    Influenza Virus 

Vaccine                                             2018 

00:00:00            Completed                               [facility]

 

                                                    Pneumococcal 13 

Conjugate, PCV13 

(Prevnar 13)                                        2018 

00:00:00            Completed                               [facility]

 

                                                    Influenza Virus 

Vaccine                                             2018 

00:00:00            Completed                               [facility]

 

                                                    Pneumococcal 13 

Conjugate, PCV13 

(Prevnar 13)                                        2018 

00:00:00            Completed                               [facility]

 

                                                    Influenza Virus 

Vaccine                                             2018 

00:00:00            Completed                               [facility]

 

                                                    Pneumococcal 13 

Conjugate, PCV13 

(Prevnar 13)                                        2018 

00:00:00            Completed                               [facility]

 

                                                    Influenza Virus 

Vaccine                                             2018 

00:00:00            Completed                               [facility]

 

                                                    Pneumococcal 13 

Conjugate, PCV13 

(Prevnar 13)                                        2018 

00:00:00            Completed                               [facility]

 

                                                    Influenza Virus 

Vaccine                                             2018 

00:00:00            Completed                               [facility]

 

                                                    Pneumococcal 13 

Conjugate, PCV13 

(Prevnar 13)                                        2018 

00:00:00            Completed                               [facility]

 

                                                    Influenza Virus 

Vaccine                                             2018 

00:00:00            Completed                               [facility]

 

                                                    Pneumococcal 13 

Conjugate, PCV13 

(Prevnar 13)                                        2018 

00:00:00            Completed                               [facility]

 

                                                    Influenza Virus 

Vaccine                                             2018 

00:00:00            Completed                               [facility]

 

                                                    Pneumococcal 13 

Conjugate, PCV13 

(Prevnar 13)                                        2018 

00:00:00            Completed                               [facility]

 

                                                    Influenza Virus 

Vaccine                                             2018 

00:00:00            Completed                               [facility]

 

                                                    Pneumococcal 13 

Conjugate, PCV13 

(Prevnar 13)                                        2018 

00:00:00            Completed                               [facility]

 

                                                    Influenza Virus 

Vaccine                                             2018 

00:00:00            Completed                               [facility]

 

                                                    Pneumococcal 13 

Conjugate, PCV13 

(Prevnar 13)                                        2018 

00:00:00            Completed                               [facility]

 

                                                    Influenza Virus 

Vaccine                                             2018 

00:00:00            Completed                               [facility]

 

                                                    Pneumococcal 13 

Conjugate, PCV13 

(Prevnar 13)                                        2018 

00:00:00            Completed                               [facility]

 

                                                    Influenza Virus 

Vaccine                                             2018 

00:00:00            Completed                               [facility]

 

                                                    Pneumococcal 13 

Conjugate, PCV13 

(Prevnar 13)                                        2018 

00:00:00            Completed                               [facility]

 

                                                    Influenza Virus 

Vaccine                                             2018 

00:00:00            Completed                               [facility]

 

                                                    Pneumococcal 13 

Conjugate, PCV13 

(Prevnar 13)                                        2018 

00:00:00            Completed                               [facility]

 

                                                    Influenza Virus 

Vaccine                                             2018 

00:00:00            Completed                               [facility]

 

                                                    Pneumococcal 13 

Conjugate, PCV13 

(Prevnar 13)                                        2018 

00:00:00            Completed                               [facility]

 

                                                    Influenza Virus 

Vaccine                                             2018 

00:00:00            Completed                               [facility]

 

                                                    Pneumococcal 13 

Conjugate, PCV13 

(Prevnar 13)                                        2018 

00:00:00            Completed                               [facility]

 

                                                    Influenza Virus 

Vaccine                                             2018 

00:00:00            Completed                               [facility]

 

                                                    Pneumococcal 13 

Conjugate, PCV13 

(Prevnar 13)                                        2018 

00:00:00            Completed                               [facility]

 

                                                    Influenza Virus 

Vaccine                                             2018 

00:00:00            Completed                               [facility]

 

                                                    Pneumococcal 13 

Conjugate, PCV13 

(Prevnar 13)                                        2018 

00:00:00            Completed                               [facility]

 

                                                    Influenza Virus 

Vaccine                                             2018 

00:00:00            Completed                               [facility]

 

                                                    Pneumococcal 13 

Conjugate, PCV13 

(Prevnar 13)                                        2018 

00:00:00            Completed                               [facility]

 

                                                    Influenza Virus 

Vaccine                                             2018 

00:00:00            Completed                               [facility]

 

                                                    Pneumococcal 13 

Conjugate, PCV13 

(Prevnar 13)                                        2018 

00:00:00            Completed                               [facility]

 

                                                    Influenza Virus 

Vaccine                                             2018 

00:00:00            Completed                               [facility]

 

                                                    Pneumococcal 13 

Conjugate, PCV13 

(Prevnar 13)                                        2018 

00:00:00            Completed                               [facility]

 

                                                    Influenza Virus 

Vaccine                                             2018 

00:00:00            Completed                               [facility]

 

                                                    Pneumococcal 13 

Conjugate, PCV13 

(Prevnar 13)                                        2018 

00:00:00            Completed                               [facility]

 

                                                    Influenza Virus 

Vaccine                                             2018 

00:00:00            Completed                               [facility]

 

                                                    Pneumococcal 13 

Conjugate, PCV13 

(Prevnar 13)                                        2018 

00:00:00            Completed                               [facility]

 

                                                    Influenza Virus 

Vaccine                                             2018 

00:00:00            Completed                               [facility]

 

                                                    Pneumococcal 13 

Conjugate, PCV13 

(Prevnar 13)                                        2018 

00:00:00            Completed                               [facility]

 

                                                    Influenza Virus 

Vaccine                                             2018 

00:00:00            Completed                               [facility]

 

                                                    Pneumococcal 13 

Conjugate, PCV13 

(Prevnar 13)                                        2018 

00:00:00            Completed                               [facility]

 

                                                    Influenza Virus 

Vaccine                                             2018 

00:00:00            Completed                               [facility]

 

                                                    Pneumococcal 13 

Conjugate, PCV13 

(Prevnar 13)                                        2018 

00:00:00            Completed                               [facility]

 

                                                    Influenza Virus 

Vaccine                                             2018 

00:00:00            Completed                               [facility]

 

                                                    Pneumococcal 13 

Conjugate, PCV13 

(Prevnar 13)                                        2018 

00:00:00            Completed                               [facility]

 

                                                    Influenza Virus 

Vaccine                                             2018 

00:00:00            Completed                               [facility]

 

                                                    Pneumococcal 13 

Conjugate, PCV13 

(Prevnar 13)                                        2018 

00:00:00            Completed                               [facility]

 

                                                    Influenza Virus 

Vaccine                                             2018 

00:00:00            Completed                               [facility]

 

                                                    Pneumococcal 13 

Conjugate, PCV13 

(Prevnar 13)                                        2018 

00:00:00            Completed                               [facility]

 

                                                    Influenza Virus 

Vaccine                                             2018 

00:00:00            Completed                               [facility]

 

                                                    Pneumococcal 13 

Conjugate, PCV13 

(Prevnar 13)                                        2018 

00:00:00            Completed                               [facility]

 

                                                    Influenza Virus 

Vaccine                                             2018 

00:00:00            Completed                               [facility]

 

                                                    Pneumococcal 13 

Conjugate, PCV13 

(Prevnar 13)                                        2018 

00:00:00            Completed                               [facility]

 

                                                    Influenza Virus 

Vaccine                                             2018 

00:00:00            Completed                               [facility]

 

                                                    Pneumococcal 13 

Conjugate, PCV13 

(Prevnar 13)                                        2018 

00:00:00            Completed                               [facility]

 

                                                    Influenza Virus 

Vaccine                                             2018 

00:00:00            Completed                               [facility]

 

                                                    Pneumococcal 13 

Conjugate, PCV13 

(Prevnar 13)                                        2018 

00:00:00            Completed                               [facility]

 

                                                    Influenza Virus 

Vaccine                                             2018 

00:00:00            Completed                               [facility]

 

                                                    Pneumococcal 13 

Conjugate, PCV13 

(Prevnar 13)                                        2018 

00:00:00            Completed                               [facility]

 

                                                    Influenza Virus 

Vaccine                                             2018 

00:00:00            Completed                               [facility]

 

                                                    Pneumococcal 13 

Conjugate, PCV13 

(Prevnar 13)                                        2018 

00:00:00            Completed                               [facility]

 

                                                    Influenza Virus 

Vaccine                                             2018 

00:00:00            Completed                               [facility]

 

                                                    Pneumococcal 13 

Conjugate, PCV13 

(Prevnar 13)                                        2018 

00:00:00            Completed                               [facility]

 

                                                    Influenza Virus 

Vaccine                                             2018 

00:00:00            Completed                               [facility]

 

                                                    Pneumococcal 13 

Conjugate, PCV13 

(Prevnar 13)                                        2018 

00:00:00            Completed                               [facility]

 

                                                    Influenza Virus 

Vaccine                                             2018 

00:00:00            Completed                               [facility]

 

                                                    Pneumococcal 13 

Conjugate, PCV13 

(Prevnar 13)                                        2018 

00:00:00            Completed                               [facility]

 

                                                    Influenza Virus 

Vaccine                                             2018 

00:00:00            Completed                               [facility]

 

                                                    Pneumococcal 13 

Conjugate, PCV13 

(Prevnar 13)                                        2018 

00:00:00            Completed                               [facility]

 

                                                    Influenza Virus 

Vaccine                                             2018 

00:00:00            Completed                               [facility]

 

                                                    Pneumococcal 13 

Conjugate, PCV13 

(Prevnar 13)                                        2018 

00:00:00            Completed                               [facility]

 

                                                    Influenza Virus 

Vaccine                                             2018 

00:00:00            Completed                               [facility]

 

                                                    Pneumococcal 13 

Conjugate, PCV13 

(Prevnar 13)                                        2018 

00:00:00            Completed                               [facility]

 

                                                    Influenza Virus 

Vaccine                                             2018 

00:00:00            Completed                               [facility]

 

                                                    Pneumococcal 13 

Conjugate, PCV13 

(Prevnar 13)                                        2018 

00:00:00            Completed                               [facility]

 

                                                    Influenza Virus 

Vaccine                                             2018 

00:00:00            Completed                               [facility]

 

                                                    Pneumococcal 13 

Conjugate, PCV13 

(Prevnar 13)                                        2018 

00:00:00            Completed                               [facility]

 

                                                    Influenza Virus 

Vaccine                                             2018 

00:00:00            Completed                               [facility]

 

                                                    Pneumococcal 13 

Conjugate, PCV13 

(Prevnar 13)                                        2018 

00:00:00            Completed                               [facility]

 

                                                    Influenza Virus 

Vaccine                                             2018 

00:00:00            Completed                               [facility]

 

                                                    Pneumococcal 13 

Conjugate, PCV13 

(Prevnar 13)                                        2018 

00:00:00            Completed                               [facility]

 

                                                    Influenza Virus 

Vaccine                                             2018 

00:00:00            Completed                               [facility]

 

                                                    Pneumococcal 13 

Conjugate, PCV13 

(Prevnar 13)                                        2018 

00:00:00            Completed                               [facility]

 

                                                    Influenza Virus 

Vaccine                                             2018 

00:00:00            Completed                               [facility]

 

                                                    Pneumococcal 13 

Conjugate, PCV13 

(Prevnar 13)                                        2018 

00:00:00            Completed                               [facility]

 

                                                    Influenza Virus 

Vaccine                                             2018 

00:00:00            Completed                               [facility]

 

                                                    Pneumococcal 13 

Conjugate, PCV13 

(Prevnar 13)                                        2018 

00:00:00            Completed                               [facility]

 

                                                    Influenza Virus 

Vaccine                                             2018 

00:00:00            Completed                               [facility]

 

                                                    Pneumococcal 13 

Conjugate, PCV13 

(Prevnar 13)                                        2018 

00:00:00            Completed                               [facility]

 

                                                    Influenza Virus 

Vaccine                                             2018 

00:00:00            Completed                               [facility]

 

                                                    Pneumococcal 13 

Conjugate, PCV13 

(Prevnar 13)                                        2018 

00:00:00            Completed                               [facility]

 

                                                    Influenza Virus 

Vaccine                                             2018 

00:00:00            Completed                               [facility]

 

                                                    Pneumococcal 13 

Conjugate, PCV13 

(Prevnar 13)                                        2018 

00:00:00            Completed                               [facility]

 

                                                    Influenza Virus 

Vaccine                                             2018 

00:00:00            Completed                               [facility]

 

                                                    Pneumococcal 13 

Conjugate, PCV13 

(Prevnar 13)                                        2018 

00:00:00            Completed                               [facility]

 

                                                    Influenza Virus 

Vaccine                                             2018 

00:00:00            Completed                               [facility]

 

                                                    Pneumococcal 13 

Conjugate, PCV13 

(Prevnar 13)                                        2018 

00:00:00            Completed                               [facility]

 

                                                    Influenza Virus 

Vaccine                                             2018 

00:00:00            Completed                               [facility]

 

                                                    Pneumococcal 13 

Conjugate, PCV13 

(Prevnar 13)                                        2018 

00:00:00            Completed                               [facility]

 

                                                    Influenza Virus 

Vaccine                                             2018 

00:00:00            Completed                               [facility]

 

                                                    Pneumococcal 13 

Conjugate, PCV13 

(Prevnar 13)                                        2018 

00:00:00            Completed                               [facility]

 

                                                    Influenza Virus 

Vaccine                                             2018 

00:00:00            Completed                               [facility]

 

                                                    Pneumococcal 13 

Conjugate, PCV13 

(Prevnar 13)                                        2018 

00:00:00            Completed                               [facility]

 

                                                    Influenza Virus 

Vaccine                                             2018 

00:00:00            Completed                               [facility]

 

                                                    Pneumococcal 13 

Conjugate, PCV13 

(Prevnar 13)                                        2018 

00:00:00            Completed                               [facility]

 

                                                    Influenza Virus 

Vaccine                                             2018 

00:00:00            Completed                               [facility]

 

                                                    Pneumococcal 13 

Conjugate, PCV13 

(Prevnar 13)                                        2018 

00:00:00            Completed                               [facility]

 

                                                    Influenza Virus 

Vaccine                                             2018 

00:00:00            Completed                               [facility]

 

                                                    Pneumococcal 13 

Conjugate, PCV13 

(Prevnar 13)                                        2018 

00:00:00            Completed                               [facility]

 

                                                    Influenza Virus 

Vaccine                                             2018 

00:00:00            Completed                               [facility]

 

                                                    Pneumococcal 13 

Conjugate, PCV13 

(Prevnar 13)                                        2018 

00:00:00            Completed                               [facility]

 

                                                    Influenza Virus 

Vaccine                                             2018 

00:00:00            Completed                               [facility]

 

                                                    Pneumococcal 13 

Conjugate, PCV13 

(Prevnar 13)                                        2018 

00:00:00            Completed                               [facility]

 

                                                    Influenza Virus 

Vaccine                                             2018 

00:00:00            Completed                               [facility]

 

                                                    Pneumococcal 13 

Conjugate, PCV13 

(Prevnar 13)                                        2018 

00:00:00            Completed                               [facility]

 

                                                    Influenza Virus 

Vaccine                                             2018 

00:00:00            Completed                               [facility]

 

                                                    Pneumococcal 13 

Conjugate, PCV13 

(Prevnar 13)                                        2018 

00:00:00            Completed                               [facility]

 

                                                    Influenza Virus 

Vaccine                                             2018 

00:00:00            Completed                               [facility]

 

                                                    Pneumococcal 13 

Conjugate, PCV13 

(Prevnar 13)                                        2018 

00:00:00            Completed                               [facility]

 

                                                    Influenza Virus 

Vaccine                                             2018 

00:00:00            Completed                               [facility]

 

                                                    Pneumococcal 13 

Conjugate, PCV13 

(Prevnar 13)                                        2018 

00:00:00            Completed                               [facility]

 

                                                    Influenza Virus 

Vaccine                                             2018 

00:00:00            Completed                               [facility]

 

                                                    Pneumococcal 13 

Conjugate, PCV13 

(Prevnar 13)                                        2018 

00:00:00            Completed                               [facility]

 

                                                    Influenza Virus 

Vaccine                                             2018 

00:00:00            Completed                               [facility]

 

                                                    Pneumococcal 13 

Conjugate, PCV13 

(Prevnar 13)                                        2018 

00:00:00            Completed                               [facility]

 

                                                    Influenza Virus 

Vaccine                                             2018 

00:00:00            Completed                               [facility]

 

                                                    Pneumococcal 13 

Conjugate, PCV13 

(Prevnar 13)                                        2018 

00:00:00            Completed                               [facility]

 

                                                    Influenza Virus 

Vaccine                                             2018 

00:00:00            Completed                               [facility]

 

                                                    Pneumococcal 13 

Conjugate, PCV13 

(Prevnar 13)                                        2018 

00:00:00            Completed                               [facility]

 

                                                    Influenza Virus 

Vaccine                                             2018 

00:00:00            Completed                               [facility]

 

                                                    Pneumococcal 13 

Conjugate, PCV13 

(Prevnar 13)                                        2018 

00:00:00            Completed                               [facility]

 

                                                    Influenza Virus 

Vaccine                                             2018 

00:00:00            Completed                               [facility]

 

                                                    Pneumococcal 13 

Conjugate, PCV13 

(Prevnar 13)                                        2018 

00:00:00            Completed                               [facility]

 

                                                    Influenza Virus 

Vaccine                                             2018 

00:00:00            Completed                               [facility]

 

                                                    Pneumococcal 13 

Conjugate, PCV13 

(Prevnar 13)                                        2018 

00:00:00            Completed                               [facility]

 

                                                    Influenza Virus 

Vaccine                                             2018 

00:00:00            Completed                               [facility]

 

                                                    Pneumococcal 13 

Conjugate, PCV13 

(Prevnar 13)                                        2018 

00:00:00            Completed                               [facility]

 

                                                    Influenza Virus 

Vaccine                                             2018 

00:00:00            Completed                               [facility]

 

                                                    Pneumococcal 13 

Conjugate, PCV13 

(Prevnar 13)                                        2018 

00:00:00            Completed                               [facility]

 

                                                    Influenza Virus 

Vaccine                                             2018 

00:00:00            Completed                               [facility]

 

                                                    Pneumococcal 13 

Conjugate, PCV13 

(Prevnar 13)                                        2018 

00:00:00            Completed                               [facility]

 

                                                    Influenza Virus 

Vaccine                                             2018 

00:00:00            Completed                               [facility]

 

                                                    Pneumococcal 13 

Conjugate, PCV13 

(Prevnar 13)                                        2018 

00:00:00            Completed                               [facility]

 

                                                    Influenza Virus 

Vaccine                                             2018 

00:00:00            Completed                               [facility]

 

                                                    Pneumococcal 13 

Conjugate, PCV13 

(Prevnar 13)                                        2018 

00:00:00            Completed                               [facility]

 

                                                    Influenza Virus 

Vaccine                                             2018 

00:00:00            Completed                               [facility]

 

                                                    Pneumococcal 13 

Conjugate, PCV13 

(Prevnar 13)                                        2018 

00:00:00            Completed                               [facility]

 

                                                    Influenza Virus 

Vaccine                                             2018 

00:00:00            Completed                               [facility]

 

                                                    Pneumococcal 13 

Conjugate, PCV13 

(Prevnar 13)                                        2018 

00:00:00            Completed                               [facility]

 

                                                    Influenza Virus 

Vaccine                                             2018 

00:00:00            Completed                               [facility]

 

                                                    Pneumococcal 13 

Conjugate, PCV13 

(Prevnar 13)                                        2018 

00:00:00            Completed                               [facility]

 

                                                    Influenza Virus 

Vaccine                                             2018 

00:00:00            Completed                               [facility]

 

                                                    Pneumococcal 13 

Conjugate, PCV13 

(Prevnar 13)                                        2018 

00:00:00            Completed                               [facility]

 

                                                    Influenza Virus 

Vaccine                                             2018 

00:00:00            Completed                               [facility]

 

                                                    Pneumococcal 13 

Conjugate, PCV13 

(Prevnar 13)                                        2018 

00:00:00            Completed                               [facility]

 

                                                    Influenza Virus 

Vaccine                                             2018 

00:00:00            Completed                               [facility]

 

                                                    Pneumococcal 13 

Conjugate, PCV13 

(Prevnar 13)                                        2018 

00:00:00            Completed                               [facility]

 

                                                    Influenza Virus 

Vaccine                                             2018 

00:00:00            Completed                               [facility]

 

                                                    Pneumococcal 13 

Conjugate, PCV13 

(Prevnar 13)                                        2018 

00:00:00            Completed                               [facility]

 

                                                    Influenza Virus 

Vaccine                                             2018 

00:00:00            Completed                               [facility]

 

                                                    Pneumococcal 13 

Conjugate, PCV13 

(Prevnar 13)                                        2018 

00:00:00            Completed                               [facility]

 

                                                    Influenza Virus 

Vaccine                                             2018 

00:00:00            Completed                               [facility]

 

                                                    Pneumococcal 13 

Conjugate, PCV13 

(Prevnar 13)                                        2018 

00:00:00            Completed                               [facility]

 

                                                    Influenza Virus 

Vaccine                                             2018 

00:00:00            Completed                               [facility]

 

                                                    Pneumococcal 13 

Conjugate, PCV13 

(Prevnar 13)                                        2018 

00:00:00            Completed                               [facility]

 

                                                    Influenza Virus 

Vaccine                                             2018 

00:00:00            Completed                               [facility]

 

                                                    Pneumococcal 13 

Conjugate, PCV13 

(Prevnar 13)                                        2018 

00:00:00            Completed                               [facility]

 

                                                    Influenza Virus 

Vaccine                                             2018 

00:00:00            Completed                               [facility]

 

                                                    Pneumococcal 13 

Conjugate, PCV13 

(Prevnar 13)                                        2018 

00:00:00            Completed                               [facility]

 

                                                    Influenza Virus 

Vaccine                                             2018 

00:00:00            Completed                               [facility]

 

                                                    Pneumococcal 13 

Conjugate, PCV13 

(Prevnar 13)                                        2018 

00:00:00            Completed                               [facility]

 

                                                    Influenza Virus 

Vaccine                                             2018 

00:00:00            Completed                               [facility]

 

                                                    Pneumococcal 13 

Conjugate, PCV13 

(Prevnar 13)                                        2018 

00:00:00            Completed                               [facility]

 

                                                    Influenza Virus 

Vaccine                                             2018 

00:00:00            Completed                               [facility]

 

                                                    Pneumococcal 13 

Conjugate, PCV13 

(Prevnar 13)                                        2018 

00:00:00            Completed                               [facility]

 

                                                    Influenza Virus 

Vaccine                                             2018 

00:00:00            Completed                               [facility]

 

                                                    Pneumococcal 13 

Conjugate, PCV13 

(Prevnar 13)                                        2018 

00:00:00            Completed                               [facility]

 

                                                    Influenza Virus 

Vaccine                                             2018 

00:00:00            Completed                               [facility]

 

                                                    Pneumococcal 13 

Conjugate, PCV13 

(Prevnar 13)                                        2018 

00:00:00            Completed                               [facility]

 

                                                    Influenza Virus 

Vaccine                                             2018 

00:00:00            Completed                               [facility]

 

                                                    Pneumococcal 13 

Conjugate, PCV13 

(Prevnar 13)                                        2018 

00:00:00            Completed                               [facility]

 

                                                    Influenza Virus 

Vaccine                                             2018 

00:00:00            Completed                               [facility]

 

                                                    Pneumococcal 13 

Conjugate, PCV13 

(Prevnar 13)                                        2018 

00:00:00            Completed                               [facility]

 

                                                    Influenza Virus 

Vaccine                                             2018 

00:00:00            Completed                               [facility]

 

                                                    Pneumococcal 13 

Conjugate, PCV13 

(Prevnar 13)                                        2018 

00:00:00            Completed                               [facility]

 

                                                    Influenza Virus 

Vaccine                                             2018 

00:00:00            Completed                               [facility]

 

                                                    Pneumococcal 13 

Conjugate, PCV13 

(Prevnar 13)                                        2018 

00:00:00            Completed                               [facility]

 

                                                    Influenza Virus 

Vaccine                                             2018 

00:00:00            Completed                               [facility]

 

                                                    Pneumococcal 13 

Conjugate, PCV13 

(Prevnar 13)                                        2018 

00:00:00            Completed                               [facility]

 

                                                    Influenza Virus 

Vaccine                                             2018 

00:00:00            Completed                               [facility]

 

                                                    Pneumococcal 13 

Conjugate, PCV13 

(Prevnar 13)                                        2018 

00:00:00            Completed                               [facility]

 

                                                    Influenza Virus 

Vaccine                                             2018 

00:00:00            Completed                               [facility]

 

                                                    Pneumococcal 13 

Conjugate, PCV13 

(Prevnar 13)                                        2018 

00:00:00            Completed                               [facility]

 

                                                    Influenza Virus 

Vaccine                                             2018 

00:00:00            Completed                               [facility]

 

                                                    Pneumococcal 13 

Conjugate, PCV13 

(Prevnar 13)                                        2018 

00:00:00            Completed                               [facility]

 

                                                    Influenza Virus 

Vaccine                                             2018 

00:00:00            Completed                               [facility]

 

                                                    Pneumococcal 13 

Conjugate, PCV13 

(Prevnar 13)                                        2018 

00:00:00            Completed                               [facility]

 

                                                    Influenza Virus 

Vaccine                                             2018 

00:00:00            Completed                               [facility]

 

                                                    Pneumococcal 13 

Conjugate, PCV13 

(Prevnar 13)                                        2018 

00:00:00            Completed                               [facility]

 

                                                    Influenza Virus 

Vaccine                                             2018 

00:00:00            Completed                               [facility]

 

                                                    Pneumococcal 13 

Conjugate, PCV13 

(Prevnar 13)                                        2018 

00:00:00            Completed                               [facility]

 

                                                    Influenza Virus 

Vaccine                                             2018 

00:00:00            Completed                               [facility]

 

                                                    Pneumococcal 13 

Conjugate, PCV13 

(Prevnar 13)                                        2018 

00:00:00            Completed                               [facility]

 

                                                    Influenza Virus 

Vaccine                                             2018 

00:00:00            Completed                               [facility]

 

                                                    Pneumococcal 13 

Conjugate, PCV13 

(Prevnar 13)                                        2018 

00:00:00            Completed                               [facility]

 

                                                    Influenza Virus 

Vaccine                                             2018 

00:00:00            Completed                               [facility]

 

                                                    Pneumococcal 13 

Conjugate, PCV13 

(Prevnar 13)                                        2018 

00:00:00            Completed                               [facility]

 

                                                    Influenza Virus 

Vaccine                                             2018 

00:00:00            Completed                               [facility]

 

                                                    Pneumococcal 13 

Conjugate, PCV13 

(Prevnar 13)                                        2018 

00:00:00            Completed                               [facility]

 

                                                    Influenza Virus 

Vaccine                                             2018 

00:00:00            Completed                               [facility]

 

                                                    Pneumococcal 13 

Conjugate, PCV13 

(Prevnar 13)                                        2018 

00:00:00            Completed                               [facility]

 

                                                    Influenza Virus 

Vaccine                                             2018 

00:00:00            Completed                               [facility]

 

                                                    Pneumococcal 13 

Conjugate, PCV13 

(Prevnar 13)                                        2018 

00:00:00            Completed                               [facility]

 

                                                    Influenza Virus 

Vaccine                                             2018 

00:00:00            Completed                               [facility]

 

                                                    Pneumococcal 13 

Conjugate, PCV13 

(Prevnar 13)                                        2018 

00:00:00            Completed                               [facility]

 

                                                    Influenza Virus 

Vaccine                                             2018 

00:00:00            Completed                               [facility]

 

                                                    Pneumococcal 13 

Conjugate, PCV13 

(Prevnar 13)                                        2018 

00:00:00            Completed                               [facility]

 

                                                    Influenza Virus 

Vaccine                                             2018 

00:00:00            Completed                               [facility]

 

                                                    Pneumococcal 13 

Conjugate, PCV13 

(Prevnar 13)                                        2018 

00:00:00            Completed                               [facility]

 

                                                    Influenza Virus 

Vaccine                                             2018 

00:00:00            Completed                               [facility]

 

                                                    Pneumococcal 13 

Conjugate, PCV13 

(Prevnar 13)                                        2018 

00:00:00            Completed                               [facility]

 

                                                    Influenza Virus 

Vaccine                                             2018 

00:00:00            Completed                               [facility]

 

                                                    Pneumococcal 13 

Conjugate, PCV13 

(Prevnar 13)                                        2018 

00:00:00            Completed                               [facility]

 

                                                    Influenza Virus 

Vaccine                                             2018 

00:00:00            Completed                               [facility]

 

                                                    Pneumococcal 13 

Conjugate, PCV13 

(Prevnar 13)                                        2018 

00:00:00            Completed                               [facility]

 

                                                    Influenza Virus 

Vaccine                                             2018 

00:00:00            Completed                               [facility]

 

                                                    Pneumococcal 13 

Conjugate, PCV13 

(Prevnar 13)                                        2018 

00:00:00            Completed                               [facility]

 

                                                    Influenza Virus 

Vaccine                                             2018 

00:00:00            Completed                               [facility]

 

                                                    Pneumococcal 13 

Conjugate, PCV13 

(Prevnar 13)                                        2018 

00:00:00            Completed                               [facility]

 

                                                    Influenza Virus 

Vaccine                                             2018 

00:00:00            Completed                               [facility]

 

                                                    Pneumococcal 13 

Conjugate, PCV13 

(Prevnar 13)                                        2018 

00:00:00            Completed                               [facility]

 

                                                    Influenza Virus 

Vaccine                                             2018 

00:00:00            Completed                               [facility]

 

                                                    Pneumococcal 13 

Conjugate, PCV13 

(Prevnar 13)                                        2018 

00:00:00            Completed                               [facility]

 

                                                    Influenza Virus 

Vaccine                                             2018 

00:00:00            Completed                               [facility]

 

                                                    Pneumococcal 13 

Conjugate, PCV13 

(Prevnar 13)                                        2018 

00:00:00            Completed                               [facility]

 

                                                    Influenza Virus 

Vaccine                                             2018 

00:00:00            Completed                               [facility]

 

                                                    Pneumococcal 13 

Conjugate, PCV13 

(Prevnar 13)                                        2018 

00:00:00            Completed                               [facility]

 

                                                    Influenza Virus 

Vaccine                                             2018 

00:00:00            Completed                               [facility]

 

                                                    Pneumococcal 13 

Conjugate, PCV13 

(Prevnar 13)                                        2018 

00:00:00            Completed                               [facility]

 

                                                    Influenza Virus 

Vaccine                                             2018 

00:00:00            Completed                               [facility]

 

                                                    Pneumococcal 13 

Conjugate, PCV13 

(Prevnar 13)                                        2018 

00:00:00            Completed                               [facility]

 

                                                    Influenza Virus 

Vaccine                                             2018 

00:00:00            Completed                               [facility]

 

                                                    Pneumococcal 13 

Conjugate, PCV13 

(Prevnar 13)                                        2018 

00:00:00            Completed                               [facility]

 

                                                    Influenza Virus 

Vaccine                                             2018 

00:00:00            Completed                               [facility]

 

                                                    Pneumococcal 13 

Conjugate, PCV13 

(Prevnar 13)                                        2018 

00:00:00            Completed                               [facility]

 

                                                    Influenza Virus 

Vaccine                                             2018 

00:00:00            Completed                               [facility]

 

                                                    Pneumococcal 13 

Conjugate, PCV13 

(Prevnar 13)                                        2018 

00:00:00            Completed                               [facility]

 

                                                    Influenza Virus 

Vaccine                                             2018 

00:00:00            Completed                               [facility]

 

                                                    Pneumococcal 13 

Conjugate, PCV13 

(Prevnar 13)                                        2018 

00:00:00            Completed                               [facility]

 

                                                    Influenza Virus 

Vaccine                                             2018 

00:00:00            Completed                               [facility]

 

                                                    Pneumococcal 13 

Conjugate, PCV13 

(Prevnar 13)                                        2018 

00:00:00            Completed                               [facility]

 

                                                    Influenza Virus 

Vaccine                                             2018 

00:00:00            Completed                               [facility]

 

                                                    Pneumococcal 13 

Conjugate, PCV13 

(Prevnar 13)                                        2018 

00:00:00            Completed                               [facility]

 

                                                    Influenza Virus 

Vaccine                                             2018 

00:00:00            Completed                               [facility]

 

                                                    Pneumococcal 13 

Conjugate, PCV13 

(Prevnar 13)                                        2018 

00:00:00            Completed                               [facility]

 

                                                    Influenza Virus 

Vaccine                                             2018 

00:00:00            Completed                               [facility]

 

                                                    Pneumococcal 13 

Conjugate, PCV13 

(Prevnar 13)                                        2018 

00:00:00            Completed                               [facility]

 

                                                    Influenza Virus 

Vaccine                                             2018 

00:00:00            Completed                               [facility]

 

                                                    Pneumococcal 13 

Conjugate, PCV13 

(Prevnar 13)                                        2018 

00:00:00            Completed                               [facility]

 

                                                    Influenza Virus 

Vaccine                                             2018 

00:00:00            Completed                               [facility]

 

                                Influenza High Dose                 2015-10-05 

00:00:00            Completed                               [facility]

 

                                Influenza High Dose                 2015-10-05 

00:00:00            Completed                               [facility]

 

                                Influenza High Dose                 2015-10-05 

00:00:00            Completed                               [facility]

 

                                Influenza High Dose                 2015-10-05 

00:00:00            Completed                               [facility]

 

                                Influenza High Dose                 2015-10-05 

00:00:00            Completed                               [facility]

 

                                Influenza High Dose                 2015-10-05 

00:00:00            Completed                               [facility]

 

                                Influenza High Dose                 2015-10-05 

00:00:00            Completed                               [facility]

 

                                Influenza High Dose                 2015-10-05 

00:00:00            Completed                               [facility]

 

                                Influenza High Dose                 2015-10-05 

00:00:00            Completed                               [facility]

 

                                Influenza High Dose                 2015-10-05 

00:00:00            Completed                               [facility]

 

                                Influenza High Dose                 2015-10-05 

00:00:00            Completed                               [facility]

 

                                Influenza High Dose                 2015-10-05 

00:00:00            Completed                               [facility]

 

                                Influenza High Dose                 2015-10-05 

00:00:00            Completed                               [facility]

 

                                Influenza High Dose                 2015-10-05 

00:00:00            Completed                               [facility]

 

                                Influenza High Dose                 2015-10-05 

00:00:00            Completed                               [facility]

 

                                Influenza High Dose                 2015-10-05 

00:00:00            Completed                               [facility]

 

                                Influenza High Dose                 2015-10-05 

00:00:00            Completed                               [facility]

 

                                Influenza High Dose                 2015-10-05 

00:00:00            Completed                               [facility]

 

                                Influenza High Dose                 2015-10-05 

00:00:00            Completed                               [facility]

 

                                Influenza High Dose                 2015-10-05 

00:00:00            Completed                               [facility]

 

                                Influenza High Dose                 2015-10-05 

00:00:00            Completed                               [facility]

 

                                Influenza High Dose                 2015-10-05 

00:00:00            Completed                               [facility]

 

                                Influenza High Dose                 2015-10-05 

00:00:00            Completed                               [facility]

 

                                Influenza High Dose                 2015-10-05 

00:00:00            Completed                               [facility]

 

                                Influenza High Dose                 2015-10-05 

00:00:00            Completed                               [facility]

 

                                Influenza High Dose                 2015-10-05 

00:00:00            Completed                               [facility]

 

                                Influenza High Dose                 2015-10-05 

00:00:00            Completed                               [facility]

 

                                Influenza High Dose                 2015-10-05 

00:00:00            Completed                               [facility]

 

                                Influenza High Dose                 2015-10-05 

00:00:00            Completed                               [facility]

 

                                Influenza High Dose                 2015-10-05 

00:00:00            Completed                               [facility]

 

                                Influenza High Dose                 2015-10-05 

00:00:00            Completed                               [facility]

 

                                Influenza High Dose                 2015-10-05 

00:00:00            Completed                               [facility]

 

                                Influenza High Dose                 2015-10-05 

00:00:00            Completed                               [facility]

 

                                Influenza High Dose                 2015-10-05 

00:00:00            Completed                               [facility]

 

                                Influenza High Dose                 2015-10-05 

00:00:00            Completed                               [facility]

 

                                Influenza High Dose                 2015-10-05 

00:00:00            Completed                               [facility]

 

                                Influenza High Dose                 2015-10-05 

00:00:00            Completed                               [facility]

 

                                Influenza High Dose                 2015-10-05 

00:00:00            Completed                               [facility]

 

                                Influenza High Dose                 2015-10-05 

00:00:00            Completed                               [facility]

 

                                Influenza High Dose                 2015-10-05 

00:00:00            Completed                               [facility]

 

                                Influenza High Dose                 2015-10-05 

00:00:00            Completed                               [facility]

 

                                Influenza High Dose                 2015-10-05 

00:00:00            Completed                               [facility]

 

                                Influenza High Dose                 2015-10-05 

00:00:00            Completed                               [facility]

 

                                Influenza High Dose                 2015-10-05 

00:00:00            Completed                               [facility]

 

                                Influenza High Dose                 2015-10-05 

00:00:00            Completed                               [facility]

 

                                Influenza High Dose                 2015-10-05 

00:00:00            Completed                               [facility]

 

                                Influenza High Dose                 2015-10-05 

00:00:00            Completed                               [facility]

 

                                Influenza High Dose                 2015-10-05 

00:00:00            Completed                               [facility]

 

                                Influenza High Dose                 2015-10-05 

00:00:00            Completed                               [facility]

 

                                Influenza High Dose                 2015-10-05 

00:00:00            Completed                               [facility]

 

                                Influenza High Dose                 2015-10-05 

00:00:00            Completed                               [facility]

 

                                Influenza High Dose                 2015-10-05 

00:00:00            Completed                               [facility]

 

                                Influenza High Dose                 2015-10-05 

00:00:00            Completed                               [facility]

 

                                Influenza High Dose                 2015-10-05 

00:00:00            Completed                               [facility]

 

                                Influenza High Dose                 2015-10-05 

00:00:00            Completed                               [facility]

 

                                Influenza High Dose                 2015-10-05 

00:00:00            Completed                               [facility]

 

                                Influenza High Dose                 2015-10-05 

00:00:00            Completed                               [facility]

 

                                Influenza High Dose                 2015-10-05 

00:00:00            Completed                               [facility]

 

                                Influenza High Dose                 2015-10-05 

00:00:00            Completed                               [facility]

 

                                Influenza High Dose                 2015-10-05 

00:00:00            Completed                               [facility]

 

                                Influenza High Dose                 2015-10-05 

00:00:00            Completed                               [facility]

 

                                Influenza High Dose                 2015-10-05 

00:00:00            Completed                               [facility]

 

                                Influenza High Dose                 2015-10-05 

00:00:00            Completed                               [facility]

 

                                Influenza High Dose                 2015-10-05 

00:00:00            Completed                               [facility]

 

                                Influenza High Dose                 2015-10-05 

00:00:00            Completed                               [facility]

 

                                Influenza High Dose                 2015-10-05 

00:00:00            Completed                               [facility]

 

                                Influenza High Dose                 2015-10-05 

00:00:00            Completed                               [facility]

 

                                Influenza High Dose                 2015-10-05 

00:00:00            Completed                               [facility]

 

                                Influenza High Dose                 2015-10-05 

00:00:00            Completed                               [facility]

 

                                Influenza High Dose                 2015-10-05 

00:00:00            Completed                               [facility]

 

                                Influenza High Dose                 2015-10-05 

00:00:00            Completed                               [facility]

 

                                Influenza High Dose                 2015-10-05 

00:00:00            Completed                               [facility]

 

                                Influenza High Dose                 2015-10-05 

00:00:00            Completed                               [facility]

 

                                Influenza High Dose                 2015-10-05 

00:00:00            Completed                               [facility]

 

                                Influenza High Dose                 2015-10-05 

00:00:00            Completed                               [facility]

 

                                Influenza High Dose                 2015-10-05 

00:00:00            Completed                               [facility]

 

                                Influenza High Dose                 2015-10-05 

00:00:00            Completed                               [facility]

 

                                Influenza High Dose                 2015-10-05 

00:00:00            Completed                               [facility]

 

                                Influenza High Dose                 2015-10-05 

00:00:00            Completed                               [facility]

 

                                Influenza High Dose                 2015-10-05 

00:00:00            Completed                               [facility]

 

                                Influenza High Dose                 2015-10-05 

00:00:00            Completed                               [facility]

 

                                Influenza High Dose                 2015-10-05 

00:00:00            Completed                               [facility]

 

                                Influenza High Dose                 2015-10-05 

00:00:00            Completed                               [facility]

 

                                Influenza High Dose                 2015-10-05 

00:00:00            Completed                               [facility]

 

                                Influenza High Dose                 2015-10-05 

00:00:00            Completed                               [facility]

 

                                Influenza High Dose                 2015-10-05 

00:00:00            Completed                               [facility]

 

                                Influenza High Dose                 2015-10-05 

00:00:00            Completed                               [facility]

 

                                Influenza High Dose                 2015-10-05 

00:00:00            Completed                               [facility]

 

                                Influenza High Dose                 2015-10-05 

00:00:00            Completed                               [facility]

 

                                Influenza High Dose                 2015-10-05 

00:00:00            Completed                               [facility]

 

                                Influenza High Dose                 2015-10-05 

00:00:00            Completed                               [facility]

 

                                Influenza High Dose                 2015-10-05 

00:00:00            Completed                               [facility]

 

                                Influenza High Dose                 2015-10-05 

00:00:00            Completed                               [facility]

 

                                Influenza High Dose                 2015-10-05 

00:00:00            Completed                               [facility]

 

                                Influenza High Dose                 2015-10-05 

00:00:00            Completed                               [facility]

 

                                Influenza High Dose                 2015-10-05 

00:00:00            Completed                               [facility]

 

                                Influenza High Dose                 2015-10-05 

00:00:00            Completed                               [facility]

 

                                Influenza High Dose                 2015-10-05 

00:00:00            Completed                               [facility]

 

                                Influenza High Dose                 2015-10-05 

00:00:00            Completed                               [facility]

 

                                Influenza High Dose                 2015-10-05 

00:00:00            Completed                               [facility]

 

                                Influenza High Dose                 2015-10-05 

00:00:00            Completed                               [facility]

 

                                Influenza High Dose                 2015-10-05 

00:00:00            Completed                               [facility]

 

                                Influenza High Dose                 2015-10-05 

00:00:00            Completed                               [facility]

 

                                Influenza High Dose                 2015-10-05 

00:00:00            Completed                               [facility]

 

                                Influenza High Dose                 2015-10-05 

00:00:00            Completed                               [facility]

 

                                Influenza High Dose                 2015-10-05 

00:00:00            Completed                               [facility]

 

                                Influenza High Dose                 2015-10-05 

00:00:00            Completed                               [facility]

 

                                Influenza High Dose                 2015-10-05 

00:00:00            Completed                               [facility]

 

                                Influenza High Dose                 2015-10-05 

00:00:00            Completed                               [facility]

 

                                Influenza High Dose                 2015-10-05 

00:00:00            Completed                               [facility]

 

                                Influenza High Dose                 2015-10-05 

00:00:00            Completed                               [facility]

 

                                Influenza High Dose                 2015-10-05 

00:00:00            Completed                               [facility]

 

                                Influenza High Dose                 2015-10-05 

00:00:00            Completed                               [facility]

 

                                Influenza High Dose                 2015-10-05 

00:00:00            Completed                               [facility]

 

                                Influenza High Dose                 2015-10-05 

00:00:00            Completed                               [facility]

 

                                Influenza High Dose                 2015-10-05 

00:00:00            Completed                               [facility]

 

                                Influenza High Dose                 2015-10-05 

00:00:00            Completed                               [facility]

 

                                Influenza High Dose                 2015-10-05 

00:00:00            Completed                               [facility]

 

                                Influenza High Dose                 2015-10-05 

00:00:00            Completed                               [facility]

 

                                Influenza High Dose                 2015-10-05 

00:00:00            Completed                               [facility]

 

                                Influenza High Dose                 2015-10-05 

00:00:00            Completed                               [facility]

 

                                Influenza High Dose                 2015-10-05 

00:00:00            Completed                               [facility]

 

                                Influenza High Dose                 2015-10-05 

00:00:00            Completed                               [facility]

 

                                Influenza High Dose                 2015-10-05 

00:00:00            Completed                               [facility]

 

                                Influenza High Dose                 2015-10-05 

00:00:00            Completed                               [facility]

 

                                Influenza High Dose                 2015-10-05 

00:00:00            Completed                               [facility]

 

                                Influenza High Dose                 2015-10-05 

00:00:00            Completed                               [facility]

 

                                Influenza High Dose                 2015-10-05 

00:00:00            Completed                               [facility]

 

                                Influenza High Dose                 2015-10-05 

00:00:00            Completed                               [facility]

 

                                Influenza High Dose                 2015-10-05 

00:00:00            Completed                               [facility]

 

                                Influenza High Dose                 2015-10-05 

00:00:00            Completed                               [facility]

 

                                Influenza High Dose                 2015-10-05 

00:00:00            Completed                               [facility]

 

                                Influenza High Dose                 2015-10-05 

00:00:00            Completed                               [facility]

 

                                Influenza High Dose                 2015-10-05 

00:00:00            Completed                               [facility]

 

                                Influenza High Dose                 2015-10-05 

00:00:00            Completed                               [facility]

 

                                Influenza High Dose                 2015-10-05 

00:00:00            Completed                               [facility]

 

                                Influenza High Dose                 2015-10-05 

00:00:00            Completed                               [facility]

 

                                Influenza High Dose                 2015-10-05 

00:00:00            Completed                               [facility]

 

                                Influenza High Dose                 2015-10-05 

00:00:00            Completed                               [facility]

 

                                Influenza High Dose                 2015-10-05 

00:00:00            Completed                               [facility]

 

                                Influenza High Dose                 2015-10-05 

00:00:00            Completed                               [facility]

 

                                Influenza High Dose                 2015-10-05 

00:00:00            Completed                               [facility]

 

                                Influenza High Dose                 2015-10-05 

00:00:00            Completed                               [facility]

 

                                Influenza High Dose                 2015-10-05 

00:00:00            Completed                               [facility]

 

                                Influenza High Dose                 2015-10-05 

00:00:00            Completed                               [facility]

 

                                Influenza High Dose                 2015-10-05 

00:00:00            Completed                               [facility]

 

                                Influenza High Dose                 2015-10-05 

00:00:00            Completed                               [facility]

 

                                Influenza High Dose                 2015-10-05 

00:00:00            Completed                               [facility]

 

                                Influenza High Dose                 2015-10-05 

00:00:00            Completed                               [facility]

 

                                Influenza High Dose                 2015-10-05 

00:00:00            Completed                               [facility]

 

                                Influenza High Dose                 2015-10-05 

00:00:00            Completed                               [facility]

 

                                Influenza High Dose                 2015-10-05 

00:00:00            Completed                               [facility]

 

                                Influenza High Dose                 2015-10-05 

00:00:00            Completed                               [facility]

 

                                Influenza High Dose                 2015-10-05 

00:00:00            Completed                               [facility]

 

                                Influenza High Dose                 2015-10-05 

00:00:00            Completed                               [facility]

 

                                Influenza High Dose                 2015-10-05 

00:00:00            Completed                               [facility]

 

                                Influenza High Dose                 2015-10-05 

00:00:00            Completed                               [facility]

 

                                Influenza High Dose                 2015-10-05 

00:00:00            Completed                               [facility]

 

                                Influenza High Dose                 2015-10-05 

00:00:00            Completed                               [facility]

 

                                Influenza High Dose                 2015-10-05 

00:00:00            Completed                               [facility]

 

                                Influenza High Dose                 2015-10-05 

00:00:00            Completed                               [facility]

 

                                Influenza High Dose                 2015-10-05 

00:00:00            Completed                               [facility]

 

                                Influenza High Dose                 2015-10-05 

00:00:00            Completed                               [facility]

 

                                Influenza High Dose                 2015-10-05 

00:00:00            Completed                               [facility]

 

                                Influenza High Dose                 2015-10-05 

00:00:00            Completed                               [facility]

 

                                Influenza High Dose                 2015-10-05 

00:00:00            Completed                               [facility]

 

                                Influenza High Dose                 2015-10-05 

00:00:00            Completed                               [facility]

 

                                Influenza High Dose                 2015-10-05 

00:00:00            Completed                               [facility]

 

                                Influenza High Dose                 2015-10-05 

00:00:00            Completed                               [facility]

 

                                Influenza High Dose                 2015-10-05 

00:00:00            Completed                               [facility]

 

                                Influenza High Dose                 2015-10-05 

00:00:00            Completed                               [facility]

 

                                Influenza High Dose                 2015-10-05 

00:00:00            Completed                               [facility]

 

                                Influenza High Dose                 2015-10-05 

00:00:00            Completed                               [facility]

 

                                Influenza High Dose                 2015-10-05 

00:00:00            Completed                               [facility]

 

                                Influenza High Dose                 2015-10-05 

00:00:00            Completed                               [facility]

 

                                Influenza High Dose                 2015-10-05 

00:00:00            Completed                               [facility]

 

                                Influenza High Dose                 2015-10-05 

00:00:00            Completed                               [facility]

 

                                Influenza High Dose                 2015-10-05 

00:00:00            Completed                               [facility]

 

                                Influenza High Dose                 2015-10-05 

00:00:00            Completed                               [facility]

 

                                Influenza High Dose                 2015-10-05 

00:00:00            Completed                               [facility]

 

                                Influenza High Dose                 2015-10-05 

00:00:00            Completed                               [facility]

 

                                Influenza High Dose                 2015-10-05 

00:00:00            Completed                               [facility]

 

                                Influenza High Dose                 2015-10-05 

00:00:00            Completed                               [facility]

 

                                Influenza High Dose                 2015-10-05 

00:00:00            Completed                               [facility]

 

                                Influenza High Dose                 2015-10-05 

00:00:00            Completed                               [facility]

 

                                Influenza High Dose                 2015-10-05 

00:00:00            Completed                               [facility]

 

                                Influenza High Dose                 2015-10-05 

00:00:00            Completed                               [facility]

 

                                Influenza High Dose                 2015-10-05 

00:00:00            Completed                               [facility]

 

                                Influenza High Dose                 2015-10-05 

00:00:00            Completed                               [facility]

 

                                Influenza High Dose                 2015-10-05 

00:00:00            Completed                               [facility]

 

                                Influenza High Dose                 2015-10-05 

00:00:00            Completed                               [facility]

 

                                Influenza High Dose                 2015-10-05 

00:00:00            Completed                               [facility]

 

                                Influenza High Dose                 2015-10-05 

00:00:00            Completed                               [facility]

 

                                Influenza High Dose                 2015-10-05 

00:00:00            Completed                               [facility]

 

                                Influenza High Dose                 2015-10-05 

00:00:00            Completed                               [facility]

 

                                Influenza High Dose                 2015-10-05 

00:00:00            Completed                               [facility]

 

                                Influenza High Dose                 2015-10-05 

00:00:00            Completed                               [facility]

 

                                Influenza High Dose                 2015-10-05 

00:00:00            Completed                               [facility]

 

                                Influenza High Dose                 2015-10-05 

00:00:00            Completed                               [facility]

 

                                Influenza High Dose                 2015-10-05 

00:00:00            Completed                               [facility]

 

                                Influenza High Dose                 2015-10-05 

00:00:00            Completed                               [facility]

 

                                Influenza High Dose                 2015-10-05 

00:00:00            Completed                               [facility]

 

                    Influenza High Dose           Unknown   Completed           

[facility]

 

                                                    Pneumococcal 13 

Conjugate, PCV13 

(Prevnar 13)              Unknown      Completed                 [facility]

 

                                                    Influenza Virus 

Vaccine                   Unknown      Completed                 [facility]

 

                                                    SARS-COV-2 COVID-19 

PFIZER VACCINE              Unknown      Completed                 [facility]

 

                                                    SARS-COV-2 COVID-19 

PFIZER VACCINE              Unknown      Completed                 [facility]

 

                                                    SARS-COV-2 COVID-19 

PFIZER VACCINE              Unknown      Completed                 [facility]

 

                    Influenza High Dose           Unknown   Completed           

[facility]

 

                                                    Pneumococcal 

Polysaccharide, 

PPSV23 (PNEUMOVAX)              Unknown      Completed                 [facility]

 

                                                    SARS-COV-2 COVID-19 

PFIZER VACCINE              Unknown      Completed                 [facility]

 

                    PPD (TB)            Unknown   Completed           [facility]

 

                                                    Influenza Virus 

Vaccine Quad IM 3+ 

YRS                       Unknown      Completed                 [facility]

 

                                                    Influenza High Dose 

Quad                      Unknown      Completed                 [facility]

 

                                                    SARS-COV-2 COVID-19 

CHAPARRO-SUCROSE 

VACCINE 12 YRS+, 

BIVALENT 0.3ML, IM, 

(PFIZER GRAY TOP)              Unknown      Completed                 [facility]

 

                    Influenza High Dose           Unknown   Completed           

[facility]

 

                                                    Pneumococcal 13 

Conjugate, PCV13 

(Prevnar 13)              Unknown      Completed                 [facility]

 

                                                    Influenza Virus 

Vaccine                   Unknown      Completed                 [facility]

 

                                                    SARS-COV-2 COVID-19 

PFIZER VACCINE              Unknown      Completed                 [facility]

 

                                                    SARS-COV-2 COVID-19 

PFIZER VACCINE              Unknown      Completed                 [facility]

 

                                                    SARS-COV-2 COVID-19 

PFIZER VACCINE              Unknown      Completed                 [facility]

 

                    Influenza High Dose           Unknown   Completed           

[facility]

 

                                                    Pneumococcal 

Polysaccharide, 

PPSV23 (PNEUMOVAX)              Unknown      Completed                 [facility]

 

                                                    SARS-COV-2 COVID-19 

PFIZER VACCINE              Unknown      Completed                 [facility]

 

                    PPD (TB)            Unknown   Completed           [facility]

 

                                                    Influenza Virus 

Vaccine Quad IM 3+ 

YRS                       Unknown      Completed                 [facility]

 

                                                    Influenza High Dose 

Quad                      Unknown      Completed                 [facility]

 

                                                    SARS-COV-2 COVID-19 

CHAPARRO-SUCROSE 

VACCINE 12 YRS+, 

BIVALENT 0.3ML, IM, 

(PFIZER GRAY TOP)              Unknown      Completed                 [facility]

 

                    Influenza High Dose           Unknown   Completed           

[facility]

 

                                                    Pneumococcal 13 

Conjugate, PCV13 

(Prevnar 13)              Unknown      Completed                 [facility]

 

                                                    Influenza Virus 

Vaccine                   Unknown      Completed                 [facility]

 

                                                    SARS-COV-2 COVID-19 

PFIZER VACCINE              Unknown      Completed                 [facility]

 

                                                    SARS-COV-2 COVID-19 

PFIZER VACCINE              Unknown      Completed                 [facility]

 

                                                    SARS-COV-2 COVID-19 

PFIZER VACCINE              Unknown      Completed                 [facility]

 

                    Influenza High Dose           Unknown   Completed           

[facility]

 

                                                    Pneumococcal 

Polysaccharide, 

PPSV23 (PNEUMOVAX)              Unknown      Completed                 [facility]

 

                                                    SARS-COV-2 COVID-19 

PFIZER VACCINE              Unknown      Completed                 [facility]

 

                    PPD (TB)            Unknown   Completed           [facility]

 

                                                    Influenza Virus 

Vaccine Quad IM 3+ 

YRS                       Unknown      Completed                 [facility]

 

                                                    Influenza High Dose 

Quad                      Unknown      Completed                 [facility]

 

                                                    SARS-COV-2 COVID-19 

CHAPARRO-SUCROSE 

VACCINE 12 YRS+, 

BIVALENT 0.3ML, IM, 

(PFIZER GRAY TOP)              Unknown      Completed                 [facility]

 

                    Influenza High Dose           Unknown   Completed           

[facility]

 

                                                    Pneumococcal 13 

Conjugate, PCV13 

(Prevnar 13)              Unknown      Completed                 [facility]

 

                                                    Influenza Virus 

Vaccine                   Unknown      Completed                 [facility]

 

                                                    SARS-COV-2 COVID-19 

PFIZER VACCINE              Unknown      Completed                 [facility]

 

                                                    SARS-COV-2 COVID-19 

PFIZER VACCINE              Unknown      Completed                 [facility]

 

                                                    SARS-COV-2 COVID-19 

PFIZER VACCINE              Unknown      Completed                 [facility]

 

                    Influenza High Dose           Unknown   Completed           

[facility]

 

                                                    Pneumococcal 

Polysaccharide, 

PPSV23 (PNEUMOVAX)              Unknown      Completed                 [facility]

 

                                                    SARS-COV-2 COVID-19 

PFIZER VACCINE              Unknown      Completed                 [facility]

 

                    PPD (TB)            Unknown   Completed           [facility]

 

                                                    Influenza Virus 

Vaccine Quad IM 3+ 

YRS                       Unknown      Completed                 [facility]

 

                                                    Influenza High Dose 

Quad                      Unknown      Completed                 [facility]

 

                                                    SARS-COV-2 COVID-19 

CHAPARRO-SUCROSE 

VACCINE 12 YRS+, 

BIVALENT 0.3ML, IM, 

(PFIZER GRAY TOP)              Unknown      Completed                 [facility]

 

                    Influenza High Dose           Unknown   Completed           

[facility]

 

                                                    Pneumococcal 13 

Conjugate, PCV13 

(Prevnar 13)              Unknown      Completed                 [facility]

 

                                                    Influenza Virus 

Vaccine                   Unknown      Completed                 [facility]

 

                                                    SARS-COV-2 COVID-19 

PFIZER VACCINE              Unknown      Completed                 [facility]

 

                                                    SARS-COV-2 COVID-19 

PFIZER VACCINE              Unknown      Completed                 [facility]

 

                                                    SARS-COV-2 COVID-19 

PFIZER VACCINE              Unknown      Completed                 [facility]

 

                    Influenza High Dose           Unknown   Completed           

[facility]

 

                                                    Pneumococcal 

Polysaccharide, 

PPSV23 (PNEUMOVAX)              Unknown      Completed                 [facility]

 

                                                    SARS-COV-2 COVID-19 

PFIZER VACCINE              Unknown      Completed                 [facility]

 

                    PPD (TB)            Unknown   Completed           [facility]

 

                                                    Influenza Virus 

Vaccine Quad IM 3+ 

YRS                       Unknown      Completed                 [facility]

 

                                                    Influenza High Dose 

Quad                      Unknown      Completed                 [facility]

 

                                                    SARS-COV-2 COVID-19 

CHAPARRO-SUCROSE 

VACCINE 12 YRS+, 

BIVALENT 0.3ML, IM, 

(PFIZER GRAY TOP)              Unknown      Completed                 [facility]

 

                    Influenza High Dose           Unknown   Completed           

[facility]

 

                                                    Pneumococcal 13 

Conjugate, PCV13 

(Prevnar 13)              Unknown      Completed                 [facility]

 

                                                    Influenza Virus 

Vaccine                   Unknown      Completed                 [facility]

 

                                                    SARS-COV-2 COVID-19 

PFIZER VACCINE              Unknown      Completed                 [facility]

 

                                                    SARS-COV-2 COVID-19 

PFIZER VACCINE              Unknown      Completed                 [facility]

 

                                                    SARS-COV-2 COVID-19 

PFIZER VACCINE              Unknown      Completed                 [facility]

 

                    Influenza High Dose           Unknown   Completed           

[facility]

 

                                                    Pneumococcal 

Polysaccharide, 

PPSV23 (PNEUMOVAX)              Unknown      Completed                 [facility]

 

                                                    SARS-COV-2 COVID-19 

PFIZER VACCINE              Unknown      Completed                 [facility]

 

                    PPD (TB)            Unknown   Completed           [facility]

 

                                                    Influenza Virus 

Vaccine Quad IM 3+ 

YRS                       Unknown      Completed                 [facility]

 

                                                    Influenza High Dose 

Quad                      Unknown      Completed                 [facility]

 

                                                    SARS-COV-2 COVID-19 

CHAPARRO-SUCROSE 

VACCINE 12 YRS+, 

BIVALENT 0.3ML, IM, 

(PFIZER GRAY TOP)              Unknown      Completed                 [facility]

 

                    Influenza High Dose           Unknown   Completed           

[facility]

 

                                                    Pneumococcal 13 

Conjugate, PCV13 

(Prevnar 13)              Unknown      Completed                 [facility]

 

                                                    Influenza Virus 

Vaccine                   Unknown      Completed                 [facility]

 

                                                    SARS-COV-2 COVID-19 

PFIZER VACCINE              Unknown      Completed                 [facility]

 

                                                    SARS-COV-2 COVID-19 

PFIZER VACCINE              Unknown      Completed                 [facility]

 

                                                    SARS-COV-2 COVID-19 

PFIZER VACCINE              Unknown      Completed                 [facility]

 

                    Influenza High Dose           Unknown   Completed           

[facility]

 

                                                    Pneumococcal 

Polysaccharide, 

PPSV23 (PNEUMOVAX)              Unknown      Completed                 [facility]

 

                                                    SARS-COV-2 COVID-19 

PFIZER VACCINE              Unknown      Completed                 [facility]

 

                    PPD (TB)            Unknown   Completed           [facility]

 

                                                    Influenza Virus 

Vaccine Quad IM 3+ 

YRS                       Unknown      Completed                 [facility]

 

                                                    Influenza High Dose 

Quad                      Unknown      Completed                 [facility]

 

                                                    SARS-COV-2 COVID-19 

CHAPARRO-SUCROSE 

VACCINE 12 YRS+, 

BIVALENT 0.3ML, IM, 

(PFIZER GRAY TOP)              Unknown      Completed                 [facility]

 

                    Influenza High Dose           Unknown   Completed           

[facility]

 

                                                    Pneumococcal 13 

Conjugate, PCV13 

(Prevnar 13)              Unknown      Completed                 [facility]

 

                                                    Influenza Virus 

Vaccine                   Unknown      Completed                 [facility]

 

                                                    SARS-COV-2 COVID-19 

PFIZER VACCINE              Unknown      Completed                 [facility]

 

                                                    SARS-COV-2 COVID-19 

PFIZER VACCINE              Unknown      Completed                 [facility]

 

                                                    SARS-COV-2 COVID-19 

PFIZER VACCINE              Unknown      Completed                 [facility]

 

                    Influenza High Dose           Unknown   Completed           

[facility]

 

                                                    Pneumococcal 

Polysaccharide, 

PPSV23 (PNEUMOVAX)              Unknown      Completed                 [facility]

 

                                                    SARS-COV-2 COVID-19 

PFIZER VACCINE              Unknown      Completed                 [facility]

 

                    PPD (TB)            Unknown   Completed           [facility]

 

                                                    Influenza Virus 

Vaccine Quad IM 3+ 

YRS                       Unknown      Completed                 [facility]

 

                                                    Influenza High Dose 

Quad                      Unknown      Completed                 [facility]

 

                                                    SARS-COV-2 COVID-19 

CHAPARRO-SUCROSE 

VACCINE 12 YRS+, 

BIVALENT 0.3ML, IM, 

(PFIZER GRAY TOP)              Unknown      Completed                 [facility]

 

                    Influenza High Dose           Unknown   Completed           

[facility]

 

                                                    Pneumococcal 13 

Conjugate, PCV13 

(Prevnar 13)              Unknown      Completed                 [facility]

 

                                                    Influenza Virus 

Vaccine                   Unknown      Completed                 [facility]

 

                                                    SARS-COV-2 COVID-19 

PFIZER VACCINE              Unknown      Completed                 [facility]

 

                                                    SARS-COV-2 COVID-19 

PFIZER VACCINE              Unknown      Completed                 [facility]

 

                                                    SARS-COV-2 COVID-19 

PFIZER VACCINE              Unknown      Completed                 [facility]

 

                    Influenza High Dose           Unknown   Completed           

[facility]

 

                                                    Pneumococcal 

Polysaccharide, 

PPSV23 (PNEUMOVAX)              Unknown      Completed                 [facility]

 

                                                    SARS-COV-2 COVID-19 

PFIZER VACCINE              Unknown      Completed                 [facility]

 

                    PPD (TB)            Unknown   Completed           [facility]

 

                                                    Influenza Virus 

Vaccine Quad IM 3+ 

YRS                       Unknown      Completed                 [facility]

 

                                                    Influenza High Dose 

Quad                      Unknown      Completed                 [facility]

 

                                                    SARS-COV-2 COVID-19 

CHAPARRO-SUCROSE 

VACCINE 12 YRS+, 

BIVALENT 0.3ML, IM, 

(PFIZER GRAY TOP)              Unknown      Completed                 [facility]

 

                    Influenza High Dose           Unknown   Completed           

[facility]

 

                                                    Pneumococcal 13 

Conjugate, PCV13 

(Prevnar 13)              Unknown      Completed                 [facility]

 

                                                    Influenza Virus 

Vaccine                   Unknown      Completed                 [facility]

 

                                                    SARS-COV-2 COVID-19 

PFIZER VACCINE              Unknown      Completed                 [facility]

 

                                                    SARS-COV-2 COVID-19 

PFIZER VACCINE              Unknown      Completed                 [facility]

 

                                                    SARS-COV-2 COVID-19 

PFIZER VACCINE              Unknown      Completed                 [facility]

 

                    Influenza High Dose           Unknown   Completed           

[facility]

 

                                                    Pneumococcal 

Polysaccharide, 

PPSV23 (PNEUMOVAX)              Unknown      Completed                 [facility]

 

                                                    SARS-COV-2 COVID-19 

PFIZER VACCINE              Unknown      Completed                 [facility]

 

                    PPD (TB)            Unknown   Completed           [facility]

 

                                                    Influenza Virus 

Vaccine Quad IM 3+ 

YRS                       Unknown      Completed                 [facility]

 

                                                    Influenza High Dose 

Quad                      Unknown      Completed                 [facility]

 

                                                    SARS-COV-2 COVID-19 

CHAPARRO-SUCROSE 

VACCINE 12 YRS+, 

BIVALENT 0.3ML, IM, 

(PFIZER GRAY TOP)              Unknown      Completed                 [facility]

 

                    Influenza High Dose           Unknown   Completed           

[facility]

 

                                                    Pneumococcal 13 

Conjugate, PCV13 

(Prevnar 13)              Unknown      Completed                 [facility]

 

                                                    Influenza Virus 

Vaccine                   Unknown      Completed                 [facility]

 

                                                    SARS-COV-2 COVID-19 

PFIZER VACCINE              Unknown      Completed                 [facility]

 

                                                    SARS-COV-2 COVID-19 

PFIZER VACCINE              Unknown      Completed                 [facility]

 

                                                    SARS-COV-2 COVID-19 

PFIZER VACCINE              Unknown      Completed                 [facility]

 

                    Influenza High Dose           Unknown   Completed           

[facility]

 

                                                    Pneumococcal 

Polysaccharide, 

PPSV23 (PNEUMOVAX)              Unknown      Completed                 [facility]

 

                                                    SARS-COV-2 COVID-19 

PFIZER VACCINE              Unknown      Completed                 [facility]

 

                    PPD (TB)            Unknown   Completed           [facility]

 

                                                    Influenza Virus 

Vaccine Quad IM 3+ 

YRS                       Unknown      Completed                 [facility]

 

                                                    Influenza High Dose 

Quad                      Unknown      Completed                 [facility]

 

                                                    SARS-COV-2 COVID-19 

CHAPARRO-SUCROSE 

VACCINE 12 YRS+, 

BIVALENT 0.3ML, IM, 

(PFIZER GRAY TOP)              Unknown      Completed                 [facility]

 

                    Influenza High Dose           Unknown   Completed           

[facility]

 

                                                    Pneumococcal 13 

Conjugate, PCV13 

(Prevnar 13)              Unknown      Completed                 [facility]

 

                                                    Influenza Virus 

Vaccine                   Unknown      Completed                 [facility]

 

                                                    SARS-COV-2 COVID-19 

PFIZER VACCINE              Unknown      Completed                 [facility]

 

                                                    SARS-COV-2 COVID-19 

PFIZER VACCINE              Unknown      Completed                 [facility]

 

                                                    SARS-COV-2 COVID-19 

PFIZER VACCINE              Unknown      Completed                 [facility]

 

                    Influenza High Dose           Unknown   Completed           

[facility]

 

                                                    Pneumococcal 

Polysaccharide, 

PPSV23 (PNEUMOVAX)              Unknown      Completed                 [facility]

 

                                                    SARS-COV-2 COVID-19 

PFIZER VACCINE              Unknown      Completed                 [facility]

 

                    PPD (TB)            Unknown   Completed           [facility]

 

                                                    Influenza Virus 

Vaccine Quad IM 3+ 

YRS                       Unknown      Completed                 [facility]

 

                                                    Influenza High Dose 

Quad                      Unknown      Completed                 [facility]

 

                                                    SARS-COV-2 COVID-19 

CHAPARRO-SUCROSE 

VACCINE 12 YRS+, 

BIVALENT 0.3ML, IM, 

(PFIZER GRAY TOP)              Unknown      Completed                 [facility]

 

                    Influenza High Dose           Unknown   Completed           

[facility]

 

                                                    Pneumococcal 13 

Conjugate, PCV13 

(Prevnar 13)              Unknown      Completed                 [facility]

 

                                                    Influenza Virus 

Vaccine                   Unknown      Completed                 [facility]

 

                                                    SARS-COV-2 COVID-19 

PFIZER VACCINE              Unknown      Completed                 [facility]

 

                                                    SARS-COV-2 COVID-19 

PFIZER VACCINE              Unknown      Completed                 [facility]

 

                                                    SARS-COV-2 COVID-19 

PFIZER VACCINE              Unknown      Completed                 [facility]

 

                    Influenza High Dose           Unknown   Completed           

[facility]

 

                                                    Pneumococcal 

Polysaccharide, 

PPSV23 (PNEUMOVAX)              Unknown      Completed                 [facility]

 

                                                    SARS-COV-2 COVID-19 

PFIZER VACCINE              Unknown      Completed                 [facility]

 

                    PPD (TB)            Unknown   Completed           [facility]

 

                                                    Influenza Virus 

Vaccine Quad IM 3+ 

YRS                       Unknown      Completed                 [facility]

 

                                                    Influenza High Dose 

Quad                      Unknown      Completed                 [facility]

 

                                                    SARS-COV-2 COVID-19 

CHAPARRO-SUCROSE 

VACCINE 12 YRS+, 

BIVALENT 0.3ML, IM, 

(PFIZER GRAY TOP)              Unknown      Completed                 [facility]

 

                    Influenza High Dose           Unknown   Completed           

[facility]

 

                                                    Pneumococcal 13 

Conjugate, PCV13 

(Prevnar 13)              Unknown      Completed                 [facility]

 

                                                    Influenza Virus 

Vaccine                   Unknown      Completed                 [facility]

 

                                                    SARS-COV-2 COVID-19 

PFIZER VACCINE              Unknown      Completed                 [facility]

 

                                                    SARS-COV-2 COVID-19 

PFIZER VACCINE              Unknown      Completed                 [facility]

 

                                                    SARS-COV-2 COVID-19 

PFIZER VACCINE              Unknown      Completed                 [facility]

 

                    Influenza High Dose           Unknown   Completed           

[facility]

 

                                                    Pneumococcal 

Polysaccharide, 

PPSV23 (PNEUMOVAX)              Unknown      Completed                 [facility]

 

                                                    SARS-COV-2 COVID-19 

PFIZER VACCINE              Unknown      Completed                 [facility]

 

                    PPD (TB)            Unknown   Completed           [facility]

 

                                                    Influenza Virus 

Vaccine Quad IM 3+ 

YRS                       Unknown      Completed                 [facility]

 

                                                    Influenza High Dose 

Quad                      Unknown      Completed                 [facility]

 

                                                    SARS-COV-2 COVID-19 

CHAPARRO-SUCROSE 

VACCINE 12 YRS+, 

BIVALENT 0.3ML, IM, 

(PFIZER GRAY TOP)              Unknown      Completed                 [facility]

 

                    Influenza High Dose           Unknown   Completed           

[facility]

 

                                                    Pneumococcal 13 

Conjugate, PCV13 

(Prevnar 13)              Unknown      Completed                 [facility]

 

                                                    Influenza Virus 

Vaccine                   Unknown      Completed                 [facility]

 

                                                    SARS-COV-2 COVID-19 

PFIZER VACCINE              Unknown      Completed                 [facility]

 

                                                    SARS-COV-2 COVID-19 

PFIZER VACCINE              Unknown      Completed                 [facility]

 

                                                    SARS-COV-2 COVID-19 

PFIZER VACCINE              Unknown      Completed                 [facility]

 

                    Influenza High Dose           Unknown   Completed           

[facility]

 

                                                    Pneumococcal 

Polysaccharide, 

PPSV23 (PNEUMOVAX)              Unknown      Completed                 [facility]

 

                                                    SARS-COV-2 COVID-19 

PFIZER VACCINE              Unknown      Completed                 [facility]

 

                    PPD (TB)            Unknown   Completed           [facility]

 

                                                    Influenza Virus 

Vaccine Quad IM 3+ 

YRS                       Unknown      Completed                 [facility]

 

                                                    Influenza High Dose 

Quad                      Unknown      Completed                 [facility]

 

                                                    SARS-COV-2 COVID-19 

CHAPARRO-SUCROSE 

VACCINE 12 YRS+, 

BIVALENT 0.3ML, IM, 

(PFIZER GRAY TOP)              Unknown      Completed                 [facility]

 

                    Influenza High Dose           Unknown   Completed           

[facility]

 

                                                    Pneumococcal 13 

Conjugate, PCV13 

(Prevnar 13)              Unknown      Completed                 [facility]

 

                                                    Influenza Virus 

Vaccine                   Unknown      Completed                 [facility]

 

                                                    SARS-COV-2 COVID-19 

PFIZER VACCINE              Unknown      Completed                 [facility]

 

                                                    SARS-COV-2 COVID-19 

PFIZER VACCINE              Unknown      Completed                 [facility]

 

                                                    SARS-COV-2 COVID-19 

PFIZER VACCINE              Unknown      Completed                 [facility]

 

                    Influenza High Dose           Unknown   Completed           

[facility]

 

                                                    Pneumococcal 

Polysaccharide, 

PPSV23 (PNEUMOVAX)              Unknown      Completed                 [facility]

 

                                                    SARS-COV-2 COVID-19 

PFIZER VACCINE              Unknown      Completed                 [facility]

 

                    PPD (TB)            Unknown   Completed           [facility]

 

                                                    Influenza Virus 

Vaccine Quad IM 3+ 

YRS                       Unknown      Completed                 [facility]

 

                                                    Influenza High Dose 

Quad                      Unknown      Completed                 [facility]

 

                                                    SARS-COV-2 COVID-19 

CHAPRARO-SUCROSE 

VACCINE 12 YRS+, 

BIVALENT 0.3ML, IM, 

(PFIZER GRAY TOP)              Unknown      Completed                 [facility]

 

                    Influenza High Dose           Unknown   Completed           

[facility]

 

                                                    Pneumococcal 13 

Conjugate, PCV13 

(Prevnar 13)              Unknown      Completed                 [facility]

 

                                                    Influenza Virus 

Vaccine                   Unknown      Completed                 [facility]

 

                                                    SARS-COV-2 COVID-19 

PFIZER VACCINE              Unknown      Completed                 [facility]

 

                                                    SARS-COV-2 COVID-19 

PFIZER VACCINE              Unknown      Completed                 [facility]

 

                                                    SARS-COV-2 COVID-19 

PFIZER VACCINE              Unknown      Completed                 [facility]

 

                    Influenza High Dose           Unknown   Completed           

[facility]

 

                                                    Pneumococcal 

Polysaccharide, 

PPSV23 (PNEUMOVAX)              Unknown      Completed                 [facility]

 

                                                    SARS-COV-2 COVID-19 

PFIZER VACCINE              Unknown      Completed                 [facility]

 

                    PPD (TB)            Unknown   Completed           [facility]

 

                                                    Influenza Virus 

Vaccine Quad IM 3+ 

YRS                       Unknown      Completed                 [facility]

 

                                                    Influenza High Dose 

Quad                      Unknown      Completed                 [facility]

 

                                                    SARS-COV-2 COVID-19 

CHAPARRO-SUCROSE 

VACCINE 12 YRS+, 

BIVALENT 0.3ML, IM, 

(PFIZER GRAY TOP)              Unknown      Completed                 [facility]

 

                    Influenza High Dose           Unknown   Completed           

[facility]

 

                                                    Pneumococcal 13 

Conjugate, PCV13 

(Prevnar 13)              Unknown      Completed                 [facility]

 

                                                    Influenza Virus 

Vaccine                   Unknown      Completed                 [facility]

 

                                                    SARS-COV-2 COVID-19 

PFIZER VACCINE              Unknown      Completed                 [facility]

 

                                                    SARS-COV-2 COVID-19 

PFIZER VACCINE              Unknown      Completed                 [facility]

 

                                                    SARS-COV-2 COVID-19 

PFIZER VACCINE              Unknown      Completed                 [facility]

 

                    Influenza High Dose           Unknown   Completed           

[facility]

 

                                                    Pneumococcal 

Polysaccharide, 

PPSV23 (PNEUMOVAX)              Unknown      Completed                 [facility]

 

                                                    SARS-COV-2 COVID-19 

PFIZER VACCINE              Unknown      Completed                 [facility]

 

                    PPD (TB)            Unknown   Completed           [facility]

 

                                                    Influenza Virus 

Vaccine Quad IM 3+ 

YRS                       Unknown      Completed                 [facility]

 

                                                    Influenza High Dose 

Quad                      Unknown      Completed                 [facility]

 

                                                    SARS-COV-2 COVID-19 

CHAPARRO-SUCROSE 

VACCINE 12 YRS+, 

BIVALENT 0.3ML, IM, 

(PFIZER GRAY TOP)              Unknown      Completed                 [facility]

 

                    Influenza High Dose           Unknown   Completed           

[facility]

 

                                                    Pneumococcal 13 

Conjugate, PCV13 

(Prevnar 13)              Unknown      Completed                 [facility]

 

                                                    Influenza Virus 

Vaccine                   Unknown      Completed                 [facility]

 

                                                    SARS-COV-2 COVID-19 

PFIZER VACCINE              Unknown      Completed                 [facility]

 

                                                    SARS-COV-2 COVID-19 

PFIZER VACCINE              Unknown      Completed                 [facility]

 

                                                    SARS-COV-2 COVID-19 

PFIZER VACCINE              Unknown      Completed                 [facility]

 

                    Influenza High Dose           Unknown   Completed           

[facility]

 

                                                    Pneumococcal 

Polysaccharide, 

PPSV23 (PNEUMOVAX)              Unknown      Completed                 [facility]

[facility]

 

                                                    SARS-COV-2 COVID-19 

PFIZER VACCINE              Unknown      Completed                 [facility]

 

                    PPD (TB)            Unknown   Completed           [facility]

 

                                                    Influenza Virus 

Vaccine Quad IM 3+ 

YRS                       Unknown      Completed                 [facility]

 

                                                    Influenza High Dose 

Quad                      Unknown      Completed                 [facility]

 

                                                    SARS-COV-2 COVID-19 

CHAPARRO-SUCROSE 

VACCINE 12 YRS+, 

BIVALENT 0.3ML, IM, 

(PFIZER GRAY TOP)              Unknown      Completed                 [facility]

 

                    Influenza High Dose           Unknown   Completed           

[facility]

 

                                                    Pneumococcal 13 

Conjugate, PCV13 

(Prevnar 13)              Unknown      Completed                 [facility]

 

                                                    Influenza Virus 

Vaccine                   Unknown      Completed                 [facility]

 

                                                    SARS-COV-2 COVID-19 

PFIZER VACCINE              Unknown      Completed                 [facility]

 

                                                    SARS-COV-2 COVID-19 

PFIZER VACCINE              Unknown      Completed                 [facility]

 

                                                    SARS-COV-2 COVID-19 

PFIZER VACCINE              Unknown      Completed                 [facility]

 

                    Influenza High Dose           Unknown   Completed           

[facility]

 

                                                    Pneumococcal 

Polysaccharide, 

PPSV23 (PNEUMOVAX)              Unknown      Completed                 [facility]

 

                                                    SARS-COV-2 COVID-19 

PFIZER VACCINE              Unknown      Completed                 [facility]

 

                    PPD (TB)            Unknown   Completed           [facility]

 

                                                    Influenza Virus 

Vaccine Quad IM 3+ 

YRS                       Unknown      Completed                 [facility]

 

                                                    Influenza High Dose 

Quad                      Unknown      Completed                 [facility]

 

                                                    SARS-COV-2 COVID-19 

CHAPARRO-SUCROSE 

VACCINE 12 YRS+, 

BIVALENT 0.3ML, IM, 

(PFIZER GRAY TOP)              Unknown      Completed                 [facility]

 

                                                    Influenza Virus 

Vaccine,quad 

Im,preserve Free 

65+ (FLUAD)               Unknown      Completed                 [facility]

 

                    Influenza High Dose           Unknown   Completed           

[facility]

 

                                                    Pneumococcal 13 

Conjugate, PCV13 

(Prevnar 13)              Unknown      Completed                 [facility]

 

                                                    Influenza Virus 

Vaccine                   Unknown      Completed                 [facility]

 

                                                    SARS-COV-2 COVID-19 

PFIZER VACCINE              Unknown      Completed                 [facility]

 

                                                    SARS-COV-2 COVID-19 

PFIZER VACCINE              Unknown      Completed                 [facility]

 

                                                    SARS-COV-2 COVID-19 

PFIZER VACCINE              Unknown      Completed                 [facility]

 

                    Influenza High Dose           Unknown   Completed           

[facility]

 

                                                    Pneumococcal 

Polysaccharide, 

PPSV23 (PNEUMOVAX)              Unknown      Completed                 [facility]

 

                                                    SARS-COV-2 COVID-19 

PFIZER VACCINE              Unknown      Completed                 [facility]

 

                    PPD (TB)            Unknown   Completed           [facility]

 

                                                    Influenza Virus 

Vaccine Quad IM 3+ 

YRS                       Unknown      Completed                 [facility]

 

                                                    Influenza High Dose 

Quad                      Unknown      Completed                 [facility]

 

                                                    SARS-COV-2 COVID-19 

CHAPARRO-SUCROSE 

VACCINE 12 YRS+, 

BIVALENT 0.3ML, IM, 

(PFIZER GRAY TOP)              Unknown      Completed                 [facility]

 

                                                    Influenza Virus 

Vaccine,quad 

Im,preserve Free 

65+ (FLUAD)               Unknown      Completed                 [facility]

 

                    Influenza High Dose           Unknown   Completed           

[facility]

 

                                                    Pneumococcal 13 

Conjugate, PCV13 

(Prevnar 13)              Unknown      Completed                 [facility]

 

                                                    Influenza Virus 

Vaccine                   Unknown      Completed                 [facility]

 

                                                    SARS-COV-2 COVID-19 

PFIZER VACCINE              Unknown      Completed                 [facility]

 

                                                    SARS-COV-2 COVID-19 

PFIZER VACCINE              Unknown      Completed                 [facility]

 

                                                    SARS-COV-2 COVID-19 

PFIZER VACCINE              Unknown      Completed                 [facility]

 

                    Influenza High Dose           Unknown   Completed           

[facility]

 

                                                    Pneumococcal 

Polysaccharide, 

PPSV23 (PNEUMOVAX)              Unknown      Completed                 [facility]

 

                                                    SARS-COV-2 COVID-19 

PFIZER VACCINE              Unknown      Completed                 [facility]

 

                    PPD (TB)            Unknown   Completed           [facility]

 

                                                    Influenza Virus 

Vaccine Quad IM 3+ 

YRS                       Unknown      Completed                 [facility]

 

                                                    Influenza High Dose 

Quad                      Unknown      Completed                 [facility]

 

                                                    SARS-COV-2 COVID-19 

CHAPARRO-SUCROSE 

VACCINE 12 YRS+, 

BIVALENT 0.3ML, IM, 

(PFIZER GRAY TOP)              Unknown      Completed                 [facility]

 

                                                    Influenza Virus 

Vaccine,quad 

Im,preserve Free 

65+ (FLUAD)               Unknown      Completed                 [facility]

 

                    Influenza High Dose           Unknown   Completed           

[facility]

 

                                                    Pneumococcal 13 

Conjugate, PCV13 

(Prevnar 13)              Unknown      Completed                 [facility]

 

                                                    Influenza Virus 

Vaccine                   Unknown      Completed                 [facility]

 

                                                    SARS-COV-2 COVID-19 

PFIZER VACCINE              Unknown      Completed                 [facility]

 

                                                    SARS-COV-2 COVID-19 

PFIZER VACCINE              Unknown      Completed                 [facility]

 

                                                    SARS-COV-2 COVID-19 

PFIZER VACCINE              Unknown      Completed                 [facility]

 

                    Influenza High Dose           Unknown   Completed           

[facility]

 

                                                    Pneumococcal 

Polysaccharide, 

PPSV23 (PNEUMOVAX)              Unknown      Completed                 [facility]

 

                                                    SARS-COV-2 COVID-19 

PFIZER VACCINE              Unknown      Completed                 [facility]

 

                    PPD (TB)            Unknown   Completed           [facility]

 

                                                    Influenza Virus 

Vaccine Quad IM 3+ 

YRS                       Unknown      Completed                 [facility]

 

                                                    Influenza High Dose 

Quad                      Unknown      Completed                 [facility]

 

                                                    SARS-COV-2 COVID-19 

CHAPARRO-SUCROSE 

VACCINE 12 YRS+, 

BIVALENT 0.3ML, IM, 

(PFIZER GRAY TOP)              Unknown      Completed                 [facility]

 

                                                    Influenza Virus 

Vaccine,quad 

Im,preserve Free 

65+ (FLUAD)               Unknown      Completed                 [facility]

 

                    Influenza High Dose           Unknown   Completed           

[facility]

 

                                                    Pneumococcal 13 

Conjugate, PCV13 

(Prevnar 13)              Unknown      Completed                 [facility]

 

                                                    Influenza Virus 

Vaccine                   Unknown      Completed                 [facility]

 

                                                    SARS-COV-2 COVID-19 

PFIZER VACCINE              Unknown      Completed                 [facility]

 

                                                    SARS-COV-2 COVID-19 

PFIZER VACCINE              Unknown      Completed                 [facility]

 

                                                    SARS-COV-2 COVID-19 

PFIZER VACCINE              Unknown      Completed                 [facility]

 

                    Influenza High Dose           Unknown   Completed           

[facility]

 

                                                    Pneumococcal 

Polysaccharide, 

PPSV23 (PNEUMOVAX)              Unknown      Completed                 [facility]

 

                                                    SARS-COV-2 COVID-19 

PFIZER VACCINE              Unknown      Completed                 [facility]

 

                    PPD (TB)            Unknown   Completed           [facility]

 

                                                    Influenza Virus 

Vaccine Quad IM 3+ 

YRS                       Unknown      Completed                 [facility]

 

                                                    Influenza High Dose 

Quad                      Unknown      Completed                 [facility]

 

                                                    SARS-COV-2 COVID-19 

CHAPARRO-SUCROSE 

VACCINE 12 YRS+, 

BIVALENT 0.3ML, IM, 

(PFIZER GRAY TOP)              Unknown      Completed                 [facility]

 

                                                    Influenza Virus 

Vaccine,quad 

Im,preserve Free 

65+ (FLUAD)               Unknown      Completed                 [facility]

 

                    Influenza High Dose           Unknown   Completed           

[facility]

 

                                                    Pneumococcal 13 

Conjugate, PCV13 

(Prevnar 13)              Unknown      Completed                 [facility]

 

                                                    Influenza Virus 

Vaccine                   Unknown      Completed                 [facility]

 

                                                    SARS-COV-2 COVID-19 

PFIZER VACCINE              Unknown      Completed                 [facility]

 

                                                    SARS-COV-2 COVID-19 

PFIZER VACCINE              Unknown      Completed                 [facility]

 

                                                    SARS-COV-2 COVID-19 

PFIZER VACCINE              Unknown      Completed                 [facility]

 

                    Influenza High Dose           Unknown   Completed           

[facility]

 

                                                    Pneumococcal 

Polysaccharide, 

PPSV23 (PNEUMOVAX)              Unknown      Completed                 [facility]

 

                                                    SARS-COV-2 COVID-19 

PFIZER VACCINE              Unknown      Completed                 [facility]

 

                    PPD (TB)            Unknown   Completed           [facility]

 

                                                    Influenza Virus 

Vaccine Quad IM 3+ 

YRS                       Unknown      Completed                 [facility]

 

                                                    Influenza High Dose 

Quad                      Unknown      Completed                 [facility]

 

                                                    SARS-COV-2 COVID-19 

CHAPARRO-SUCROSE 

VACCINE 12 YRS+, 

BIVALENT 0.3ML, IM, 

(PFIZER GRAY TOP)              Unknown      Completed                 [facility]

 

                                                    Influenza Virus 

Vaccine,quad 

Im,preserve Free 

65+ (FLUAD)               Unknown      Completed                 [facility]

 

                    Influenza High Dose           Unknown   Completed           

[facility]

 

                                                    Pneumococcal 13 

Conjugate, PCV13 

(Prevnar 13)              Unknown      Completed                 [facility]

 

                                                    Influenza Virus 

Vaccine                   Unknown      Completed                 [facility]

 

                                                    SARS-COV-2 COVID-19 

PFIZER VACCINE              Unknown      Completed                 [facility]

 

                                                    SARS-COV-2 COVID-19 

PFIZER VACCINE              Unknown      Completed                 [facility]

 

                                                    SARS-COV-2 COVID-19 

PFIZER VACCINE              Unknown      Completed                 [facility]

 

                    Influenza High Dose           Unknown   Completed           

[facility]

 

                                                    Pneumococcal 

Polysaccharide, 

PPSV23 (PNEUMOVAX)              Unknown      Completed                 [facility]

 

                                                    SARS-COV-2 COVID-19 

PFIZER VACCINE              Unknown      Completed                 [facility]

 

                    PPD (TB)            Unknown   Completed           [facility]

 

                                                    Influenza Virus 

Vaccine Quad IM 3+ 

YRS                       Unknown      Completed                 [facility]

 

                                                    Influenza High Dose 

Quad                      Unknown      Completed                 [facility]

 

                                                    SARS-COV-2 COVID-19 

CHAPARRO-SUCROSE 

VACCINE 12 YRS+, 

BIVALENT 0.3ML, IM, 

(PFIZER GRAY TOP)              Unknown      Completed                 [facility]

 

                                                    Influenza Virus 

Vaccine,quad 

Im,preserve Free 

65+ (FLUAD)               Unknown      Completed                 [facility]

 

                    Influenza High Dose           Unknown   Completed           

[facility]

 

                                                    Pneumococcal 13 

Conjugate, PCV13 

(Prevnar 13)              Unknown      Completed                 [facility]

 

                                                    Influenza Virus 

Vaccine                   Unknown      Completed                 [facility]

 

                                                    SARS-COV-2 COVID-19 

PFIZER VACCINE              Unknown      Completed                 [facility]

 

                                                    SARS-COV-2 COVID-19 

PFIZER VACCINE              Unknown      Completed                 [facility]

 

                                                    SARS-COV-2 COVID-19 

PFIZER VACCINE              Unknown      Completed                 [facility]

 

                    Influenza High Dose           Unknown   Completed           

[facility]

 

                                                    Pneumococcal 

Polysaccharide, 

PPSV23 (PNEUMOVAX)              Unknown      Completed                 [facility]

 

                                                    SARS-COV-2 COVID-19 

PFIZER VACCINE              Unknown      Completed                 [facility]

 

                    PPD (TB)            Unknown   Completed           [facility]

 

                                                    Influenza Virus 

Vaccine Quad IM 3+ 

YRS                       Unknown      Completed                 [facility]

 

                                                    Influenza High Dose 

Quad                      Unknown      Completed                 [facility]

 

                                                    SARS-COV-2 COVID-19 

CHAPARRO-SUCROSE 

VACCINE 12 YRS+, 

BIVALENT 0.3ML, IM, 

(PFIZER GRAY TOP)              Unknown      Completed                 [facility]

 

                                                    Influenza Virus 

Vaccine,quad 

Im,preserve Free 

65+ (FLUAD)               Unknown      Completed                 [facility]

 

                    Influenza High Dose           Unknown   Completed           

[facility]

 

                                                    Pneumococcal 13 

Conjugate, PCV13 

(Prevnar 13)              Unknown      Completed                 [facility]

 

                                                    Influenza Virus 

Vaccine                   Unknown      Completed                 [facility]

 

                                                    SARS-COV-2 COVID-19 

PFIZER VACCINE              Unknown      Completed                 [facility]

 

                                                    SARS-COV-2 COVID-19 

PFIZER VACCINE              Unknown      Completed                 [facility]

 

                                                    SARS-COV-2 COVID-19 

PFIZER VACCINE              Unknown      Completed                 [facility]

 

                    Influenza High Dose           Unknown   Completed           

[facility]

 

                                                    Pneumococcal 

Polysaccharide, 

PPSV23 (PNEUMOVAX)              Unknown      Completed                 [facility]

 

                                                    SARS-COV-2 COVID-19 

PFIZER VACCINE              Unknown      Completed                 [facility]

 

                    PPD (TB)            Unknown   Completed           [facility]

 

                                                    Influenza Virus 

Vaccine Quad IM 3+ 

YRS                       Unknown      Completed                 [facility]

 

                                                    Influenza High Dose 

Quad                      Unknown      Completed                 [facility]

 

                                                    SARS-COV-2 COVID-19 

CHAPARRO-SUCROSE 

VACCINE 12 YRS+, 

BIVALENT 0.3ML, IM, 

(PFIZER GRAY TOP)              Unknown      Completed                 [facility]

 

                                                    Influenza Virus 

Vaccine,quad 

Im,preserve Free 

65+ (FLUAD)               Unknown      Completed                 [facility]

 

                    Influenza High Dose           Unknown   Completed           

[facility]

 

                                                    Pneumococcal 13 

Conjugate, PCV13 

(Prevnar 13)              Unknown      Completed                 [facility]

 

                                                    Influenza Virus 

Vaccine                   Unknown      Completed                 [facility]

 

                                                    SARS-COV-2 COVID-19 

PFIZER VACCINE              Unknown      Completed                 [facility]

 

                                                    SARS-COV-2 COVID-19 

PFIZER VACCINE              Unknown      Completed                 [facility]

 

                                                    SARS-COV-2 COVID-19 

PFIZER VACCINE              Unknown      Completed                 [facility]

 

                    Influenza High Dose           Unknown   Completed           

[facility]

 

                                                    Pneumococcal 

Polysaccharide, 

PPSV23 (PNEUMOVAX)              Unknown      Completed                 [facility]

 

                                                    SARS-COV-2 COVID-19 

PFIZER VACCINE              Unknown      Completed                 [facility]

 

                    PPD (TB)            Unknown   Completed           [facility]

 

                                                    Influenza Virus 

Vaccine Quad IM 3+ 

YRS                       Unknown      Completed                 [facility]

 

                                                    Influenza High Dose 

Quad                      Unknown      Completed                 [facility]

 

                                                    SARS-COV-2 COVID-19 

CHAPARRO-SUCROSE 

VACCINE 12 YRS+, 

BIVALENT 0.3ML, IM, 

(PFIZER GRAY TOP)              Unknown      Completed                 [facility]

 

                                                    Influenza Virus 

Vaccine,quad 

Im,preserve Free 

65+ (FLUAD)               Unknown      Completed                 [facility]

 

                    Influenza High Dose           Unknown   Completed           

[facility]

 

                                                    Pneumococcal 13 

Conjugate, PCV13 

(Prevnar 13)              Unknown      Completed                 [facility]

 

                                                    Influenza Virus 

Vaccine                   Unknown      Completed                 [facility]

 

                                                    SARS-COV-2 COVID-19 

PFIZER VACCINE              Unknown      Completed                 [facility]

 

                                                    SARS-COV-2 COVID-19 

PFIZER VACCINE              Unknown      Completed                 [facility]

 

                                                    SARS-COV-2 COVID-19 

PFIZER VACCINE              Unknown      Completed                 [facility]

 

                    Influenza High Dose           Unknown   Completed           

[facility]

 

                                                    Pneumococcal 

Polysaccharide, 

PPSV23 (PNEUMOVAX)              Unknown      Completed                 [facility]

 

                                                    SARS-COV-2 COVID-19 

PFIZER VACCINE              Unknown      Completed                 [facility]

 

                    PPD (TB)            Unknown   Completed           [facility]

 

                                                    Influenza Virus 

Vaccine Quad IM 3+ 

YRS                       Unknown      Completed                 [facility]

 

                                                    Influenza High Dose 

Quad                      Unknown      Completed                 [facility]

 

                                                    SARS-COV-2 COVID-19 

CHAPARRO-SUCROSE 

VACCINE 12 YRS+, 

BIVALENT 0.3ML, IM, 

(PFIZER GRAY TOP)              Unknown      Completed                 [facility]

 

                                                    Influenza Virus 

Vaccine,quad 

Im,preserve Free 

65+ (FLUAD)               Unknown      Completed                 [facility]

 

                    Influenza High Dose           Unknown   Completed           

[facility]

 

                                                    Pneumococcal 13 

Conjugate, PCV13 

(Prevnar 13)              Unknown      Completed                 [facility]

 

                                                    Influenza Virus 

Vaccine                   Unknown      Completed                 [facility]

 

                                                    SARS-COV-2 COVID-19 

PFIZER VACCINE              Unknown      Completed                 [facility]

 

                                                    SARS-COV-2 COVID-19 

PFIZER VACCINE              Unknown      Completed                 [facility]

 

                                                    SARS-COV-2 COVID-19 

PFIZER VACCINE              Unknown      Completed                 [facility]

 

                    Influenza High Dose           Unknown   Completed           

[facility]

 

                                                    Pneumococcal 

Polysaccharide, 

PPSV23 (PNEUMOVAX)              Unknown      Completed                 [facility]

 

                                                    SARS-COV-2 COVID-19 

PFIZER VACCINE              Unknown      Completed                 [facility]

 

                    PPD (TB)            Unknown   Completed           [facility]

 

                                                    Influenza Virus 

Vaccine Quad IM 3+ 

YRS                       Unknown      Completed                 [facility]

 

                                                    Influenza High Dose 

Quad                      Unknown      Completed                 [facility]

 

                                                    SARS-COV-2 COVID-19 

CHAPARRO-SUCROSE 

VACCINE 12 YRS+, 

BIVALENT 0.3ML, IM, 

(PFIZER GRAY TOP)              Unknown      Completed                 [facility]

 

                                                    Influenza Virus 

Vaccine,quad 

Im,preserve Free 

65+ (FLUAD)               Unknown      Completed                 [facility]

 

                    Influenza High Dose           Unknown   Completed           

[facility]

 

                                                    Pneumococcal 13 

Conjugate, PCV13 

(Prevnar 13)              Unknown      Completed                 [facility]

 

                                                    Influenza Virus 

Vaccine                   Unknown      Completed                 [facility]

 

                                                    SARS-COV-2 COVID-19 

PFIZER VACCINE              Unknown      Completed                 [facility]

 

                                                    SARS-COV-2 COVID-19 

PFIZER VACCINE              Unknown      Completed                 [facility]

 

                                                    SARS-COV-2 COVID-19 

PFIZER VACCINE              Unknown      Completed                 [facility]

 

                    Influenza High Dose           Unknown   Completed           

[facility]

 

                                                    Pneumococcal 

Polysaccharide, 

PPSV23 (PNEUMOVAX)              Unknown      Completed                 [facility]

 

                                                    SARS-COV-2 COVID-19 

PFIZER VACCINE              Unknown      Completed                 [facility]

 

                    PPD (TB)            Unknown   Completed           [facility]

 

                                                    Influenza Virus 

Vaccine Quad IM 3+ 

YRS                       Unknown      Completed                 [facility]

 

                                                    Influenza High Dose 

Quad                      Unknown      Completed                 [facility]

 

                                                    SARS-COV-2 COVID-19 

CHAPARRO-SUCROSE 

VACCINE 12 YRS+, 

BIVALENT 0.3ML, IM, 

(PFIZER GRAY TOP)              Unknown      Completed                 [facility]

 

                                                    Influenza Virus 

Vaccine,quad 

Im,preserve Free 

65+ (FLUAD)               Unknown      Completed                 [facility]

 

                    Influenza High Dose           Unknown   Completed           

[facility]

 

                                                    Pneumococcal 13 

Conjugate, PCV13 

(Prevnar 13)              Unknown      Completed                 [facility]

 

                                                    Influenza Virus 

Vaccine                   Unknown      Completed                 [facility]

 

                                                    SARS-COV-2 COVID-19 

PFIZER VACCINE              Unknown      Completed                 [facility]

 

                                                    SARS-COV-2 COVID-19 

PFIZER VACCINE              Unknown      Completed                 [facility]

 

                                                    SARS-COV-2 COVID-19 

PFIZER VACCINE              Unknown      Completed                 [facility]

 

                    Influenza High Dose           Unknown   Completed           

[facility]

 

                                                    Pneumococcal 

Polysaccharide, 

PPSV23 (PNEUMOVAX)              Unknown      Completed                 [facility]

 

                                                    SARS-COV-2 COVID-19 

PFIZER VACCINE              Unknown      Completed                 [facility]

 

                    PPD (TB)            Unknown   Completed           [facility]

 

                                                    Influenza Virus 

Vaccine Quad IM 3+ 

YRS                       Unknown      Completed                 [facility]

 

                                                    Influenza High Dose 

Quad                      Unknown      Completed                 [facility]

 

                                                    SARS-COV-2 COVID-19 

CHAPARRO-SUCROSE 

VACCINE 12 YRS+, 

BIVALENT 0.3ML, IM, 

(PFIZER GRAY TOP)              Unknown      Completed                 [facility]

 

                                                    Influenza Virus 

Vaccine,quad 

Im,preserve Free 

65+ (FLUAD)               Unknown      Completed                 [facility]

 

                    Influenza High Dose           Unknown   Completed           

[facility]

 

                                                    Pneumococcal 13 

Conjugate, PCV13 

(Prevnar 13)              Unknown      Completed                 [facility]

 

                                                    Influenza Virus 

Vaccine                   Unknown      Completed                 [facility]

 

                                                    SARS-COV-2 COVID-19 

PFIZER VACCINE              Unknown      Completed                 [facility]

 

                                                    SARS-COV-2 COVID-19 

PFIZER VACCINE              Unknown      Completed                 [facility]

 

                                                    SARS-COV-2 COVID-19 

PFIZER VACCINE              Unknown      Completed                 [facility]

 

                    Influenza High Dose           Unknown   Completed           

[facility]

 

                                                    Pneumococcal 

Polysaccharide, 

PPSV23 (PNEUMOVAX)              Unknown      Completed                 [facility]

 

                                                    SARS-COV-2 COVID-19 

PFIZER VACCINE              Unknown      Completed                 [facility]

 

                    PPD (TB)            Unknown   Completed           [facility]

 

                                                    Influenza Virus 

Vaccine Quad IM 3+ 

YRS                       Unknown      Completed                 [facility]

 

                                                    Influenza High Dose 

Quad                      Unknown      Completed                 [facility]

 

                                                    SARS-COV-2 COVID-19 

CHAPARRO-SUCROSE 

VACCINE 12 YRS+, 

BIVALENT 0.3ML, IM, 

(PFIZER GRAY TOP)              Unknown      Completed                 [facility]

 

                                                    Influenza Virus 

Vaccine,quad 

Im,preserve Free 

65+ (FLUAD)               Unknown      Completed                 [facility]

 

                    Influenza High Dose           Unknown   Completed           

[facility]

 

                                                    Pneumococcal 13 

Conjugate, PCV13 

(Prevnar 13)              Unknown      Completed                 [facility]

 

                                                    Influenza Virus 

Vaccine                   Unknown      Completed                 [facility]

 

                                                    SARS-COV-2 COVID-19 

PFIZER VACCINE              Unknown      Completed                 [facility]

 

                                                    SARS-COV-2 COVID-19 

PFIZER VACCINE              Unknown      Completed                 [facility]

 

                                                    SARS-COV-2 COVID-19 

PFIZER VACCINE              Unknown      Completed                 [facility]

 

                    Influenza High Dose           Unknown   Completed           

[facility]

 

                                                    Pneumococcal 

Polysaccharide, 

PPSV23 (PNEUMOVAX)              Unknown      Completed                 [facility]

 

                                                    SARS-COV-2 COVID-19 

PFIZER VACCINE              Unknown      Completed                 [facility]

 

                    PPD (TB)            Unknown   Completed           [facility]

 

                                                    Influenza Virus 

Vaccine Quad IM 3+ 

YRS                       Unknown      Completed                 [facility]

 

                                                    Influenza High Dose 

Quad                      Unknown      Completed                 [facility]

 

                                                    SARS-COV-2 COVID-19 

CHAPARRO-SUCROSE 

VACCINE 12 YRS+, 

BIVALENT 0.3ML, IM, 

(PFIZER GRAY TOP)              Unknown      Completed                 [facility]

 

                                                    Influenza Virus 

Vaccine,quad 

Im,preserve Free 

65+ (FLUAD)               Unknown      Completed                 [facility]

 

                    Influenza High Dose           Unknown   Completed           

[facility]

 

                                                    Pneumococcal 13 

Conjugate, PCV13 

(Prevnar 13)              Unknown      Completed                 [facility]

 

                                                    Influenza Virus 

Vaccine                   Unknown      Completed                 [facility]

 

                                                    SARS-COV-2 COVID-19 

PFIZER VACCINE              Unknown      Completed                 [facility]

 

                                                    SARS-COV-2 COVID-19 

PFIZER VACCINE              Unknown      Completed                 [facility]

 

                                                    SARS-COV-2 COVID-19 

PFIZER VACCINE              Unknown      Completed                 [facility]

 

                    Influenza High Dose           Unknown   Completed           

[facility]

 

                                                    Pneumococcal 

Polysaccharide, 

PPSV23 (PNEUMOVAX)              Unknown      Completed                 [facility]

 

                                                    SARS-COV-2 COVID-19 

PFIZER VACCINE              Unknown      Completed                 [facility]

 

                    PPD (TB)            Unknown   Completed           [facility]

 

                                                    Influenza Virus 

Vaccine Quad IM 3+ 

YRS                       Unknown      Completed                 [facility]

 

                                                    Influenza High Dose 

Quad                      Unknown      Completed                 [facility]

 

                                                    SARS-COV-2 COVID-19 

CHAPARRO-SUCROSE 

VACCINE 12 YRS+, 

BIVALENT 0.3ML, IM, 

(PFIZER GRAY TOP)              Unknown      Completed                 [facility]

 

                                                    Influenza Virus 

Vaccine,quad 

Im,preserve Free 

65+ (FLUAD)               Unknown      Completed                 [facility]

 

                    Influenza High Dose           Unknown   Completed           

[facility]

 

                                                    Pneumococcal 13 

Conjugate, PCV13 

(Prevnar 13)              Unknown      Completed                 [facility]

 

                                                    Influenza Virus 

Vaccine                   Unknown      Completed                 [facility]

 

                                                    SARS-COV-2 COVID-19 

PFIZER VACCINE              Unknown      Completed                 [facility]

 

                                                    SARS-COV-2 COVID-19 

PFIZER VACCINE              Unknown      Completed                 [facility]

 

                                                    SARS-COV-2 COVID-19 

PFIZER VACCINE              Unknown      Completed                 [facility]

 

                    Influenza High Dose           Unknown   Completed           

[facility]

 

                                                    Pneumococcal 

Polysaccharide, 

PPSV23 (PNEUMOVAX)              Unknown      Completed                 [facility]

 

                                                    SARS-COV-2 COVID-19 

PFIZER VACCINE              Unknown      Completed                 [facility]

 

                    PPD (TB)            Unknown   Completed           [facility]

 

                                                    Influenza Virus 

Vaccine Quad IM 3+ 

YRS                       Unknown      Completed                 [facility]

 

                                                    Influenza High Dose 

Quad                      Unknown      Completed                 [facility]

 

                                                    SARS-COV-2 COVID-19 

CHAPARRO-SUCROSE 

VACCINE 12 YRS+, 

BIVALENT 0.3ML, IM, 

(PFIZER GRAY TOP)              Unknown      Completed                 [facility]

 

                                                    Influenza Virus 

Vaccine,quad 

Im,preserve Free 

65+ (FLUAD)               Unknown      Completed                 [facility]

 

                    Influenza High Dose           Unknown   Completed           

[facility]

 

                                                    Pneumococcal 13 

Conjugate, PCV13 

(Prevnar 13)              Unknown      Completed                 [facility]

 

                                                    Influenza Virus 

Vaccine                   Unknown      Completed                 [facility]

 

                                                    SARS-COV-2 COVID-19 

PFIZER VACCINE              Unknown      Completed                 [facility]

 

                                                    SARS-COV-2 COVID-19 

PFIZER VACCINE              Unknown      Completed                 [facility]

 

                                                    SARS-COV-2 COVID-19 

PFIZER VACCINE              Unknown      Completed                 [facility]

 

                    Influenza High Dose           Unknown   Completed           

[facility]

 

                                                    Pneumococcal 

Polysaccharide, 

PPSV23 (PNEUMOVAX)              Unknown      Completed                 [facility] [facility]

 

                                                    SARS-COV-2 COVID-19 

PFIZER VACCINE              Unknown      Completed                 [facility]

 

                    PPD (TB)            Unknown   Completed           [facility]

 

                                                    Influenza Virus 

Vaccine Quad IM 3+ 

YRS                       Unknown      Completed                 [facility]

 

                                                    Influenza High Dose 

Quad                      Unknown      Completed                 [facility]

 

                                                    SARS-COV-2 COVID-19 

CHAPARRO-SUCROSE 

VACCINE 12 YRS+, 

BIVALENT 0.3ML, IM, 

(PFIZER GRAY TOP)              Unknown      Completed                 [facility]

 

                    Influenza High Dose           Unknown   Completed           

[facility]

 

                                                    Pneumococcal 13 

Conjugate, PCV13 

(Prevnar 13)              Unknown      Completed                 [facility]

 

                                                    Influenza Virus 

Vaccine                   Unknown      Completed                 [facility]

 

                                                    SARS-COV-2 COVID-19 

PFIZER VACCINE              Unknown      Completed                 [facility]

 

                                                    SARS-COV-2 COVID-19 

PFIZER VACCINE              Unknown      Completed                 [facility]

 

                                                    SARS-COV-2 COVID-19 

PFIZER VACCINE              Unknown      Completed                 [facility]

 

                    Influenza High Dose           Unknown   Completed           

[facility]

 

                                                    Pneumococcal 

Polysaccharide, 

PPSV23 (PNEUMOVAX)              Unknown      Completed                 [facility]

 

                                                    SARS-COV-2 COVID-19 

PFIZER VACCINE              Unknown      Completed                 [facility]

 

                    PPD (TB)            Unknown   Completed           [facility]

 

                                                    Influenza Virus 

Vaccine Quad IM 3+ 

YRS                       Unknown      Completed                 [facility]

 

                                                    Influenza High Dose 

Quad                      Unknown      Completed                 [facility]

 

                                                    SARS-COV-2 COVID-19 

CHAPARRO-SUCROSE 

VACCINE 12 YRS+, 

BIVALENT 0.3ML, IM, 

(PFIZER GRAY TOP)              Unknown      Completed                 [facility]

 

                    Influenza High Dose           Unknown   Completed           

[facility]

 

                                                    Pneumococcal 13 

Conjugate, PCV13 

(Prevnar 13)              Unknown      Completed                 [facility]

 

                                                    Influenza Virus 

Vaccine                   Unknown      Completed                 [facility]

 

                                                    SARS-COV-2 COVID-19 

PFIZER VACCINE              Unknown      Completed                 [facility]

 

                                                    SARS-COV-2 COVID-19 

PFIZER VACCINE              Unknown      Completed                 [facility]

 

                                                    SARS-COV-2 COVID-19 

PFIZER VACCINE              Unknown      Completed                 [facility]

 

                    Influenza High Dose           Unknown   Completed           

[facility]

 

                                                    Pneumococcal 

Polysaccharide, 

PPSV23 (PNEUMOVAX)              Unknown      Completed                 [facility]

 

                                                    SARS-COV-2 COVID-19 

PFIZER VACCINE              Unknown      Completed                 [facility]

 

                    PPD (TB)            Unknown   Completed           [facility]

 

                                                    Influenza Virus 

Vaccine Quad IM 3+ 

YRS                       Unknown      Completed                 [facility]

 

                                                    Influenza High Dose 

Quad                      Unknown      Completed                 [facility]

 

                                                    SARS-COV-2 COVID-19 

CHAPARRO-SUCROSE 

VACCINE 12 YRS+, 

BIVALENT 0.3ML, IM, 

(PFIZER GRAY TOP)              Unknown      Completed                 [facility]

 

                                                    Influenza Virus 

Vaccine,quad 

Im,preserve Free 

65+ (FLUAD)               Unknown      Completed                 [facility]

 

                    Influenza High Dose           Unknown   Completed           

[facility]

 

                                                    Pneumococcal 13 

Conjugate, PCV13 

(Prevnar 13)              Unknown      Completed                 [facility]

 

                                                    Influenza Virus 

Vaccine                   Unknown      Completed                 [facility]

 

                                                    SARS-COV-2 COVID-19 

PFIZER VACCINE              Unknown      Completed                 [facility]

 

                                                    SARS-COV-2 COVID-19 

PFIZER VACCINE              Unknown      Completed                 [facility]

 

                                                    SARS-COV-2 COVID-19 

PFIZER VACCINE              Unknown      Completed                 [facility]

 

                    Influenza High Dose           Unknown   Completed           

[facility]

 

                                                    Pneumococcal 

Polysaccharide, 

PPSV23 (PNEUMOVAX)              Unknown      Completed                 [facility]

 

                                                    SARS-COV-2 COVID-19 

PFIZER VACCINE              Unknown      Completed                 [facility]

 

                    PPD (TB)            Unknown   Completed           [facility]

 

                                                    Influenza Virus 

Vaccine Quad IM 3+ 

YRS                       Unknown      Completed                 [facility]

 

                                                    Influenza High Dose 

Quad                      Unknown      Completed                 [facility]

 

                                                    SARS-COV-2 COVID-19 

CHAPARRO-SUCROSE 

VACCINE 12 YRS+, 

BIVALENT 0.3ML, IM, 

(PFIZER GRAY TOP)              Unknown      Completed                 [facility]

 

                                                    Influenza Virus 

Vaccine,quad 

Im,preserve Free 

65+ (FLUAD)               Unknown      Completed                 [facility]

 

                    Influenza High Dose           Unknown   Completed           

[facility]

 

                                                    Pneumococcal 13 

Conjugate, PCV13 

(Prevnar 13)              Unknown      Completed                 [facility]

 

                                                    Influenza Virus 

Vaccine                   Unknown      Completed                 [facility]

 

                                                    SARS-COV-2 COVID-19 

PFIZER VACCINE              Unknown      Completed                 [facility]

 

                                                    SARS-COV-2 COVID-19 

PFIZER VACCINE              Unknown      Completed                 [facility]

 

                                                    SARS-COV-2 COVID-19 

PFIZER VACCINE              Unknown      Completed                 [facility]

 

                    Influenza High Dose           Unknown   Completed           

[facility]

 

                                                    Pneumococcal 

Polysaccharide, 

PPSV23 (PNEUMOVAX)              Unknown      Completed                 [facility]

 

                                                    SARS-COV-2 COVID-19 

PFIZER VACCINE              Unknown      Completed                 [facility]

 

                    PPD (TB)            Unknown   Completed           [facility]

 

                                                    Influenza Virus 

Vaccine Quad IM 3+ 

YRS                       Unknown      Completed                 [facility]

 

                                                    Influenza High Dose 

Quad                      Unknown      Completed                 [facility]

 

                                                    SARS-COV-2 COVID-19 

CHAPARRO-SUCROSE 

VACCINE 12 YRS+, 

BIVALENT 0.3ML, IM, 

(PFIZER GRAY TOP)              Unknown      Completed                 [facility]

 

                                                    Influenza Virus 

Vaccine,quad 

Im,preserve Free 

65+ (FLUAD)               Unknown      Completed                 [facility]

 

                    Influenza High Dose           Unknown   Completed           

[facility]

 

                                                    Pneumococcal 13 

Conjugate, PCV13 

(Prevnar 13)              Unknown      Completed                 [facility]

 

                                                    Influenza Virus 

Vaccine                   Unknown      Completed                 [facility]

 

                                                    SARS-COV-2 COVID-19 

PFIZER VACCINE              Unknown      Completed                 [facility]

 

                                                    SARS-COV-2 COVID-19 

PFIZER VACCINE              Unknown      Completed                 [facility]

 

                                                    SARS-COV-2 COVID-19 

PFIZER VACCINE              Unknown      Completed                 [facility]

 

                    Influenza High Dose           Unknown   Completed           

[facility]

 

                                                    Pneumococcal 

Polysaccharide, 

PPSV23 (PNEUMOVAX)              Unknown      Completed                 [facility]

 

                                                    SARS-COV-2 COVID-19 

PFIZER VACCINE              Unknown      Completed                 [facility]

 

                    PPD (TB)            Unknown   Completed           [facility]

 

                                                    Influenza Virus 

Vaccine Quad IM 3+ 

YRS                       Unknown      Completed                 [facility]

 

                                                    Influenza High Dose 

Quad                      Unknown      Completed                 [facility]

 

                                                    SARS-COV-2 COVID-19 

CHAPARRO-SUCROSE 

VACCINE 12 YRS+, 

BIVALENT 0.3ML, IM, 

(PFIZER GRAY TOP)              Unknown      Completed                 [facility]

 

                                                    Influenza Virus 

Vaccine,quad 

Im,preserve Free 

65+ (FLUAD)               Unknown      Completed                 [facility]

 

                    Influenza High Dose           Unknown   Completed           

[facility]

 

                                                    Pneumococcal 13 

Conjugate, PCV13 

(Prevnar 13)              Unknown      Completed                 [facility]

 

                                                    Influenza Virus 

Vaccine                   Unknown      Completed                 [facility]

 

                                                    SARS-COV-2 COVID-19 

PFIZER VACCINE              Unknown      Completed                 [facility]

 

                                                    SARS-COV-2 COVID-19 

PFIZER VACCINE              Unknown      Completed                 [facility]

 

                                                    SARS-COV-2 COVID-19 

PFIZER VACCINE              Unknown      Completed                 [facility]

 

                    Influenza High Dose           Unknown   Completed           

[facility]

 

                                                    Pneumococcal 

Polysaccharide, 

PPSV23 (PNEUMOVAX)              Unknown      Completed                 [facility]

 

                                                    SARS-COV-2 COVID-19 

PFIZER VACCINE              Unknown      Completed                 [facility]

 

                    PPD (TB)            Unknown   Completed           [facility]

 

                                                    Influenza Virus 

Vaccine Quad IM 3+ 

YRS                       Unknown      Completed                 [facility]

 

                                                    Influenza High Dose 

Quad                      Unknown      Completed                 [facility]

 

                                                    SARS-COV-2 COVID-19 

CHAPARRO-SUCROSE 

VACCINE 12 YRS+, 

BIVALENT 0.3ML, IM, 

(PFIZER GRAY TOP)              Unknown      Completed                 [facility]

 

                                                    Influenza Virus 

Vaccine,quad 

Im,preserve Free 

65+ (FLUAD)               Unknown      Completed                 [facility]

 

                    Influenza High Dose           Unknown   Completed           

[facility]

 

                                                    Pneumococcal 13 

Conjugate, PCV13 

(Prevnar 13)              Unknown      Completed                 [facility]

 

                                                    Influenza Virus 

Vaccine                   Unknown      Completed                 [facility]

 

                                                    SARS-COV-2 COVID-19 

PFIZER VACCINE              Unknown      Completed                 [facility]

 

                                                    SARS-COV-2 COVID-19 

PFIZER VACCINE              Unknown      Completed                 [facility]

 

                                                    SARS-COV-2 COVID-19 

PFIZER VACCINE              Unknown      Completed                 [facility]

 

                    Influenza High Dose           Unknown   Completed           

[facility]

 

                                                    Pneumococcal 

Polysaccharide, 

PPSV23 (PNEUMOVAX)              Unknown      Completed                 [facility]

 

                                                    SARS-COV-2 COVID-19 

PFIZER VACCINE              Unknown      Completed                 [facility]

 

                    PPD (TB)            Unknown   Completed           [facility]

 

                                                    Influenza Virus 

Vaccine Quad IM 3+ 

YRS                       Unknown      Completed                 [facility]

 

                                                    Influenza High Dose 

Quad                      Unknown      Completed                 [facility]

 

                                                    SARS-COV-2 COVID-19 

CHAPARRO-SUCROSE 

VACCINE 12 YRS+, 

BIVALENT 0.3ML, IM, 

(PFIZER GRAY TOP)              Unknown      Completed                 [facility]

 

                                                    Influenza Virus 

Vaccine,quad 

Im,preserve Free 

65+ (FLUAD)               Unknown      Completed                 [facility]

 

                    Influenza High Dose           Unknown   Completed           

[facility]

 

                                                    Pneumococcal 13 

Conjugate, PCV13 

(Prevnar 13)              Unknown      Completed                 [facility]

 

                                                    Influenza Virus 

Vaccine                   Unknown      Completed                 [facility]

 

                                                    SARS-COV-2 COVID-19 

PFIZER VACCINE              Unknown      Completed                 [facility]

 

                                                    SARS-COV-2 COVID-19 

PFIZER VACCINE              Unknown      Completed                 [facility]

 

                                                    SARS-COV-2 COVID-19 

PFIZER VACCINE              Unknown      Completed                 [facility]

 

                    Influenza High Dose           Unknown   Completed           

[facility]

 

                                                    Pneumococcal 

Polysaccharide, 

PPSV23 (PNEUMOVAX)              Unknown      Completed                 [facility]

 

                                                    SARS-COV-2 COVID-19 

PFIZER VACCINE              Unknown      Completed                 [facility]

 

                    PPD (TB)            Unknown   Completed           [facility]

 

                                                    Influenza Virus 

Vaccine Quad IM 3+ 

YRS                       Unknown      Completed                 [facility]

 

                                                    Influenza High Dose 

Quad                      Unknown      Completed                 [facility]

 

                                                    SARS-COV-2 COVID-19 

CHAPARRO-SUCROSE 

VACCINE 12 YRS+, 

BIVALENT 0.3ML, IM, 

(PFIZER GRAY TOP)              Unknown      Completed                 [facility]

 

                                                    Influenza Virus 

Vaccine,quad 

Im,preserve Free 

65+ (FLUAD)               Unknown      Completed                 [facility]

 

                    Influenza High Dose           Unknown   Completed           

[facility]

 

                                                    Pneumococcal 13 

Conjugate, PCV13 

(Prevnar 13)              Unknown      Completed                 [facility]

 

                                                    Influenza Virus 

Vaccine                   Unknown      Completed                 [facility]

 

                                                    SARS-COV-2 COVID-19 

PFIZER VACCINE              Unknown      Completed                 [facility]

 

                                                    SARS-COV-2 COVID-19 

PFIZER VACCINE              Unknown      Completed                 [facility]

 

                                                    SARS-COV-2 COVID-19 

PFIZER VACCINE              Unknown      Completed                 [facility]

 

                    Influenza High Dose           Unknown   Completed           

[facility]

 

                                                    Pneumococcal 

Polysaccharide, 

PPSV23 (PNEUMOVAX)              Unknown      Completed                 [facility]

 

                                                    SARS-COV-2 COVID-19 

PFIZER VACCINE              Unknown      Completed                 [facility]

 

                    PPD (TB)            Unknown   Completed           [facility]

 

                                                    Influenza Virus 

Vaccine Quad IM 3+ 

YRS                       Unknown      Completed                 [facility]

 

                                                    Influenza High Dose 

Quad                      Unknown      Completed                 [facility]

 

                                                    SARS-COV-2 COVID-19 

CHAPARRO-SUCROSE 

VACCINE 12 YRS+, 

BIVALENT 0.3ML, IM, 

(PFIZER GRAY TOP)              Unknown      Completed                 [facility]

 

                                                    Influenza Virus 

Vaccine,quad 

Im,preserve Free 

65+ (FLUAD)               Unknown      Completed                 [facility]

 

                    Influenza High Dose           Unknown   Completed           

[facility]

 

                                                    Pneumococcal 13 

Conjugate, PCV13 

(Prevnar 13)              Unknown      Completed                 [facility]

 

                                                    Influenza Virus 

Vaccine                   Unknown      Completed                 [facility]

 

                                                    SARS-COV-2 COVID-19 

PFIZER VACCINE              Unknown      Completed                 [facility]

 

                                                    SARS-COV-2 COVID-19 

PFIZER VACCINE              Unknown      Completed                 [facility]

 

                                                    SARS-COV-2 COVID-19 

PFIZER VACCINE              Unknown      Completed                 [facility]

 

                    Influenza High Dose           Unknown   Completed           

[facility]

 

                                                    Pneumococcal 

Polysaccharide, 

PPSV23 (PNEUMOVAX)              Unknown      Completed                 [facility]

 

                                                    SARS-COV-2 COVID-19 

PFIZER VACCINE              Unknown      Completed                 [facility]

 

                    PPD (TB)            Unknown   Completed           [facility]

 

                                                    Influenza Virus 

Vaccine Quad IM 3+ 

YRS                       Unknown      Completed                 [facility]

 

                                                    Influenza High Dose 

Quad                      Unknown      Completed                 [facility]

 

                                                    SARS-COV-2 COVID-19 

CHAPARRO-SUCROSE 

VACCINE 12 YRS+, 

BIVALENT 0.3ML, IM, 

(PFIZER GRAY TOP)              Unknown      Completed                 [facility]

 

                                                    Influenza Virus 

Vaccine,quad 

Im,preserve Free 

65+ (FLUAD)               Unknown      Completed                 [facility]

 

                    Influenza High Dose           Unknown   Completed           

[facility]

 

                                                    Pneumococcal 13 

Conjugate, PCV13 

(Prevnar 13)              Unknown      Completed                 [facility]

 

                                                    Influenza Virus 

Vaccine                   Unknown      Completed                 [facility]

 

                                                    SARS-COV-2 COVID-19 

PFIZER VACCINE              Unknown      Completed                 [facility]

 

                                                    SARS-COV-2 COVID-19 

PFIZER VACCINE              Unknown      Completed                 [facility]

 

                                                    SARS-COV-2 COVID-19 

PFIZER VACCINE              Unknown      Completed                 [facility]

 

                    Influenza High Dose           Unknown   Completed           

[facility]

 

                                                    Pneumococcal 

Polysaccharide, 

PPSV23 (PNEUMOVAX)              Unknown      Completed                 [facility]

 

                                                    SARS-COV-2 COVID-19 

PFIZER VACCINE              Unknown      Completed                 [facility]

 

                    PPD (TB)            Unknown   Completed           [facility]

 

                                                    Influenza Virus 

Vaccine Quad IM 3+ 

YRS                       Unknown      Completed                 [facility]

 

                                                    Influenza High Dose 

Quad                      Unknown      Completed                 [facility]

 

                                                    SARS-COV-2 COVID-19 

CHAPARRO-SUCROSE 

VACCINE 12 YRS+, 

BIVALENT 0.3ML, IM, 

(PFIZER GRAY TOP)              Unknown      Completed                 [facility]

 

                                                    Influenza Virus 

Vaccine,quad 

Im,preserve Free 

65+ (FLUAD)               Unknown      Completed                 [facility]

 

                    Influenza High Dose           Unknown   Completed           

[facility]

 

                                                    Pneumococcal 13 

Conjugate, PCV13 

(Prevnar 13)              Unknown      Completed                 [facility]

 

                                                    Influenza Virus 

Vaccine                   Unknown      Completed                 [facility]

 

                                                    SARS-COV-2 COVID-19 

PFIZER VACCINE              Unknown      Completed                 [facility]

 

                                                    SARS-COV-2 COVID-19 

PFIZER VACCINE              Unknown      Completed                 [facility]

 

                                                    SARS-COV-2 COVID-19 

PFIZER VACCINE              Unknown      Completed                 [facility]

 

                    Influenza High Dose           Unknown   Completed           

[facility]

 

                                                    Pneumococcal 

Polysaccharide, 

PPSV23 (PNEUMOVAX)              Unknown      Completed                 [facility]

 

                                                    SARS-COV-2 COVID-19 

PFIZER VACCINE              Unknown      Completed                 [facility]

 

                    PPD (TB)            Unknown   Completed           [facility]

 

                                                    Influenza Virus 

Vaccine Quad IM 3+ 

YRS                       Unknown      Completed                 [facility]

 

                                                    Influenza High Dose 

Quad                      Unknown      Completed                 [facility]

 

                                                    SARS-COV-2 COVID-19 

CHAPARRO-SUCROSE 

VACCINE 12 YRS+, 

BIVALENT 0.3ML, IM, 

(PFIZER GRAY TOP)              Unknown      Completed                 [facility]

 

                                                    Influenza Virus 

Vaccine,quad 

Im,preserve Free 

65+ (FLUAD)               Unknown      Completed                 [facility]

 

                    Influenza High Dose           Unknown   Completed           

[facility]

 

                                                    Pneumococcal 13 

Conjugate, PCV13 

(Prevnar 13)              Unknown      Completed                 [facility]

 

                                                    Influenza Virus 

Vaccine                   Unknown      Completed                 [facility]

 

                                                    SARS-COV-2 COVID-19 

PFIZER VACCINE              Unknown      Completed                 [facility]

 

                                                    SARS-COV-2 COVID-19 

PFIZER VACCINE              Unknown      Completed                 [facility]

 

                                                    SARS-COV-2 COVID-19 

PFIZER VACCINE              Unknown      Completed                 [facility]

 

                    Influenza High Dose           Unknown   Completed           

[facility]

 

                                                    Pneumococcal 

Polysaccharide, 

PPSV23 (PNEUMOVAX)              Unknown      Completed                 [facility]

 

                                                    SARS-COV-2 COVID-19 

PFIZER VACCINE              Unknown      Completed                 [facility]

 

                    PPD (TB)            Unknown   Completed           [facility]

 

                                                    Influenza Virus 

Vaccine Quad IM 3+ 

YRS                       Unknown      Completed                 [facility]

 

                                                    Influenza High Dose 

Quad                      Unknown      Completed                 [facility]

 

                                                    SARS-COV-2 COVID-19 

CHAPARRO-SUCROSE 

VACCINE 12 YRS+, 

BIVALENT 0.3ML, IM, 

(PFIZER GRAY TOP)              Unknown      Completed                 [facility]

 

                                                    Influenza Virus 

Vaccine,quad 

Im,preserve Free 

65+ (FLUAD)               Unknown      Completed                 [facility]

 

                    Influenza High Dose           Unknown   Completed           

[facility]

 

                                                    Pneumococcal 13 

Conjugate, PCV13 

(Prevnar 13)              Unknown      Completed                 [facility]

 

                                                    Influenza Virus 

Vaccine                   Unknown      Completed                 [facility]

 

                                                    SARS-COV-2 COVID-19 

PFIZER VACCINE              Unknown      Completed                 [facility]

 

                                                    SARS-COV-2 COVID-19 

PFIZER VACCINE              Unknown      Completed                 [facility]

 

                                                    SARS-COV-2 COVID-19 

PFIZER VACCINE              Unknown      Completed                 [facility]

 

                    Influenza High Dose           Unknown   Completed           

[facility]

 

                                                    Pneumococcal 

Polysaccharide, 

PPSV23 (PNEUMOVAX)              Unknown      Completed                 [facility]

 

                                                    SARS-COV-2 COVID-19 

PFIZER VACCINE              Unknown      Completed                 [facility]

 

                    PPD (TB)            Unknown   Completed           [facility]

 

                                                    Influenza Virus 

Vaccine Quad IM 3+ 

YRS                       Unknown      Completed                 [facility]

 

                                                    Influenza High Dose 

Quad                      Unknown      Completed                 [facility]

 

                                                    SARS-COV-2 COVID-19 

CHAPARRO-SUCROSE 

VACCINE 12 YRS+, 

BIVALENT 0.3ML, IM, 

(PFIZER GRAY TOP)              Unknown      Completed                 [facility]

 

                                                    Influenza Virus 

Vaccine,quad 

Im,preserve Free 

65+ (FLUAD)               Unknown      Completed                 [facility]

 

                    Influenza High Dose           Unknown   Completed           

[facility]

 

                                                    Pneumococcal 13 

Conjugate, PCV13 

(Prevnar 13)              Unknown      Completed                 [facility]

 

                                                    Influenza Virus 

Vaccine                   Unknown      Completed                 [facility]

 

                                                    SARS-COV-2 COVID-19 

PFIZER VACCINE              Unknown      Completed                 [facility]

 

                                                    SARS-COV-2 COVID-19 

PFIZER VACCINE              Unknown      Completed                 [facility]

 

                                                    SARS-COV-2 COVID-19 

PFIZER VACCINE              Unknown      Completed                 [facility]

 

                    Influenza High Dose           Unknown   Completed           

[facility]

 

                                                    Pneumococcal 

Polysaccharide, 

PPSV23 (PNEUMOVAX)              Unknown      Completed                 [facility]

 

                                                    SARS-COV-2 COVID-19 

PFIZER VACCINE              Unknown      Completed                 [facility]

 

                    PPD (TB)            Unknown   Completed           [facility]

 

                                                    Influenza Virus 

Vaccine Quad IM 3+ 

YRS                       Unknown      Completed                 [facility]

 

                                                    Influenza High Dose 

Quad                      Unknown      Completed                 [facility]

 

                                                    SARS-COV-2 COVID-19 

CHAPARRO-SUCROSE 

VACCINE 12 YRS+, 

BIVALENT 0.3ML, IM, 

(PFIZER GRAY TOP)              Unknown      Completed                 [facility]

 

                                                    Influenza Virus 

Vaccine,quad 

Im,preserve Free 

65+ (FLUAD)               Unknown      Completed                 [facility]

 

                    Influenza High Dose           Unknown   Completed           

[facility]

 

                                                    Pneumococcal 13 

Conjugate, PCV13 

(Prevnar 13)              Unknown      Completed                 [facility]

 

                                                    Influenza Virus 

Vaccine                   Unknown      Completed                 [facility]

 

                                                    SARS-COV-2 COVID-19 

PFIZER VACCINE              Unknown      Completed                 [facility]

 

                                                    SARS-COV-2 COVID-19 

PFIZER VACCINE              Unknown      Completed                 [facility]

 

                                                    SARS-COV-2 COVID-19 

PFIZER VACCINE              Unknown      Completed                 [facility]

 

                    Influenza High Dose           Unknown   Completed           

[facility]

 

                                                    Pneumococcal 

Polysaccharide, 

PPSV23 (PNEUMOVAX)              Unknown      Completed                 [facility]

 

                                                    SARS-COV-2 COVID-19 

PFIZER VACCINE              Unknown      Completed                 [facility]

 

                    PPD (TB)            Unknown   Completed           [facility]

 

                                                    Influenza Virus 

Vaccine Quad IM 3+ 

YRS                       Unknown      Completed                 [facility]

 

                                                    Influenza High Dose 

Quad                      Unknown      Completed                 [facility]

 

                                                    SARS-COV-2 COVID-19 

CHAPARRO-SUCROSE 

VACCINE 12 YRS+, 

BIVALENT 0.3ML, IM, 

(PFIZER GRAY TOP)              Unknown      Completed                 [facility]

 

                                                    Influenza Virus 

Vaccine,quad 

Im,preserve Free 

65+ (FLUAD)               Unknown      Completed                 [facility]

 

                    Influenza High Dose           Unknown   Completed           

[facility]

 

                                                    Pneumococcal 13 

Conjugate, PCV13 

(Prevnar 13)              Unknown      Completed                 [facility]

 

                                                    Influenza Virus 

Vaccine                   Unknown      Completed                 [facility]

 

                                                    SARS-COV-2 COVID-19 

PFIZER VACCINE              Unknown      Completed                 [facility]

 

                                                    SARS-COV-2 COVID-19 

PFIZER VACCINE              Unknown      Completed                 [facility]

 

                                                    SARS-COV-2 COVID-19 

PFIZER VACCINE              Unknown      Completed                 [facility]

 

                    Influenza High Dose           Unknown   Completed           

[facility]

 

                                                    Pneumococcal 

Polysaccharide, 

PPSV23 (PNEUMOVAX)              Unknown      Completed                 [facility]

 

                                                    SARS-COV-2 COVID-19 

PFIZER VACCINE              Unknown      Completed                 [facility]

 

                    PPD (TB)            Unknown   Completed           [facility]

 

                                                    Influenza Virus 

Vaccine Quad IM 3+ 

YRS                       Unknown      Completed                 [facility]

 

                                                    Influenza High Dose 

Quad                      Unknown      Completed                 [facility]

 

                                                    SARS-COV-2 COVID-19 

CHAPARRO-SUCROSE 

VACCINE 12 YRS+, 

BIVALENT 0.3ML, IM, 

(PFIZER GRAY TOP)              Unknown      Completed                 [facility]

 

                                                    Influenza Virus 

Vaccine,quad 

Im,preserve Free 

65+ (FLUAD)               Unknown      Completed                 [facility]

 

                    Influenza High Dose           Unknown   Completed           

[facility]

 

                                                    Pneumococcal 13 

Conjugate, PCV13 

(Prevnar 13)              Unknown      Completed                 [facility]

 

                                                    Influenza Virus 

Vaccine                   Unknown      Completed                 [facility]

 

                                                    SARS-COV-2 COVID-19 

PFIZER VACCINE              Unknown      Completed                 [facility]

 

                                                    SARS-COV-2 COVID-19 

PFIZER VACCINE              Unknown      Completed                 [facility]

 

                                                    SARS-COV-2 COVID-19 

PFIZER VACCINE              Unknown      Completed                 [facility]

 

                    Influenza High Dose           Unknown   Completed           

[facility]

 

                                                    Pneumococcal 

Polysaccharide, 

PPSV23 (PNEUMOVAX)              Unknown      Completed                 [facility]

 

                                                    SARS-COV-2 COVID-19 

PFIZER VACCINE              Unknown      Completed                 [facility]

 

                    PPD (TB)            Unknown   Completed           [facility]

 

                                                    Influenza Virus 

Vaccine Quad IM 3+ 

YRS                       Unknown      Completed                 [facility]

 

                                                    Influenza High Dose 

Quad                      Unknown      Completed                 [facility]

 

                                                    SARS-COV-2 COVID-19 

CHAPARRO-SUCROSE 

VACCINE 12 YRS+, 

BIVALENT 0.3ML, IM, 

(PFIZER GRAY TOP)              Unknown      Completed                 [facility]

 

                                                    Influenza Virus 

Vaccine,quad 

Im,preserve Free 

65+ (FLUAD)               Unknown      Completed                 [facility]

 

                    Influenza High Dose           Unknown   Completed           

[facility]

 

                                                    Pneumococcal 13 

Conjugate, PCV13 

(Prevnar 13)              Unknown      Completed                 [facility]

 

                                                    Influenza Virus 

Vaccine                   Unknown      Completed                 [facility]

 

                                                    SARS-COV-2 COVID-19 

PFIZER VACCINE              Unknown      Completed                 [facility]

 

                                                    SARS-COV-2 COVID-19 

PFIZER VACCINE              Unknown      Completed                 [facility]

 

                                                    SARS-COV-2 COVID-19 

PFIZER VACCINE              Unknown      Completed                 [facility]

 

                    Influenza High Dose           Unknown   Completed           

[facility]

 

                                                    Pneumococcal 

Polysaccharide, 

PPSV23 (PNEUMOVAX)              Unknown      Completed                 [facility]

 

                                                    SARS-COV-2 COVID-19 

PFIZER VACCINE              Unknown      Completed                 [facility]

 

                    PPD (TB)            Unknown   Completed           [facility]

 

                                                    Influenza Virus 

Vaccine Quad IM 3+ 

YRS                       Unknown      Completed                 [facility]

 

                                                    Influenza High Dose 

Quad                      Unknown      Completed                 [facility]

 

                                                    SARS-COV-2 COVID-19 

CHAPARRO-SUCROSE 

VACCINE 12 YRS+, 

BIVALENT 0.3ML, IM, 

(PFIZER GRAY TOP)              Unknown      Completed                 [facility]

 

                                                    Influenza Virus 

Vaccine,quad 

Im,preserve Free 

65+ (FLUAD)               Unknown      Completed                 [facility]

 

                    Influenza High Dose           Unknown   Completed           

[facility]

 

                                                    Pneumococcal 13 

Conjugate, PCV13 

(Prevnar 13)              Unknown      Completed                 [facility]

 

                                                    Influenza Virus 

Vaccine                   Unknown      Completed                 [facility]

 

                                                    SARS-COV-2 COVID-19 

PFIZER VACCINE              Unknown      Completed                 [facility]

 

                                                    SARS-COV-2 COVID-19 

PFIZER VACCINE              Unknown      Completed                 [facility]

 

                                                    SARS-COV-2 COVID-19 

PFIZER VACCINE              Unknown      Completed                 [facility]

 

                    Influenza High Dose           Unknown   Completed           

[facility]

 

                                                    Pneumococcal 

Polysaccharide, 

PPSV23 (PNEUMOVAX)              Unknown      Completed                 [facility]

 

                                                    SARS-COV-2 COVID-19 

PFIZER VACCINE              Unknown      Completed                 [facility]

 

                    PPD (TB)            Unknown   Completed           [facility]

 

                                                    Influenza Virus 

Vaccine Quad IM 3+ 

YRS                       Unknown      Completed                 [facility]

 

                                                    Influenza High Dose 

Quad                      Unknown      Completed                 [facility]

 

                                                    SARS-COV-2 COVID-19 

CHAPARRO-SUCROSE 

VACCINE 12 YRS+, 

BIVALENT 0.3ML, IM, 

(PFIZER GRAY TOP)              Unknown      Completed                 [facility]

 

                                                    Influenza Virus 

Vaccine,quad 

Im,preserve Free 

65+ (FLUAD)               Unknown      Completed                 [facility]

 

                    Influenza High Dose           Unknown   Completed           

[facility]

 

                                                    Pneumococcal 13 

Conjugate, PCV13 

(Prevnar 13)              Unknown      Completed                 [facility]

 

                                                    Influenza Virus 

Vaccine                   Unknown      Completed                 [facility]

 

                                                    SARS-COV-2 COVID-19 

PFIZER VACCINE              Unknown      Completed                 [facility]

 

                                                    SARS-COV-2 COVID-19 

PFIZER VACCINE              Unknown      Completed                 [facility]

 

                                                    SARS-COV-2 COVID-19 

PFIZER VACCINE              Unknown      Completed                 [facility]

 

                    Influenza High Dose           Unknown   Completed           

[facility]

 

                                                    Pneumococcal 

Polysaccharide, 

PPSV23 (PNEUMOVAX)              Unknown      Completed                 [facility]

 

                                                    SARS-COV-2 COVID-19 

PFIZER VACCINE              Unknown      Completed                 [facility]

 

                    PPD (TB)            Unknown   Completed           [facility]

 

                                                    Influenza Virus 

Vaccine Quad IM 3+ 

YRS                       Unknown      Completed                 [facility]

 

                                                    Influenza High Dose 

Quad                      Unknown      Completed                 [facility]

 

                                                    SARS-COV-2 COVID-19 

CHAPARRO-SUCROSE 

VACCINE 12 YRS+, 

BIVALENT 0.3ML, IM, 

(PFIZER GRAY TOP)              Unknown      Completed                 [facility]

 

                                                    Influenza Virus 

Vaccine,quad 

Im,preserve Free 

65+ (FLUAD)               Unknown      Completed                 [facility]

 

                    Influenza High Dose           Unknown   Completed           

[facility]

 

                                                    Pneumococcal 13 

Conjugate, PCV13 

(Prevnar 13)              Unknown      Completed                 [facility]

 

                                                    Influenza Virus 

Vaccine                   Unknown      Completed                 [facility]

 

                                                    SARS-COV-2 COVID-19 

PFIZER VACCINE              Unknown      Completed                 [facility]

 

                                                    SARS-COV-2 COVID-19 

PFIZER VACCINE              Unknown      Completed                 [facility]

 

                                                    SARS-COV-2 COVID-19 

PFIZER VACCINE              Unknown      Completed                 [facility]

 

                    Influenza High Dose           Unknown   Completed           

[facility]

 

                                                    Pneumococcal 

Polysaccharide, 

PPSV23 (PNEUMOVAX)              Unknown      Completed                 [facility]

 

                                                    SARS-COV-2 COVID-19 

PFIZER VACCINE              Unknown      Completed                 [facility]

 

                    PPD (TB)            Unknown   Completed           [facility]

 

                                                    Influenza Virus 

Vaccine Quad IM 3+ 

YRS                       Unknown      Completed                 [facility]

 

                                                    Influenza High Dose 

Quad                      Unknown      Completed                 [facility]

 

                                                    SARS-COV-2 COVID-19 

CHAPARRO-SUCROSE 

VACCINE 12 YRS+, 

BIVALENT 0.3ML, IM, 

(PFIZER GRAY TOP)              Unknown      Completed                 [facility]

 

                                                    Influenza Virus 

Vaccine,quad 

Im,preserve Free 

65+ (FLUAD)               Unknown      Completed                 [facility]

 

                    Influenza High Dose           Unknown   Completed           

[facility]

 

                                                    Pneumococcal 13 

Conjugate, PCV13 

(Prevnar 13)              Unknown      Completed                 [facility]

 

                                                    Influenza Virus 

Vaccine                   Unknown      Completed                 [facility]

 

                                                    SARS-COV-2 COVID-19 

PFIZER VACCINE              Unknown      Completed                 [facility]

 

                                                    SARS-COV-2 COVID-19 

PFIZER VACCINE              Unknown      Completed                 [facility]

 

                                                    SARS-COV-2 COVID-19 

PFIZER VACCINE              Unknown      Completed                 [facility]

 

                    Influenza High Dose           Unknown   Completed           

[facility]

 

                                                    Pneumococcal 

Polysaccharide, 

PPSV23 (PNEUMOVAX)              Unknown      Completed                 [facility]

 

                                                    SARS-COV-2 COVID-19 

PFIZER VACCINE              Unknown      Completed                 [facility]

 

                    PPD (TB)            Unknown   Completed           [facility]

 

                                                    Influenza Virus 

Vaccine Quad IM 3+ 

YRS                       Unknown      Completed                 [facility]

 

                                                    Influenza High Dose 

Quad                      Unknown      Completed                 [facility]

 

                                                    SARS-COV-2 COVID-19 

CHAPARRO-SUCROSE 

VACCINE 12 YRS+, 

BIVALENT 0.3ML, IM, 

(PFIZER GRAY TOP)              Unknown      Completed                 [facility]

 

                                                    Influenza Virus 

Vaccine,quad 

Im,preserve Free 

65+ (FLUAD)               Unknown      Completed                 [facility]

 

                    Influenza High Dose           Unknown   Completed           

[facility]

 

                                                    Pneumococcal 13 

Conjugate, PCV13 

(Prevnar 13)              Unknown      Completed                 [facility]

 

                                                    Influenza Virus 

Vaccine                   Unknown      Completed                 [facility]

 

                                                    SARS-COV-2 COVID-19 

PFIZER VACCINE              Unknown      Completed                 [facility]

 

                                                    SARS-COV-2 COVID-19 

PFIZER VACCINE              Unknown      Completed                 [facility]

 

                                                    SARS-COV-2 COVID-19 

PFIZER VACCINE              Unknown      Completed                 [facility]

 

                    Influenza High Dose           Unknown   Completed           

[facility]

 

                                                    Pneumococcal 

Polysaccharide, 

PPSV23 (PNEUMOVAX)              Unknown      Completed                 [facility]

 

                                                    SARS-COV-2 COVID-19 

PFIZER VACCINE              Unknown      Completed                 [facility]

 

                    PPD (TB)            Unknown   Completed           [facility]

 

                                                    Influenza Virus 

Vaccine Quad IM 3+ 

YRS                       Unknown      Completed                 [facility]

 

                                                    Influenza High Dose 

Quad                      Unknown      Completed                 [facility]

 

                                                    SARS-COV-2 COVID-19 

CHAPARRO-SUCROSE 

VACCINE 12 YRS+, 

BIVALENT 0.3ML, IM, 

(PFIZER GRAY TOP)              Unknown      Completed                 [facility]

 

                                                    Influenza Virus 

Vaccine,quad 

Im,preserve Free 

65+ (FLUAD)               Unknown      Completed                 [facility]

 

                    Influenza High Dose           Unknown   Completed           

[facility]

 

                                                    Pneumococcal 13 

Conjugate, PCV13 

(Prevnar 13)              Unknown      Completed                 [facility]

 

                                                    Influenza Virus 

Vaccine                   Unknown      Completed                 [facility]

 

                                                    SARS-COV-2 COVID-19 

PFIZER VACCINE              Unknown      Completed                 [facility]

 

                                                    SARS-COV-2 COVID-19 

PFIZER VACCINE              Unknown      Completed                 [facility]

 

                                                    SARS-COV-2 COVID-19 

PFIZER VACCINE              Unknown      Completed                 [facility]

 

                    Influenza High Dose           Unknown   Completed           

[facility]

 

                                                    Pneumococcal 

Polysaccharide, 

PPSV23 (PNEUMOVAX)              Unknown      Completed                 [facility]

 

                                                    SARS-COV-2 COVID-19 

PFIZER VACCINE              Unknown      Completed                 [facility]

 

                    PPD (TB)            Unknown   Completed           [facility]

 

                                                    Influenza Virus 

Vaccine Quad IM 3+ 

YRS                       Unknown      Completed                 [facility]

 

                                                    Influenza High Dose 

Quad                      Unknown      Completed                 [facility]

 

                                                    SARS-COV-2 COVID-19 

CHAPARRO-SUCROSE 

VACCINE 12 YRS+, 

BIVALENT 0.3ML, IM, 

(PFIZER GRAY TOP)              Unknown      Completed                 [facility]

 

                                                    Influenza Virus 

Vaccine,quad 

Im,preserve Free 

65+ (FLUAD)               Unknown      Completed                 [facility]

 

                    Influenza High Dose           Unknown   Completed           

[facility]

 

                                                    Pneumococcal 13 

Conjugate, PCV13 

(Prevnar 13)              Unknown      Completed                 [facility]

 

                                                    Influenza Virus 

Vaccine                   Unknown      Completed                 [facility]

 

                                                    SARS-COV-2 COVID-19 

PFIZER VACCINE              Unknown      Completed                 [facility]

 

                                                    SARS-COV-2 COVID-19 

PFIZER VACCINE              Unknown      Completed                 [facility]

 

                                                    SARS-COV-2 COVID-19 

PFIZER VACCINE              Unknown      Completed                 [facility]

 

                    Influenza High Dose           Unknown   Completed           

[facility]

 

                                                    Pneumococcal 

Polysaccharide, 

PPSV23 (PNEUMOVAX)              Unknown      Completed                 [facility]

 

                                                    SARS-COV-2 COVID-19 

PFIZER VACCINE              Unknown      Completed                 [facility]

 

                    PPD (TB)            Unknown   Completed           [facility]

 

                                                    Influenza Virus 

Vaccine Quad IM 3+ 

YRS                       Unknown      Completed                 [facility]

 

                                                    Influenza High Dose 

Quad                      Unknown      Completed                 [facility]

 

                                                    SARS-COV-2 COVID-19 

CHAPARRO-SUCROSE 

VACCINE 12 YRS+, 

BIVALENT 0.3ML, IM, 

(PFIZER GRAY TOP)              Unknown      Completed                 [facility]

 

                                                    Influenza Virus 

Vaccine,quad 

Im,preserve Free 

65+ (FLUAD)               Unknown      Completed                 [facility]

 

                    Influenza High Dose           Unknown   Completed           

[facility]

 

                                                    Pneumococcal 13 

Conjugate, PCV13 

(Prevnar 13)              Unknown      Completed                 [facility]

 

                                                    Influenza Virus 

Vaccine                   Unknown      Completed                 [facility]

 

                                                    SARS-COV-2 COVID-19 

PFIZER VACCINE              Unknown      Completed                 [facility]

 

                                                    SARS-COV-2 COVID-19 

PFIZER VACCINE              Unknown      Completed                 [facility]

 

                                                    SARS-COV-2 COVID-19 

PFIZER VACCINE              Unknown      Completed                 [facility]

 

                    Influenza High Dose           Unknown   Completed           

[facility]

 

                                                    Pneumococcal 

Polysaccharide, 

PPSV23 (PNEUMOVAX)              Unknown      Completed                 [facility]

 

                                                    SARS-COV-2 COVID-19 

PFIZER VACCINE              Unknown      Completed                 [facility]

 

                    PPD (TB)            Unknown   Completed           [facility]

 

                                                    Influenza Virus 

Vaccine Quad IM 3+ 

YRS                       Unknown      Completed                 [facility]

 

                                                    Influenza High Dose 

Quad                      Unknown      Completed                 [facility]

 

                                                    SARS-COV-2 COVID-19 

CHAPARRO-SUCROSE 

VACCINE 12 YRS+, 

BIVALENT 0.3ML, IM, 

(PFIZER GRAY TOP)              Unknown      Completed                 [facility]

 

                                                    Influenza Virus 

Vaccine,quad 

Im,preserve Free 

65+ (FLUAD)               Unknown      Completed                 [facility]

 

                    Influenza High Dose           Unknown   Completed           

[facility]

 

                                                    Pneumococcal 13 

Conjugate, PCV13 

(Prevnar 13)              Unknown      Completed                 [facility]

 

                                                    Influenza Virus 

Vaccine                   Unknown      Completed                 [facility]

 

                                                    SARS-COV-2 COVID-19 

PFIZER VACCINE              Unknown      Completed                 [facility]

 

                                                    SARS-COV-2 COVID-19 

PFIZER VACCINE              Unknown      Completed                 [facility]

 

                                                    SARS-COV-2 COVID-19 

PFIZER VACCINE              Unknown      Completed                 [facility]

 

                    Influenza High Dose           Unknown   Completed           

[facility]

 

                                                    Pneumococcal 

Polysaccharide, 

PPSV23 (PNEUMOVAX)              Unknown      Completed                 [facility]

 

                                                    SARS-COV-2 COVID-19 

PFIZER VACCINE              Unknown      Completed                 [facility]

 

                    PPD (TB)            Unknown   Completed           [facility]

 

                                                    Influenza Virus 

Vaccine Quad IM 3+ 

YRS                       Unknown      Completed                 [facility]

 

                                                    Influenza High Dose 

Quad                      Unknown      Completed                 [facility]

 

                                                    SARS-COV-2 COVID-19 

CHAPARRO-SUCROSE 

VACCINE 12 YRS+, 

BIVALENT 0.3ML, IM, 

(PFIZER GRAY TOP)              Unknown      Completed                 [facility]

 

                                                    Influenza Virus 

Vaccine,quad 

Im,preserve Free 

65+ (FLUAD)               Unknown      Completed                 [facility]

 

                    Influenza High Dose           Unknown   Completed           

[facility]

 

                                                    Pneumococcal 13 

Conjugate, PCV13 

(Prevnar 13)              Unknown      Completed                 [facility]

 

                                                    Influenza Virus 

Vaccine                   Unknown      Completed                 [facility]

 

                                                    SARS-COV-2 COVID-19 

PFIZER VACCINE              Unknown      Completed                 [facility]

 

                                                    SARS-COV-2 COVID-19 

PFIZER VACCINE              Unknown      Completed                 [facility]

 

                                                    SARS-COV-2 COVID-19 

PFIZER VACCINE              Unknown      Completed                 [facility]

 

                    Influenza High Dose           Unknown   Completed           

[facility]

 

                                                    Pneumococcal 

Polysaccharide, 

PPSV23 (PNEUMOVAX)              Unknown      Completed                 [facility]

 

                                                    SARS-COV-2 COVID-19 

PFIZER VACCINE              Unknown      Completed                 [facility]

 

                    PPD (TB)            Unknown   Completed           [facility]

 

                                                    Influenza Virus 

Vaccine Quad IM 3+ 

YRS                       Unknown      Completed                 [facility] [facility]

 

                                                    Influenza High Dose 

Quad                      Unknown      Completed                 [facility]

 

                                                    SARS-COV-2 COVID-19 

CHAPARRO-SUCROSE 

VACCINE 12 YRS+, 

BIVALENT 0.3ML, IM, 

(PFIZER GRAY TOP)              Unknown      Completed                 [facility]

 

                                                    Influenza Virus 

Vaccine,quad 

Im,preserve Free 

65+ (FLUAD)               Unknown      Completed                 [facility]

 

                    Influenza High Dose           Unknown   Completed           

[facility]

 

                                                    Pneumococcal 13 

Conjugate, PCV13 

(Prevnar 13)              Unknown      Completed                 [facility]

 

                                                    Influenza Virus 

Vaccine                   Unknown      Completed                 [facility]

 

                                                    SARS-COV-2 COVID-19 

PFIZER VACCINE              Unknown      Completed                 [facility]

 

                                                    SARS-COV-2 COVID-19 

PFIZER VACCINE              Unknown      Completed                 [facility]

 

                                                    SARS-COV-2 COVID-19 

PFIZER VACCINE              Unknown      Completed                 [facility]

 

                    Influenza High Dose           Unknown   Completed           

[facility]

 

                                                    Pneumococcal 

Polysaccharide, 

PPSV23 (PNEUMOVAX)              Unknown      Completed                 [facility]

 

                                                    SARS-COV-2 COVID-19 

PFIZER VACCINE              Unknown      Completed                 [facility]

 

                    PPD (TB)            Unknown   Completed           [facility]

 

                                                    Influenza Virus 

Vaccine Quad IM 3+ 

YRS                       Unknown      Completed                 [facility]

 

                                                    Influenza High Dose 

Quad                      Unknown      Completed                 [facility]

 

                                                    SARS-COV-2 COVID-19 

CHAPARRO-SUCROSE 

VACCINE 12 YRS+, 

BIVALENT 0.3ML, IM, 

(PFIZER GRAY TOP)              Unknown      Completed                 [facility]

 

                                                    Influenza Virus 

Vaccine,quad 

Im,preserve Free 

65+ (FLUAD)               Unknown      Completed                 [facility]

 

                    Influenza High Dose           Unknown   Completed           

[facility]

 

                                                    Pneumococcal 13 

Conjugate, PCV13 

(Prevnar 13)              Unknown      Completed                 [facility]

 

                                                    Influenza Virus 

Vaccine                   Unknown      Completed                 [facility]

 

                                                    SARS-COV-2 COVID-19 

PFIZER VACCINE              Unknown      Completed                 [facility]

 

                                                    SARS-COV-2 COVID-19 

PFIZER VACCINE              Unknown      Completed                 [facility]

 

                                                    SARS-COV-2 COVID-19 

PFIZER VACCINE              Unknown      Completed                 [facility]

 

                    Influenza High Dose           Unknown   Completed           

[facility]

 

                                                    Pneumococcal 

Polysaccharide, 

PPSV23 (PNEUMOVAX)              Unknown      Completed                 [facility]

 

                                                    SARS-COV-2 COVID-19 

PFIZER VACCINE              Unknown      Completed                 [facility]

 

                    PPD (TB)            Unknown   Completed           [facility]

 

                                                    Influenza Virus 

Vaccine Quad IM 3+ 

YRS                       Unknown      Completed                 [facility]

 

                                                    Influenza High Dose 

Quad                      Unknown      Completed                 [facility]

 

                                                    SARS-COV-2 COVID-19 

CHAPARRO-SUCROSE 

VACCINE 12 YRS+, 

BIVALENT 0.3ML, IM, 

(PFIZER GRAY TOP)              Unknown      Completed                 [facility]

 

                                                    Influenza Virus 

Vaccine,quad 

Im,preserve Free 

65+ (FLUAD)               Unknown      Completed                 [facility]

 

                    Influenza High Dose           Unknown   Completed           

[facility]

 

                                                    Pneumococcal 13 

Conjugate, PCV13 

(Prevnar 13)              Unknown      Completed                 [facility]

 

                                                    Influenza Virus 

Vaccine                   Unknown      Completed                 [facility]

 

                                                    SARS-COV-2 COVID-19 

PFIZER VACCINE              Unknown      Completed                 [facility]

 

                                                    SARS-COV-2 COVID-19 

PFIZER VACCINE              Unknown      Completed                 [facility]

 

                                                    SARS-COV-2 COVID-19 

PFIZER VACCINE              Unknown      Completed                 [facility]

 

                    Influenza High Dose           Unknown   Completed           

[facility]

 

                                                    Pneumococcal 

Polysaccharide, 

PPSV23 (PNEUMOVAX)              Unknown      Completed                 [facility]

 

                                                    SARS-COV-2 COVID-19 

PFIZER VACCINE              Unknown      Completed                 [facility]

 

                    PPD (TB)            Unknown   Completed           [facility]

 

                                                    Influenza Virus 

Vaccine Quad IM 3+ 

YRS                       Unknown      Completed                 [facility]

 

                                                    Influenza High Dose 

Quad                      Unknown      Completed                 [facility]

 

                                                    SARS-COV-2 COVID-19 

CHAPARRO-SUCROSE 

VACCINE 12 YRS+, 

BIVALENT 0.3ML, IM, 

(PFIZER GRAY TOP)              Unknown      Completed                 [facility]

 

                                                    Influenza Virus 

Vaccine,quad 

Im,preserve Free 

65+ (FLUAD)               Unknown      Completed                 [facility]

 

                    Influenza High Dose           Unknown   Completed           

[facility]

 

                                                    Pneumococcal 13 

Conjugate, PCV13 

(Prevnar 13)              Unknown      Completed                 [facility]

 

                                                    Influenza Virus 

Vaccine                   Unknown      Completed                 [facility]

 

                                                    SARS-COV-2 COVID-19 

PFIZER VACCINE              Unknown      Completed                 [facility]

 

                                                    SARS-COV-2 COVID-19 

PFIZER VACCINE              Unknown      Completed                 [facility]

 

                                                    SARS-COV-2 COVID-19 

PFIZER VACCINE              Unknown      Completed                 [facility]

 

                    Influenza High Dose           Unknown   Completed           

[facility]

 

                                                    Pneumococcal 

Polysaccharide, 

PPSV23 (PNEUMOVAX)              Unknown      Completed                 [facility]

 

                                                    SARS-COV-2 COVID-19 

PFIZER VACCINE              Unknown      Completed                 [facility]

 

                    PPD (TB)            Unknown   Completed           [facility]

 

                                                    Influenza Virus 

Vaccine Quad IM 3+ 

YRS                       Unknown      Completed                 [facility]

 

                                                    Influenza High Dose 

Quad                      Unknown      Completed                 [facility]

 

                                                    SARS-COV-2 COVID-19 

CHAPARRO-SUCROSE 

VACCINE 12 YRS+, 

BIVALENT 0.3ML, IM, 

(PFIZER GRAY TOP)              Unknown      Completed                 [facility]

 

                                                    Influenza Virus 

Vaccine,quad 

Im,preserve Free 

65+ (FLUAD)               Unknown      Completed                 [facility]

 

                    Influenza High Dose           Unknown   Completed           

[facility]

 

                                                    Pneumococcal 13 

Conjugate, PCV13 

(Prevnar 13)              Unknown      Completed                 [facility]

 

                                                    Influenza Virus 

Vaccine                   Unknown      Completed                 [facility]

 

                                                    SARS-COV-2 COVID-19 

PFIZER VACCINE              Unknown      Completed                 [facility]

 

                                                    SARS-COV-2 COVID-19 

PFIZER VACCINE              Unknown      Completed                 [facility]

 

                                                    SARS-COV-2 COVID-19 

PFIZER VACCINE              Unknown      Completed                 [facility]

 

                    Influenza High Dose           Unknown   Completed           

[facility]

 

                                                    Pneumococcal 

Polysaccharide, 

PPSV23 (PNEUMOVAX)              Unknown      Completed                 [facility]

 

                                                    SARS-COV-2 COVID-19 

PFIZER VACCINE              Unknown      Completed                 [facility]

 

                    PPD (TB)            Unknown   Completed           [facility]

 

                                                    Influenza Virus 

Vaccine Quad IM 3+ 

YRS                       Unknown      Completed                 [facility]

 

                                                    Influenza High Dose 

Quad                      Unknown      Completed                 [facility]

 

                                                    SARS-COV-2 COVID-19 

CHAPARRO-SUCROSE 

VACCINE 12 YRS+, 

BIVALENT 0.3ML, IM, 

(PFIZER GRAY TOP)              Unknown      Completed                 [facility]

 

                                                    Influenza Virus 

Vaccine,quad 

Im,preserve Free 

65+ (FLUAD)               Unknown      Completed                 [facility]

 

                    Influenza High Dose           Unknown   Completed           

[facility]

 

                                                    Pneumococcal 13 

Conjugate, PCV13 

(Prevnar 13)              Unknown      Completed                 [facility]

 

                                                    Influenza Virus 

Vaccine                   Unknown      Completed                 [facility]

 

                                                    SARS-COV-2 COVID-19 

PFIZER VACCINE              Unknown      Completed                 [facility]

 

                                                    SARS-COV-2 COVID-19 

PFIZER VACCINE              Unknown      Completed                 [facility]

 

                                                    SARS-COV-2 COVID-19 

PFIZER VACCINE              Unknown      Completed                 [facility]

 

                    Influenza High Dose           Unknown   Completed           

[facility]

 

                                                    Pneumococcal 

Polysaccharide, 

PPSV23 (PNEUMOVAX)              Unknown      Completed                 [facility]

 

                                                    SARS-COV-2 COVID-19 

PFIZER VACCINE              Unknown      Completed                 [facility]

 

                    PPD (TB)            Unknown   Completed           [facility]

 

                                                    Influenza Virus 

Vaccine Quad IM 3+ 

YRS                       Unknown      Completed                 [facility]

 

                                                    Influenza High Dose 

Quad                      Unknown      Completed                 [facility]

 

                                                    SARS-COV-2 COVID-19 

CHAPARRO-SUCROSE 

VACCINE 12 YRS+, 

BIVALENT 0.3ML, IM, 

(PFIZER GRAY TOP)              Unknown      Completed                 [facility]

 

                                                    Influenza Virus 

Vaccine,quad 

Im,preserve Free 

65+ (FLUAD)               Unknown      Completed                 [facility]

 

                    Influenza High Dose           Unknown   Completed           

[facility]

 

                                                    Pneumococcal 13 

Conjugate, PCV13 

(Prevnar 13)              Unknown      Completed                 [facility]

 

                                                    Influenza Virus 

Vaccine                   Unknown      Completed                 [facility]

 

                                                    SARS-COV-2 COVID-19 

PFIZER VACCINE              Unknown      Completed                 [facility]

 

                                                    SARS-COV-2 COVID-19 

PFIZER VACCINE              Unknown      Completed                 [facility]

 

                                                    SARS-COV-2 COVID-19 

PFIZER VACCINE              Unknown      Completed                 [facility]

 

                    Influenza High Dose           Unknown   Completed           

[facility]

 

                                                    Pneumococcal 

Polysaccharide, 

PPSV23 (PNEUMOVAX)              Unknown      Completed                 [facility]

 

                                                    SARS-COV-2 COVID-19 

PFIZER VACCINE              Unknown      Completed                 [facility]

 

                    PPD (TB)            Unknown   Completed           [facility]

 

                                                    Influenza Virus 

Vaccine Quad IM 3+ 

YRS                       Unknown      Completed                 [facility]

 

                                                    Influenza High Dose 

Quad                      Unknown      Completed                 [facility]

 

                                                    SARS-COV-2 COVID-19 

CHAPARRO-SUCROSE 

VACCINE 12 YRS+, 

BIVALENT 0.3ML, IM, 

(PFIZER GRAY TOP)              Unknown      Completed                 [facility]

 

                                                    Influenza Virus 

Vaccine,quad 

Im,preserve Free 

65+ (FLUAD)               Unknown      Completed                 [facility]

 

                    Influenza High Dose           Unknown   Completed           

[facility]

 

                                                    Pneumococcal 13 

Conjugate, PCV13 

(Prevnar 13)              Unknown      Completed                 [facility]

 

                                                    Influenza Virus 

Vaccine                   Unknown      Completed                 [facility]

 

                                                    SARS-COV-2 COVID-19 

PFIZER VACCINE              Unknown      Completed                 [facility]

 

                                                    SARS-COV-2 COVID-19 

PFIZER VACCINE              Unknown      Completed                 [facility]

 

                                                    SARS-COV-2 COVID-19 

PFIZER VACCINE              Unknown      Completed                 [facility]

 

                    Influenza High Dose           Unknown   Completed           

[facility]

 

                                                    Pneumococcal 

Polysaccharide, 

PPSV23 (PNEUMOVAX)              Unknown      Completed                 [facility]

 

                                                    SARS-COV-2 COVID-19 

PFIZER VACCINE              Unknown      Completed                 [facility]

 

                    PPD (TB)            Unknown   Completed           [facility]

 

                                                    Influenza Virus 

Vaccine Quad IM 3+ 

YRS                       Unknown      Completed                 [facility]

 

                                                    Influenza High Dose 

Quad                      Unknown      Completed                 [facility]

 

                                                    SARS-COV-2 COVID-19 

CHAPARRO-SUCROSE 

VACCINE 12 YRS+, 

BIVALENT 0.3ML, IM, 

(PFIZER GRAY TOP)              Unknown      Completed                 [facility]

 

                                                    Influenza Virus 

Vaccine,quad 

Im,preserve Free 

65+ (FLUAD)               Unknown      Completed                 [facility]

 

                    Influenza High Dose           Unknown   Completed           

[facility]

 

                                                    Pneumococcal 13 

Conjugate, PCV13 

(Prevnar 13)              Unknown      Completed                 [facility]

 

                                                    Influenza Virus 

Vaccine                   Unknown      Completed                 [facility]

 

                                                    SARS-COV-2 COVID-19 

PFIZER VACCINE              Unknown      Completed                 [facility]

 

                                                    SARS-COV-2 COVID-19 

PFIZER VACCINE              Unknown      Completed                 [facility]

 

                                                    SARS-COV-2 COVID-19 

PFIZER VACCINE              Unknown      Completed                 [facility]

 

                    Influenza High Dose           Unknown   Completed           

[facility]

 

                                                    Pneumococcal 

Polysaccharide, 

PPSV23 (PNEUMOVAX)              Unknown      Completed                 [facility]

 

                                                    SARS-COV-2 COVID-19 

PFIZER VACCINE              Unknown      Completed                 [facility]

 

                    PPD (TB)            Unknown   Completed           [facility]

 

                                                    Influenza Virus 

Vaccine Quad IM 3+ 

YRS                       Unknown      Completed                 [facility]

 

                                                    Influenza High Dose 

Quad                      Unknown      Completed                 [facility]

 

                                                    SARS-COV-2 COVID-19 

CHAPARRO-SUCROSE 

VACCINE 12 YRS+, 

BIVALENT 0.3ML, IM, 

(PFIZER GRAY TOP)              Unknown      Completed                 [facility]

 

                                                    Influenza Virus 

Vaccine,quad 

Im,preserve Free 

65+ (FLUAD)               Unknown      Completed                 [facility]

 

                    Influenza High Dose           Unknown   Completed           

[facility]

 

                                                    Pneumococcal 13 

Conjugate, PCV13 

(Prevnar 13)              Unknown      Completed                 [facility]

 

                                                    Influenza Virus 

Vaccine                   Unknown      Completed                 [facility]

 

                                                    SARS-COV-2 COVID-19 

PFIZER VACCINE              Unknown      Completed                 [facility]

 

                                                    SARS-COV-2 COVID-19 

PFIZER VACCINE              Unknown      Completed                 [facility]

 

                                                    SARS-COV-2 COVID-19 

PFIZER VACCINE              Unknown      Completed                 [facility]

 

                    Influenza High Dose           Unknown   Completed           

[facility]

 

                                                    Pneumococcal 

Polysaccharide, 

PPSV23 (PNEUMOVAX)              Unknown      Completed                 [facility]

 

                                                    SARS-COV-2 COVID-19 

PFIZER VACCINE              Unknown      Completed                 [facility]

 

                    PPD (TB)            Unknown   Completed           [facility]

 

                                                    Influenza Virus 

Vaccine Quad IM 3+ 

YRS                       Unknown      Completed                 [facility]

 

                                                    Influenza High Dose 

Quad                      Unknown      Completed                 [facility]

 

                                                    SARS-COV-2 COVID-19 

CHAPARRO-SUCROSE 

VACCINE 12 YRS+, 

BIVALENT 0.3ML, IM, 

(PFIZER GRAY TOP)              Unknown      Completed                 [facility]

 

                                                    Influenza Virus 

Vaccine,quad 

Im,preserve Free 

65+ (FLUAD)               Unknown      Completed                 [facility]

 

                    Influenza High Dose           Unknown   Completed           

[facility]

 

                                                    Pneumococcal 13 

Conjugate, PCV13 

(Prevnar 13)              Unknown      Completed                 [facility]

 

                                                    Influenza Virus 

Vaccine                   Unknown      Completed                 [facility]

 

                                                    SARS-COV-2 COVID-19 

PFIZER VACCINE              Unknown      Completed                 [facility]

 

                                                    SARS-COV-2 COVID-19 

PFIZER VACCINE              Unknown      Completed                 [facility]

 

                                                    SARS-COV-2 COVID-19 

PFIZER VACCINE              Unknown      Completed                 [facility]

 

                    Influenza High Dose           Unknown   Completed           

[facility]

 

                                                    Pneumococcal 

Polysaccharide, 

PPSV23 (PNEUMOVAX)              Unknown      Completed                 [facility]

 

                                                    SARS-COV-2 COVID-19 

PFIZER VACCINE              Unknown      Completed                 [facility]

 

                    PPD (TB)            Unknown   Completed           [facility]

 

                                                    Influenza Virus 

Vaccine Quad IM 3+ 

YRS                       Unknown      Completed                 [facility]

 

                                                    Influenza High Dose 

Quad                      Unknown      Completed                 [facility]

 

                                                    SARS-COV-2 COVID-19 

CHAPARRO-SUCROSE 

VACCINE 12 YRS+, 

BIVALENT 0.3ML, IM, 

(PFIZER GRAY TOP)              Unknown      Completed                 [facility]

 

                                                    Influenza Virus 

Vaccine,quad 

Im,preserve Free 

65+ (FLUAD)               Unknown      Completed                 [facility]

 

                    Influenza High Dose           Unknown   Completed           

[facility]

 

                                                    Pneumococcal 13 

Conjugate, PCV13 

(Prevnar 13)              Unknown      Completed                 [facility]

 

                                                    Influenza Virus 

Vaccine                   Unknown      Completed                 [facility]

 

                                                    SARS-COV-2 COVID-19 

PFIZER VACCINE              Unknown      Completed                 [facility]

 

                                                    SARS-COV-2 COVID-19 

PFIZER VACCINE              Unknown      Completed                 [facility]

 

                                                    SARS-COV-2 COVID-19 

PFIZER VACCINE              Unknown      Completed                 [facility]

 

                    Influenza High Dose           Unknown   Completed           

[facility]

 

                                                    Pneumococcal 

Polysaccharide, 

PPSV23 (PNEUMOVAX)              Unknown      Completed                 [facility]

 

                                                    SARS-COV-2 COVID-19 

PFIZER VACCINE              Unknown      Completed                 [facility]

 

                    PPD (TB)            Unknown   Completed           [facility]

 

                                                    Influenza Virus 

Vaccine Quad IM 3+ 

YRS                       Unknown      Completed                 [facility]

 

                                                    Influenza High Dose 

Quad                      Unknown      Completed                 [facility]

 

                                                    SARS-COV-2 COVID-19 

CHAPARRO-SUCROSE 

VACCINE 12 YRS+, 

BIVALENT 0.3ML, IM, 

(PFIZER GRAY TOP)              Unknown      Completed                 [facility]

 

                                                    Influenza Virus 

Vaccine,quad 

Im,preserve Free 

65+ (FLUAD)               Unknown      Completed                 [facility]

 

                    Influenza High Dose           Unknown   Completed           

[facility]

 

                                                    Pneumococcal 13 

Conjugate, PCV13 

(Prevnar 13)              Unknown      Completed                 [facility]

 

                                                    Influenza Virus 

Vaccine                   Unknown      Completed                 [facility]

 

                                                    SARS-COV-2 COVID-19 

PFIZER VACCINE              Unknown      Completed                 [facility]

 

                                                    SARS-COV-2 COVID-19 

PFIZER VACCINE              Unknown      Completed                 [facility]

 

                                                    SARS-COV-2 COVID-19 

PFIZER VACCINE              Unknown      Completed                 [facility]

 

                    Influenza High Dose           Unknown   Completed           

[facility]

 

                                                    Pneumococcal 

Polysaccharide, 

PPSV23 (PNEUMOVAX)              Unknown      Completed                 [facility]

 

                                                    SARS-COV-2 COVID-19 

PFIZER VACCINE              Unknown      Completed                 [facility]

 

                    PPD (TB)            Unknown   Completed           [facility]

 

                                                    Influenza Virus 

Vaccine Quad IM 3+ 

YRS                       Unknown      Completed                 [facility]

 

                                                    Influenza High Dose 

Quad                      Unknown      Completed                 [facility]

 

                                                    SARS-COV-2 COVID-19 

CHAPARRO-SUCROSE 

VACCINE 12 YRS+, 

BIVALENT 0.3ML, IM, 

(PFIZER GRAY TOP)              Unknown      Completed                 [facility]

 

                                                    Influenza Virus 

Vaccine,quad 

Im,preserve Free 

65+ (FLUAD)               Unknown      Completed                 [facility]

 

                    Influenza High Dose           Unknown   Completed           

[facility]

 

                                                    Pneumococcal 13 

Conjugate, PCV13 

(Prevnar 13)              Unknown      Completed                 [facility]

 

                                                    Influenza Virus 

Vaccine                   Unknown      Completed                 [facility]

 

                                                    SARS-COV-2 COVID-19 

PFIZER VACCINE              Unknown      Completed                 [facility]

 

                                                    SARS-COV-2 COVID-19 

PFIZER VACCINE              Unknown      Completed                 [facility]

 

                                                    SARS-COV-2 COVID-19 

PFIZER VACCINE              Unknown      Completed                 [facility]

 

                    Influenza High Dose           Unknown   Completed           

[facility]

 

                                                    Pneumococcal 

Polysaccharide, 

PPSV23 (PNEUMOVAX)              Unknown      Completed                 [facility]

 

                                                    SARS-COV-2 COVID-19 

PFIZER VACCINE              Unknown      Completed                 [facility]

 

                    PPD (TB)            Unknown   Completed           [facility]

 

                                                    Influenza Virus 

Vaccine Quad IM 3+ 

YRS                       Unknown      Completed                 [facility]

 

                                                    Influenza High Dose 

Quad                      Unknown      Completed                 [facility]

 

                                                    SARS-COV-2 COVID-19 

CHAPARRO-SUCROSE 

VACCINE 12 YRS+, 

BIVALENT 0.3ML, IM, 

(PFIZER GRAY TOP)              Unknown      Completed                 [facility]

 

                                                    Influenza Virus 

Vaccine,quad 

Im,preserve Free 

65+ (FLUAD)               Unknown      Completed                 [facility]

 

                    Influenza High Dose           Unknown   Completed           

[facility]

 

                                                    Pneumococcal 13 

Conjugate, PCV13 

(Prevnar 13)              Unknown      Completed                 [facility]

 

                                                    Influenza Virus 

Vaccine                   Unknown      Completed                 [facility]

 

                                                    SARS-COV-2 COVID-19 

PFIZER VACCINE              Unknown      Completed                 [facility]

 

                                                    SARS-COV-2 COVID-19 

PFIZER VACCINE              Unknown      Completed                 [facility]

 

                                                    SARS-COV-2 COVID-19 

PFIZER VACCINE              Unknown      Completed                 [facility]

 

                    Influenza High Dose           Unknown   Completed           

[facility]

 

                                                    Pneumococcal 

Polysaccharide, 

PPSV23 (PNEUMOVAX)              Unknown      Completed                 [facility]

 

                                                    SARS-COV-2 COVID-19 

PFIZER VACCINE              Unknown      Completed                 [facility]

 

                    PPD (TB)            Unknown   Completed           [facility]

 

                                                    Influenza Virus 

Vaccine Quad IM 3+ 

YRS                       Unknown      Completed                 [facility]

 

                                                    Influenza High Dose 

Quad                      Unknown      Completed                 [facility]

 

                                                    SARS-COV-2 COVID-19 

CHAPARRO-SUCROSE 

VACCINE 12 YRS+, 

BIVALENT 0.3ML, IM, 

(PFIZER GRAY TOP)              Unknown      Completed                 [facility]

 

                                                    Influenza Virus 

Vaccine,quad 

Im,preserve Free 

65+ (FLUAD)               Unknown      Completed                 [facility]

 

                                                    SARS-COV-2 COVID 19 

CHAPARRO SUCROSE 

VACCINE 12+, 

9297-6994, 0.3 ML 

(30 MCG), IM PFIZER 

(GRAY TOP)                Unknown      Completed                 [facility]

 

                    Influenza High Dose           Unknown   Completed           

[facility]

 

                                                    Pneumococcal 13 

Conjugate, PCV13 

(Prevnar 13)              Unknown      Completed                 [facility]

 

                                                    Influenza Virus 

Vaccine                   Unknown      Completed                 [facility]

 

                                                    SARS-COV-2 COVID-19 

PFIZER VACCINE              Unknown      Completed                 [facility]

 

                                                    SARS-COV-2 COVID-19 

PFIZER VACCINE              Unknown      Completed                 [facility]

 

                                                    SARS-COV-2 COVID-19 

PFIZER VACCINE              Unknown      Completed                 [facility]

 

                    Influenza High Dose           Unknown   Completed           

[facility]

 

                                                    Pneumococcal 

Polysaccharide, 

PPSV23 (PNEUMOVAX)              Unknown      Completed                 [facility]

 

                                                    SARS-COV-2 COVID-19 

PFIZER VACCINE              Unknown      Completed                 [facility]

 

                    PPD (TB)            Unknown   Completed           [facility]

 

                                                    Influenza Virus 

Vaccine Quad IM 3+ 

YRS                       Unknown      Completed                 [facility]

 

                                                    Influenza High Dose 

Quad                      Unknown      Completed                 [facility]

 

                                                    SARS-COV-2 COVID-19 

CHAPARRO-SUCROSE 

VACCINE 12 YRS+, 

BIVALENT 0.3ML, IM, 

(PFIZER GRAY TOP)              Unknown      Completed                 [facility]

 

                                                    Influenza Virus 

Vaccine,quad 

Im,preserve Free 

65+ (FLUAD)               Unknown      Completed                 [facility]

 

                                                    SARS-COV-2 COVID 19 

CHAPARRO SUCROSE 

VACCINE 12+, 

4085-4148, 0.3 ML 

(30 MCG), IM PFIZER 

(GRAY TOP)                Unknown      Completed                 [facility]

 

                    Influenza High Dose           Unknown   Completed           

[facility]

 

                                                    Pneumococcal 13 

Conjugate, PCV13 

(Prevnar 13)              Unknown      Completed                 [facility]

 

                                                    Influenza Virus 

Vaccine                   Unknown      Completed                 [facility]

 

                                                    SARS-COV-2 COVID-19 

PFIZER VACCINE              Unknown      Completed                 [facility]

 

                                                    SARS-COV-2 COVID-19 

PFIZER VACCINE              Unknown      Completed                 [facility]

 

                                                    SARS-COV-2 COVID-19 

PFIZER VACCINE              Unknown      Completed                 [facility]

 

                    Influenza High Dose           Unknown   Completed           

[facility]

 

                                                    Pneumococcal 

Polysaccharide, 

PPSV23 (PNEUMOVAX)              Unknown      Completed                 [facility]

 

                                                    SARS-COV-2 COVID-19 

PFIZER VACCINE              Unknown      Completed                 [facility]

 

                    PPD (TB)            Unknown   Completed           [facility]

 

                                                    Influenza Virus 

Vaccine Quad IM 3+ 

YRS                       Unknown      Completed                 [facility]

 

                                                    Influenza High Dose 

Quad                      Unknown      Completed                 [facility]

 

                                                    SARS-COV-2 COVID-19 

CHAPARRO-SUCROSE 

VACCINE 12 YRS+, 

BIVALENT 0.3ML, IM, 

(PFIZER GRAY TOP)              Unknown      Completed                 [facility]

 

                                                    Influenza Virus 

Vaccine,quad 

Im,preserve Free 

65+ (FLUAD)               Unknown      Completed                 [facility]

 

                                                    SARS-COV-2 COVID 19 

CHAPARRO SUCROSE 

VACCINE 12+, 

7251-6190, 0.3 ML 

(30 MCG), IM PFIZER 

(GRAY TOP)                Unknown      Completed                 [facility]

 

                    Influenza High Dose           Unknown   Completed           

[facility]

 

                                                    Pneumococcal 13 

Conjugate, PCV13 

(Prevnar 13)              Unknown      Completed                 [facility]

 

                                                    Influenza Virus 

Vaccine                   Unknown      Completed                 [facility]

 

                                                    SARS-COV-2 COVID-19 

PFIZER VACCINE              Unknown      Completed                 [facility]

 

                                                    SARS-COV-2 COVID-19 

PFIZER VACCINE              Unknown      Completed                 [facility]

 

                                                    SARS-COV-2 COVID-19 

PFIZER VACCINE              Unknown      Completed                 [facility]

 

                    Influenza High Dose           Unknown   Completed           

[facility]

 

                                                    Pneumococcal 

Polysaccharide, 

PPSV23 (PNEUMOVAX)              Unknown      Completed                 [facility]

 

                                                    SARS-COV-2 COVID-19 

PFIZER VACCINE              Unknown      Completed                 [facility]

 

                    PPD (TB)            Unknown   Completed           [facility]

 

                                                    Influenza Virus 

Vaccine Quad IM 3+ 

YRS                       Unknown      Completed                 [facility]

 

                                                    Influenza High Dose 

Quad                      Unknown      Completed                 [facility]

 

                                                    SARS-COV-2 COVID-19 

CHAPARRO-SUCROSE 

VACCINE 12 YRS+, 

BIVALENT 0.3ML, IM, 

(PFIZER GRAY TOP)              Unknown      Completed                 [facility]

 

                                                    Influenza Virus 

Vaccine,quad 

Im,preserve Free 

65+ (FLUAD)               Unknown      Completed                 [facility]

 

                                                    SARS-COV-2 COVID 19 

CHAPARRO SUCROSE 

VACCINE 12, 

2960-4486, 0.3 ML 

(30 MCG), IM PFIZER 

(GRAY TOP)                Unknown      Completed                 [facility]

 

                    Influenza High Dose           Unknown   Completed           

[facility]

 

                                                    Pneumococcal 13 

Conjugate, PCV13 

(Prevnar 13)              Unknown      Completed                 [facility]

 

                                                    Influenza Virus 

Vaccine                   Unknown      Completed                 [facility]

 

                                                    SARS-COV-2 COVID-19 

PFIZER VACCINE              Unknown      Completed                 [facility]

 

                                                    SARS-COV-2 COVID-19 

PFIZER VACCINE              Unknown      Completed                 [facility]

 

                                                    SARS-COV-2 COVID-19 

PFIZER VACCINE              Unknown      Completed                 [facility]

 

                    Influenza High Dose           Unknown   Completed           

[facility]

 

                                                    Pneumococcal 

Polysaccharide, 

PPSV23 (PNEUMOVAX)              Unknown      Completed                 [facility]

 

                                                    SARS-COV-2 COVID-19 

PFIZER VACCINE              Unknown      Completed                 [facility]

 

                    PPD (TB)            Unknown   Completed           [facility]

 

                                                    Influenza Virus 

Vaccine Quad IM 3+ 

YRS                       Unknown      Completed                 [facility]

 

                                                    Influenza High Dose 

Quad                      Unknown      Completed                 [facility]

 

                                                    SARS-COV-2 COVID-19 

CHAPARRO-SUCROSE 

VACCINE 12 YRS+, 

BIVALENT 0.3ML, IM, 

(PFIZER GRAY TOP)              Unknown      Completed                 [facility]

 

                                                    Influenza Virus 

Vaccine,quad 

Im,preserve Free 

65+ (FLUAD)               Unknown      Completed                 [facility]

 

                                                    SARS-COV-2 COVID 19 

CHAPARRO SUCROSE 

VACCINE 12+, 

4355-7132, 0.3 ML 

(30 MCG), IM PFIZER 

(GRAY TOP)                Unknown      Completed                 [facility]

 

                    Influenza High Dose           Unknown   Completed           

[facility]

 

                                                    Pneumococcal 13 

Conjugate, PCV13 

(Prevnar 13)              Unknown      Completed                 [facility]

 

                                                    Influenza Virus 

Vaccine                   Unknown      Completed                 [facility]

 

                                                    SARS-COV-2 COVID-19 

PFIZER VACCINE              Unknown      Completed                 [facility]

 

                                                    SARS-COV-2 COVID-19 

PFIZER VACCINE              Unknown      Completed                 [facility]

 

                                                    SARS-COV-2 COVID-19 

PFIZER VACCINE              Unknown      Completed                 [facility]

 

                    Influenza High Dose           Unknown   Completed           

[facility]

 

                                                    Pneumococcal 

Polysaccharide, 

PPSV23 (PNEUMOVAX)              Unknown      Completed                 [facility]

 

                                                    SARS-COV-2 COVID-19 

PFIZER VACCINE              Unknown      Completed                 [facility]

 

                    PPD (TB)            Unknown   Completed           [facility]

 

                                                    Influenza Virus 

Vaccine Quad IM 3+ 

YRS                       Unknown      Completed                 [facility]

 

                                                    Influenza High Dose 

Quad                      Unknown      Completed                 [facility]

 

                                                    SARS-COV-2 COVID-19 

CHAPARRO-SUCROSE 

VACCINE 12 YRS+, 

BIVALENT 0.3ML, IM, 

(PFIZER GRAY TOP)              Unknown      Completed                 [facility]

 

                                                    Influenza Virus 

Vaccine,quad 

Im,preserve Free 

65+ (FLUAD)               Unknown      Completed                 [facility]

 

                                                    SARS-COV-2 COVID 19 

CHAPARRO SUCROSE 

VACCINE 12, 

0701-8620, 0.3 ML 

(30 MCG), IM PFIZER 

(GRAY TOP)                Unknown      Completed                 [facility]

 

                    Influenza High Dose           Unknown   Completed           

[facility]

 

                                                    Pneumococcal 13 

Conjugate, PCV13 

(Prevnar 13)              Unknown      Completed                 [facility]

 

                                                    Influenza Virus 

Vaccine                   Unknown      Completed                 [facility]

 

                                                    SARS-COV-2 COVID-19 

PFIZER VACCINE              Unknown      Completed                 [facility]

 

                                                    SARS-COV-2 COVID-19 

PFIZER VACCINE              Unknown      Completed                 [facility]

 

                                                    SARS-COV-2 COVID-19 

PFIZER VACCINE              Unknown      Completed                 [facility]

 

                    Influenza High Dose           Unknown   Completed           

[facility]

 

                                                    Pneumococcal 

Polysaccharide, 

PPSV23 (PNEUMOVAX)              Unknown      Completed                 [facility]

 

                                                    SARS-COV-2 COVID-19 

PFIZER VACCINE              Unknown      Completed                 [facility]

 

                    PPD (TB)            Unknown   Completed           [facility]

 

                                                    Influenza Virus 

Vaccine Quad IM 3+ 

YRS                       Unknown      Completed                 [facility]

 

                                                    Influenza High Dose 

Quad                      Unknown      Completed                 [facility]

 

                                                    SARS-COV-2 COVID-19 

CHAPARRO-SUCROSE 

VACCINE 12 YRS+, 

BIVALENT 0.3ML, IM, 

(PFIZER GRAY TOP)              Unknown      Completed                 [facility]

 

                                                    Influenza Virus 

Vaccine,quad 

Im,preserve Free 

65+ (FLUAD)               Unknown      Completed                 [facility]

 

                                                    SARS-COV-2 COVID 19 

CHAPARRO SUCROSE 

VACCINE 12+, 

3103-0220, 0.3 ML 

(30 MCG), IM PFIZER 

(GRAY TOP)                Unknown      Completed                 [facility]

 

                    Influenza High Dose           Unknown   Completed           

[facility]

 

                                                    Pneumococcal 13 

Conjugate, PCV13 

(Prevnar 13)              Unknown      Completed                 [facility]

 

                                                    Influenza Virus 

Vaccine                   Unknown      Completed                 [facility]

 

                                                    SARS-COV-2 COVID-19 

PFIZER VACCINE              Unknown      Completed                 [facility]

 

                                                    SARS-COV-2 COVID-19 

PFIZER VACCINE              Unknown      Completed                 [facility]

 

                                                    SARS-COV-2 COVID-19 

PFIZER VACCINE              Unknown      Completed                 [facility]

 

                    Influenza High Dose           Unknown   Completed           

[facility]

 

                                                    Pneumococcal 

Polysaccharide, 

PPSV23 (PNEUMOVAX)              Unknown      Completed                 [facility]

 

                                                    SARS-COV-2 COVID-19 

PFIZER VACCINE              Unknown      Completed                 [facility]

 

                    PPD (TB)            Unknown   Completed           [facility]

 

                                                    Influenza Virus 

Vaccine Quad IM 3+ 

YRS                       Unknown      Completed                 [facility]

 

                                                    Influenza High Dose 

Quad                      Unknown      Completed                 [facility]

 

                                                    SARS-COV-2 COVID-19 

CHAPARRO-SUCROSE 

VACCINE 12 YRS+, 

BIVALENT 0.3ML, IM, 

(PFIZER GRAY TOP)              Unknown      Completed                 [facility]

 

                                                    Influenza Virus 

Vaccine,quad 

Im,preserve Free 

65+ (FLUAD)               Unknown      Completed                 [facility]

 

                                                    SARS-COV-2 COVID 19 

CHAPARRO SUCROSE 

VACCINE 12+, 

6363-1143, 0.3 ML 

(30 MCG), IM PFIZER 

(GRAY TOP)                Unknown      Completed                 [facility]

 

                    Influenza High Dose           Unknown   Completed           

[facility]

 

                                                    Pneumococcal 13 

Conjugate, PCV13 

(Prevnar 13)              Unknown      Completed                 [facility]

 

                                                    Influenza Virus 

Vaccine                   Unknown      Completed                 [facility]

 

                                                    SARS-COV-2 COVID-19 

PFIZER VACCINE              Unknown      Completed                 [facility]

 

                                                    SARS-COV-2 COVID-19 

PFIZER VACCINE              Unknown      Completed                 [facility]

 

                                                    SARS-COV-2 COVID-19 

PFIZER VACCINE              Unknown      Completed                 [facility]

 

                    Influenza High Dose           Unknown   Completed           

[facility]

 

                                                    Pneumococcal 

Polysaccharide, 

PPSV23 (PNEUMOVAX)              Unknown      Completed                 [facility]

 

                                                    SARS-COV-2 COVID-19 

PFIZER VACCINE              Unknown      Completed                 [facility]

 

                    PPD (TB)            Unknown   Completed           [facility]

 

                                                    Influenza Virus 

Vaccine Quad IM 3+ 

YRS                       Unknown      Completed                 [facility]

 

                                                    Influenza High Dose 

Quad                      Unknown      Completed                 [facility]

 

                                                    SARS-COV-2 COVID-19 

CHAPARRO-SUCROSE 

VACCINE 12 YRS+, 

BIVALENT 0.3ML, IM, 

(PFIZER GRAY TOP)              Unknown      Completed                 [facility]

 

                                                    Influenza Virus 

Vaccine,quad 

Im,preserve Free 

65+ (FLUAD)               Unknown      Completed                 [facility]

 

                                                    SARS-COV-2 COVID 19 

CHAPARRO SUCROSE 

VACCINE 12+, 

7420-8608, 0.3 ML 

(30 MCG), IM PFIZER 

(GRAY TOP)                Unknown      Completed                 [facility]

 

                    Influenza High Dose           Unknown   Completed           

[facility]

 

                                                    Pneumococcal 13 

Conjugate, PCV13 

(Prevnar 13)              Unknown      Completed                 [facility]

 

                                                    Influenza Virus 

Vaccine                   Unknown      Completed                 [facility]

 

                                                    SARS-COV-2 COVID-19 

PFIZER VACCINE              Unknown      Completed                 [facility]

 

                                                    SARS-COV-2 COVID-19 

PFIZER VACCINE              Unknown      Completed                 [facility]

 

                                                    SARS-COV-2 COVID-19 

PFIZER VACCINE              Unknown      Completed                 [facility]

 

                    Influenza High Dose           Unknown   Completed           

[facility]

 

                                                    Pneumococcal 

Polysaccharide, 

PPSV23 (PNEUMOVAX)              Unknown      Completed                 [facility]

 

                                                    SARS-COV-2 COVID-19 

PFIZER VACCINE              Unknown      Completed                 [facility]

 

                    PPD (TB)            Unknown   Completed           [facility]

 

                                                    Influenza Virus 

Vaccine Quad IM 3+ 

YRS                       Unknown      Completed                 [facility]

 

                                                    Influenza High Dose 

Quad                      Unknown      Completed                 [facility]

 

                                                    SARS-COV-2 COVID-19 

CHAPARRO-SUCROSE 

VACCINE 12 YRS+, 

BIVALENT 0.3ML, IM, 

(PFIZER GRAY TOP)              Unknown      Completed                 [facility]

 

                                                    Influenza Virus 

Vaccine,quad 

Im,preserve Free 

65+ (FLUAD)               Unknown      Completed                 [facility]

 

                                                    SARS-COV-2 COVID 19 

CHAPARRO SUCROSE 

VACCINE 12+, 

1665-3518, 0.3 ML 

(30 MCG), IM PFIZER 

(GRAY TOP)                Unknown      Completed                 [facility]

 

                    Influenza High Dose           Unknown   Completed           

[facility]

 

                                                    Pneumococcal 13 

Conjugate, PCV13 

(Prevnar 13)              Unknown      Completed                 [facility]

 

                                                    Influenza Virus 

Vaccine                   Unknown      Completed                 [facility]

 

                                                    SARS-COV-2 COVID-19 

PFIZER VACCINE              Unknown      Completed                 [facility]

 

                                                    SARS-COV-2 COVID-19 

PFIZER VACCINE              Unknown      Completed                 [facility]

 

                                                    SARS-COV-2 COVID-19 

PFIZER VACCINE              Unknown      Completed                 [facility]

 

                    Influenza High Dose           Unknown   Completed           

[facility]

 

                                                    Pneumococcal 

Polysaccharide, 

PPSV23 (PNEUMOVAX)              Unknown      Completed                 [facility]

 

                                                    SARS-COV-2 COVID-19 

PFIZER VACCINE              Unknown      Completed                 [facility]

 

                    PPD (TB)            Unknown   Completed           [facility]

 

                                                    Influenza Virus 

Vaccine Quad IM 3+ 

YRS                       Unknown      Completed                 [facility]

 

                                                    Influenza High Dose 

Quad                      Unknown      Completed                 [facility]

 

                                                    SARS-COV-2 COVID-19 

CHAPARRO-SUCROSE 

VACCINE 12 YRS+, 

BIVALENT 0.3ML, IM, 

(PFIZER GRAY TOP)              Unknown      Completed                 [facility]

 

                                                    Influenza Virus 

Vaccine,quad 

Im,preserve Free 

65+ (FLUAD)               Unknown      Completed                 [facility]

 

                                                    SARS-COV-2 COVID 19 

CHAPARRO SUCROSE 

VACCINE 12+, 

8089-9897, 0.3 ML 

(30 MCG), IM PFIZER 

(GRAY TOP)                Unknown      Completed                 [facility]

 

                    Influenza High Dose           Unknown   Completed           

[facility]

 

                                                    Pneumococcal 13 

Conjugate, PCV13 

(Prevnar 13)              Unknown      Completed                 [facility]

 

                                                    Influenza Virus 

Vaccine                   Unknown      Completed                 [facility]

 

                                                    SARS-COV-2 COVID-19 

PFIZER VACCINE              Unknown      Completed                 [facility]

 

                                                    SARS-COV-2 COVID-19 

PFIZER VACCINE              Unknown      Completed                 [facility]

 

                                                    SARS-COV-2 COVID-19 

PFIZER VACCINE              Unknown      Completed                 [facility]

 

                    Influenza High Dose           Unknown   Completed           

[facility]

 

                                                    Pneumococcal 

Polysaccharide, 

PPSV23 (PNEUMOVAX)              Unknown      Completed                 [facility]

 

                                                    SARS-COV-2 COVID-19 

PFIZER VACCINE              Unknown      Completed                 [facility]

 

                    PPD (TB)            Unknown   Completed           [facility]

 

                                                    Influenza Virus 

Vaccine Quad IM 3+ 

YRS                       Unknown      Completed                 [facility]

 

                                                    Influenza High Dose 

Quad                      Unknown      Completed                 [facility]

 

                                                    SARS-COV-2 COVID-19 

CHAPARRO-SUCROSE 

VACCINE 12 YRS+, 

BIVALENT 0.3ML, IM, 

(PFIZER GRAY TOP)              Unknown      Completed                 [facility]

 

                                                    Influenza Virus 

Vaccine,quad 

Im,preserve Free 

65+ (FLUAD)               Unknown      Completed                 [facility]

 

                                                    SARS-COV-2 COVID 19 

CHAPARRO SUCROSE 

VACCINE 12+, 

7130-6667, 0.3 ML 

(30 MCG), IM PFIZER 

(GRAY TOP)                Unknown      Completed                 [facility]

 

                    Influenza High Dose           Unknown   Completed           

[facility]

 

                                                    Pneumococcal 13 

Conjugate, PCV13 

(Prevnar 13)              Unknown      Completed                 [facility]

 

                                                    Influenza Virus 

Vaccine                   Unknown      Completed                 [facility]

 

                                                    SARS-COV-2 COVID-19 

PFIZER VACCINE              Unknown      Completed                 [facility]

 

                                                    SARS-COV-2 COVID-19 

PFIZER VACCINE              Unknown      Completed                 [facility]

 

                                                    SARS-COV-2 COVID-19 

PFIZER VACCINE              Unknown      Completed                 [facility]

 

                    Influenza High Dose           Unknown   Completed           

[facility]

 

                                                    Pneumococcal 

Polysaccharide, 

PPSV23 (PNEUMOVAX)              Unknown      Completed                 [facility]

 

                                                    SARS-COV-2 COVID-19 

PFIZER VACCINE              Unknown      Completed                 [facility]

 

                    PPD (TB)            Unknown   Completed           [facility]

 

                                                    Influenza Virus 

Vaccine Quad IM 3+ 

YRS                       Unknown      Completed                 [facility]

 

                                                    Influenza High Dose 

Quad                      Unknown      Completed                 [facility]

 

                                                    SARS-COV-2 COVID-19 

CHAPARRO-SUCROSE 

VACCINE 12 YRS+, 

BIVALENT 0.3ML, IM, 

(PFIZER GRAY TOP)              Unknown      Completed                 [facility]

 

                                                    Influenza Virus 

Vaccine,quad 

Im,preserve Free 

65+ (FLUAD)               Unknown      Completed                 [facility]

 

                                                    SARS-COV-2 COVID 19 

CHAPARRO SUCROSE 

VACCINE 12+, 

3793-4047, 0.3 ML 

(30 MCG), IM PFIZER 

(GRAY TOP)                Unknown      Completed                 [facility]

 

                    Influenza High Dose           Unknown   Completed           

[facility]

 

                                                    Pneumococcal 13 

Conjugate, PCV13 

(Prevnar 13)              Unknown      Completed                 [facility]

 

                                                    Influenza Virus 

Vaccine                   Unknown      Completed                 [facility]

 

                                                    SARS-COV-2 COVID-19 

PFIZER VACCINE              Unknown      Completed                 [facility]

 

                                                    SARS-COV-2 COVID-19 

PFIZER VACCINE              Unknown      Completed                 [facility]

 

                                                    SARS-COV-2 COVID-19 

PFIZER VACCINE              Unknown      Completed                 [facility] [facility]

 

                    Influenza High Dose           Unknown   Completed           

[facility]

 

                                                    Pneumococcal 

Polysaccharide, 

PPSV23 (PNEUMOVAX)              Unknown      Completed                 [facility]

 

                                                    SARS-COV-2 COVID-19 

PFIZER VACCINE              Unknown      Completed                 [facility]

 

                    PPD (TB)            Unknown   Completed           [facility]

 

                                                    Influenza Virus 

Vaccine Quad IM 3+ 

YRS                       Unknown      Completed                 [facility]

 

                                                    Influenza High Dose 

Quad                      Unknown      Completed                 [facility]

 

                                                    SARS-COV-2 COVID-19 

CHAPARRO-SUCROSE 

VACCINE 12 YRS+, 

BIVALENT 0.3ML, IM, 

(PFIZER GRAY TOP)              Unknown      Completed                 [facility]

 

                                                    Influenza Virus 

Vaccine,quad 

Im,preserve Free 

65+ (FLUAD)               Unknown      Completed                 [facility]

 

                                                    SARS-COV-2 COVID 19 

CHAPARRO SUCROSE 

VACCINE 12+, 

5700-6450, 0.3 ML 

(30 MCG), IM PFIZER 

(GRAY TOP)                Unknown      Completed                 [facility]

 

                    Influenza High Dose           Unknown   Completed           

[facility]

 

                                                    Pneumococcal 13 

Conjugate, PCV13 

(Prevnar 13)              Unknown      Completed                 [facility]

 

                                                    Influenza Virus 

Vaccine                   Unknown      Completed                 [facility]

 

                                                    SARS-COV-2 COVID-19 

PFIZER VACCINE              Unknown      Completed                 [facility]

 

                                                    SARS-COV-2 COVID-19 

PFIZER VACCINE              Unknown      Completed                 [facility]

 

                                                    SARS-COV-2 COVID-19 

PFIZER VACCINE              Unknown      Completed                 [facility]

 

                    Influenza High Dose           Unknown   Completed           

[facility]

 

                                                    Pneumococcal 

Polysaccharide, 

PPSV23 (PNEUMOVAX)              Unknown      Completed                 [facility]

 

                                                    SARS-COV-2 COVID-19 

PFIZER VACCINE              Unknown      Completed                 [facility]

 

                    PPD (TB)            Unknown   Completed           [facility]

 

                                                    Influenza Virus 

Vaccine Quad IM 3+ 

YRS                       Unknown      Completed                 [facility]

 

                                                    Influenza High Dose 

Quad                      Unknown      Completed                 [facility]

 

                                                    SARS-COV-2 COVID-19 

CHAPARRO-SUCROSE 

VACCINE 12 YRS+, 

BIVALENT 0.3ML, IM, 

(PFIZER GRAY TOP)              Unknown      Completed                 [facility]

 

                                                    Influenza Virus 

Vaccine,quad 

Im,preserve Free 

65+ (FLUAD)               Unknown      Completed                 [facility]

 

                                                    SARS-COV-2 COVID 19 

CHAPARRO SUCROSE 

VACCINE 12+, 

0908-6075, 0.3 ML 

(30 MCG), IM PFIZER 

(GRAY TOP)                Unknown      Completed                 [facility]

 

                    Influenza High Dose           Unknown   Completed           

[facility]

 

                                                    Pneumococcal 13 

Conjugate, PCV13 

(Prevnar 13)              Unknown      Completed                 [facility]

 

                                                    Influenza Virus 

Vaccine                   Unknown      Completed                 [facility]

 

                                                    SARS-COV-2 COVID-19 

PFIZER VACCINE              Unknown      Completed                 [facility]

 

                                                    SARS-COV-2 COVID-19 

PFIZER VACCINE              Unknown      Completed                 [facility]

 

                                                    SARS-COV-2 COVID-19 

PFIZER VACCINE              Unknown      Completed                 [facility]

 

                    Influenza High Dose           Unknown   Completed           

[facility]

 

                                                    Pneumococcal 

Polysaccharide, 

PPSV23 (PNEUMOVAX)              Unknown      Completed                 [facility]

 

                                                    SARS-COV-2 COVID-19 

PFIZER VACCINE              Unknown      Completed                 [facility]

 

                    PPD (TB)            Unknown   Completed           [facility]

 

                                                    Influenza Virus 

Vaccine Quad IM 3+ 

YRS                       Unknown      Completed                 [facility]

 

                                                    Influenza High Dose 

Quad                      Unknown      Completed                 [facility]

 

                                                    SARS-COV-2 COVID-19 

CHAPARRO-SUCROSE 

VACCINE 12 YRS+, 

BIVALENT 0.3ML, IM, 

(PFIZER GRAY TOP)              Unknown      Completed                 [facility]

 

                                                    Influenza Virus 

Vaccine,quad 

Im,preserve Free 

65+ (FLUAD)               Unknown      Completed                 [facility]

 

                                                    SARS-COV-2 COVID 19 

CHAPARRO SUCROSE 

VACCINE 12+, 

2481-7116, 0.3 ML 

(30 MCG), IM PFIZER 

(GRAY TOP)                Unknown      Completed                 [facility]

 

                    Influenza High Dose           Unknown   Completed           

[facility]

 

                                                    Pneumococcal 13 

Conjugate, PCV13 

(Prevnar 13)              Unknown      Completed                 [facility]

 

                                                    Influenza Virus 

Vaccine                   Unknown      Completed                 [facility]

 

                                                    SARS-COV-2 COVID-19 

PFIZER VACCINE              Unknown      Completed                 [facility]

 

                                                    SARS-COV-2 COVID-19 

PFIZER VACCINE              Unknown      Completed                 [facility]

 

                                                    SARS-COV-2 COVID-19 

PFIZER VACCINE              Unknown      Completed                 [facility]

 

                    Influenza High Dose           Unknown   Completed           

[facility]

 

                                                    Pneumococcal 

Polysaccharide, 

PPSV23 (PNEUMOVAX)              Unknown      Completed                 [facility]

 

                                                    SARS-COV-2 COVID-19 

PFIZER VACCINE              Unknown      Completed                 [facility]

 

                    PPD (TB)            Unknown   Completed           [facility]

 

                                                    Influenza Virus 

Vaccine Quad IM 3+ 

YRS                       Unknown      Completed                 [facility]

 

                                                    Influenza High Dose 

Quad                      Unknown      Completed                 [facility]

 

                                                    SARS-COV-2 COVID-19 

CHAPARRO-SUCROSE 

VACCINE 12 YRS+, 

BIVALENT 0.3ML, IM, 

(PFIZER GRAY TOP)              Unknown      Completed                 [facility]

 

                                                    Influenza Virus 

Vaccine,quad 

Im,preserve Free 

65+ (FLUAD)               Unknown      Completed                 [facility]

 

                                                    SARS-COV-2 COVID 19 

CHAPARRO SUCROSE 

VACCINE 12+, 

4703-7668, 0.3 ML 

(30 MCG), IM PFIZER 

(GRAY TOP)                Unknown      Completed                 [facility]

 

                    Influenza High Dose           Unknown   Completed           

[facility]

 

                                                    Pneumococcal 13 

Conjugate, PCV13 

(Prevnar 13)              Unknown      Completed                 [facility]

 

                                                    Influenza Virus 

Vaccine                   Unknown      Completed                 [facility]

 

                                                    SARS-COV-2 COVID-19 

PFIZER VACCINE              Unknown      Completed                 [facility]

 

                                                    SARS-COV-2 COVID-19 

PFIZER VACCINE              Unknown      Completed                 [facility]

 

                                                    SARS-COV-2 COVID-19 

PFIZER VACCINE              Unknown      Completed                 [facility]

 

                    Influenza High Dose           Unknown   Completed           

[facility]

 

                                                    Pneumococcal 

Polysaccharide, 

PPSV23 (PNEUMOVAX)              Unknown      Completed                 [facility]

 

                                                    SARS-COV-2 COVID-19 

PFIZER VACCINE              Unknown      Completed                 [facility]

 

                    PPD (TB)            Unknown   Completed           [facility]

 

                                                    Influenza Virus 

Vaccine Quad IM 3+ 

YRS                       Unknown      Completed                 [facility]

 

                                                    Influenza High Dose 

Quad                      Unknown      Completed                 [facility]

 

                                                    SARS-COV-2 COVID-19 

CHAPARRO-SUCROSE 

VACCINE 12 YRS+, 

BIVALENT 0.3ML, IM, 

(PFIZER GRAY TOP)              Unknown      Completed                 [facility]

 

                                                    Influenza Virus 

Vaccine,quad 

Im,preserve Free 

65+ (FLUAD)               Unknown      Completed                 [facility]

 

                                                    SARS-COV-2 COVID 19 

CHAPARRO SUCROSE 

VACCINE 12+, 

0422-2301, 0.3 ML 

(30 MCG), IM PFIZER 

(GRAY TOP)                Unknown      Completed                 [facility]

 

                    Influenza High Dose           Unknown   Completed           

[facility]

 

                                                    Pneumococcal 13 

Conjugate, PCV13 

(Prevnar 13)              Unknown      Completed                 [facility]

 

                                                    Influenza Virus 

Vaccine                   Unknown      Completed                 [facility]

 

                                                    SARS-COV-2 COVID-19 

PFIZER VACCINE              Unknown      Completed                 [facility]

 

                                                    SARS-COV-2 COVID-19 

PFIZER VACCINE              Unknown      Completed                 [facility]

 

                                                    SARS-COV-2 COVID-19 

PFIZER VACCINE              Unknown      Completed                 [facility]

 

                    Influenza High Dose           Unknown   Completed           

[facility]

 

                                                    Pneumococcal 

Polysaccharide, 

PPSV23 (PNEUMOVAX)              Unknown      Completed                 [facility]

 

                                                    SARS-COV-2 COVID-19 

PFIZER VACCINE              Unknown      Completed                 [facility]

 

                    PPD (TB)            Unknown   Completed           [facility]

 

                                                    Influenza Virus 

Vaccine Quad IM 3+ 

YRS                       Unknown      Completed                 [facility]

 

                                                    Influenza High Dose 

Quad                      Unknown      Completed                 [facility]

 

                                                    SARS-COV-2 COVID-19 

CHAPARRO-SUCROSE 

VACCINE 12 YRS+, 

BIVALENT 0.3ML, IM, 

(PFIZER GRAY TOP)              Unknown      Completed                 [facility]

 

                                                    Influenza Virus 

Vaccine,quad 

Im,preserve Free 

65+ (FLUAD)               Unknown      Completed                 [facility]

 

                                                    SARS-COV-2 COVID 19 

CHAPARRO SUCROSE 

VACCINE 12+, 

2430-8633, 0.3 ML 

(30 MCG), IM PFIZER 

(GRAY TOP)                Unknown      Completed                 [facility]

 

                    Influenza High Dose           Unknown   Completed           

[facility]

 

                                                    Pneumococcal 13 

Conjugate, PCV13 

(Prevnar 13)              Unknown      Completed                 [facility]

 

                                                    Influenza Virus 

Vaccine                   Unknown      Completed                 [facility]

 

                                                    SARS-COV-2 COVID-19 

PFIZER VACCINE              Unknown      Completed                 [facility]

 

                                                    SARS-COV-2 COVID-19 

PFIZER VACCINE              Unknown      Completed                 [facility]

 

                                                    SARS-COV-2 COVID-19 

PFIZER VACCINE              Unknown      Completed                 [facility]

 

                    Influenza High Dose           Unknown   Completed           

[facility]

 

                                                    Pneumococcal 

Polysaccharide, 

PPSV23 (PNEUMOVAX)              Unknown      Completed                 [facility]

 

                                                    SARS-COV-2 COVID-19 

PFIZER VACCINE              Unknown      Completed                 [facility]

 

                    PPD (TB)            Unknown   Completed           [facility]

 

                                                    Influenza Virus 

Vaccine Quad IM 3+ 

YRS                       Unknown      Completed                 [facility]

 

                                                    Influenza High Dose 

Quad                      Unknown      Completed                 [facility]

 

                                                    SARS-COV-2 COVID-19 

CHAPARRO-SUCROSE 

VACCINE 12 YRS+, 

BIVALENT 0.3ML, IM, 

(PFIZER GRAY TOP)              Unknown      Completed                 [facility]

 

                                                    Influenza Virus 

Vaccine,quad 

Im,preserve Free 

65+ (FLUAD)               Unknown      Completed                 [facility]

 

                                                    SARS-COV-2 COVID 19 

CHAPARRO SUCROSE 

VACCINE 12+, 

7139-4471, 0.3 ML 

(30 MCG), IM PFIZER 

(GRAY TOP)                Unknown      Completed                 [facility]

 

                    Influenza High Dose           Unknown   Completed           

[facility]

 

                                                    Pneumococcal 13 

Conjugate, PCV13 

(Prevnar 13)              Unknown      Completed                 [facility]

 

                                                    Influenza Virus 

Vaccine                   Unknown      Completed                 [facility]

 

                                                    SARS-COV-2 COVID-19 

PFIZER VACCINE              Unknown      Completed                 [facility]

 

                                                    SARS-COV-2 COVID-19 

PFIZER VACCINE              Unknown      Completed                 [facility]

 

                                                    SARS-COV-2 COVID-19 

PFIZER VACCINE              Unknown      Completed                 [facility]

 

                    Influenza High Dose           Unknown   Completed           

[facility]

 

                                                    Pneumococcal 

Polysaccharide, 

PPSV23 (PNEUMOVAX)              Unknown      Completed                 [facility]

 

                                                    SARS-COV-2 COVID-19 

PFIZER VACCINE              Unknown      Completed                 [facility]

 

                    PPD (TB)            Unknown   Completed           [facility]

 

                                                    Influenza Virus 

Vaccine Quad IM 3+ 

YRS                       Unknown      Completed                 [facility]

 

                                                    Influenza High Dose 

Quad                      Unknown      Completed                 [facility]

 

                                                    SARS-COV-2 COVID-19 

CHAPARRO-SUCROSE 

VACCINE 12 YRS+, 

BIVALENT 0.3ML, IM, 

(PFIZER GRAY TOP)              Unknown      Completed                 [facility]

 

                                                    Influenza Virus 

Vaccine,quad 

Im,preserve Free 

65+ (FLUAD)               Unknown      Completed                 [facility]

 

                                                    SARS-COV-2 COVID 19 

CHAPARRO SUCROSE 

VACCINE 12+, 

3509-5727, 0.3 ML 

(30 MCG), IM PFIZER 

(GRAY TOP)                Unknown      Completed                 [facility]

 

                    Influenza High Dose           Unknown   Completed           

[facility]

 

                                                    Pneumococcal 13 

Conjugate, PCV13 

(Prevnar 13)              Unknown      Completed                 [facility]

 

                                                    Influenza Virus 

Vaccine                   Unknown      Completed                 [facility]

 

                                                    SARS-COV-2 COVID-19 

PFIZER VACCINE              Unknown      Completed                 [facility]

 

                                                    SARS-COV-2 COVID-19 

PFIZER VACCINE              Unknown      Completed                 [facility]

 

                                                    SARS-COV-2 COVID-19 

PFIZER VACCINE              Unknown      Completed                 [facility]

 

                    Influenza High Dose           Unknown   Completed           

[facility]

 

                                                    Pneumococcal 

Polysaccharide, 

PPSV23 (PNEUMOVAX)              Unknown      Completed                 [facility]

 

                                                    SARS-COV-2 COVID-19 

PFIZER VACCINE              Unknown      Completed                 [facility]

 

                    PPD (TB)            Unknown   Completed           [facility]

 

                                                    Influenza Virus 

Vaccine Quad IM 3+ 

YRS                       Unknown      Completed                 [facility]

 

                                                    Influenza High Dose 

Quad                      Unknown      Completed                 [facility]

 

                                                    SARS-COV-2 COVID-19 

CHAPARRO-SUCROSE 

VACCINE 12 YRS+, 

BIVALENT 0.3ML, IM, 

(PFIZER GRAY TOP)              Unknown      Completed                 [facility]

 

                                                    Influenza Virus 

Vaccine,quad 

Im,preserve Free 

65+ (FLUAD)               Unknown      Completed                 [facility]

 

                                                    SARS-COV-2 COVID 19 

CHAPARRO SUCROSE 

VACCINE 12+, 

4581-7309, 0.3 ML 

(30 MCG), IM PFIZER 

(GRAY TOP)                Unknown      Completed                 [facility]

 

                    Influenza High Dose           Unknown   Completed           

[facility]

 

                                                    Pneumococcal 13 

Conjugate, PCV13 

(Prevnar 13)              Unknown      Completed                 [facility]

 

                                                    Influenza Virus 

Vaccine                   Unknown      Completed                 [facility]

 

                                                    SARS-COV-2 COVID-19 

PFIZER VACCINE              Unknown      Completed                 [facility]

 

                                                    SARS-COV-2 COVID-19 

PFIZER VACCINE              Unknown      Completed                 [facility]

 

                                                    SARS-COV-2 COVID-19 

PFIZER VACCINE              Unknown      Completed                 [facility]

 

                    Influenza High Dose           Unknown   Completed           

[facility]

 

                                                    Pneumococcal 

Polysaccharide, 

PPSV23 (PNEUMOVAX)              Unknown      Completed                 [facility]

 

                                                    SARS-COV-2 COVID-19 

PFIZER VACCINE              Unknown      Completed                 [facility]

 

                    PPD (TB)            Unknown   Completed           [facility]

 

                                                    Influenza Virus 

Vaccine Quad IM 3+ 

YRS                       Unknown      Completed                 [facility]

 

                                                    Influenza High Dose 

Quad                      Unknown      Completed                 [facility]

 

                                                    SARS-COV-2 COVID-19 

CHAPARRO-SUCROSE 

VACCINE 12 YRS+, 

BIVALENT 0.3ML, IM, 

(PFIZER GRAY TOP)              Unknown      Completed                 [facility]

 

                                                    Influenza Virus 

Vaccine,quad 

Im,preserve Free 

65+ (FLUAD)               Unknown      Completed                 [facility]

 

                                                    SARS-COV-2 COVID 19 

CHAPARRO SUCROSE 

VACCINE 12+, 

9684-1462, 0.3 ML 

(30 MCG), IM PFIZER 

(GRAY TOP)                Unknown      Completed                 [facility]

 

                    Influenza High Dose           Unknown   Completed           

[facility]

 

                                                    Pneumococcal 13 

Conjugate, PCV13 

(Prevnar 13)              Unknown      Completed                 [facility]

 

                                                    Influenza Virus 

Vaccine                   Unknown      Completed                 [facility]

 

                                                    SARS-COV-2 COVID-19 

PFIZER VACCINE              Unknown      Completed                 [facility]

 

                                                    SARS-COV-2 COVID-19 

PFIZER VACCINE              Unknown      Completed                 [facility]

 

                                                    SARS-COV-2 COVID-19 

PFIZER VACCINE              Unknown      Completed                 [facility]

 

                    Influenza High Dose           Unknown   Completed           

[facility]

 

                                                    Pneumococcal 

Polysaccharide, 

PPSV23 (PNEUMOVAX)              Unknown      Completed                 [facility]

 

                                                    SARS-COV-2 COVID-19 

PFIZER VACCINE              Unknown      Completed                 [facility]

 

                    PPD (TB)            Unknown   Completed           [facility]

 

                                                    Influenza Virus 

Vaccine Quad IM 3+ 

YRS                       Unknown      Completed                 [facility]

 

                                                    Influenza High Dose 

Quad                      Unknown      Completed                 [facility]

 

                                                    SARS-COV-2 COVID-19 

CHAPARRO-SUCROSE 

VACCINE 12 YRS+, 

BIVALENT 0.3ML, IM, 

(PFIZER GRAY TOP)              Unknown      Completed                 [facility]

 

                                                    Influenza Virus 

Vaccine,quad 

Im,preserve Free 

65+ (FLUAD)               Unknown      Completed                 [facility]

 

                                                    SARS-COV-2 COVID 19 

CHAPARRO SUCROSE 

VACCINE 12+, 

5049-3234, 0.3 ML 

(30 MCG), IM PFIZER 

(GRAY TOP)                Unknown      Completed                 [facility]

 

                    Influenza High Dose           Unknown   Completed           

[facility]

 

                                                    Pneumococcal 13 

Conjugate, PCV13 

(Prevnar 13)              Unknown      Completed                 [facility]

 

                                                    Influenza Virus 

Vaccine                   Unknown      Completed                 [facility]

 

                                                    SARS-COV-2 COVID-19 

PFIZER VACCINE              Unknown      Completed                 [facility]

 

                                                    SARS-COV-2 COVID-19 

PFIZER VACCINE              Unknown      Completed                 [facility]

 

                                                    SARS-COV-2 COVID-19 

PFIZER VACCINE              Unknown      Completed                 [facility]

 

                    Influenza High Dose           Unknown   Completed           

[facility]

 

                                                    Pneumococcal 

Polysaccharide, 

PPSV23 (PNEUMOVAX)              Unknown      Completed                 [facility]

 

                                                    SARS-COV-2 COVID-19 

PFIZER VACCINE              Unknown      Completed                 [facility]

 

                    PPD (TB)            Unknown   Completed           [facility]

 

                                                    Influenza Virus 

Vaccine Quad IM 3+ 

YRS                       Unknown      Completed                 [facility]

 

                                                    Influenza High Dose 

Quad                      Unknown      Completed                 [facility]

 

                                                    SARS-COV-2 COVID-19 

CHAPARRO-SUCROSE 

VACCINE 12 YRS+, 

BIVALENT 0.3ML, IM, 

(PFIZER GRAY TOP)              Unknown      Completed                 [facility]

 

                                                    Influenza Virus 

Vaccine,quad 

Im,preserve Free 

65+ (FLUAD)               Unknown      Completed                 [facility]

 

                                                    SARS-COV-2 COVID 19 

CHAPARRO SUCROSE 

VACCINE 12+, 

2621-0939, 0.3 ML 

(30 MCG), IM PFIZER 

(GRAY TOP)                Unknown      Completed                 [facility]

 

                    Influenza High Dose           Unknown   Completed           

[facility]

 

                                                    Pneumococcal 13 

Conjugate, PCV13 

(Prevnar 13)              Unknown      Completed                 [facility]

 

                                                    Influenza Virus 

Vaccine                   Unknown      Completed                 [facility]

 

                                                    SARS-COV-2 COVID-19 

PFIZER VACCINE              Unknown      Completed                 [facility]

 

                                                    SARS-COV-2 COVID-19 

PFIZER VACCINE              Unknown      Completed                 [facility]

 

                                                    SARS-COV-2 COVID-19 

PFIZER VACCINE              Unknown      Completed                 [facility]

 

                    Influenza High Dose           Unknown   Completed           

[facility]

 

                                                    Pneumococcal 

Polysaccharide, 

PPSV23 (PNEUMOVAX)              Unknown      Completed                 [facility]

 

                                                    SARS-COV-2 COVID-19 

PFIZER VACCINE              Unknown      Completed                 [facility]

 

                    PPD (TB)            Unknown   Completed           [facility]

 

                                                    Influenza Virus 

Vaccine Quad IM 3+ 

YRS                       Unknown      Completed                 [facility]

 

                                                    Influenza High Dose 

Quad                      Unknown      Completed                 [facility]

 

                                                    SARS-COV-2 COVID-19 

CHAPARRO-SUCROSE 

VACCINE 12 YRS+, 

BIVALENT 0.3ML, IM, 

(PFIZER GRAY TOP)              Unknown      Completed                 [facility]

 

                                                    Influenza Virus 

Vaccine,quad 

Im,preserve Free 

65+ (FLUAD)               Unknown      Completed                 [facility]

 

                                                    SARS-COV-2 COVID 19 

CHAPARRO SUCROSE 

VACCINE 12+, 

2292-7314, 0.3 ML 

(30 MCG), IM PFIZER 

(GRAY TOP)                Unknown      Completed                 [facility]

 

                    Influenza High Dose           Unknown   Completed           

[facility]

 

                                                    Pneumococcal 13 

Conjugate, PCV13 

(Prevnar 13)              Unknown      Completed                 [facility]

 

                                                    Influenza Virus 

Vaccine                   Unknown      Completed                 [facility]

 

                                                    SARS-COV-2 COVID-19 

PFIZER VACCINE              Unknown      Completed                 [facility]

 

                                                    SARS-COV-2 COVID-19 

PFIZER VACCINE              Unknown      Completed                 [facility]

 

                                                    SARS-COV-2 COVID-19 

PFIZER VACCINE              Unknown      Completed                 [facility]

 

                    Influenza High Dose           Unknown   Completed           

[facility]

 

                                                    Pneumococcal 

Polysaccharide, 

PPSV23 (PNEUMOVAX)              Unknown      Completed                 [facility]

 

                                                    SARS-COV-2 COVID-19 

PFIZER VACCINE              Unknown      Completed                 [facility]

 

                    PPD (TB)            Unknown   Completed           [facility]

 

                                                    Influenza Virus 

Vaccine Quad IM 3+ 

YRS                       Unknown      Completed                 [facility]

 

                                                    Influenza High Dose 

Quad                      Unknown      Completed                 [facility]

 

                                                    SARS-COV-2 COVID-19 

CHAPARRO-SUCROSE 

VACCINE 12 YRS+, 

BIVALENT 0.3ML, IM, 

(PFIZER GRAY TOP)              Unknown      Completed                 [facility]

 

                                                    Influenza Virus 

Vaccine,quad 

Im,preserve Free 

65+ (FLUAD)               Unknown      Completed                 [facility]

 

                                                    SARS-COV-2 COVID 19 

CHAPARRO SUCROSE 

VACCINE 12+, 

6248-2970, 0.3 ML 

(30 MCG), IM PFIZER 

(GRAY TOP)                Unknown      Completed                 [facility]

 

                    Influenza High Dose           Unknown   Completed           

[facility]

 

                                                    Pneumococcal 13 

Conjugate, PCV13 

(Prevnar 13)              Unknown      Completed                 [facility]

 

                                                    Influenza Virus 

Vaccine                   Unknown      Completed                 [facility]

 

                                                    SARS-COV-2 COVID-19 

PFIZER VACCINE              Unknown      Completed                 [facility]

 

                                                    SARS-COV-2 COVID-19 

PFIZER VACCINE              Unknown      Completed                 [facility]

 

                                                    SARS-COV-2 COVID-19 

PFIZER VACCINE              Unknown      Completed                 [facility]

 

                    Influenza High Dose           Unknown   Completed           

[facility]

 

                                                    Pneumococcal 

Polysaccharide, 

PPSV23 (PNEUMOVAX)              Unknown      Completed                 [facility]

 

                                                    SARS-COV-2 COVID-19 

PFIZER VACCINE              Unknown      Completed                 [facility]

 

                    PPD (TB)            Unknown   Completed           [facility]

 

                                                    Influenza Virus 

Vaccine Quad IM 3+ 

YRS                       Unknown      Completed                 [facility]

 

                                                    Influenza High Dose 

Quad                      Unknown      Completed                 [facility]

 

                                                    SARS-COV-2 COVID-19 

CHAPARRO-SUCROSE 

VACCINE 12 YRS+, 

BIVALENT 0.3ML, IM, 

(PFIZER GRAY TOP)              Unknown      Completed                 [facility]

 

                                                    Influenza Virus 

Vaccine,quad 

Im,preserve Free 

65+ (FLUAD)               Unknown      Completed                 [facility]

 

                                                    SARS-COV-2 COVID 19 

CHAPARRO SUCROSE 

VACCINE 12+, 

0628-9020, 0.3 ML 

(30 MCG), IM PFIZER 

(GRAY TOP)                Unknown      Completed                 [facility]

 

                    Influenza High Dose           Unknown   Completed           

[facility]

 

                                                    Pneumococcal 13 

Conjugate, PCV13 

(Prevnar 13)              Unknown      Completed                 [facility]

 

                                                    Influenza Virus 

Vaccine                   Unknown      Completed                 [facility]

 

                                                    SARS-COV-2 COVID-19 

PFIZER VACCINE              Unknown      Completed                 [facility]

 

                                                    SARS-COV-2 COVID-19 

PFIZER VACCINE              Unknown      Completed                 [facility]

 

                                                    SARS-COV-2 COVID-19 

PFIZER VACCINE              Unknown      Completed                 [facility]

 

                    Influenza High Dose           Unknown   Completed           

[facility]

 

                                                    Pneumococcal 

Polysaccharide, 

PPSV23 (PNEUMOVAX)              Unknown      Completed                 [facility]

 

                                                    SARS-COV-2 COVID-19 

PFIZER VACCINE              Unknown      Completed                 [facility]

 

                    PPD (TB)            Unknown   Completed           [facility]

 

                                                    Influenza Virus 

Vaccine Quad IM 3+ 

YRS                       Unknown      Completed                 [facility]

 

                                                    Influenza High Dose 

Quad                      Unknown      Completed                 [facility]

 

                                                    SARS-COV-2 COVID-19 

CHAPARRO-SUCROSE 

VACCINE 12 YRS+, 

BIVALENT 0.3ML, IM, 

(PFIZER GRAY TOP)              Unknown      Completed                 [facility]

 

                                                    Influenza Virus 

Vaccine,quad 

Im,preserve Free 

65+ (FLUAD)               Unknown      Completed                 [facility]

 

                                                    SARS-COV-2 COVID 19 

CHAPARRO SUCROSE 

VACCINE 12+, 

7298-4044, 0.3 ML 

(30 MCG), IM PFIZER 

(GRAY TOP)                Unknown      Completed                 [facility]

 

                    Influenza High Dose           Unknown   Completed           

[facility]

 

                                                    Pneumococcal 13 

Conjugate, PCV13 

(Prevnar 13)              Unknown      Completed                 [facility]

 

                                                    Influenza Virus 

Vaccine                   Unknown      Completed                 [facility]

 

                                                    SARS-COV-2 COVID-19 

PFIZER VACCINE              Unknown      Completed                 [facility]

 

                                                    SARS-COV-2 COVID-19 

PFIZER VACCINE              Unknown      Completed                 [facility]

 

                                                    SARS-COV-2 COVID-19 

PFIZER VACCINE              Unknown      Completed                 [facility]

 

                    Influenza High Dose           Unknown   Completed           

[facility]

 

                                                    Pneumococcal 

Polysaccharide, 

PPSV23 (PNEUMOVAX)              Unknown      Completed                 [facility]

 

                                                    SARS-COV-2 COVID-19 

PFIZER VACCINE              Unknown      Completed                 [facility]

 

                    PPD (TB)            Unknown   Completed           [facility]

 

                                                    Influenza Virus 

Vaccine Quad IM 3+ 

YRS                       Unknown      Completed                 [facility]

 

                                                    Influenza High Dose 

Quad                      Unknown      Completed                 [facility]

 

                                                    SARS-COV-2 COVID-19 

CHAPARRO-SUCROSE 

VACCINE 12 YRS+, 

BIVALENT 0.3ML, IM, 

(PFIZER GRAY TOP)              Unknown      Completed                 [facility]

 

                                                    Influenza Virus 

Vaccine,quad 

Im,preserve Free 

65+ (FLUAD)               Unknown      Completed                 [facility]

 

                                                    SARS-COV-2 COVID 19 

CHAPARRO SUCROSE 

VACCINE 12+, 

9168-5405, 0.3 ML 

(30 MCG), IM PFIZER 

(GRAY TOP)                Unknown      Completed                 [facility]

 

                    Influenza High Dose           Unknown   Completed           

[facility]

 

                                                    Pneumococcal 13 

Conjugate, PCV13 

(Prevnar 13)              Unknown      Completed                 [facility]

 

                                                    Influenza Virus 

Vaccine                   Unknown      Completed                 [facility]

 

                                                    SARS-COV-2 COVID-19 

PFIZER VACCINE              Unknown      Completed                 [facility]

 

                                                    SARS-COV-2 COVID-19 

PFIZER VACCINE              Unknown      Completed                 [facility]

 

                                                    SARS-COV-2 COVID-19 

PFIZER VACCINE              Unknown      Completed                 [facility]

 

                    Influenza High Dose           Unknown   Completed           

[facility]

 

                                                    Pneumococcal 

Polysaccharide, 

PPSV23 (PNEUMOVAX)              Unknown      Completed                 [facility]

 

                                                    SARS-COV-2 COVID-19 

PFIZER VACCINE              Unknown      Completed                 [facility]

 

                    PPD (TB)            Unknown   Completed           [facility]

 

                                                    Influenza Virus 

Vaccine Quad IM 3+ 

YRS                       Unknown      Completed                 [facility]

 

                                                    Influenza High Dose 

Quad                      Unknown      Completed                 [facility]

 

                                                    SARS-COV-2 COVID-19 

CHAPARRO-SUCROSE 

VACCINE 12 YRS+, 

BIVALENT 0.3ML, IM, 

(PFIZER GRAY TOP)              Unknown      Completed                 [facility]

 

                                                    Influenza Virus 

Vaccine,quad 

Im,preserve Free 

65+ (FLUAD)               Unknown      Completed                 [facility] [facility]

 

                                                    SARS-COV-2 COVID 19 

CHAPARRO SUCROSE 

VACCINE 12+, 

5320-4245, 0.3 ML 

(30 MCG), IM PFIZER 

(GRAY TOP)                Unknown      Completed                 [facility]

 

                    Influenza High Dose           Unknown   Completed           

[facility]

 

                                                    Pneumococcal 13 

Conjugate, PCV13 

(Prevnar 13)              Unknown      Completed                 [facility]

 

                                                    Influenza Virus 

Vaccine                   Unknown      Completed                 [facility]

 

                                                    SARS-COV-2 COVID-19 

PFIZER VACCINE              Unknown      Completed                 [facility]

 

                                                    SARS-COV-2 COVID-19 

PFIZER VACCINE              Unknown      Completed                 [facility]

 

                                                    SARS-COV-2 COVID-19 

PFIZER VACCINE              Unknown      Completed                 [facility]

 

                    Influenza High Dose           Unknown   Completed           

[facility]

 

                                                    Pneumococcal 

Polysaccharide, 

PPSV23 (PNEUMOVAX)              Unknown      Completed                 [facility]

 

                                                    SARS-COV-2 COVID-19 

PFIZER VACCINE              Unknown      Completed                 [facility]

 

                    PPD (TB)            Unknown   Completed           [facility]

 

                                                    Influenza Virus 

Vaccine Quad IM 3+ 

YRS                       Unknown      Completed                 [facility]

 

                                                    Influenza High Dose 

Quad                      Unknown      Completed                 [facility]

 

                                                    SARS-COV-2 COVID-19 

CHAPARRO-SUCROSE 

VACCINE 12 YRS+, 

BIVALENT 0.3ML, IM, 

(PFIZER GRAY TOP)              Unknown      Completed                 [facility]

 

                                                    Influenza Virus 

Vaccine,quad 

Im,preserve Free 

65+ (FLUAD)               Unknown      Completed                 [facility]

 

                                                    SARS-COV-2 COVID 19 

CHAPARRO SUCROSE 

VACCINE 12+, 

4005-2276, 0.3 ML 

(30 MCG), IM PFIZER 

(GRAY TOP)                Unknown      Completed                 [facility]

 

                    Influenza High Dose           Unknown   Completed           

[facility]

 

                                                    Pneumococcal 13 

Conjugate, PCV13 

(Prevnar 13)              Unknown      Completed                 [facility]

 

                                                    Influenza Virus 

Vaccine                   Unknown      Completed                 [facility]

 

                                                    SARS-COV-2 COVID-19 

PFIZER VACCINE              Unknown      Completed                 [facility]

 

                                                    SARS-COV-2 COVID-19 

PFIZER VACCINE              Unknown      Completed                 [facility]

 

                                                    SARS-COV-2 COVID-19 

PFIZER VACCINE              Unknown      Completed                 [facility]

 

                    Influenza High Dose           Unknown   Completed           

[facility]

 

                                                    Pneumococcal 

Polysaccharide, 

PPSV23 (PNEUMOVAX)              Unknown      Completed                 [facility]

 

                                                    SARS-COV-2 COVID-19 

PFIZER VACCINE              Unknown      Completed                 [facility]

 

                    PPD (TB)            Unknown   Completed           [facility]

 

                                                    Influenza Virus 

Vaccine Quad IM 3+ 

YRS                       Unknown      Completed                 [facility]

 

                                                    Influenza High Dose 

Quad                      Unknown      Completed                 [facility]

 

                                                    SARS-COV-2 COVID-19 

CHAPARRO-SUCROSE 

VACCINE 12 YRS+, 

BIVALENT 0.3ML, IM, 

(PFIZER GRAY TOP)              Unknown      Completed                 [facility]

 

                                                    Influenza Virus 

Vaccine,quad 

Im,preserve Free 

65+ (FLUAD)               Unknown      Completed                 [facility]

 

                                                    SARS-COV-2 COVID 19 

CHAPARRO SUCROSE 

VACCINE 12+, 

0832-5647, 0.3 ML 

(30 MCG), IM PFIZER 

(GRAY TOP)                Unknown      Completed                 [facility]

 

                    Influenza High Dose           Unknown   Completed           

[facility]

 

                                                    Pneumococcal 13 

Conjugate, PCV13 

(Prevnar 13)              Unknown      Completed                 [facility]

 

                                                    Influenza Virus 

Vaccine                   Unknown      Completed                 [facility]

 

                                                    SARS-COV-2 COVID-19 

PFIZER VACCINE              Unknown      Completed                 [facility]

 

                                                    SARS-COV-2 COVID-19 

PFIZER VACCINE              Unknown      Completed                 [facility]

 

                                                    SARS-COV-2 COVID-19 

PFIZER VACCINE              Unknown      Completed                 [facility]

 

                    Influenza High Dose           Unknown   Completed           

[facility]

 

                                                    Pneumococcal 

Polysaccharide, 

PPSV23 (PNEUMOVAX)              Unknown      Completed                 [facility]

 

                                                    SARS-COV-2 COVID-19 

PFIZER VACCINE              Unknown      Completed                 [facility]

 

                    PPD (TB)            Unknown   Completed           [facility]

 

                                                    Influenza Virus 

Vaccine Quad IM 3+ 

YRS                       Unknown      Completed                 [facility]

 

                                                    Influenza High Dose 

Quad                      Unknown      Completed                 [facility]

 

                                                    SARS-COV-2 COVID-19 

CHAPARRO-SUCROSE 

VACCINE 12 YRS+, 

BIVALENT 0.3ML, IM, 

(PFIZER GRAY TOP)              Unknown      Completed                 [facility]

 

                                                    Influenza Virus 

Vaccine,quad 

Im,preserve Free 

65+ (FLUAD)               Unknown      Completed                 [facility]

 

                                                    SARS-COV-2 COVID 19 

CHAPARRO SUCROSE 

VACCINE 12+, 

3114-7092, 0.3 ML 

(30 MCG), IM PFIZER 

(GRAY TOP)                Unknown      Completed                 [facility]

 

                    Influenza High Dose           Unknown   Completed           

[facility]

 

                                                    Pneumococcal 13 

Conjugate, PCV13 

(Prevnar 13)              Unknown      Completed                 [facility]

 

                                                    Influenza Virus 

Vaccine                   Unknown      Completed                 [facility]

 

                                                    SARS-COV-2 COVID-19 

PFIZER VACCINE              Unknown      Completed                 [facility]

 

                                                    SARS-COV-2 COVID-19 

PFIZER VACCINE              Unknown      Completed                 [facility]

 

                                                    SARS-COV-2 COVID-19 

PFIZER VACCINE              Unknown      Completed                 [facility]

 

                    Influenza High Dose           Unknown   Completed           

[facility]

 

                                                    Pneumococcal 

Polysaccharide, 

PPSV23 (PNEUMOVAX)              Unknown      Completed                 [facility]

 

                                                    SARS-COV-2 COVID-19 

PFIZER VACCINE              Unknown      Completed                 [facility]

 

                    PPD (TB)            Unknown   Completed           [facility]

 

                                                    Influenza Virus 

Vaccine Quad IM 3+ 

YRS                       Unknown      Completed                 [facility]

 

                                                    Influenza High Dose 

Quad                      Unknown      Completed                 [facility]

 

                                                    SARS-COV-2 COVID-19 

CHAPARRO-SUCROSE 

VACCINE 12 YRS+, 

BIVALENT 0.3ML, IM, 

(PFIZER GRAY TOP)              Unknown      Completed                 [facility]

 

                                                    Influenza Virus 

Vaccine,quad 

Im,preserve Free 

65+ (FLUAD)               Unknown      Completed                 [facility]

 

                                                    SARS-COV-2 COVID 19 

CHAPARRO SUCROSE 

VACCINE 12+, 

5795-1339, 0.3 ML 

(30 MCG), IM PFIZER 

(GRAY TOP)                Unknown      Completed                 [facility]

 

                    Influenza High Dose           Unknown   Completed           

[facility]

 

                                                    Pneumococcal 13 

Conjugate, PCV13 

(Prevnar 13)              Unknown      Completed                 [facility]

 

                                                    Influenza Virus 

Vaccine                   Unknown      Completed                 [facility]

 

                                                    SARS-COV-2 COVID-19 

PFIZER VACCINE              Unknown      Completed                 [facility]

 

                                                    SARS-COV-2 COVID-19 

PFIZER VACCINE              Unknown      Completed                 [facility]

 

                                                    SARS-COV-2 COVID-19 

PFIZER VACCINE              Unknown      Completed                 [facility]

 

                    Influenza High Dose           Unknown   Completed           

[facility]

 

                                                    Pneumococcal 

Polysaccharide, 

PPSV23 (PNEUMOVAX)              Unknown      Completed                 [facility]

 

                                                    SARS-COV-2 COVID-19 

PFIZER VACCINE              Unknown      Completed                 [facility]

 

                    PPD (TB)            Unknown   Completed           [facility]

 

                                                    Influenza Virus 

Vaccine Quad IM 3+ 

YRS                       Unknown      Completed                 [facility]

 

                                                    Influenza High Dose 

Quad                      Unknown      Completed                 [facility]

 

                                                    SARS-COV-2 COVID-19 

CHAPARRO-SUCROSE 

VACCINE 12 YRS+, 

BIVALENT 0.3ML, IM, 

(PFIZER GRAY TOP)              Unknown      Completed                 [facility]

 

                                                    Influenza Virus 

Vaccine,quad 

Im,preserve Free 

65+ (FLUAD)               Unknown      Completed                 [facility]

 

                                                    SARS-COV-2 COVID 19 

CHAPARRO SUCROSE 

VACCINE 12+, 

6197-3019, 0.3 ML 

(30 MCG), IM PFIZER 

(GRAY TOP)                Unknown      Completed                 [facility]

 

                    Influenza High Dose           Unknown   Completed           

[facility]

 

                                                    Pneumococcal 13 

Conjugate, PCV13 

(Prevnar 13)              Unknown      Completed                 [facility]

 

                                                    Influenza Virus 

Vaccine                   Unknown      Completed                 [facility]

 

                                                    SARS-COV-2 COVID-19 

PFIZER VACCINE              Unknown      Completed                 [facility]

 

                                                    SARS-COV-2 COVID-19 

PFIZER VACCINE              Unknown      Completed                 [facility]

 

                                                    SARS-COV-2 COVID-19 

PFIZER VACCINE              Unknown      Completed                 [facility]

 

                    Influenza High Dose           Unknown   Completed           

[facility]

 

                                                    Pneumococcal 

Polysaccharide, 

PPSV23 (PNEUMOVAX)              Unknown      Completed                 [facility]

 

                                                    SARS-COV-2 COVID-19 

PFIZER VACCINE              Unknown      Completed                 [facility]

 

                    PPD (TB)            Unknown   Completed           [facility]

 

                                                    Influenza Virus 

Vaccine Quad IM 3+ 

YRS                       Unknown      Completed                 [facility]

 

                                                    Influenza High Dose 

Quad                      Unknown      Completed                 [facility]

 

                                                    SARS-COV-2 COVID-19 

CHAPARRO-SUCROSE 

VACCINE 12 YRS+, 

BIVALENT 0.3ML, IM, 

(PFIZER GRAY TOP)              Unknown      Completed                 [facility]

 

                                                    Influenza Virus 

Vaccine,quad 

Im,preserve Free 

65+ (FLUAD)               Unknown      Completed                 [facility]

 

                                                    SARS-COV-2 COVID 19 

CHAPARRO SUCROSE 

VACCINE 12+, 

8771-5883, 0.3 ML 

(30 MCG), IM PFIZER 

(GRAY TOP)                Unknown      Completed                 [facility]

 

                    Influenza High Dose           Unknown   Completed           

[facility]

 

                                                    Pneumococcal 13 

Conjugate, PCV13 

(Prevnar 13)              Unknown      Completed                 [facility]

 

                                                    Influenza Virus 

Vaccine                   Unknown      Completed                 [facility]

 

                                                    SARS-COV-2 COVID-19 

PFIZER VACCINE              Unknown      Completed                 [facility]

 

                                                    SARS-COV-2 COVID-19 

PFIZER VACCINE              Unknown      Completed                 [facility]

 

                                                    SARS-COV-2 COVID-19 

PFIZER VACCINE              Unknown      Completed                 [facility]

 

                    Influenza High Dose           Unknown   Completed           

[facility]

 

                                                    Pneumococcal 

Polysaccharide, 

PPSV23 (PNEUMOVAX)              Unknown      Completed                 [facility]

 

                                                    SARS-COV-2 COVID-19 

PFIZER VACCINE              Unknown      Completed                 [facility]

 

                    PPD (TB)            Unknown   Completed           [facility]

 

                                                    Influenza Virus 

Vaccine Quad IM 3+ 

YRS                       Unknown      Completed                 [facility]

 

                                                    Influenza High Dose 

Quad                      Unknown      Completed                 [facility]

 

                                                    SARS-COV-2 COVID-19 

CHAPARRO-SUCROSE 

VACCINE 12 YRS+, 

BIVALENT 0.3ML, IM, 

(PFIZER GRAY TOP)              Unknown      Completed                 [facility]

 

                                                    Influenza Virus 

Vaccine,quad 

Im,preserve Free 

65+ (FLUAD)               Unknown      Completed                 [facility]

 

                                                    SARS-COV-2 COVID 19 

CHAPARRO SUCROSE 

VACCINE 12+, 

8413-9329, 0.3 ML 

(30 MCG), IM PFIZER 

(GRAY TOP)                Unknown      Completed                 [facility]

 

                    Influenza High Dose           Unknown   Completed           

[facility]

 

                                                    Pneumococcal 13 

Conjugate, PCV13 

(Prevnar 13)              Unknown      Completed                 [facility]

 

                                                    Influenza Virus 

Vaccine                   Unknown      Completed                 [facility]

 

                                                    SARS-COV-2 COVID-19 

PFIZER VACCINE              Unknown      Completed                 [facility]

 

                                                    SARS-COV-2 COVID-19 

PFIZER VACCINE              Unknown      Completed                 [facility]

 

                                                    SARS-COV-2 COVID-19 

PFIZER VACCINE              Unknown      Completed                 [facility]

 

                    Influenza High Dose           Unknown   Completed           

[facility]

 

                                                    Pneumococcal 

Polysaccharide, 

PPSV23 (PNEUMOVAX)              Unknown      Completed                 [facility]

 

                                                    SARS-COV-2 COVID-19 

PFIZER VACCINE              Unknown      Completed                 [facility]

 

                    PPD (TB)            Unknown   Completed           [facility]

 

                                                    Influenza Virus 

Vaccine Quad IM 3+ 

YRS                       Unknown      Completed                 [facility]

 

                                                    Influenza High Dose 

Quad                      Unknown      Completed                 [facility]

 

                                                    SARS-COV-2 COVID-19 

CHAPARRO-SUCROSE 

VACCINE 12 YRS+, 

BIVALENT 0.3ML, IM, 

(PFIZER GRAY TOP)              Unknown      Completed                 [facility]

 

                                                    Influenza Virus 

Vaccine,quad 

Im,preserve Free 

65+ (FLUAD)               Unknown      Completed                 [facility]

 

                                                    SARS-COV-2 COVID 19 

CHAPARRO SUCROSE 

VACCINE 12+, 

5814-0405, 0.3 ML 

(30 MCG), IM PFIZER 

(GRAY TOP)                Unknown      Completed                 [facility]

 

                    Influenza High Dose           Unknown   Completed           

[facility]

 

                                                    Pneumococcal 13 

Conjugate, PCV13 

(Prevnar 13)              Unknown      Completed                 [facility]

 

                                                    Influenza Virus 

Vaccine                   Unknown      Completed                 [facility]

 

                                                    SARS-COV-2 COVID-19 

PFIZER VACCINE              Unknown      Completed                 [facility]

 

                                                    SARS-COV-2 COVID-19 

PFIZER VACCINE              Unknown      Completed                 [facility]

 

                                                    SARS-COV-2 COVID-19 

PFIZER VACCINE              Unknown      Completed                 [facility]

 

                    Influenza High Dose           Unknown   Completed           

[facility]

 

                                                    Pneumococcal 

Polysaccharide, 

PPSV23 (PNEUMOVAX)              Unknown      Completed                 [facility]

 

                                                    SARS-COV-2 COVID-19 

PFIZER VACCINE              Unknown      Completed                 [facility]

 

                    PPD (TB)            Unknown   Completed           [facility]

 

                                                    Influenza Virus 

Vaccine Quad IM 3+ 

YRS                       Unknown      Completed                 [facility] [place]

 

                                                    Influenza High Dose 

Quad                      Unknown      Completed                 [facility]

 

                                                    SARS-COV-2 COVID-19 

CHAPARRO-SUCROSE 

VACCINE 12 YRS+, 

BIVALENT 0.3ML, IM, 

(PFIZER GRAY TOP)              Unknown      Completed                 [facility]

 

                                                    Influenza Virus 

Vaccine,quad 

Im,preserve Free 

65+ (FLUAD)               Unknown      Completed                 [facility]

 

                                                    SARS-COV-2 COVID 19 

CHAPARRO SUCROSE 

VACCINE 12+, 

1499-1553, 0.3 ML 

(30 MCG), IM PFIZER 

(GRAY TOP)                Unknown      Completed                 [facility]

 

                    Influenza High Dose           Unknown   Completed           

[facility]

 

                                                    Pneumococcal 13 

Conjugate, PCV13 

(Prevnar 13)              Unknown      Completed                 [facility]

 

                                                    Influenza Virus 

Vaccine                   Unknown      Completed                 [facility]

 

                                                    SARS-COV-2 COVID-19 

PFIZER VACCINE              Unknown      Completed                 [facility]

 

                                                    SARS-COV-2 COVID-19 

PFIZER VACCINE              Unknown      Completed                 [facility]

 

                                                    SARS-COV-2 COVID-19 

PFIZER VACCINE              Unknown      Completed                 [facility]

 

                    Influenza High Dose           Unknown   Completed           

[facility]

 

                                                    Pneumococcal 

Polysaccharide, 

PPSV23 (PNEUMOVAX)              Unknown      Completed                 [facility]

 

                                                    SARS-COV-2 COVID-19 

PFIZER VACCINE              Unknown      Completed                 [facility]

 

                    PPD (TB)            Unknown   Completed           [facility]

 

                                                    Influenza Virus 

Vaccine Quad IM 3+ 

YRS                       Unknown      Completed                 [facility]

 

                                                    Influenza High Dose 

Quad                      Unknown      Completed                 [facility]

 

                                                    SARS-COV-2 COVID-19 

CHAPARRO-SUCROSE 

VACCINE 12 YRS+, 

BIVALENT 0.3ML, IM, 

(PFIZER GRAY TOP)              Unknown      Completed                 [facility]

 

                                                    Influenza Virus 

Vaccine,quad 

Im,preserve Free 

65+ (FLUAD)               Unknown      Completed                 [facility]

 

                                                    SARS-COV-2 COVID 19 

CHAPARRO SUCROSE 

VACCINE 12+, 

3995-6900, 0.3 ML 

(30 MCG), IM PFIZER 

(GRAY TOP)                Unknown      Completed                 [facility]

 

                    Influenza High Dose           Unknown   Completed           

[facility]

 

                                                    Pneumococcal 13 

Conjugate, PCV13 

(Prevnar 13)              Unknown      Completed                 [facility]

 

                                                    Influenza Virus 

Vaccine                   Unknown      Completed                 [facility]

 

                                                    SARS-COV-2 COVID-19 

PFIZER VACCINE              Unknown      Completed                 [facility]

 

                                                    SARS-COV-2 COVID-19 

PFIZER VACCINE              Unknown      Completed                 [facility]

 

                                                    SARS-COV-2 COVID-19 

PFIZER VACCINE              Unknown      Completed                 [facility]

 

                    Influenza High Dose           Unknown   Completed           

[facility]

 

                                                    Pneumococcal 

Polysaccharide, 

PPSV23 (PNEUMOVAX)              Unknown      Completed                 [facility]

 

                                                    SARS-COV-2 COVID-19 

PFIZER VACCINE              Unknown      Completed                 [facility]

 

                    PPD (TB)            Unknown   Completed           [facility]

 

                                                    Influenza Virus 

Vaccine Quad IM 3+ 

YRS                       Unknown      Completed                 [facility]

 

                                                    Influenza High Dose 

Quad                      Unknown      Completed                 [facility]

 

                                                    SARS-COV-2 COVID-19 

CHAPARRO-SUCROSE 

VACCINE 12 YRS+, 

BIVALENT 0.3ML, IM, 

(PFIZER GRAY TOP)              Unknown      Completed                 [facility]

 

                                                    Influenza Virus 

Vaccine,quad 

Im,preserve Free 

65+ (FLUAD)               Unknown      Completed                 [facility]

 

                                                    SARS-COV-2 COVID 19 

CHAPARRO SUCROSE 

VACCINE 12+, 

4114-7367, 0.3 ML 

(30 MCG), IM PFIZER 

(GRAY TOP)                Unknown      Completed                 [facility]

 

                    Influenza High Dose           Unknown   Completed           

[facility]

 

                                                    Pneumococcal 13 

Conjugate, PCV13 

(Prevnar 13)              Unknown      Completed                 [facility]

 

                                                    Influenza Virus 

Vaccine                   Unknown      Completed                 [facility]

 

                                                    SARS-COV-2 COVID-19 

PFIZER VACCINE              Unknown      Completed                 [facility]

 

                                                    SARS-COV-2 COVID-19 

PFIZER VACCINE              Unknown      Completed                 [facility]

 

                                                    SARS-COV-2 COVID-19 

PFIZER VACCINE              Unknown      Completed                 [facility]

 

                    Influenza High Dose           Unknown   Completed           

[facility]

 

                                                    Pneumococcal 

Polysaccharide, 

PPSV23 (PNEUMOVAX)              Unknown      Completed                 [facility]

 

                                                    SARS-COV-2 COVID-19 

PFIZER VACCINE              Unknown      Completed                 [facility]

 

                    PPD (TB)            Unknown   Completed           [facility]

 

                                                    Influenza Virus 

Vaccine Quad IM 3+ 

YRS                       Unknown      Completed                 [facility]

 

                                                    Influenza High Dose 

Quad                      Unknown      Completed                 [facility]

 

                                                    SARS-COV-2 COVID-19 

CHAPARRO-SUCROSE 

VACCINE 12 YRS+, 

BIVALENT 0.3ML, IM, 

(PFIZER GRAY TOP)              Unknown      Completed                 [facility]

 

                                                    Influenza Virus 

Vaccine,quad 

Im,preserve Free 

65+ (FLUAD)               Unknown      Completed                 [facility]

 

                                                    SARS-COV-2 COVID 19 

CHAPARRO SUCROSE 

VACCINE 12+, 

2495-2948, 0.3 ML 

(30 MCG), IM PFIZER 

(GRAY TOP)                Unknown      Completed                 [facility]

 

                    Influenza High Dose           Unknown   Completed           

[facility]

 

                                                    Pneumococcal 13 

Conjugate, PCV13 

(Prevnar 13)              Unknown      Completed                 [facility]

 

                                                    Influenza Virus 

Vaccine                   Unknown      Completed                 [facility]

 

                                                    SARS-COV-2 COVID-19 

PFIZER VACCINE              Unknown      Completed                 [facility]

 

                                                    SARS-COV-2 COVID-19 

PFIZER VACCINE              Unknown      Completed                 [facility]

 

                                                    SARS-COV-2 COVID-19 

PFIZER VACCINE              Unknown      Completed                 [facility]

 

                    Influenza High Dose           Unknown   Completed           

[facility]

 

                                                    Pneumococcal 

Polysaccharide, 

PPSV23 (PNEUMOVAX)              Unknown      Completed                 [facility]

 

                                                    SARS-COV-2 COVID-19 

PFIZER VACCINE              Unknown      Completed                 [facility]

 

                    PPD (TB)            Unknown   Completed           [facility]

 

                                                    Influenza Virus 

Vaccine Quad IM 3+ 

YRS                       Unknown      Completed                 [facility]

 

                                                    Influenza High Dose 

Quad                      Unknown      Completed                 [facility]

 

                                                    SARS-COV-2 COVID-19 

CHAPARRO-SUCROSE 

VACCINE 12 YRS+, 

BIVALENT 0.3ML, IM, 

(PFIZER GRAY TOP)              Unknown      Completed                 [facility]

 

                                                    Influenza Virus 

Vaccine,quad 

Im,preserve Free 

65+ (FLUAD)               Unknown      Completed                 [facility]

 

                                                    SARS-COV-2 COVID 19 

CHAPARRO SUCROSE 

VACCINE 12+, 

4076-2308, 0.3 ML 

(30 MCG), IM PFIZER 

(GRAY TOP)                Unknown      Completed                 [facility]

 

                    Influenza High Dose           Unknown   Completed           

[facility]

 

                                                    Pneumococcal 13 

Conjugate, PCV13 

(Prevnar 13)              Unknown      Completed                 [facility]

 

                                                    Influenza Virus 

Vaccine                   Unknown      Completed                 [facility]

 

                                                    SARS-COV-2 COVID-19 

PFIZER VACCINE              Unknown      Completed                 [facility]

 

                                                    SARS-COV-2 COVID-19 

PFIZER VACCINE              Unknown      Completed                 [facility]

 

                                                    SARS-COV-2 COVID-19 

PFIZER VACCINE              Unknown      Completed                 [facility]

 

                    Influenza High Dose           Unknown   Completed           

[facility]

 

                                                    Pneumococcal 

Polysaccharide, 

PPSV23 (PNEUMOVAX)              Unknown      Completed                 [facility]

 

                                                    SARS-COV-2 COVID-19 

PFIZER VACCINE              Unknown      Completed                 [facility]

 

                    PPD (TB)            Unknown   Completed           [facility]

 

                                                    Influenza Virus 

Vaccine Quad IM 3+ 

YRS                       Unknown      Completed                 [facility]

 

                                                    Influenza High Dose 

Quad                      Unknown      Completed                 [facility]

 

                                                    SARS-COV-2 COVID-19 

CHAPARRO-SUCROSE 

VACCINE 12 YRS+, 

BIVALENT 0.3ML, IM, 

(PFIZER GRAY TOP)              Unknown      Completed                 [facility]

 

                                                    Influenza Virus 

Vaccine,quad 

Im,preserve Free 

65+ (FLUAD)               Unknown      Completed                 [facility]

 

                                                    SARS-COV-2 COVID 19 

CHAPARRO SUCROSE 

VACCINE 12+, 

4209-3670, 0.3 ML 

(30 MCG), IM PFIZER 

(GRAY TOP)                Unknown      Completed                 [facility]

 

                    Influenza High Dose           Unknown   Completed           

[facility]

 

                                                    Pneumococcal 13 

Conjugate, PCV13 

(Prevnar 13)              Unknown      Completed                 [facility]

 

                                                    Influenza Virus 

Vaccine                   Unknown      Completed                 [facility]

 

                                                    SARS-COV-2 COVID-19 

PFIZER VACCINE              Unknown      Completed                 [facility]

 

                                                    SARS-COV-2 COVID-19 

PFIZER VACCINE              Unknown      Completed                 [facility]

 

                                                    SARS-COV-2 COVID-19 

PFIZER VACCINE              Unknown      Completed                 [facility]

 

                    Influenza High Dose           Unknown   Completed           

[facility]

 

                                                    Pneumococcal 

Polysaccharide, 

PPSV23 (PNEUMOVAX)              Unknown      Completed                 [facility]

 

                                                    SARS-COV-2 COVID-19 

PFIZER VACCINE              Unknown      Completed                 [facility]

 

                    PPD (TB)            Unknown   Completed           [facility]

 

                                                    Influenza Virus 

Vaccine Quad IM 3+ 

YRS                       Unknown      Completed                 [facility]

 

                                                    Influenza High Dose 

Quad                      Unknown      Completed                 [facility]

 

                                                    SARS-COV-2 COVID-19 

CHAPARRO-SUCROSE 

VACCINE 12 YRS+, 

BIVALENT 0.3ML, IM, 

(PFIZER GRAY TOP)              Unknown      Completed                 [facility]

 

                                                    Influenza Virus 

Vaccine,quad 

Im,preserve Free 

65+ (FLUAD)               Unknown      Completed                 [facility]

 

                                                    SARS-COV-2 COVID 19 

CHAPARRO SUCROSE 

VACCINE 12+, 

3310-3517, 0.3 ML 

(30 MCG), IM PFIZER 

(GRAY TOP)                Unknown      Completed                 [facility]

 

                    Influenza High Dose           Unknown   Completed           

[facility]

 

                                                    Pneumococcal 13 

Conjugate, PCV13 

(Prevnar 13)              Unknown      Completed                 [facility]

 

                                                    Influenza Virus 

Vaccine                   Unknown      Completed                 [facility]

 

                                                    SARS-COV-2 COVID-19 

PFIZER VACCINE              Unknown      Completed                 [facility]

 

                                                    SARS-COV-2 COVID-19 

PFIZER VACCINE              Unknown      Completed                 [facility]

 

                                                    SARS-COV-2 COVID-19 

PFIZER VACCINE              Unknown      Completed                 [facility]

 

                    Influenza High Dose           Unknown   Completed           

[facility]

 

                                                    Pneumococcal 

Polysaccharide, 

PPSV23 (PNEUMOVAX)              Unknown      Completed                 [facility]

 

                                                    SARS-COV-2 COVID-19 

PFIZER VACCINE              Unknown      Completed                 [facility]

 

                    PPD (TB)            Unknown   Completed           [facility]

 

                                                    Influenza Virus 

Vaccine Quad IM 3+ 

YRS                       Unknown      Completed                 [facility]

 

                                                    Influenza High Dose 

Quad                      Unknown      Completed                 [facility]

 

                                                    SARS-COV-2 COVID-19 

CHAPARRO-SUCROSE 

VACCINE 12 YRS+, 

BIVALENT 0.3ML, IM, 

(PFIZER GRAY TOP)              Unknown      Completed                 [facility]

 

                                                    Influenza Virus 

Vaccine,quad 

Im,preserve Free 

65+ (FLUAD)               Unknown      Completed                 [facility]

 

                                                    SARS-COV-2 COVID 19 

CHAPARRO SUCROSE 

VACCINE 12+, 

0875-6440, 0.3 ML 

(30 MCG), IM PFIZER 

(GRAY TOP)                Unknown      Completed                 [facility]

 

                    Influenza High Dose           Unknown   Completed           

[facility]

 

                                                    Pneumococcal 13 

Conjugate, PCV13 

(Prevnar 13)              Unknown      Completed                 [facility]

 

                                                    Influenza Virus 

Vaccine                   Unknown      Completed                 [facility]

 

                                                    SARS-COV-2 COVID-19 

PFIZER VACCINE              Unknown      Completed                 [facility]

 

                                                    SARS-COV-2 COVID-19 

PFIZER VACCINE              Unknown      Completed                 [facility]

 

                                                    SARS-COV-2 COVID-19 

PFIZER VACCINE              Unknown      Completed                 [facility]

 

                    Influenza High Dose           Unknown   Completed           

[facility]

 

                                                    Pneumococcal 

Polysaccharide, 

PPSV23 (PNEUMOVAX)              Unknown      Completed                 [facility]

 

                                                    SARS-COV-2 COVID-19 

PFIZER VACCINE              Unknown      Completed                 [facility]

 

                    PPD (TB)            Unknown   Completed           [facility]

 

                                                    Influenza Virus 

Vaccine Quad IM 3+ 

YRS                       Unknown      Completed                 [facility]

 

                                                    Influenza High Dose 

Quad                      Unknown      Completed                 [facility]

 

                                                    SARS-COV-2 COVID-19 

CHAPARRO-SUCROSE 

VACCINE 12 YRS+, 

BIVALENT 0.3ML, IM, 

(PFIZER GRAY TOP)              Unknown      Completed                 [facility]

 

                                                    Influenza Virus 

Vaccine,quad 

Im,preserve Free 

65+ (FLUAD)               Unknown      Completed                 [facility]

 

                                                    SARS-COV-2 COVID 19 

CHAPARRO SUCROSE 

VACCINE 12+, 

4895-2169, 0.3 ML 

(30 MCG), IM PFIZER 

(GRAY TOP)                Unknown      Completed                 [facility]

 

                    Influenza High Dose           Unknown   Completed           

[facility]

 

                                                    Pneumococcal 13 

Conjugate, PCV13 

(Prevnar 13)              Unknown      Completed                 [facility]

 

                                                    Influenza Virus 

Vaccine                   Unknown      Completed                 [facility]

 

                                                    SARS-COV-2 COVID-19 

PFIZER VACCINE              Unknown      Completed                 [facility]

 

                                                    SARS-COV-2 COVID-19 

PFIZER VACCINE              Unknown      Completed                 [facility]

 

                                                    SARS-COV-2 COVID-19 

PFIZER VACCINE              Unknown      Completed                 [facility]

 

                    Influenza High Dose           Unknown   Completed           

[facility]

 

                                                    Pneumococcal 

Polysaccharide, 

PPSV23 (PNEUMOVAX)              Unknown      Completed                 [facility]

 

                                                    SARS-COV-2 COVID-19 

PFIZER VACCINE              Unknown      Completed                 [facility]

 

                    PPD (TB)            Unknown   Completed           [facility]

 

                                                    Influenza Virus 

Vaccine Quad IM 3+ 

YRS                       Unknown      Completed                 [facility]

 

                                                    Influenza High Dose 

Quad                      Unknown      Completed                 [facility]

 

                                                    SARS-COV-2 COVID-19 

CHAPARRO-SUCROSE 

VACCINE 12 YRS+, 

BIVALENT 0.3ML, IM, 

(PFIZER GRAY TOP)              Unknown      Completed                 [facility]

 

                                                    Influenza Virus 

Vaccine,quad 

Im,preserve Free 

65+ (FLUAD)               Unknown      Completed                 [facility]

 

                                                    SARS-COV-2 COVID 19 

CHAPARRO SUCROSE 

VACCINE 12+, 

3180-1889, 0.3 ML 

(30 MCG), IM PFIZER 

(GRAY TOP)                Unknown      Completed                 [facility]

 

                    Influenza High Dose           Unknown   Completed           

[facility]

 

                                                    Pneumococcal 13 

Conjugate, PCV13 

(Prevnar 13)              Unknown      Completed                 [facility]

 

                                                    Influenza Virus 

Vaccine                   Unknown      Completed                 [facility]

 

                                                    SARS-COV-2 COVID-19 

PFIZER VACCINE              Unknown      Completed                 [facility]

 

                                                    SARS-COV-2 COVID-19 

PFIZER VACCINE              Unknown      Completed                 [facility]

 

                                                    SARS-COV-2 COVID-19 

PFIZER VACCINE              Unknown      Completed                 [facility]

 

                    Influenza High Dose           Unknown   Completed           

[facility]

 

                                                    Pneumococcal 

Polysaccharide, 

PPSV23 (PNEUMOVAX)              Unknown      Completed                 [facility]

 

                                                    SARS-COV-2 COVID-19 

PFIZER VACCINE              Unknown      Completed                 [facility]

 

                    PPD (TB)            Unknown   Completed           [facility]

 

                                                    Influenza Virus 

Vaccine Quad IM 3+ 

YRS                       Unknown      Completed                 [facility]

 

                                                    Influenza High Dose 

Quad                      Unknown      Completed                 [facility]

 

                                                    SARS-COV-2 COVID-19 

CHAPARRO-SUCROSE 

VACCINE 12 YRS+, 

BIVALENT 0.3ML, IM, 

(PFIZER GRAY TOP)              Unknown      Completed                 [facility]

 

                                                    Influenza Virus 

Vaccine,quad 

Im,preserve Free 

65+ (FLUAD)               Unknown      Completed                 [facility]

 

                                                    SARS-COV-2 COVID 19 

CHAPARRO SUCROSE 

VACCINE 12+, 

5553-0754, 0.3 ML 

(30 MCG), IM PFIZER 

(GRAY TOP)                Unknown      Completed                 [facility]

 

                    Influenza High Dose           Unknown   Completed           

[facility]

 

                                                    Pneumococcal 13 

Conjugate, PCV13 

(Prevnar 13)              Unknown      Completed                 [facility]

 

                                                    Influenza Virus 

Vaccine                   Unknown      Completed                 [facility]

 

                                                    SARS-COV-2 COVID-19 

PFIZER VACCINE              Unknown      Completed                 [facility]

 

                                                    SARS-COV-2 COVID-19 

PFIZER VACCINE              Unknown      Completed                 [facility]

 

                                                    SARS-COV-2 COVID-19 

PFIZER VACCINE              Unknown      Completed                 [facility]

 

                    Influenza High Dose           Unknown   Completed           

[facility]

 

                                                    Pneumococcal 

Polysaccharide, 

PPSV23 (PNEUMOVAX)              Unknown      Completed                 [facility]

 

                                                    SARS-COV-2 COVID-19 

PFIZER VACCINE              Unknown      Completed                 [facility]

 

                    PPD (TB)            Unknown   Completed           [facility]

 

                                                    Influenza Virus 

Vaccine Quad IM 3+ 

YRS                       Unknown      Completed                 [facility]

 

                                                    Influenza High Dose 

Quad                      Unknown      Completed                 [facility]

 

                                                    SARS-COV-2 COVID-19 

CHAPARRO-SUCROSE 

VACCINE 12 YRS+, 

BIVALENT 0.3ML, IM, 

(PFIZER GRAY TOP)              Unknown      Completed                 [facility]

 

                                                    Influenza Virus 

Vaccine,quad 

Im,preserve Free 

65+ (FLUAD)               Unknown      Completed                 [facility]

 

                                                    SARS-COV-2 COVID 19 

CHAPARRO SUCROSE 

VACCINE 12+, 

1826-5896, 0.3 ML 

(30 MCG), IM PFIZER 

(GRAY TOP)                Unknown      Completed                 [facility]

 

                    Influenza High Dose           Unknown   Completed           

[facility]

 

                                                    Pneumococcal 13 

Conjugate, PCV13 

(Prevnar 13)              Unknown      Completed                 [facility]

 

                                                    Influenza Virus 

Vaccine                   Unknown      Completed                 [facility]

 

                                                    SARS-COV-2 COVID-19 

PFIZER VACCINE              Unknown      Completed                 [facility]

 

                                                    SARS-COV-2 COVID-19 

PFIZER VACCINE              Unknown      Completed                 [facility]

 

                                                    SARS-COV-2 COVID-19 

PFIZER VACCINE              Unknown      Completed                 [facility]

 

                    Influenza High Dose           Unknown   Completed           

[facility]

 

                                                    Pneumococcal 

Polysaccharide, 

PPSV23 (PNEUMOVAX)              Unknown      Completed                 [facility]

 

                                                    SARS-COV-2 COVID-19 

PFIZER VACCINE              Unknown      Completed                 [facility]

 

                    PPD (TB)            Unknown   Completed           [facility]

 

                                                    Influenza Virus 

Vaccine Quad IM 3+ 

YRS                       Unknown      Completed                 [facility]

 

                                                    Influenza High Dose 

Quad                      Unknown      Completed                 [facility]

 

                                                    SARS-COV-2 COVID-19 

CHAPARRO-SUCROSE 

VACCINE 12 YRS+, 

BIVALENT 0.3ML, IM, 

(PFIZER GRAY TOP)              Unknown      Completed                 [facility]

 

                                                    Influenza Virus 

Vaccine,quad 

Im,preserve Free 

65+ (FLUAD)               Unknown      Completed                 [facility]

 

                                                    SARS-COV-2 COVID 19 

CHAPARRO SUCROSE 

VACCINE 12+, 

4623-5258, 0.3 ML 

(30 MCG), IM PFIZER 

(GRAY TOP)                Unknown      Completed                 [facility]

 

                    Influenza High Dose           Unknown   Completed           

[facility]

 

                                                    Pneumococcal 13 

Conjugate, PCV13 

(Prevnar 13)              Unknown      Completed                 [facility]

 

                                                    Influenza Virus 

Vaccine                   Unknown      Completed                 [facility]

 

                                                    SARS-COV-2 COVID-19 

PFIZER VACCINE              Unknown      Completed                 [facility]

 

                                                    SARS-COV-2 COVID-19 

PFIZER VACCINE              Unknown      Completed                 [facility]

 

                                                    SARS-COV-2 COVID-19 

PFIZER VACCINE              Unknown      Completed                 [facility]

 

                    Influenza High Dose           Unknown   Completed           

[facility]

 

                                                    Pneumococcal 

Polysaccharide, 

PPSV23 (PNEUMOVAX)              Unknown      Completed                 [facility]

 

                                                    SARS-COV-2 COVID-19 

PFIZER VACCINE              Unknown      Completed                 [facility]

 

                    PPD (TB)            Unknown   Completed           [facility]

 

                                                    Influenza Virus 

Vaccine Quad IM 3+ 

YRS                       Unknown      Completed                 [facility]

 

                                                    Influenza High Dose 

Quad                      Unknown      Completed                 [facility]

 

                                                    SARS-COV-2 COVID-19 

CHAPARRO-SUCROSE 

VACCINE 12 YRS+, 

BIVALENT 0.3ML, IM, 

(PFIZER GRAY TOP)              Unknown      Completed                 [facility]

 

                                                    Influenza Virus 

Vaccine,quad 

Im,preserve Free 

65+ (FLUAD)               Unknown      Completed                 [facility]

 

                                                    SARS-COV-2 COVID 19 

CHAPARRO SUCROSE 

VACCINE 12+, 

9514-3308, 0.3 ML 

(30 MCG), IM PFIZER 

(GRAY TOP)                Unknown      Completed                 [facility]

 

                    Influenza High Dose           Unknown   Completed           

[facility]

 

                                                    Pneumococcal 13 

Conjugate, PCV13 

(Prevnar 13)              Unknown      Completed                 [facility]

 

                                                    Influenza Virus 

Vaccine                   Unknown      Completed                 [facility]

 

                                                    SARS-COV-2 COVID-19 

PFIZER VACCINE              Unknown      Completed                 [facility]

 

                                                    SARS-COV-2 COVID-19 

PFIZER VACCINE              Unknown      Completed                 [facility]

 

                                                    SARS-COV-2 COVID-19 

PFIZER VACCINE              Unknown      Completed                 [facility]

 

                    Influenza High Dose           Unknown   Completed           

[facility]

 

                                                    Pneumococcal 

Polysaccharide, 

PPSV23 (PNEUMOVAX)              Unknown      Completed                 [facility]

 

                                                    SARS-COV-2 COVID-19 

PFIZER VACCINE              Unknown      Completed                 [facility]

 

                    PPD (TB)            Unknown   Completed           [facility]

 

                                                    Influenza Virus 

Vaccine Quad IM 3+ 

YRS                       Unknown      Completed                 [facility]

 

                                                    Influenza High Dose 

Quad                      Unknown      Completed                 [facility]

 

                                                    SARS-COV-2 COVID-19 

CHAPARRO-SUCROSE 

VACCINE 12 YRS+, 

BIVALENT 0.3ML, IM, 

(PFIZER GRAY TOP)              Unknown      Completed                 [facility]

 

                                                    Influenza Virus 

Vaccine,quad 

Im,preserve Free 

65+ (FLUAD)               Unknown      Completed                 [facility]

 

                                                    SARS-COV-2 COVID 19 

CHAPARRO SUCROSE 

VACCINE 12+, 

4827-6965, 0.3 ML 

(30 MCG), IM PFIZER 

(GRAY TOP)                Unknown      Completed                 [facility]

 

                    Influenza High Dose           Unknown   Completed           

[facility]

 

                                                    Pneumococcal 13 

Conjugate, PCV13 

(Prevnar 13)              Unknown      Completed                 [facility]

 

                                                    Influenza Virus 

Vaccine                   Unknown      Completed                 [facility]

 

                                                    SARS-COV-2 COVID-19 

PFIZER VACCINE              Unknown      Completed                 [facility]

 

                                                    SARS-COV-2 COVID-19 

PFIZER VACCINE              Unknown      Completed                 [facility]

 

                                                    SARS-COV-2 COVID-19 

PFIZER VACCINE              Unknown      Completed                 [facility]

 

                    Influenza High Dose           Unknown   Completed           

[facility]

 

                                                    Pneumococcal 

Polysaccharide, 

PPSV23 (PNEUMOVAX)              Unknown      Completed                 [facility]

 

                                                    SARS-COV-2 COVID-19 

PFIZER VACCINE              Unknown      Completed                 [facility]

 

                    PPD (TB)            Unknown   Completed           [facility]

 

                                                    Influenza Virus 

Vaccine Quad IM 3+ 

YRS                       Unknown      Completed                 [facility]

 

                                                    Influenza High Dose 

Quad                      Unknown      Completed                 [facility]

 

                                                    SARS-COV-2 COVID-19 

CHAPARRO-SUCROSE 

VACCINE 12 YRS+, 

BIVALENT 0.3ML, IM, 

(PFIZER GRAY TOP)              Unknown      Completed                 [facility]

 

                                                    Influenza Virus 

Vaccine,quad 

Im,preserve Free 

65+ (FLUAD)               Unknown      Completed                 [facility]

 

                                                    SARS-COV-2 COVID 19 

CHAPARRO SUCROSE 

VACCINE 12+, 

9131-5499, 0.3 ML 

(30 MCG), IM PFIZER 

(GRAY TOP)                Unknown      Completed                 [facility]

 

                    Influenza High Dose           Unknown   Completed           

[facility]

 

                                                    Pneumococcal 13 

Conjugate, PCV13 

(Prevnar 13)              Unknown      Completed                 [facility]

 

                                                    Influenza Virus 

Vaccine                   Unknown      Completed                 [facility]

 

                                                    SARS-COV-2 COVID-19 

PFIZER VACCINE              Unknown      Completed                 [facility]

 

                                                    SARS-COV-2 COVID-19 

PFIZER VACCINE              Unknown      Completed                 [facility]

 

                                                    SARS-COV-2 COVID-19 

PFIZER VACCINE              Unknown      Completed                 [facility]

 

                    Influenza High Dose           Unknown   Completed           

[facility]

 

                                                    Pneumococcal 

Polysaccharide, 

PPSV23 (PNEUMOVAX)              Unknown      Completed                 [facility]

 

                                                    SARS-COV-2 COVID-19 

PFIZER VACCINE              Unknown      Completed                 [facility]

 

                    PPD (TB)            Unknown   Completed           [facility]

 

                                                    Influenza Virus 

Vaccine Quad IM 3+ 

YRS                       Unknown      Completed                 [facility]

 

                                                    Influenza High Dose 

Quad                      Unknown      Completed                 [facility]

 

                                                    SARS-COV-2 COVID-19 

CHAPARRO-SUCROSE 

VACCINE 12 YRS+, 

BIVALENT 0.3ML, IM, 

(PFIZER GRAY TOP)              Unknown      Completed                 [facility]

 

                                                    Influenza Virus 

Vaccine,quad 

Im,preserve Free 

65+ (FLUAD)               Unknown      Completed                 [facility]

 

                                                    SARS-COV-2 COVID 19 

CHAPARRO SUCROSE 

VACCINE 12+, 

4415-8751, 0.3 ML 

(30 MCG), IM PFIZER 

(GRAY TOP)                Unknown      Completed                 [facility]

 

                    Influenza High Dose           Unknown   Completed           

[facility]

 

                                                    Pneumococcal 13 

Conjugate, PCV13 

(Prevnar 13)              Unknown      Completed                 [facility]

 

                                                    Influenza Virus 

Vaccine                   Unknown      Completed                 [facility]

 

                                                    SARS-COV-2 COVID-19 

PFIZER VACCINE              Unknown      Completed                 [facility]

 

                                                    SARS-COV-2 COVID-19 

PFIZER VACCINE              Unknown      Completed                 [facility]

 

                                                    SARS-COV-2 COVID-19 

PFIZER VACCINE              Unknown      Completed                 [facility]

 

                    Influenza High Dose           Unknown   Completed           

[facility]

 

                                                    Pneumococcal 

Polysaccharide, 

PPSV23 (PNEUMOVAX)              Unknown      Completed                 [facility]

 

                                                    SARS-COV-2 COVID-19 

PFIZER VACCINE              Unknown      Completed                 [facility]

 

                    PPD (TB)            Unknown   Completed           [facility]

 

                                                    Influenza Virus 

Vaccine Quad IM 3+ 

YRS                       Unknown      Completed                 [facility]

 

                                                    Influenza High Dose 

Quad                      Unknown      Completed                 [facility]

 

                                                    SARS-COV-2 COVID-19 

CHAPARRO-SUCROSE 

VACCINE 12 YRS+, 

BIVALENT 0.3ML, IM, 

(PFIZER GRAY TOP)              Unknown      Completed                 [facility]

 

                                                    Influenza Virus 

Vaccine,quad 

Im,preserve Free 

65+ (FLUAD)               Unknown      Completed                 [facility]

 

                                                    SARS-COV-2 COVID 19 

CHAPARRO SUCROSE 

VACCINE 12+, 

2925-4295, 0.3 ML 

(30 MCG), IM PFIZER 

(GRAY TOP)                Unknown      Completed                 [facility]

 

                    Influenza High Dose           Unknown   Completed           

[facility]

 

                                                    Pneumococcal 13 

Conjugate, PCV13 

(Prevnar 13)              Unknown      Completed                 [facility]

 

                                                    Influenza Virus 

Vaccine                   Unknown      Completed                 [facility]

 

                                                    SARS-COV-2 COVID-19 

PFIZER VACCINE              Unknown      Completed                 [facility]

 

                                                    SARS-COV-2 COVID-19 

PFIZER VACCINE              Unknown      Completed                 [facility]

 

                                                    SARS-COV-2 COVID-19 

PFIZER VACCINE              Unknown      Completed                 [facility]

 

                    Influenza High Dose           Unknown   Completed           

[facility]

 

                                                    Pneumococcal 

Polysaccharide, 

PPSV23 (PNEUMOVAX)              Unknown      Completed                 [facility]

 

                                                    SARS-COV-2 COVID-19 

PFIZER VACCINE              Unknown      Completed                 [facility]

 

                    PPD (TB)            Unknown   Completed           [facility]

 

                                                    Influenza Virus 

Vaccine Quad IM 3+ 

YRS                       Unknown      Completed                 [facility]

 

                                                    Influenza High Dose 

Quad                      Unknown      Completed                 [facility]

 

                                                    SARS-COV-2 COVID-19 

CHAPARRO-SUCROSE 

VACCINE 12 YRS+, 

BIVALENT 0.3ML, IM, 

(PFIZER GRAY TOP)              Unknown      Completed                 [facility]

 

                                                    Influenza Virus 

Vaccine,quad 

Im,preserve Free 

65+ (FLUAD)               Unknown      Completed                 [facility]

 

                                                    SARS-COV-2 COVID 19 

CHAPARRO SUCROSE 

VACCINE 12+, 

6518-4561, 0.3 ML 

(30 MCG), IM PFIZER 

(GRAY TOP)                Unknown      Completed                 [facility]

 

                    Influenza High Dose           Unknown   Completed           

[facility]

 

                                                    Pneumococcal 13 

Conjugate, PCV13 

(Prevnar 13)              Unknown      Completed                 [facility]

 

                                                    Influenza Virus 

Vaccine                   Unknown      Completed                 [facility]

 

                                                    SARS-COV-2 COVID-19 

PFIZER VACCINE              Unknown      Completed                 [facility]

 

                                                    SARS-COV-2 COVID-19 

PFIZER VACCINE              Unknown      Completed                 [facility]

 

                                                    SARS-COV-2 COVID-19 

PFIZER VACCINE              Unknown      Completed                 [facility]

 

                    Influenza High Dose           Unknown   Completed           

[facility]

 

                                                    Pneumococcal 

Polysaccharide, 

PPSV23 (PNEUMOVAX)              Unknown      Completed                 [facility]

 

                                                    SARS-COV-2 COVID-19 

PFIZER VACCINE              Unknown      Completed                 [facility]

 

                    PPD (TB)            Unknown   Completed           [facility]

 

                                                    Influenza Virus 

Vaccine Quad IM 3+ 

YRS                       Unknown      Completed                 [facility]

 

                                                    Influenza High Dose 

Quad                      Unknown      Completed                 [facility]

 

                                                    SARS-COV-2 COVID-19 

CHAPARRO-SUCROSE 

VACCINE 12 YRS+, 

BIVALENT 0.3ML, IM, 

(PFIZER GRAY TOP)              Unknown      Completed                 [facility]

 

                                                    Influenza Virus 

Vaccine,quad 

Im,preserve Free 

65+ (FLUAD)               Unknown      Completed                 [facility]

 

                                                    SARS-COV-2 COVID 19 

CHAPARRO SUCROSE 

VACCINE 12+, 

1267-2797, 0.3 ML 

(30 MCG), IM PFIZER 

(GRAY TOP)                Unknown      Completed                 [facility]

 

                    Influenza High Dose           Unknown   Completed           

[facility]

 

                                                    Pneumococcal 13 

Conjugate, PCV13 

(Prevnar 13)              Unknown      Completed                 [facility]

 

                                                    Influenza Virus 

Vaccine                   Unknown      Completed                 [facility]

 

                                                    SARS-COV-2 COVID-19 

PFIZER VACCINE              Unknown      Completed                 [facility]

 

                                                    SARS-COV-2 COVID-19 

PFIZER VACCINE              Unknown      Completed                 [facility]

 

                                                    SARS-COV-2 COVID-19 

PFIZER VACCINE              Unknown      Completed                 [facility]

 

                    Influenza High Dose           Unknown   Completed           

[facility]

 

                                                    Pneumococcal 

Polysaccharide, 

PPSV23 (PNEUMOVAX)              Unknown      Completed                 [facility]

 

                                                    SARS-COV-2 COVID-19 

PFIZER VACCINE              Unknown      Completed                 [facility]

 

                    PPD (TB)            Unknown   Completed           [facility]

 

                                                    Influenza Virus 

Vaccine Quad IM 3+ 

YRS                       Unknown      Completed                 [facility]

 

                                                    Influenza High Dose 

Quad                      Unknown      Completed                 [facility]

 

                                                    SARS-COV-2 COVID-19 

CHAPARRO-SUCROSE 

VACCINE 12 YRS+, 

BIVALENT 0.3ML, IM, 

(PFIZER GRAY TOP)              Unknown      Completed                 [facility]

 

                                                    Influenza Virus 

Vaccine,quad 

Im,preserve Free 

65+ (FLUAD)               Unknown      Completed                 [facility]

 

                                                    SARS-COV-2 COVID 19 

CHAPARRO SUCROSE 

VACCINE 12+, 

4152-1547, 0.3 ML 

(30 MCG), IM PFIZER 

(GRAY TOP)                Unknown      Completed                 [facility]

 

                    Influenza High Dose           Unknown   Completed           

[facility]

 

                                                    Pneumococcal 13 

Conjugate, PCV13 

(Prevnar 13)              Unknown      Completed                 [facility]

 

                                                    Influenza Virus 

Vaccine                   Unknown      Completed                 [facility]

 

                                                    SARS-COV-2 COVID-19 

PFIZER VACCINE              Unknown      Completed                 [facility]

 

                                                    SARS-COV-2 COVID-19 

PFIZER VACCINE              Unknown      Completed                 [facility]

 

                                                    SARS-COV-2 COVID-19 

PFIZER VACCINE              Unknown      Completed                 [facility]

 

                    Influenza High Dose           Unknown   Completed           

[facility]

 

                                                    Pneumococcal 

Polysaccharide, 

PPSV23 (PNEUMOVAX)              Unknown      Completed                 [facility]

 

                                                    SARS-COV-2 COVID-19 

PFIZER VACCINE              Unknown      Completed                 [facility]

 

                    PPD (TB)            Unknown   Completed           [facility]

 

                                                    Influenza Virus 

Vaccine Quad IM 3+ 

YRS                       Unknown      Completed                 [facility]

 

                                                    Influenza High Dose 

Quad                      Unknown      Completed                 [facility]

 

                                                    SARS-COV-2 COVID-19 

CHAPARRO-SUCROSE 

VACCINE 12 YRS+, 

BIVALENT 0.3ML, IM, 

(PFIZER GRAY TOP)              Unknown      Completed                 [facility]

 

                                                    Influenza Virus 

Vaccine,quad 

Im,preserve Free 

65+ (FLUAD)               Unknown      Completed                 [facility]

 

                                                    SARS-COV-2 COVID 19 

CHAPARRO SUCROSE 

VACCINE 12+, 

5494-2804, 0.3 ML 

(30 MCG), IM PFIZER 

(GRAY TOP)                Unknown      Completed                 [facility]

 

                    Influenza High Dose           Unknown   Completed           

[facility]

 

                                                    Pneumococcal 13 

Conjugate, PCV13 

(Prevnar 13)              Unknown      Completed                 [facility]

 

                                                    Influenza Virus 

Vaccine                   Unknown      Completed                 [facility]

 

                                                    SARS-COV-2 COVID-19 

PFIZER VACCINE              Unknown      Completed                 [facility]

 

                                                    SARS-COV-2 COVID-19 

PFIZER VACCINE              Unknown      Completed                 [facility]

 

                                                    SARS-COV-2 COVID-19 

PFIZER VACCINE              Unknown      Completed                 [facility]

 

                    Influenza High Dose           Unknown   Completed           

[facility]

 

                                                    Pneumococcal 

Polysaccharide, 

PPSV23 (PNEUMOVAX)              Unknown      Completed                 [facility]

 

                                                    SARS-COV-2 COVID-19 

PFIZER VACCINE              Unknown      Completed                 [facility]

 

                    PPD (TB)            Unknown   Completed           [facility]

 

                                                    Influenza Virus 

Vaccine Quad IM 3+ 

YRS                       Unknown      Completed                 [facility]

 

                                                    Influenza High Dose 

Quad                      Unknown      Completed                 [facility]

 

                                                    SARS-COV-2 COVID-19 

CHAPARRO-SUCROSE 

VACCINE 12 YRS+, 

BIVALENT 0.3ML, IM, 

(PFIZER GRAY TOP)              Unknown      Completed                 [facility]

 

                                                    Influenza Virus 

Vaccine,quad 

Im,preserve Free 

65+ (FLUAD)               Unknown      Completed                 [facility]

 

                                                    SARS-COV-2 COVID 19 

CHAPARRO SUCROSE 

VACCINE 12+, 

8394-9308, 0.3 ML 

(30 MCG), IM PFIZER 

(GRAY TOP)                Unknown      Completed                 [facility]

 

                    Influenza High Dose           Unknown   Completed           

[facility]

 

                                                    Pneumococcal 13 

Conjugate, PCV13 

(Prevnar 13)              Unknown      Completed                 [facility]

 

                                                    Influenza Virus 

Vaccine                   Unknown      Completed                 [facility]

 

                                                    SARS-COV-2 COVID-19 

PFIZER VACCINE              Unknown      Completed                 [facility]

 

                                                    SARS-COV-2 COVID-19 

PFIZER VACCINE              Unknown      Completed                 [facility]

 

                                                    SARS-COV-2 COVID-19 

PFIZER VACCINE              Unknown      Completed                 [facility]

 

                    Influenza High Dose           Unknown   Completed           

[facility]

 

                                                    Pneumococcal 

Polysaccharide, 

PPSV23 (PNEUMOVAX)              Unknown      Completed                 [facility]

 

                                                    SARS-COV-2 COVID-19 

PFIZER VACCINE              Unknown      Completed                 [facility]

 

                    PPD (TB)            Unknown   Completed           [facility]

 

                                                    Influenza Virus 

Vaccine Quad IM 3+ 

YRS                       Unknown      Completed                 [facility]

 

                                                    Influenza High Dose 

Quad                      Unknown      Completed                 [facility]

 

                                                    SARS-COV-2 COVID-19 

CHAPARRO-SUCROSE 

VACCINE 12 YRS+, 

BIVALENT 0.3ML, IM, 

(PFIZER GRAY TOP)              Unknown      Completed                 [facility]

 

                                                    Influenza Virus 

Vaccine,quad 

Im,preserve Free 

65+ (FLUAD)               Unknown      Completed                 [facility]

 

                                                    SARS-COV-2 COVID 19 

CHAPARRO SUCROSE 

VACCINE 12+, 

9318-4468, 0.3 ML 

(30 MCG), IM PFIZER 

(GRAY TOP)                Unknown      Completed                 [facility]

 

                    Influenza High Dose           Unknown   Completed           

[facility]

 

                                                    Pneumococcal 13 

Conjugate, PCV13 

(Prevnar 13)              Unknown      Completed                 [facility]

 

                                                    Influenza Virus 

Vaccine                   Unknown      Completed                 [facility]

 

                                                    SARS-COV-2 COVID-19 

PFIZER VACCINE              Unknown      Completed                 [facility]

 

                                                    SARS-COV-2 COVID-19 

PFIZER VACCINE              Unknown      Completed                 [facility]

 

                                                    SARS-COV-2 COVID-19 

PFIZER VACCINE              Unknown      Completed                 [facility]

 

                    Influenza High Dose           Unknown   Completed           

[facility]

 

                                                    Pneumococcal 

Polysaccharide, 

PPSV23 (PNEUMOVAX)              Unknown      Completed                 [facility]

 

                                                    SARS-COV-2 COVID-19 

PFIZER VACCINE              Unknown      Completed                 [facility]

 

                    PPD (TB)            Unknown   Completed           [facility]

 

                                                    Influenza Virus 

Vaccine Quad IM 3+ 

YRS                       Unknown      Completed                 [facility]

 

                                                    Influenza High Dose 

Quad                      Unknown      Completed                 [facility]

 

                                                    SARS-COV-2 COVID-19 

CHAPARRO-SUCROSE 

VACCINE 12 YRS+, 

BIVALENT 0.3ML, IM, 

(PFIZER GRAY TOP)              Unknown      Completed                 [facility]

 

                                                    Influenza Virus 

Vaccine,quad 

Im,preserve Free 

65+ (FLUAD)               Unknown      Completed                 [facility]

 

                                                    SARS-COV-2 COVID 19 

CHAPARRO SUCROSE 

VACCINE 12+, 

4052-9354, 0.3 ML 

(30 MCG), IM PFIZER 

(GRAY TOP)                Unknown      Completed                 [facility]

 

                    Influenza High Dose           Unknown   Completed           

[facility]

 

                                                    Pneumococcal 13 

Conjugate, PCV13 

(Prevnar 13)              Unknown      Completed                 [facility]

 

                                                    Influenza Virus 

Vaccine                   Unknown      Completed                 [facility]

 

                                                    SARS-COV-2 COVID-19 

PFIZER VACCINE              Unknown      Completed                 [facility]

 

                                                    SARS-COV-2 COVID-19 

PFIZER VACCINE              Unknown      Completed                 [facility]

 

                                                    SARS-COV-2 COVID-19 

PFIZER VACCINE              Unknown      Completed                 [facility]

 

                    Influenza High Dose           Unknown   Completed           

[facility]

 

                                                    Pneumococcal 

Polysaccharide, 

PPSV23 (PNEUMOVAX)              Unknown      Completed                 [facility]

 

                                                    SARS-COV-2 COVID-19 

PFIZER VACCINE              Unknown      Completed                 [facility]

 

                    PPD (TB)            Unknown   Completed           [facility]

 

                                                    Influenza Virus 

Vaccine Quad IM 3+ 

YRS                       Unknown      Completed                 [facility]

 

                                                    Influenza High Dose 

Quad                      Unknown      Completed                 [facility]

 

                                                    SARS-COV-2 COVID-19 

CHAPARRO-SUCROSE 

VACCINE 12 YRS+, 

BIVALENT 0.3ML, IM, 

(PFIZER GRAY TOP)              Unknown      Completed                 [facility]

 

                                                    Influenza Virus 

Vaccine,quad 

Im,preserve Free 

65+ (FLUAD)               Unknown      Completed                 [facility]

 

                                                    SARS-COV-2 COVID 19 

CHAPARRO SUCROSE 

VACCINE 12+, 

6698-4628, 0.3 ML 

(30 MCG), IM PFIZER 

(GRAY TOP)                Unknown      Completed                 [facility]

 

                    Influenza High Dose           Unknown   Completed           

[facility]

 

                                                    Pneumococcal 13 

Conjugate, PCV13 

(Prevnar 13)              Unknown      Completed                 [facility]

 

                                                    Influenza Virus 

Vaccine                   Unknown      Completed                 [facility]

 

                                                    SARS-COV-2 COVID-19 

PFIZER VACCINE              Unknown      Completed                 [facility]

 

                                                    SARS-COV-2 COVID-19 

PFIZER VACCINE              Unknown      Completed                 [facility]

 

                                                    SARS-COV-2 COVID-19 

PFIZER VACCINE              Unknown      Completed                 [facility]

 

                    Influenza High Dose           Unknown   Completed           

[facility]

 

                                                    Pneumococcal 

Polysaccharide, 

PPSV23 (PNEUMOVAX)              Unknown      Completed                 [facility]

 

                                                    SARS-COV-2 COVID-19 

PFIZER VACCINE              Unknown      Completed                 [facility]

 

                    PPD (TB)            Unknown   Completed           [facility]

 

                                                    Influenza Virus 

Vaccine Quad IM 3+ 

YRS                       Unknown      Completed                 [facility]

 

                                                    Influenza High Dose 

Quad                      Unknown      Completed                 [facility]

 

                                                    SARS-COV-2 COVID-19 

CHAPARRO-SUCROSE 

VACCINE 12 YRS+, 

BIVALENT 0.3ML, IM, 

(PFIZER GRAY TOP)              Unknown      Completed                 [facility]

 

                                                    Influenza Virus 

Vaccine,quad 

Im,preserve Free 

65+ (FLUAD)               Unknown      Completed                 [facility]

 

                                                    SARS-COV-2 COVID 19 

CHAPARRO SUCROSE 

VACCINE 12+, 

4121-5188, 0.3 ML 

(30 MCG), IM PFIZER 

(GRAY TOP)                Unknown      Completed                 [facility]

 

                    Influenza High Dose           Unknown   Completed           

[facility]

 

                                                    Pneumococcal 13 

Conjugate, PCV13 

(Prevnar 13)              Unknown      Completed                 [facility]

 

                                                    Influenza Virus 

Vaccine                   Unknown      Completed                 [facility]

 

                                                    SARS-COV-2 COVID-19 

PFIZER VACCINE              Unknown      Completed                 [facility]

 

                                                    SARS-COV-2 COVID-19 

PFIZER VACCINE              Unknown      Completed                 [facility]

 

                                                    SARS-COV-2 COVID-19 

PFIZER VACCINE              Unknown      Completed                 [facility]

 

                    Influenza High Dose           Unknown   Completed           

[facility]

 

                                                    Pneumococcal 

Polysaccharide, 

PPSV23 (PNEUMOVAX)              Unknown      Completed                 [facility]

 

                                                    SARS-COV-2 COVID-19 

PFIZER VACCINE              Unknown      Completed                 [facility]

 

                    PPD (TB)            Unknown   Completed           [facility]

 

                                                    Influenza Virus 

Vaccine Quad IM 3+ 

YRS                       Unknown      Completed                 [facility]

 

                                                    Influenza High Dose 

Quad                      Unknown      Completed                 [facility]

 

                                                    SARS-COV-2 COVID-19 

CHAPARRO-SUCROSE 

VACCINE 12 YRS+, 

BIVALENT 0.3ML, IM, 

(PFIZER GRAY TOP)              Unknown      Completed                 [facility]

 

                                                    Influenza Virus 

Vaccine,quad 

Im,preserve Free 

65+ (FLUAD)               Unknown      Completed                 [facility]

 

                                                    SARS-COV-2 COVID 19 

CHAPARRO SUCROSE 

VACCINE 12+, 

5296-4161, 0.3 ML 

(30 MCG), IM PFIZER 

(GRAY TOP)                Unknown      Completed                 [facility]

 

                    Influenza High Dose           Unknown   Completed           

[facility]

 

                                                    Pneumococcal 13 

Conjugate, PCV13 

(Prevnar 13)              Unknown      Completed                 [facility]

 

                                                    Influenza Virus 

Vaccine                   Unknown      Completed                 [facility]

 

                                                    SARS-COV-2 COVID-19 

PFIZER VACCINE              Unknown      Completed                 [facility]

 

                                                    SARS-COV-2 COVID-19 

PFIZER VACCINE              Unknown      Completed                 [facility]

 

                                                    SARS-COV-2 COVID-19 

PFIZER VACCINE              Unknown      Completed                 [facility]

 

                    Influenza High Dose           Unknown   Completed           

[facility]

 

                                                    Pneumococcal 

Polysaccharide, 

PPSV23 (PNEUMOVAX)              Unknown      Completed                 [facility]

 

                                                    SARS-COV-2 COVID-19 

PFIZER VACCINE              Unknown      Completed                 [facility]

 

                    PPD (TB)            Unknown   Completed           [facility]

 

                                                    Influenza Virus 

Vaccine Quad IM 3+ 

YRS                       Unknown      Completed                 [facility]

 

                                                    Influenza High Dose 

Quad                      Unknown      Completed                 [facility]

 

                                                    SARS-COV-2 COVID-19 

CHAPARRO-SUCROSE 

VACCINE 12 YRS+, 

BIVALENT 0.3ML, IM, 

(PFIZER GRAY TOP)              Unknown      Completed                 [facility]

 

                                                    Influenza Virus 

Vaccine,quad 

Im,preserve Free 

65+ (FLUAD)               Unknown      Completed                 [facility]

 

                                                    SARS-COV-2 COVID 19 

CHAPARRO SUCROSE 

VACCINE 12+, 

3938-3739, 0.3 ML 

(30 MCG), IM PFIZER 

(GRAY TOP)                Unknown      Completed                 [facility]

 

                    Influenza High Dose           Unknown   Completed           

[facility]

 

                                                    Pneumococcal 13 

Conjugate, PCV13 

(Prevnar 13)              Unknown      Completed                 [facility]

 

                                                    Influenza Virus 

Vaccine                   Unknown      Completed                 [facility]

 

                                                    SARS-COV-2 COVID-19 

PFIZER VACCINE              Unknown      Completed                 [facility]

 

                                                    SARS-COV-2 COVID-19 

PFIZER VACCINE              Unknown      Completed                 [facility]

 

                                                    SARS-COV-2 COVID-19 

PFIZER VACCINE              Unknown      Completed                 [facility]

 

                    Influenza High Dose           Unknown   Completed           

[facility]

 

                                                    Pneumococcal 

Polysaccharide, 

PPSV23 (PNEUMOVAX)              Unknown      Completed                 [facility]

 

                                                    SARS-COV-2 COVID-19 

PFIZER VACCINE              Unknown      Completed                 [facility]

 

                    PPD (TB)            Unknown   Completed           [facility]

 

                                                    Influenza Virus 

Vaccine Quad IM 3+ 

YRS                       Unknown      Completed                 [facility]

 

                                                    Influenza High Dose 

Quad                      Unknown      Completed                 [facility]

 

                                                    SARS-COV-2 COVID-19 

CHAPARRO-SUCROSE 

VACCINE 12 YRS+, 

BIVALENT 0.3ML, IM, 

(PFIZER GRAY TOP)              Unknown      Completed                 [facility]

 

                                                    Influenza Virus 

Vaccine,quad 

Im,preserve Free 

65+ (FLUAD)               Unknown      Completed                 [facility]

 

                                                    SARS-COV-2 COVID 19 

CHAPARRO SUCROSE 

VACCINE 12+, 

4843-4511, 0.3 ML 

(30 MCG), IM PFIZER 

(GRAY TOP)                Unknown      Completed                 [facility]

 

                    Influenza High Dose           Unknown   Completed           

[facility]

 

                                                    Pneumococcal 13 

Conjugate, PCV13 

(Prevnar 13)              Unknown      Completed                 [facility]

 

                                                    Influenza Virus 

Vaccine                   Unknown      Completed                 [facility]

 

                                                    SARS-COV-2 COVID-19 

PFIZER VACCINE              Unknown      Completed                 [facility]

 

                                                    SARS-COV-2 COVID-19 

PFIZER VACCINE              Unknown      Completed                 [facility]

 

                                                    SARS-COV-2 COVID-19 

PFIZER VACCINE              Unknown      Completed                 [facility]

 

                    Influenza High Dose           Unknown   Completed           

[facility]

 

                                                    Pneumococcal 

Polysaccharide, 

PPSV23 (PNEUMOVAX)              Unknown      Completed                 [facility]

 

                                                    SARS-COV-2 COVID-19 

PFIZER VACCINE              Unknown      Completed                 [facility]

 

                    PPD (TB)            Unknown   Completed           [facility]

 

                                                    Influenza Virus 

Vaccine Quad IM 3+ 

YRS                       Unknown      Completed                 [facility]

 

                                                    Influenza High Dose 

Quad                      Unknown      Completed                 [facility]

 

                                                    SARS-COV-2 COVID-19 

CHAPARRO-SUCROSE 

VACCINE 12 YRS+, 

BIVALENT 0.3ML, IM, 

(PFIZER GRAY TOP)              Unknown      Completed                 [facility]

 

                                                    Influenza Virus 

Vaccine,quad 

Im,preserve Free 

65+ (FLUAD)               Unknown      Completed                 [facility]

 

                                                    SARS-COV-2 COVID 19 

CHAPARRO SUCROSE 

VACCINE 12+, 

9677-4068, 0.3 ML 

(30 MCG), IM PFIZER 

(GRAY TOP)                Unknown      Completed                 [facility]

 

                    Influenza High Dose           Unknown   Completed           

[facility]

 

                                                    Pneumococcal 13 

Conjugate, PCV13 

(Prevnar 13)              Unknown      Completed                 [facility]

 

                                                    Influenza Virus 

Vaccine                   Unknown      Completed                 [facility]

 

                                                    SARS-COV-2 COVID-19 

PFIZER VACCINE              Unknown      Completed                 [facility]

 

                                                    SARS-COV-2 COVID-19 

PFIZER VACCINE              Unknown      Completed                 [facility]

 

                                                    SARS-COV-2 COVID-19 

PFIZER VACCINE              Unknown      Completed                 [facility]

 

                    Influenza High Dose           Unknown   Completed           

[facility]

 

                                                    Pneumococcal 

Polysaccharide, 

PPSV23 (PNEUMOVAX)              Unknown      Completed                 [facility]

 

                                                    SARS-COV-2 COVID-19 

PFIZER VACCINE              Unknown      Completed                 [facility]

 

                    PPD (TB)            Unknown   Completed           [facility]

 

                                                    Influenza Virus 

Vaccine Quad IM 3+ 

YRS                       Unknown      Completed                 [facility]

 

                                                    Influenza High Dose 

Quad                      Unknown      Completed                 [facility]

 

                                                    SARS-COV-2 COVID-19 

CHAPARRO-SUCROSE 

VACCINE 12 YRS+, 

BIVALENT 0.3ML, IM, 

(PFIZER GRAY TOP)              Unknown      Completed                 [facility]

 

                                                    Influenza Virus 

Vaccine,quad 

Im,preserve Free 

65+ (FLUAD)               Unknown      Completed                 [facility]

 

                                                    SARS-COV-2 COVID 19 

CHAPARRO SUCROSE 

VACCINE 12+, 

6457-3947, 0.3 ML 

(30 MCG), IM PFIZER 

(GRAY TOP)                Unknown      Completed                 [facility]

 

                    Influenza High Dose           Unknown   Completed           

[facility]

 

                                                    Pneumococcal 13 

Conjugate, PCV13 

(Prevnar 13)              Unknown      Completed                 [facility]

 

                                                    Influenza Virus 

Vaccine                   Unknown      Completed                 [facility]

 

                                                    SARS-COV-2 COVID-19 

PFIZER VACCINE              Unknown      Completed                 [facility]

 

                                                    SARS-COV-2 COVID-19 

PFIZER VACCINE              Unknown      Completed                 [facility]

 

                                                    SARS-COV-2 COVID-19 

PFIZER VACCINE              Unknown      Completed                 [facility]

 

                    Influenza High Dose           Unknown   Completed           

[facility]

 

                                                    Pneumococcal 

Polysaccharide, 

PPSV23 (PNEUMOVAX)              Unknown      Completed                 [facility]

 

                                                    SARS-COV-2 COVID-19 

PFIZER VACCINE              Unknown      Completed                 [facility]

 

                    PPD (TB)            Unknown   Completed           [facility]

 

                                                    Influenza Virus 

Vaccine Quad IM 3+ 

YRS                       Unknown      Completed                 [facility]

 

                                                    Influenza High Dose 

Quad                      Unknown      Completed                 [facility]

 

                                                    SARS-COV-2 COVID-19 

CHAPARRO-SUCROSE 

VACCINE 12 YRS+, 

BIVALENT 0.3ML, IM, 

(PFIZER GRAY TOP)              Unknown      Completed                 [facility]

 

                                                    Influenza Virus 

Vaccine,quad 

Im,preserve Free 

65+ (FLUAD)               Unknown      Completed                 [facility]

 

                                                    SARS-COV-2 COVID 19 

CHAPARRO SUCROSE 

VACCINE 12+, 

4995-7634, 0.3 ML 

(30 MCG), IM PFIZER 

(GRAY TOP)                Unknown      Completed                 [facility]

 

                    Influenza High Dose           Unknown   Completed           

[facility]

 

                                                    Pneumococcal 13 

Conjugate, PCV13 

(Prevnar 13)              Unknown      Completed                 [facility]

 

                                                    Influenza Virus 

Vaccine                   Unknown      Completed                 [facility]

 

                                                    SARS-COV-2 COVID-19 

PFIZER VACCINE              Unknown      Completed                 [facility]

 

                                                    SARS-COV-2 COVID-19 

PFIZER VACCINE              Unknown      Completed                 [facility]

 

                                                    SARS-COV-2 COVID-19 

PFIZER VACCINE              Unknown      Completed                 [facility]

 

                    Influenza High Dose           Unknown   Completed           

[facility]

 

                                                    Pneumococcal 

Polysaccharide, 

PPSV23 (PNEUMOVAX)              Unknown      Completed                 [facility]

 

                                                    SARS-COV-2 COVID-19 

PFIZER VACCINE              Unknown      Completed                 [facility]

 

                    PPD (TB)            Unknown   Completed           [facility]

 

                                                    Influenza Virus 

Vaccine Quad IM 3+ 

YRS                       Unknown      Completed                 [facility]

 

                                                    Influenza High Dose 

Quad                      Unknown      Completed                 [facility]

 

                                                    SARS-COV-2 COVID-19 

CHAPARRO-SUCROSE 

VACCINE 12 YRS+, 

BIVALENT 0.3ML, IM, 

(PFIZER GRAY TOP)              Unknown      Completed                 [facility]

 

                                                    Influenza Virus 

Vaccine,quad 

Im,preserve Free 

65+ (FLUAD)               Unknown      Completed                 [facility]

 

                                                    SARS-COV-2 COVID 19 

CHAPARRO SUCROSE 

VACCINE 12+, 

0831-3984, 0.3 ML 

(30 MCG), IM PFIZER 

(GRAY TOP)                Unknown      Completed                 [facility]

 

                    Influenza High Dose           Unknown   Completed           

[facility]

 

                                                    Pneumococcal 13 

Conjugate, PCV13 

(Prevnar 13)              Unknown      Completed                 [facility]

 

                                                    Influenza Virus 

Vaccine                   Unknown      Completed                 [facility]

 

                                                    SARS-COV-2 COVID-19 

PFIZER VACCINE              Unknown      Completed                 [facility]

 

                                                    SARS-COV-2 COVID-19 

PFIZER VACCINE              Unknown      Completed                 [facility]

 

                                                    SARS-COV-2 COVID-19 

PFIZER VACCINE              Unknown      Completed                 [facility]

 

                    Influenza High Dose           Unknown   Completed           

[facility]

 

                                                    Pneumococcal 

Polysaccharide, 

PPSV23 (PNEUMOVAX)              Unknown      Completed                 [facility]

 

                                                    SARS-COV-2 COVID-19 

PFIZER VACCINE              Unknown      Completed                 [facility]

 

                    PPD (TB)            Unknown   Completed           [facility]

 

                                                    Influenza Virus 

Vaccine Quad IM 3+ 

YRS                       Unknown      Completed                 [facility]

 

                                                    Influenza High Dose 

Quad                      Unknown      Completed                 [facility]

 

                                                    SARS-COV-2 COVID-19 

CHAPARRO-SUCROSE 

VACCINE 12 YRS+, 

BIVALENT 0.3ML, IM, 

(PFIZER GRAY TOP)              Unknown      Completed                 [facility]

 

                                                    Influenza Virus 

Vaccine,quad 

Im,preserve Free 

65+ (FLUAD)               Unknown      Completed                 [facility]

 

                                                    SARS-COV-2 COVID 19 

CHAPARRO SUCROSE 

VACCINE 12+, 

9464-6161, 0.3 ML 

(30 MCG), IM PFIZER 

(GRAY TOP)                Unknown      Completed                 [facility]

 

                    Influenza High Dose           Unknown   Completed           

[facility]

 

                                                    Pneumococcal 13 

Conjugate, PCV13 

(Prevnar 13)              Unknown      Completed                 [facility]

 

                                                    Influenza Virus 

Vaccine                   Unknown      Completed                 [facility]

 

                                                    SARS-COV-2 COVID-19 

PFIZER VACCINE              Unknown      Completed                 [facility]

 

                                                    SARS-COV-2 COVID-19 

PFIZER VACCINE              Unknown      Completed                 [facility]

 

                                                    SARS-COV-2 COVID-19 

PFIZER VACCINE              Unknown      Completed                 [facility]

 

                    Influenza High Dose           Unknown   Completed           

[facility]

 

                                                    Pneumococcal 

Polysaccharide, 

PPSV23 (PNEUMOVAX)              Unknown      Completed                 [facility]

 

                                                    SARS-COV-2 COVID-19 

PFIZER VACCINE              Unknown      Completed                 [facility]

 

                    PPD (TB)            Unknown   Completed           [facility]

 

                                                    Influenza Virus 

Vaccine Quad IM 3+ 

YRS                       Unknown      Completed                 [facility]

 

                                                    Influenza High Dose 

Quad                      Unknown      Completed                 [facility]

 

                                                    SARS-COV-2 COVID-19 

CHAPARRO-SUCROSE 

VACCINE 12 YRS+, 

BIVALENT 0.3ML, IM, 

(PFIZER GRAY TOP)              Unknown      Completed                 [facility]

 

                                                    Influenza Virus 

Vaccine,quad 

Im,preserve Free 

65+ (FLUAD)               Unknown      Completed                 [facility]

 

                                                    SARS-COV-2 COVID 19 

CHAPARRO SUCROSE 

VACCINE 12+, 

6764-9947, 0.3 ML 

(30 MCG), IM PFIZER 

(GRAY TOP)                Unknown      Completed                 [facility]

 

                    Influenza High Dose           Unknown   Completed           

[facility]

 

                                                    Pneumococcal 13 

Conjugate, PCV13 

(Prevnar 13)              Unknown      Completed                 [facility]

 

                                                    Influenza Virus 

Vaccine                   Unknown      Completed                 [facility]

 

                                                    SARS-COV-2 COVID-19 

PFIZER VACCINE              Unknown      Completed                 [facility]

 

                                                    SARS-COV-2 COVID-19 

PFIZER VACCINE              Unknown      Completed                 [facility]

 

                                                    SARS-COV-2 COVID-19 

PFIZER VACCINE              Unknown      Completed                 [facility]

 

                    Influenza High Dose           Unknown   Completed           

[facility]

 

                                                    Pneumococcal 

Polysaccharide, 

PPSV23 (PNEUMOVAX)              Unknown      Completed                 [facility]

 

                                                    SARS-COV-2 COVID-19 

PFIZER VACCINE              Unknown      Completed                 [facility]

 

                    PPD (TB)            Unknown   Completed           [facility]

 

                                                    Influenza Virus 

Vaccine Quad IM 3+ 

YRS                       Unknown      Completed                 [facility]

 

                                                    Influenza High Dose 

Quad                      Unknown      Completed                 [facility]

 

                                                    SARS-COV-2 COVID-19 

CHAPARRO-SUCROSE 

VACCINE 12 YRS+, 

BIVALENT 0.3ML, IM, 

(PFIZER GRAY TOP)              Unknown      Completed                 [facility]

 

                                                    Influenza Virus 

Vaccine,quad 

Im,preserve Free 

65+ (FLUAD)               Unknown      Completed                 [facility]

 

                                                    SARS-COV-2 COVID 19 

CHAPARRO SUCROSE 

VACCINE 12+, 

3440-3696, 0.3 ML 

(30 MCG), IM PFIZER 

(GRAY TOP)                Unknown      Completed                 [facility]

 

                    Influenza High Dose           Unknown   Completed           

[facility]

 

                                                    Pneumococcal 13 

Conjugate, PCV13 

(Prevnar 13)              Unknown      Completed                 [facility]

 

                                                    Influenza Virus 

Vaccine                   Unknown      Completed                 [facility]

 

                                                    SARS-COV-2 COVID-19 

PFIZER VACCINE              Unknown      Completed                 [facility]

 

                                                    SARS-COV-2 COVID-19 

PFIZER VACCINE              Unknown      Completed                 [facility]

 

                                                    SARS-COV-2 COVID-19 

PFIZER VACCINE              Unknown      Completed                 [facility]

 

                    Influenza High Dose           Unknown   Completed           

[facility]

 

                                                    Pneumococcal 

Polysaccharide, 

PPSV23 (PNEUMOVAX)              Unknown      Completed                 [facility]

 

                                                    SARS-COV-2 COVID-19 

PFIZER VACCINE              Unknown      Completed                 [facility]

 

                    PPD (TB)            Unknown   Completed           [facility]

 

                                                    Influenza Virus 

Vaccine Quad IM 3+ 

YRS                       Unknown      Completed                 [facility]

 

                                                    Influenza High Dose 

Quad                      Unknown      Completed                 [facility]

 

                                                    SARS-COV-2 COVID-19 

CHAPARRO-SUCROSE 

VACCINE 12 YRS+, 

BIVALENT 0.3ML, IM, 

(PFIZER GRAY TOP)              Unknown      Completed                 [facility]

 

                                                    Influenza Virus 

Vaccine,quad 

Im,preserve Free 

65+ (FLUAD)               Unknown      Completed                 [facility]

 

                                                    SARS-COV-2 COVID 19 

CHAPARRO SUCROSE 

VACCINE 12+, 

6276-8766, 0.3 ML 

(30 MCG), IM PFIZER 

(GRAY TOP)                Unknown      Completed                 [facility]

 

                    Influenza High Dose           Unknown   Completed           

[facility]

 

                                                    Pneumococcal 13 

Conjugate, PCV13 

(Prevnar 13)              Unknown      Completed                 [facility]

 

                                                    Influenza Virus 

Vaccine                   Unknown      Completed                 [facility]

 

                                                    SARS-COV-2 COVID-19 

PFIZER VACCINE              Unknown      Completed                 [facility]

 

                                                    SARS-COV-2 COVID-19 

PFIZER VACCINE              Unknown      Completed                 [facility]

 

                                                    SARS-COV-2 COVID-19 

PFIZER VACCINE              Unknown      Completed                 [facility]

 

                    Influenza High Dose           Unknown   Completed           

[facility]

 

                                                    Pneumococcal 

Polysaccharide, 

PPSV23 (PNEUMOVAX)              Unknown      Completed                 [facility]

 

                                                    SARS-COV-2 COVID-19 

PFIZER VACCINE              Unknown      Completed                 [facility]

 

                    PPD (TB)            Unknown   Completed           [facility]

 

                                                    Influenza Virus 

Vaccine Quad IM 3+ 

YRS                       Unknown      Completed                 [facility]

 

                                                    Influenza High Dose 

Quad                      Unknown      Completed                 [facility]

 

                                                    SARS-COV-2 COVID-19 

CHAPARRO-SUCROSE 

VACCINE 12 YRS+, 

BIVALENT 0.3ML, IM, 

(PFIZER GRAY TOP)              Unknown      Completed                 [facility]

 

                                                    Influenza Virus 

Vaccine,quad 

Im,preserve Free 

65+ (FLUAD)               Unknown      Completed                 [facility]

 

                                                    SARS-COV-2 COVID 19 

CHAPARRO SUCROSE 

VACCINE 12+, 

1625-8765, 0.3 ML 

(30 MCG), IM PFIZER 

(GRAY TOP)                Unknown      Completed                 [facility]

 

                    Influenza High Dose           Unknown   Completed           

[facility]

 

                                                    Pneumococcal 13 

Conjugate, PCV13 

(Prevnar 13)              Unknown      Completed                 [facility]

 

                                                    Influenza Virus 

Vaccine                   Unknown      Completed                 [facility]

 

                                                    SARS-COV-2 COVID-19 

PFIZER VACCINE              Unknown      Completed                 [facility]

 

                                                    SARS-COV-2 COVID-19 

PFIZER VACCINE              Unknown      Completed                 [facility]

 

                                                    SARS-COV-2 COVID-19 

PFIZER VACCINE              Unknown      Completed                 [facility]

 

                    Influenza High Dose           Unknown   Completed           

[facility]

 

                                                    Pneumococcal 

Polysaccharide, 

PPSV23 (PNEUMOVAX)              Unknown      Completed                 [facility]

 

                                                    SARS-COV-2 COVID-19 

PFIZER VACCINE              Unknown      Completed                 [facility]

 

                    PPD (TB)            Unknown   Completed           [facility]

 

                                                    Influenza Virus 

Vaccine Quad IM 3+ 

YRS                       Unknown      Completed                 [facility]

 

                                                    Influenza High Dose 

Quad                      Unknown      Completed                 [facility]

 

                                                    SARS-COV-2 COVID-19 

CHAPARRO-SUCROSE 

VACCINE 12 YRS+, 

BIVALENT 0.3ML, IM, 

(PFIZER GRAY TOP)              Unknown      Completed                 [facility]

 

                                                    Influenza Virus 

Vaccine,quad 

Im,preserve Free 

65+ (FLUAD)               Unknown      Completed                 [facility]

 

                                                    SARS-COV-2 COVID 19 

CHAPARRO SUCROSE 

VACCINE 12+, 

5140-2813, 0.3 ML 

(30 MCG), IM PFIZER 

(GRAY TOP)                Unknown      Completed                 [facility]

 

                    Influenza High Dose           Unknown   Completed           

[facility]

 

                                                    Pneumococcal 13 

Conjugate, PCV13 

(Prevnar 13)              Unknown      Completed                 [facility]

 

                                                    Influenza Virus 

Vaccine                   Unknown      Completed                 [facility]

 

                                                    SARS-COV-2 COVID-19 

PFIZER VACCINE              Unknown      Completed                 [facility]

 

                                                    SARS-COV-2 COVID-19 

PFIZER VACCINE              Unknown      Completed                 [facility]

 

                                                    SARS-COV-2 COVID-19 

PFIZER VACCINE              Unknown      Completed                 [facility]

 

                    Influenza High Dose           Unknown   Completed           

[facility]

 

                                                    Pneumococcal 

Polysaccharide, 

PPSV23 (PNEUMOVAX)              Unknown      Completed                 [facility]

 

                                                    SARS-COV-2 COVID-19 

PFIZER VACCINE              Unknown      Completed                 [facility]

 

                    PPD (TB)            Unknown   Completed           [facility]

 

                                                    Influenza Virus 

Vaccine Quad IM 3+ 

YRS                       Unknown      Completed                 [facility]

 

                                                    Influenza High Dose 

Quad                      Unknown      Completed                 [facility]

 

                                                    SARS-COV-2 COVID-19 

CHAPARRO-SUCROSE 

VACCINE 12 YRS+, 

BIVALENT 0.3ML, IM, 

(PFIZER GRAY TOP)              Unknown      Completed                 [facility]

 

                                                    Influenza Virus 

Vaccine,quad 

Im,preserve Free 

65+ (FLUAD)               Unknown      Completed                 [facility]

 

                                                    SARS-COV-2 COVID 19 

CHAPARRO SUCROSE 

VACCINE 12+, 

9721-6295, 0.3 ML 

(30 MCG), IM PFIZER 

(GRAY TOP)                Unknown      Completed                 [facility]

 

                    Influenza High Dose           Unknown   Completed           

[facility]

 

                                                    Pneumococcal 13 

Conjugate, PCV13 

(Prevnar 13)              Unknown      Completed                 [facility]

 

                                                    Influenza Virus 

Vaccine                   Unknown      Completed                 [facility]

 

                                                    SARS-COV-2 COVID-19 

PFIZER VACCINE              Unknown      Completed                 [facility]

 

                                                    SARS-COV-2 COVID-19 

PFIZER VACCINE              Unknown      Completed                 [facility]

 

                                                    SARS-COV-2 COVID-19 

PFIZER VACCINE              Unknown      Completed                 [facility]

 

                    Influenza High Dose           Unknown   Completed           

[facility]

 

                                                    Pneumococcal 

Polysaccharide, 

PPSV23 (PNEUMOVAX)              Unknown      Completed                 [facility]

 

                                                    SARS-COV-2 COVID-19 

PFIZER VACCINE              Unknown      Completed                 [facility]

 

                    PPD (TB)            Unknown   Completed           [facility]

 

                                                    Influenza Virus 

Vaccine Quad IM 3+ 

YRS                       Unknown      Completed                 [facility]

 

                                                    Influenza High Dose 

Quad                      Unknown      Completed                 [facility]

 

                                                    SARS-COV-2 COVID-19 

CHAPARRO-SUCROSE 

VACCINE 12 YRS+, 

BIVALENT 0.3ML, IM, 

(PFIZER GRAY TOP)              Unknown      Completed                 [facility]

 

                                                    Influenza Virus 

Vaccine,quad 

Im,preserve Free 

65+ (FLUAD)               Unknown      Completed                 [facility]

 

                                                    SARS-COV-2 COVID 19 

CHAPARRO SUCROSE 

VACCINE 12+, 

0201-0427, 0.3 ML 

(30 MCG), IM PFIZER 

(GRAY TOP)                Unknown      Completed                 [facility]







Vital Signs





                      Vital Name Observation Time Observation Value Comments   S

ource

 

                                                    Systolic blood 

pressure        2024 20:30:00 149 mm[Hg]                      [facility]

 

                                                    Diastolic blood 

pressure        2024 20:30:00 77 mm[Hg]                       [facility]

 

                      Heart rate 2024 20:24:00 67 /min               [facility]

[facility]

 

                      Respiratory rate 2024 20:24:00 18 /min              

 [facility]

 

                      Body height 2024 20:24:00 157.5 cm              [facility]

 

                      Body weight 2024 20:24:00 78.835 kg             [facility]

 

                      BMI        2024 20:24:00 31.79 kg/m2            [facility]

 

                                                    Oxygen saturation in 

Arterial blood by 

Pulse oximetry  2024 20:24:00 98 /min                         [facility]

 

                                                    Systolic blood 

pressure        2024 23:00:00 149 mm[Hg]                      [facility]

 

                                                    Diastolic blood 

pressure        2024 23:00:00 84 mm[Hg]                       [facility]

 

                      Heart rate 2024 22:59:00 86 /min               [facility]

 

                      Body temperature 2024 22:59:00 36.39 Donna            

 [facility]

 

                      Respiratory rate 2024 22:59:00 18 /min              

 [facility]

 

                      Body weight 2024 22:59:00 77.293 kg             [facility]

 

                      BMI        2024 22:59:00 31.17 kg/m2            [facility]

 

                                                    Oxygen saturation in 

Arterial blood by 

Pulse oximetry  2024 22:59:00 99 /min                         [facility]

 

                                                    Systolic blood 

pressure        2024 21:30:00 99 mm[Hg]                       [facility]

 

                                                    Diastolic blood 

pressure        2024 21:30:00 52 mm[Hg]                       [facility]

 

                      Heart rate 2024 21:28:00 88 /min               [facility]

[facility]

 

                      Body temperature 2024 21:28:00 36.83 Donna            

 [facility]

 

                      Respiratory rate 2024 21:28:00 18 /min              

 [facility]

 

                      Body height 2024 21:28:00 157.5 cm              [facility]

 

                      Body weight 2024 21:28:00 82.283 kg             [facility]

 

                      BMI        2024 21:28:00 33.18 kg/m2            [facility]

 

                                                    Systolic blood 

pressure        2024 21:32:00 118 mm[Hg]                      [facility]

 

                                                    Diastolic blood 

pressure        2024 21:32:00 69 mm[Hg]                       [facility]

 

                      Heart rate 2024 21:32:00 69 /min               Un[facility]

[facility]

 

                      Respiratory rate 2024 21:32:00 18 /min              

 [facility]

 

                      Body weight 2024 21:32:00 85.276 kg             [facility]

 

                      BMI        2024 21:32:00 34.39 kg/m2            [facility]

 

                                                    Oxygen saturation in 

Arterial blood by 

Pulse oximetry  2024 21:32:00 98 /min                         [facility]

 

                                                    Systolic blood 

pressure        2024 20:40:00 163 mm[Hg]                      [facility]

 

                                                    Diastolic blood 

pressure        2024 20:40:00 75 mm[Hg]                       [facility]

 

                      Heart rate 2024 20:39:00 78 /min               [facility]

[facility]

 

                      Respiratory rate 2024 20:39:00 18 /min              

 [facility]

 

                      Body height 2024 20:39:00 157.5 cm              [facility]

 

                      Body weight 2024 20:39:00 85.276 kg             [facility]

 

                      BMI        2024 20:39:00 34.39 kg/m2            [facility]

[facility]

 

                                                    Oxygen saturation in 

Arterial blood by 

Pulse oximetry  2024 20:39:00 95 /min                         [facility]

 

                                                    Systolic blood 

pressure        2024 19:23:00 153 mm[Hg]                      [facility]

 

                                                    Diastolic blood 

pressure        2024 19:23:00 80 mm[Hg]                       [facility]

 

                      Heart rate 2024 19:23:00 97 /min               Unive

[facility]

 

                      Body temperature 2024 19:21:00 36.67 Donna            

 [facility]

 

                      Respiratory rate 2024 19:21:00 18 /min              

 [facility]

 

                      Body weight 2024 19:21:00 85.367 kg             [facility]

[facility]

 

                      BMI        2024 19:21:00 34.42 kg/m2            [facility]

[facility]

 

                                                    Oxygen saturation in 

Arterial blood by 

Pulse oximetry  2024 19:21:00 98 /min                         [facility]

 

                                                    Systolic blood 

pressure        2024 21:13:00 163 mm[Hg]                      [facility]

 

                                                    Diastolic blood 

pressure        2024 21:13:00 79 mm[Hg]                       [facility]

 

                      Heart rate 2024 21:13:00 87 /min               Un[facility]

[facility]

 

                      Body height 2024 21:13:00 157.5 cm              Un[facility]

[facility]

 

                      Body weight 2024 21:13:00 84.687 kg             [facility]

[facility]

 

                      BMI        2024 21:13:00 34.15 kg/m2            [facility]

[facility]

 

                                                    Oxygen saturation in 

Arterial blood by 

Pulse oximetry  2024 21:13:00 96 /min                         [facility]

 

                                                    Systolic blood 

pressure        2024 19:19:00 129 mm[Hg]                      [facility]

 

                                                    Diastolic blood 

pressure        2024 19:19:00 67 mm[Hg]                       [facility]

 

                      Heart rate 2024 19:19:00 78 /min               [facility]

[facility]

 

                      Body temperature 2024 19:19:00 36.67 Donna            

 [facility]

 

                      Body height 2024 19:19:00 157.5 cm              [facility]

[facility]

 

                      Body weight 2024 19:19:00 83.008 kg             [facility]

[facility]

 

                      BMI        2024 19:19:00 33.47 kg/m2            Univ

[facility]

 

                                                    Oxygen saturation in 

Arterial blood by 

Pulse oximetry  2024 19:19:00 98 /min                         [facility]

 

                                                    Systolic blood 

pressure        2024 19:18:00 135 mm[Hg]                      [facility]

 

                                                    Diastolic blood 

pressure        2024 19:18:00 66 mm[Hg]                       [facility]

 

                      Heart rate 2024 19:18:00 84 /min               Un[facility]

[facility]

 

                      Body height 2024 19:18:00 157.5 cm              [facility]

[facility] 

[facility]

 

                      Body weight 2024 19:18:00 82.645 kg             Univ

[facility]

 

                      BMI        2024 19:18:00 33.32 kg/m2            Univ

[facility]

 

                                                    Oxygen saturation in 

Arterial blood by 

Pulse oximetry  2024 19:18:00 98 /min                         [facility]

 

                      Body height 2023-11-15 14:55:00 157.5 cm              [facility]

[facility]

 

                      Body weight 2023-11-15 14:55:00 78.019 kg             [facility]

[facility] 

[facility]

 

                      BMI        2023-11-15 14:55:00 31.46 kg/m2            Univ

[facility]

 

                                                    Systolic blood 

pressure        2023 20:30:00 150 mm[Hg]                      [facility]

 

                                                    Diastolic blood 

pressure        2023 20:30:00 69 mm[Hg]                       [facility]

 

                      Heart rate 2023 20:14:00 78 /min               [facility]

[facility] 

[facility]

 

                      Body height 2023 20:14:00 157.5 cm              [facility]

[facility]

 

                      Body weight 2023 20:14:00 78.245 kg             Univ

[facility] 

[facility]

 

                      BMI        2023 20:14:00 31.55 kg/m2            [facility]

[facility]

 

                      Body height 2023-10-16 18:32:00 157.5 cm              [facility]

[facility]

 

                                                    Systolic blood 

pressure        2023-10-12 17:57:00 113 mm[Hg]                      [facility]

 

                                                    Diastolic blood 

pressure        2023-10-12 17:57:00 66 mm[Hg]                       [facility]

 

                      Heart rate 2023-10-12 17:57:00 88 /min               Un[facility]

[facility]

 

                      Respiratory rate 2023-10-12 17:57:00 18 /min              

 [facility]

 

                      Body height 2023-10-12 17:57:00 157.5 cm              [facility]

[facility]

 

                      Body weight 2023-10-12 17:57:00 82.192 kg             [facility]

 

                      BMI        2023-10-12 17:57:00 33.14 kg/m2            [facility]

 

                                                    Oxygen saturation in 

Arterial blood by 

Pulse oximetry  2023-10-12 17:57:00 97 /min                         [facility]

 

                                                    Systolic blood 

pressure        2023 17:04:00 136 mm[Hg]                      [facility]

 

                                                    Diastolic blood 

pressure        2023 17:04:00 75 mm[Hg]                       [facility]

 

                      Heart rate 2023 17:04:00 78 /min               Unive

[facility]

 

                      Body temperature 2023 17:04:00 36.89 Donna            

 [facility]

 

                      Body weight 2023 17:04:00 81.965 kg             [facility]

 

                      BMI        2023 17:04:00 33.05 kg/m2            Univ

[facility]

 

                                                    Oxygen saturation in 

Arterial blood by 

Pulse oximetry  2023 17:04:00 98 /min                         [facility]

 

                                                    Systolic blood 

pressure        2023 20:29:00 142 mm[Hg]                      [facility]

 

                                                    Diastolic blood 

pressure        2023 20:29:00 71 mm[Hg]                       [facility]

 

                      Body temperature 2023 20:27:00 36.44 Donna            

 [facility]

 

                      Respiratory rate 2023 20:27:00 18 /min              

 [facility]

 

                      Body height 2023 20:27:00 157.5 cm              [facility]

 

                      Body weight 2023 20:27:00 78.472 kg             [facility]

 

                      BMI        2023 20:27:00 31.64 kg/m2            [facility]

 

                                                    Oxygen saturation in 

Arterial blood by 

Pulse oximetry  2023 20:27:00 97 /min                         [facility]

 

                                                    Systolic blood 

pressure        2023 14:18:00 144 mm[Hg]                      [facility]

 

                                                    Diastolic blood 

pressure        2023 14:18:00 65 mm[Hg]                       [facility]

 

                      Heart rate 2023 14:17:00 81 /min               [facility]

[facility]

 

                      Body temperature 2023 14:17:00 36.11 Donna            

 [facility]

 

                      Respiratory rate 2023 14:17:00 16 /min              

 [facility]

 

                      Body height 2023 14:17:00 154.9 cm              [facility]

 

                      Body weight 2023 14:17:00 81.058 kg             [facility]

 

                      BMI        2023 14:17:00 33.77 kg/m2            [facility]

 

                                                    Oxygen saturation in 

Arterial blood by 

Pulse oximetry  2023 14:17:00 96 /min                         [facility]

 

                                                    Systolic blood 

pressure        2023 19:48:00 114 mm[Hg]                      [facility]

 

                                                    Diastolic blood 

pressure        2023 19:48:00 75 mm[Hg]                       [facility]

 

                      Heart rate 2023 19:48:00 89 /min               Un[facility]

[facility]

 

                      Body temperature 2023 19:48:00 36.89 Donna            

 [facility]

 

                      Respiratory rate 2023 19:48:00 18 /min              

 [facility]

 

                      Body height 2023 19:48:00 157.5 cm              Univ

[facility]

 

                      Body weight 2023 19:48:00 79.833 kg             Univ

[facility]

 

                      BMI        2023 19:48:00 32.19 kg/m2            Univ

[facility]

 

                                                    Oxygen saturation in 

Arterial blood by 

Pulse oximetry  2023 19:48:00 96 /min                         [facility]

 

                                                    Systolic blood 

pressure        2023 19:42:00 114 mm[Hg]                      [facility]

 

                                                    Diastolic blood 

pressure        2023 19:42:00 75 mm[Hg]                       [facility]

 

                      Heart rate 2023 19:42:00 89 /min               Unive

[facility]

 

                      Body temperature 2023 19:42:00 36.89 Donna            

 [facility]

 

                      Respiratory rate 2023 19:42:00 18 /min              

 [facility]

 

                      Body height 2023 19:42:00 157.5 cm              Univ

[facility]

 

                      Body weight 2023 19:42:00 79.833 kg             [facility]

 

                      BMI        2023 19:42:00 32.19 kg/m2            Univ

[facility]

 

                                                    Oxygen saturation in 

Arterial blood by 

Pulse oximetry  2023 19:42:00 96 /min                         [facility]

 

                                                    Systolic blood 

pressure        2023 18:35:00 145 mm[Hg]                      [facility]

 

                                                    Diastolic blood 

pressure        2023 18:35:00 70 mm[Hg]                       [facility]

 

                      Heart rate 2023 18:35:00 73 /min               Unive

[facility]

 

                                                    Oxygen saturation in 

Arterial blood by 

Pulse oximetry  2023 18:35:00 96 /min                         [facility]

 

                      Body temperature 2023 18:32:00 37.11 Donna            

 [facility]

 

                      Body height 2023 18:32:00 157.5 cm              Univ

[facility]

 

                      Body weight 2023 18:32:00 85.775 kg             [facility]

 

                      BMI        2023 18:32:00 34.59 kg/m2            [facility]

 

                                                    Systolic blood 

pressure        2023 16:37:00 151 mm[Hg]                      [facility]

 

                                                    Diastolic blood 

pressure        2023 16:37:00 75 mm[Hg]                       [facility]

 

                      Heart rate 2023 16:37:00 94 /min               [facility]

[facility]

 

                      Body temperature 2023 16:37:00 36.28 Donna            

 [facility]

 

                      Respiratory rate 2023 16:37:00 16 /min              

 [facility]

 

                      Body weight 2023 16:37:00 83.008 kg             [facility]

 

                      BMI        2023 16:37:00 35.74 kg/m2            [facility]

 

                                                    Oxygen saturation in 

Arterial blood by 

Pulse oximetry  2023 16:37:00 96 /min                         [facility]

 

                                                    Systolic blood 

pressure        2023 20:42:00 143 mm[Hg]                      [facility]

 

                                                    Diastolic blood 

pressure        2023 20:42:00 84 mm[Hg]                       [facility]

 

                      Heart rate 2023 20:42:00 80 /min               Un[facility]

[facility]

 

                      Body temperature 2023 20:42:00 36.67 Donna            

 [facility]

 

                      Respiratory rate 2023 20:42:00 18 /min              

 [facility]

 

                      Body height 2023 20:42:00 152.4 cm              [facility]

 

                      Body weight 2023 20:42:00 83.008 kg             [facility]

 

                      BMI        2023 20:42:00 35.74 kg/m2            [facility]

 

                                                    Systolic blood 

pressure        2023 18:48:00 168 mm[Hg]                      [facility]

 

                                                    Diastolic blood 

pressure        2023 18:48:00 75 mm[Hg]                       [facility]

 

                      Heart rate 2023 18:48:00 83 /min               Un[facility]

[facility]

 

                      Body height 2023 18:48:00 157.5 cm              [facility]

[facility]

 

                      Body weight 2023 18:48:00 83.008 kg             [facility]

[facility]

 

                      BMI        2023 18:48:00 33.47 kg/m2            Univ

[facility]

 

                                                    Systolic blood 

pressure        2023 21:46:00 141 mm[Hg]                      [facility]

 

                                                    Diastolic blood 

pressure        2023 21:46:00 70 mm[Hg]                       [facility]

 

                      Heart rate 2023 21:45:00 84 /min               Un[facility]

[facility]

 

                      Body temperature 2023 21:45:00 36.89 Donna            

 [facility]

 

                      Respiratory rate 2023 21:45:00 18 /min              

 [facility]

 

                      Body height 2023 21:45:00 157.5 cm              Univ

[facility]

 

                      Body weight 2023 21:45:00 83.054 kg             Un[facility]

[facility]

 

                      BMI        2023 21:45:00 33.49 kg/m2            [facility]

 

                                                    Oxygen saturation in 

Arterial blood by 

Pulse oximetry  2023 21:45:00 98 /min                         [facility]

 

                                                    Systolic blood 

pressure        2023 21:50:00 162 mm[Hg]                      [facility]

 

                                                    Diastolic blood 

pressure        2023 21:50:00 82 mm[Hg]                       [facility]

 

                      Heart rate 2023 21:50:00 71 /min               Unive

[facility]

 

                      Body temperature 2023 21:35:00 36.78 Donna            

 [facility]

 

                      Respiratory rate 2023 21:35:00 18 /min              

 [facility]

 

                      Body height 2023 21:35:00 157.5 cm              Univ

[facility]

 

                      Body weight 2023 21:35:00 85.957 kg             Univ

[facility]

 

                      BMI        2023 21:35:00 34.66 kg/m2            Univ

[facility]

 

                                                    Systolic blood 

pressure        2023 22:07:00 99 mm[Hg]                       [facility]

 

                                                    Diastolic blood 

pressure        2023 22:07:00 69 mm[Hg]                       [facility]

 

                      Heart rate 2023 22:06:00 83 /min               Unive

[facility]

 

                      Respiratory rate 2023 22:06:00 18 /min              

 [facility]

 

                      Body height 2023 22:06:00 157.5 cm              [facility]

 

                      Body weight 2023 22:06:00 85.276 kg             [facility]

 

                      BMI        2023 22:06:00 34.39 kg/m2            [facility]

 

                                                    Oxygen saturation in 

Arterial blood by 

Pulse oximetry  2023 22:06:00 96 /min                         [facility]

 

                                                    Systolic blood 

pressure        2023 21:49:00 168 mm[Hg]                      [facility]

 

                                                    Diastolic blood 

pressure        2023 21:49:00 78 mm[Hg]                       [facility]

 

                      Heart rate 2023 21:49:00 69 /min               Un[facility]

[facility]

 

                      Body temperature 2023 21:49:00 36.28 Donna            

 [facility]

 

                      Respiratory rate 2023 21:49:00 18 /min              

 [facility]

 

                      Body height 2023 21:49:00 157.5 cm              [facility]

 

                      Body weight 2023 21:49:00 86.183 kg             [facility]

 

                      BMI        2023 21:49:00 34.75 kg/m2            [facility]

 

                                                    Systolic blood 

pressure        2022 20:25:00 178 mm[Hg]                      [facility]

 

                                                    Diastolic blood 

pressure        2022 20:25:00 75 mm[Hg]                       [facility]

 

                      Heart rate 2022 20:24:00 84 /min               Un[facility]

[facility]

 

                      Body temperature 2022 20:24:00 36.61 Donna            

 [facility]

 

                      Respiratory rate 2022 20:24:00 18 /min              

 [facility]

 

                      Body height 2022 20:24:00 157.5 cm              [facility]

[facility]

 

                      Body weight 2022 20:24:00 88.451 kg             [facility]

[facility]

 

                      BMI        2022 20:24:00 35.67 kg/m2            [facility]

[facility]

 

                      Body height 2022 20:38:00 157.5 cm              [facility]

[facility]

 

                      Body weight 2022 20:38:00 89.359 kg             [facility]

[facility]

 

                      BMI        2022 20:38:00 36.03 kg/m2            Univ

[facility]

 

                                                    Systolic blood 

pressure        2022 19:51:00 153 mm[Hg]                      [facility]

 

                                                    Diastolic blood 

pressure        2022 19:51:00 75 mm[Hg]                       [facility]

 

                      Heart rate 2022 19:51:00 81 /min               Unive

[facility]

 

                                                    Oxygen saturation in 

Arterial blood by 

Pulse oximetry  2022 19:51:00 99 /min                         [facility]

 

                      Body height 2022 19:40:00 157.5 cm              [facility]

 

                      Body weight 2022 19:40:00 89.404 kg             [facility]

 

                      BMI        2022 19:40:00 36.05 kg/m2            [facility]

 

                                                    Systolic blood 

pressure        2022 19:54:00 152 mm[Hg]                      [facility]

 

                                                    Diastolic blood 

pressure        2022 19:54:00 72 mm[Hg]                       [facility]

 

                      Heart rate 2022 19:53:00 90 /min               [facility]

[facility]

 

                      Body temperature 2022 19:53:00 36.67 Donna            

 [facility]

 

                      Respiratory rate 2022 19:53:00 18 /min              

 [facility]

 

                      Body height 2022 19:53:00 162.6 cm              [facility]

[facility]

 

                      Body weight 2022 19:53:00 91.173 kg             [facility]

[facility]

 

                      BMI        2022 19:53:00 34.50 kg/m2            [facility]

 

                                                    Systolic blood 

pressure        2022 21:49:00 163 mm[Hg]                      [facility]

 

                                                    Diastolic blood 

pressure        2022 21:49:00 69 mm[Hg]                       [facility]

 

                      Heart rate 2022 21:43:00 71 /min               Unive

[facility]

 

                      Respiratory rate 2022 21:43:00 18 /min              

 [facility]

 

                      Body weight 2022 21:43:00 86.183 kg             [facility]

 

                      BMI        2022 21:43:00 34.75 kg/m2            [facility]

 

                                                    Oxygen saturation in 

Arterial blood by 

Pulse oximetry  2022 21:43:00 97 /min                         [facility]

 

                                                    Systolic blood 

pressure        2022 20:59:00 146 mm[Hg]                      [facility]

 

                                                    Diastolic blood 

pressure        2022 20:59:00 68 mm[Hg]                       [facility]

 

                      Heart rate 2022 20:52:00 78 /min               Un[facility]

[facility]

 

                      Respiratory rate 2022 20:52:00 18 /min              

 [facility]

 

                      Body weight 2022 20:52:00 86.637 kg             [facility]

 

                      BMI        2022 20:52:00 34.93 kg/m2            [facility]

 

                                                    Oxygen saturation in 

Arterial blood by 

Pulse oximetry  2022 20:52:00 96 /min                         [facility]







Procedures





                                        Procedure           Date / Time 

Performed                 Performing Clinician      Source

 

                                                    POCT URINALYSIS W/O 

SPECIFIC GRAVITY    2024 00:00:00 Kushal Hollingsworth     [facility]

 

                          POCT URINALYSIS AUTO 2024 21:27:00 Inna Hollingsworth [facility]

 

                          US RETROPERITONEAL LIMITED 2024 17:55:55 Kushal Hollingsworth [facility]

 

                          XR HAND 3+ VW LEFT 2024 20:14:31 June Silva

 [facility]

 

                          XR CLAVICLE COMP BILATERAL 2024 20:14:11 June Mascorro [facility]

 

                                                    XR SHOULDER 2+ VW 

BILATERAL           2024 20:05:20 June Silva     [facility]

 

                          URINE CULTURE 2024 21:32:00 [facility]

 

                                                    JADE,POST-VOID 

RES,US,NON-IMAGING  2024 00:00:00 Kushal Hollingsworth     [facility]

 

                                                    POCT URINALYSIS W/O 

SPECIFIC GRAVITY    2024 00:00:00 Kushal Hollingsworth     [facility]

 

                                                    SARS-COV-2 COVID 19 CH[place]O 

SUCROSE VACCINE 12+, 

8539-2093, 0.3 ML (30 

MCG), IM PFIZER (GRAY TOP) 2024 20:02:01       Rupa Ayers                             [facility]

 

                                INSURANCE CORRESPONDENCE 2023 06:01:00 Doc

tor Unassigned, 

No Name                                 [facility]

 

                                INSURANCE CORRESPONDENCE 2023-10-27 05:01:00 Doc

tor Unassigned, 

No Name                                 [facility]

 

                                                    FLU 

VACC(0247-2751),65+YR,0.5 

ML,IM,ADJUVANTED,QUAD(FLUA

D)                        2023-10-12 18:34:27       Rupa Ayers                             [facility]

 

                                DME/SUPPLY JUSTIFICATION 2023 05:01:00 Doc

tor Unassigned, 

No Name                                 [facility]

 

                          XR FEMUR 2 VW LEFT 2023 18:40:33 Don Portillo [facility]

 

                          XR HIPS 3 VW LEFT 2023 18:40:33 Billy Portillo

 [facility]

 

                                OP CORRESPONDENCE 2023 05:01:00 Doctor Beth

ssialexys, 

No Name                                 [facility] - OTHER 2023 05:01:00 Doctor JENNIE gracia, 

No Name                                 [facility]

 

                                                    MR LUMBAR SPINE WO 

CONTRAST            2023 17:32:37 Billy Portillo    [facility]

 

                          CT ABDOMEN WO CONTRAST 2023-08-15 19:55:40 Billy Portillo [facility] - OTHER 2023 05:01:00 Doctor U

nassigned, 

No Name                                 [facility] - OTHER 2023 05:01:00 Doctor JENNIE gracia, 

No Name                                 [facility]

 

                                DME/SUPPLY JUSTIFICATION 2023 05:01:00 Doc

tor Unassigned, 

No Name                                 [facility] - OTHER 2023 05:01:00 Doctor JENNIE gracia, 

No Name                                 [facility]

 

                                                    PAIN MANAGEMENT AGREEMENT 

& INFORMED CONSENT        2023 05:01:00       Doctor Unassigned, 

No Name                                 [facility]

 

                          XR KNEE 3 VW BILATERAL 2023 18:18:34 John Prather [facility]

 

                          XR HIPS 2 VW BILATERAL 2023 18:17:22 John Prather [facility]

 

                          XR SHOULDER 2+ VW LEFT 2023 18:16:21 John Prather [facility] 485 2023 05:01:00 Doctor Unass

igned, 

No Name                                 [facility]

 

                          US ABDOMEN LIMITED 2023 20:56:00 Don Portillo [facility]

 

                                EXTERNAL PROVIDER RECORDS 2023 05:01:00 Do

ctor Unassigned, 

No Name                                 [facility]

 

                                                    AUTHORIZATION TO RELEASE 

PHI TO [facility]               2023 06:01:00       Doctor Unassigned, 

No Name                                 [facility]

 

                          DEXA PERIPHERAL (FOREARM) 2023-02-15 20:13:03 Billy Olvera [facility]

 

                          DEXA AXIAL (HIP AND SPINE) 2023-02-15 20:13:03 Billy Momin [facility]

 

                                ASSIGNMENT OF BENEFITS 2023-02-15 19:06:26 Docto

r Unassigned, 

No Name                                 [facility]

 

                                                    POCT URINALYSIS W/O 

SPECIFIC GRAVITY    2023 21:52:00 Kushal Hollingsworth     [facility]

 

                                                    POCT URINALYSIS W/O 

SPECIFIC GRAVITY    2022 20:20:00 Kushal Hollingsworth     [facility]

 

                                PATIENT QUESTIONNAIRE 2022 06:01:00 Doctor

 Unassigned, 

No Name                                 [facility]

 

                                                    POCT URINALYSIS W/O 

SPECIFIC GRAVITY    2022 00:00:00 Kushal Hollingsworth     [facility]

 

                                ASSIGNMENT OF BENEFITS 2022 21:11:56 Docto

r Unassigned, 

No Name                                 [facility]

 

                                REFERRAL- REQUEST/RESPONSE 2022-10-10 05:01:00 D

octor Unassigned, 

No Name                                 [facility]

 

                                EXTERNAL PROVIDER RECORDS 2022 05:01:00 Do

ctor Unassigned, 

No Name                                 [facility]

 

                          PPD (TB)     2022 21:21:40 Eliana Duncan

[facility]







Encounters





                                                    Start 

Date/Time                               End 

Date/Time                               Encounter 

Type                                    Admission 

Type                                    Attending 

Clinicians                              Care 

Facility                                Care 

Department                              Encounter 

ID                                      Source

 

                                                    2024 

13:19:01                        Outpatient                      Rupa Ayers         [facility]                 [id number]

[id number]                                   [place] 

[facility]

[place]

 

                                                    2024 

14:56:07                        Outpatient      R               CORINA ALEJO [place]        [facility]            KEE             [id number]      [facility]

 

                                                    2021 

17:45:24                        Outpatient      R               CHRIS MALDONADO         [facility]            [facility]             [id number]      [facility]

 

                                                    2021-10-31 

15:52:42                        Outpatient                      CHRIS MALDONADO         [facility]            [facility]             [id number]      [facility]

 

                                                    2024-10-25 

13:30:00                                2024-10-25 

13:30:00            Outpatient          R                   KUSHAL HOLLINGSWORTH ELISHA          [facility]            [id number]      [facility]

 

                                                    2024 

00:00:00                                2024 

13:19:54            Telephone                               Rupa Ayers                                       [facility] 

MEDICAL 

OFFICE 

BUILDING                                1.2.840.114

350.1.13.10

4.2.7.2.686

[phone]

044                       [id number]                 [facility]

 

                                                    2024 

13:00:00                                2024 

13:00:00            Outpatient          RUPA ESTES     [facility]            [id number]      [facility]

 

                                                    2024 

00:00:00                                2024 

10:26:53            Telephone                               Rupa Ayers                                       [facility] 

MEDICAL 

OFFICE 

BUILDING                                1..840.114

350.1.13.10

4.2.7.2.686

[phone]

044                       [id number]                 [facility]

 

                                                    2024 

00:00:00                                2024 

11:40:38            Refill                                  Rupa Ayers                                       [facility] 

[place]?[facility]

[facility] 

MEDICAL 

OFFICE 

BUILDING                                1..840.114

350.1.13.10

4.2.7.2.686

[phone]

044                       [id number]                 [facility]

 

                                                    2024 

00:00:00                                2024 

10:58:45                                Letter 

(Out)                                                       [facility] AT 

[place]                               1..840.114

350.1.13.10

4.2.7.2.686

[phone]

019                       [id number]                 [facility]

 

                                                    2024 

00:00:00                                2024 

00:00:00  (TEL)                         [facility]       [id number]   [place] 

Special

ties

 

                                                    2024 

00:00:00                                2024 

00:00:00  (TEL)                         [facility]       [id number]   [place] 

Special

ties

 

                                                    2024 

15:20:00                                2024 

16:33:42            Outpatient          R                   RUPA AYERS     [facility]            [id number]      [facility]

 

                                                    2024 

15:20:00                                2024 

15:40:00                                Office 

Visit                                               Rupa Ayers                                       [facility] 

[place]?[facility] 

MEDICAL 

OFFICE 

BUILDING                                1..840.114

350.1.13.10

4.2.7.2.686

[phone]

044                       [id number]                 [facility]

 

                                                    2024 

13:40:00                                2024 

13:40:00            Outpatient          R                   RUPA AYERS     [facility]            [id number]      [facility]

 

                                                    2024 

00:00:00                                2024 

15:47:52            Telephone                               Rupa Ayers                                       [facility] 

WIC[facility] 

MEDICAL 

OFFICE 

BUILDING                                1.2.840.114

350.1.13.10

4.2.7.2.686

[phone]

044                       [id number]                 [facility]

 

                                                    2024 

15:00:00                                2024 

15:00:00            Outpatient          R                   RUPA AYERS     [facility]            [id number]      [facility]

 

                                                    2024 

00:00:00                                2024 

09:12:24            Telephone                               Kushal Hollingsworth                                  [facility] 

BUILDING                                1.2.840.114

350.1.13.10

4.2.7.2.686

[phone]

098                       [id number]                 [facility]

 

                                                    2024 

00:00:00                                2024 

09:17:47            Telephone                               Rupa Ayers 

BRADY                                       [facility] 

WICHO?BUNNY

[facility] 

OFFICE 

BUILDING                                1.2.840.114

350.1.13.10

4.2.7.2.686

[phone]

044                       [id number]                 [facility]

 

                                                    2024 

00:00:00                                2024 

16:44:12                                Nurse 

Triage                                              Rupa Ayers 

BRADY                                       [facility] 

WICHO?[facility] 

OFFICE 

BUILDING                                1.2.840.114

350.1.13.10

4.2.7.2.686

[phone]

044                       [id number]                 [facility]

 

                                                    2024 

16:00:00                                2024 

16:15:00                                Technician 

Visit                                               Pob, Adc 

Lab Main

Rosina Goodrich                                 [facility] 

BUILDING                                1.2.840.114

350.1.13.10

4.2.7.2.686

[phone]

353                       [id number]                 [facility]

 

                                                    2024 

16:00:00                                2024 

16:00:00            Outpatient          R                   ROSINA GOODRICH           [facility]            [id number]      [facility]

 

                                                    2024 

13:15:00                                2024 

13:15:00            Outpatient          R                   YONI SIMMONS         [facility]            [id number]      [facility]

 

                                                    2024 

18:00:00                                2024 

18:18:19            Outpatient          R                   ULISESNERY          [facility]            [id number]      [facility]

 

                                                    2024 

18:00:00                                2024 

18:18:19                                Urgent 

Care                                                UlisesNery

Unknown, 

Attending                               [facility] 

MED[facility] 

OFFICE 

BUILDING                                1..840.114

350.1.13.10

4.2.7.2.686

[phone]

370                       [id number]                 [facility]

 

                                                    2024 

00:00:00                                2024 

15:06:44            Telephone                               Rupa Ayers                                       [facility] 

MEDICAL 

OFFICE 

BUILDING                                1..840.114

350.1.13.10

4.2.7.2.686

[phone]

044                       [id number]                 [facility]

 

                                                    2024 

00:00:00                                2024 

09:22:08            Refill                                  Billy Portillo                                   [facility] 

BUILDING                                1..840.114

350.1.13.10

4.2.7.2.686

[phone]

044                       [id number]                 [facility]

 

                                                    2024 

16:30:00                                2024 

16:59:55            Outpatient          R                   KUSHAL HOLLINGSWORTH ELISHA          [facility]            [id number]      [facility]

 

                                                    2024 

16:30:00                                2024 

16:59:55                                Office 

Visit                                               Kushal Hollingsworth                                  [facility] 

BUILDING                                1..840.114

350.1.13.10

4.2.7.2.686

[phone]

098                       [id number]                 [facility]

 

                                                    2024 

00:00:00                                2024 

08:09:19            Refill                                  Billy Portillo                                   [facility] 

MEDICAL 

OFFICE 

BUILDING                                1.2.840.114

350.1.13.10

4.2.7.2.686

[phone]

044                       [id number]                 [facility]

 

                                                    2024 

13:45:00                                2024 

13:45:00            Outpatient          EVA SUE CRAIG           [facility]            [id number]      [facility]

 

                                                    2024 

00:00:00                                2024 

18:06:04                                Patient 

Secure Msg                                          Doctor 

Unassigned, 

No Name                                 [facility] 

BUILDING                                1..840.114

350.1.13.10

4.2.7.2.686

[phone]

134                       [id number]                 [facility]

 

                                                    2024 

13:00:00                                2024 

15:10:02            Outpatient          KUSHAL MILAN 

[facility]            [id number]      [facility]

 

                                                    2024 

13:00:00                                2024 

15:10:02                                Office 

Visit                                               Darrick 

[facility] 

BUILDING                                1..840.114

350.1.13.10

4.2.7.2.686

[phone]

098                       [id number]                 [facility]

 

                                                    2024 

13:45:00                                2024 

13:45:00            Outpatient          EVA SUE CRAIG           [facility]            [id number]      [facility]

 

                                                    2024 

14:00:00                                2024 

16:59:04            Outpatient          KUSHAL MILAN 

[facility]            [id number]      [facility]

 

                                                    2024 

14:00:00                                2024 

14:15:00                                Technician 

Visit                                               [facility] 

MEDICAL 

OFFICE 

BUILDING                                1.2.840.114

350.1.13.10

4.2.7.2.686

[phone]

353                       [id number]                 [facility]

 

                                                    2024 

00:00:00                                2024 

12:30:10            Refill                                  Kushal Hollingsworth                                  [facility] 

BUILDING                                1.2.840.114

350.1.13.10

4.2.7.2.686

[phone]

098                       [id number]                 [facility]

 

                                                    2024 

00:00:00                                2024 

10:58:27                                Patient 

Secure Msg                                          Doctor 

Unassigned, 

No Name                                 [facility]                                1.2.840.114

350.1.13.10

4.2.7.2.686

[phone]

019                       [id number]                 [facility]

 

                                                    2024 

00:00:00                                2024 

10:48:54                                Case 

Management                                          Kushal Hollingsworth                                  [facility]                                1.2840.114

350.1.13.10

4.2.7.2.686

[phone]

098                       [id number]                 [facility]

 

                                                    2024 

12:24:44                                2024 

23:59:00                                Hospital 

Encounter                                           [place]

, Rupa ABREU                                       [facility]                                  1.20.114

350.1.13.10

4.2.7.2.686

[phone]

800                       [id number]                 [facility]

 

                                                    2024 

15:15:00                                2024 

16:19:09            Outpatient          R                   CORINA ALEJO 

[facility]            [id number]      [facility]

 

                                                    2024 

15:15:00                                2024 

16:19:09                                Office 

Visit                                               Sap[place] 

[facility] 

BUILDING                                1.20.114

350.1.13.10

4.2.7.2.686

[phone]

188                       [id number]                 [facility]

 

                                                    2024 

11:30:00                                2024 

12:23:00                                Hospital 

Encounter                                           Kushal Hollingsworth                                  [facility]                                  1.2840.114

350.1.13.10

4.2.7.2.686

[phone]

806                       [id number]                 [facility]

 

                                                    2024 

00:00:00                                2024 

09:13:08                                Letter 

(Out)                                                       [facility]                                1.284.114

350.1.13.10

4.2.7.2.686

[phone]

019                       [id number]                 [facility]

 

                                                    2024 

00:00:00                                2024 

18:10:22                                Patient 

Secure Msg                                          Doctor 

Unassigned, 

No Name                                 [facility] 

MEDICAL 

OFFICE 

BUILDING                                1.84.114

350.1.13.10

4.2.7.2.686

[phone]

044                       [id number]                 [facility]

 

                                                    2024 

00:00:00                                2024 

13:46:43            Telephone                               Kushal Hollingsworth                                  [facility] 

BUILDING                                1.84.114

350.1.13.10

4.2.7.2.686

[phone]

098                       [id number]                 [facility]

 

                                                    2024-05-15 

14:30:00                                2024-05-15 

16:23:51            Outpatient          R                   EVA DISLA CRAIG           [facility]            [id number]      [facility]

 

                                                    2024-05-15 

14:30:00                                2024-05-15 

16:23:51                                Ancillary 

Visit                                               Benita Marie Craig L                                 [facility] 

BUILDING                                1.284.114

350.1.13.10

4.2.7.2.686

[phone]

179                       [phone]                 [facility]

 

                                                    2024 

00:00:00                                2024 

15:48:41            Telephone                               Provider, 

Urgent Care 

Day                                     [facility] 

MEDICAL 

OFFICE 

BUILDING                                1.284.114

350.1.13.10

4.2.7.2.686

[phone]

370                       [phone]                 [facility]

 

                                                    2024 

14:40:51                                2024 

23:59:00                                Hospital 

Encounter                                           June Silva                                   [facility] 

WICHO?BUNNY CLEMONS 

MEDICAL 

OFFICE 

BUILDING                                1.2840.114

350.1.13.10

4.2.7.2.686

[phone]

808                       [phone]                 [facility]

 

                                                    2024 

14:40:51                                2024 

23:59:00                                Hospital 

Encounter                                           June Silva                                   [facility] 

WIC[facility] 

MEDICAL 

OFFICE 

BUILDING                                1.284.114

350.1.13.10

4.2.7.2.686

[phone]

808                       [id number]                 [facility]

 

                                                    2024 

14:40:50                                2024 

23:59:00                                Hospital 

Encounter                                           June Silva                                   [facility] 

WICHO?BUNNY

[facility] 

MEDICAL 

OFFICE 

BUILDING                                1.2840.114

350.1.13.10

4.2.7.2.686

[phone]

808                       [id number]                 [facility]

 

                                                    2024 

15:00:00                                2024 

15:00:00            Outpatient          R                   CORINA ALEJO [place]        [facility]            [id number]      [facility]

 

                                                    2024 

14:00:00                                2024 

14:54:13                                Urgent 

Care                                                June Silva

Unknown, 

Attending                               [facility] 

MEDICAL 

OFFICE 

BUILDING                                1.2.840.114

350.1.13.10

4.2.7.2.686

[phone]

370                       [id number]                 [facility]

 

                                                    2024 

00:00:00                                2024-05-10 

17:08:43            Billy Elliott                                   [facility] 

JERILYN 

PROFMADDYIO

NAL 

BUILDING                                1.2.840.114

350.1.13.10

4.2.7.2.686

[phone]

044                       [id number]                 [facility]

 

                                                    2024 

00:00:00                                2024-05-10 

14:34:56            Telephone                               Rupa Ayers                                       [facility] 

MEDICAL 

OFFICE 

BUILDING                                1.2840.114

350.1.13.10

4.2.7.2.686

[phone]

044                       [phone]                 [facility]

 

                                                    2024 

00:00:00                                2024 

18:35:53                                Nurse 

Triage                                              Mari Whitlock                                   [facility]                                1.2840.114

350.1.13.10

4.2.7.2.686

[phone]

019                       [id number]                 [facility]

 

                                                    2024 

00:00:00                                2024 

00:00:00            Outpatient          R                   KUSHAL HOLLINGSWORTH 

[facility]            [id number]      [facility]

 

                                                    2024 

15:00:00                                2024 

15:00:00            Outpatient          CORINA SEGOVIA [place]        [facility]            [id number]      [facility]

 

                                                    2024 

00:00:00                                2024 

14:00:45                                Letter 

(Out)                                                       [facility]                                1.2840.114

350.1.13.10

4.2.7.2.686

[phone]

019                       [id number]                 [facility]

 

                                                    2024 

00:00:00                                2024 

13:54:09            Telephone                               Kushal Hollingsworth                                  [facility]

[facility] 

BUILDING                                1.2840.114

350.1.13.10

4.2.7.2.686

[phone]

098                       [id number]                 [facility]

 

                                                    2024 

00:00:00                                2024 

00:00:00            Refill                                  Rupa Ayers                                       [facility] 

MEDICAL 

OFFICE 

BUILDING                                1.2840.114

350.1.13.10

4.2.7.2.686

[phone]

044                       [id number]                 [facility]

 

                                                    2024 

16:17:52                                2024 

23:59:00                                Hospital 

Encounter                                           Eva Disla                                 [facility] 

MEDICAL 

OFFICE 

BUILDING                                1.840.114

350.1.13.10

4.2.7.2.686

[phone]

809                       [phone]                 [facility]

 

                                                    2024 

16:15:00                                2024 

16:32:58            Outpatient          R                   EVA DISLA CRAIG           [facility]            [id number]      [facility]

 

                                                    2024 

16:15:00                                2024 

16:32:58                                Office 

Visit                                               Eva Disla                                 [facility] 

[facility] 

MEDICAL 

OFFICE 

BUILDING                                1.840.114

350.1.13.10

4.2.7.2.686

[phone]

198                       [phone]                 [facility]

 

                                                    2024 

16:15:00                                2024 

16:30:00                                Technician 

Visit                                               Pob, Adc 

Lab Main

Kushal Hollingsworth                                  [facility] 

BUILDING                                1.840.114

350.1.13.10

4.2.7.2.686

[phone]

353                       [phone]                 [facility]

 

                                                    2024 

16:15:00                                2024 

16:15:00            Outpatient          R                   DARRICK 

KUSHAL

KUSHAL HOLLINGSWORTH          [facility]            [id number]      [facility]

 

                                                    2024 

00:00:00                                2024 

00:00:00            Telephone                               Rupa Ayers                                       [facility]

[facility] 

MEDICAL 

OFFICE 

BUILDING                                1.840.114

350.1.13.10

4.2.7.2.686

[phone]

044                       [phone]                 [facility]

 

                                                    2024 

00:00:00                                2024 

00:00:00            Telephone                               Kushal Hollingsworth                                  [facility] 

BUILDING                                1..840.114

350.1.13.10

4.2.7.2.686

[phone]

098                       [id number]                 [facility]

 

                                                    2024 

13:30:00                                2024 

15:24:54            Outpatient          R                   KUSHAL HOLLINGSWORTH ELISHA          [facility]            [id number]      [facility]

 

                                                    2024 

13:30:00                                2024 

15:24:54                                Office 

Visit                                               Kushal Hollingsworth                                  [facility] 

JERILYN 

[facility]

NAL 

BUILDING                                1.2.840.114

350.1.13.10

4.2.7.2.686

[phone]

098                       [phone]                 [facility]

 

                                                    2024 

00:00:00                                2024 

00:00:00            Telephone                               Rupa Ayers                                       [facility] 

WICHO?[facility] 

OFFICE 

BUILDING                                1.2.840.114

350.1.13.10

4.2.7.2.686

[phone]

044                       [phone]                 [facility]

 

                                                    2024 

00:00:00                                2024 

00:00:00            Telephone                               Mary Mora                                [facility] 

WICHO?[facility] 

OFFICE 

BUILDING                                1.2.840.114

350.1.13.10

4.2.7.2.686

[phone]

044                       [phone]                 [facility]

 

                                                    2024 

00:00:00                                2024 

00:00:00            Telephone                               Eva Disla                                 [facility] 

WICHO?[facility] 

MEDICAL 

OFFICE 

BUILDING                                1.2.840.114

350.1.13.10

4.2.7.2.686

[phone]

198                       [phone]                 [facility]

 

                                                    2024 

00:00:00                                2024 

00:00:00            Telephone                               MorganMary PREMA                                [facility] 

WICHO?[facility] 

MEDICAL 

OFFICE 

BUILDING                                1.2.840.114

350.1.13.10

4.2.7.2.686

[phone]

044                       [phone]                 [facility]

 

                                                    2024 

11:30:00                                2024 

11:30:00            Outpatient          R                   KUSHAL HOLLINGSWORTH ELISHA          [facility]            [id number]      [facility]

 

                                                    2024 

00:00:00                                2024 

00:00:00            Telephone                               Mary Mora                                [facility] 

MEDICAL 

OFFICE 

BUILDING                                1.2.840.114

350.1.13.10

4.2.7.2.686

[phone]

044                       [phone]                 [facility]

 

                                                    2024 

15:39:18                                2024 

23:59:00                                Hospital 

Encounter                                           Mary Mora                                [facility] 

JACEK HOOVER?BUNNY

[facility] 

MEDICAL 

OFFICE 

BUILDING                                1.840.114

350.1.13.10

4.2.7.2.686

[phone]

809                       [id number]                 [facility]

 

                                                    2024 

15:24:14                                2024 

15:38:00            Outpatient          R                   MARY MORA          [facility]            [id number]      [facility]

 

                                                    2024 

15:24:14                                2024 

15:38:00                                Hospital 

Encounter                                           Mary Mora                                [facility]

[facility] 

MEDICAL 

OFFICE 

BUILDING                                1.840.114

350.1.13.10

4.2.7.2.686

[phone]

809                       [id number]                 [facility]

 

                                                    2024 

14:00:00                                2024 

15:23:56                                Office 

Visit                                               Mary Mora                                [facility]

[facility] 

MEDICAL 

OFFICE 

BUILDING                                1.2840.114

350.1.13.10

4.2.7.2.686

[phone]

044                       [id number]                 [facility]

 

                                                    2024 

14:30:00                                2024 

14:30:00            Outpatient          R                   EVA DISLA CRAIG           [facility]            [id number]      [facility]

 

                                                    2024 

00:00:00                                2024 

00:00:00                                Letter 

(Out)                                                       [facility]                                1.2.840.114

350.1.13.10

4.2.7.2.686

[phone]

019                       [phone]                 [facility]

 

                                                    2024 

00:00:00                                2024 

00:00:00            Outpatient          RUPA ESTES     [facility]            [id number]      [facility]

 

                                                    2024 

00:00:00                                2024 

00:00:00            Telephone                               Darrick 

Kushal                                  [facility] 

PROFESSIO

NAL 

BUILDING                                1.2840.114

350.1.13.10

4.2.7.2.686

[phone]

098                       [phone]                 [facility]

 

                                                    2024 

00:00:00                                2024 

00:00:00            Telephone                               Rupa Ayers                                       [facility] 

MEDICAL 

OFFICE 

BUILDING                                1.84.114

350.1.13.10

4.2.7.2.686

[phone]

044                       [phone]                 [facility]

 

                                                    2024 

00:00:00                                2024 

00:00:00                                Patient 

Secure Msg                                          Doctor 

Unassigned, 

No Name                                 [facility] 

PRIMARY 

CARE 

PAVILLION                               1.2840.114

350.1.13.10

4.2.7.2.686

[phone]

807                       [phone]                 [facility]

 

                                                    2024 

00:00:00                                2024 

00:00:00                                Patient 

Secure Msg                                          Doctor 

Unassigned, 

No Name                                 [facility]                                1.2840.114

350.1.13.10

4.2.7.2.686

[phone]

019                       [id number]                 [facility]

 

                                                    2024 

00:00:00                                2024 

00:00:00                                Letter 

(Out)                                                       [facility]                                1.2840.114

350.1.13.10

4.2.7.2.686

[phone]

019                       [id number]                 [facility]

 

                                                    2024 

14:00:00                                2024 

15:17:38            Outpatient          R                   ANDRIATOÑA KHANINE     [facility]            [id number]      [facility]

 

                                                    2024 

14:00:00                                2024 

15:17:38                                Office 

Visit                                               [place]                                       [facility]

[facility] 

MEDICAL 

OFFICE 

BUILDING                                1.2.840.114

350.1.13.10

4.2.7.2.686

[phone]

044                       [id number]                 [facility]

 

                                                    2024 

00:00:00                                2024 

00:00:00            Refill                                  Rupa Ayers                                       [facility] 

MEDICAL 

OFFICE 

BUILDING                                1.2.840.114

350.1.13.10

4.2.7.2.686

[phone]

044                       [phone]                 [facility]

 

                                                    2024 

00:00:00                                2024 

00:00:00            Telephone                               [place]

Rupa                                       [facility] 

MEDICAL 

OFFICE 

BUILDING                                1.2.840.114

350.1.13.10

4.2.7.2.686

[phone]

044                       [id number]                 [facility]

 

                                                    2024 

00:00:00                                2024 

00:00:00            Telephone                               Rupa Ayers                                       [facility] 

MEDICAL 

OFFICE 

BUILDING                                1.2.840.114

350.1.13.10

4.2.7.2.686

[phone]

044                       [phone]                 [facility]

 

                                                    2024 

00:00:00                                2024 

00:00:00            Telephone                               Rupa Ayers                                       [facility] 

MEDICAL 

OFFICE 

BUILDING                                1.2.840.114

350.1.13.10

4.2.7.2.686

[phone]

044                       [id number]                 [facility]

 

                                                    2024-03-15 

00:00:00                                2024-03-15 

00:00:00            Refill                                  [place]

Rupa                                       [facility] 

[facility] 

MEDICAL 

OFFICE 

BUILDING                                1.2.840.114

350.1.13.10

4.2.7.2.686

[phone]

044                       [id number]                 [facility]

 

                                                    2024-03-15 

00:00:00                                2024-03-15 

00:00:00                                Nurse 

Triage                                              Renetta Fermin                              [facility]                                1.2.840.114

350.1.13.10

4.2.7.2.686

[phone]

019                       [id number]                 [facility]

 

                                                    2024 

00:00:00                                2024 

00:00:00            Telephone                               Rupa Ayers                                       [facility] 

MEDICAL 

OFFICE 

BUILDING                                1.2.840.114

350.1.13.10

4.2.7.2.686

[phone]

044                       [id number]                 [facility]

 

                                                    2024 

16:30:00                                2024 

17:00:00                                Telemedici

ne Visit                                            Kushal Hollingsworth                                  [facility]                                1.2.840.114

350.1.13.10

4.2.7.2.686

[phone]

098                       [id number]                 [facility]

 

                                                    2024 

16:30:00                                2024 

16:30:00            Outpatient          [facility]            [id number]      [facility]

 

                                                    2024 

16:30:00                                2024 

16:30:00            Out[facility]          [facility]            [id number]      [facility]

 

                                                    2024 

00:00:00                                2024 

00:00:00                                Letter 

(Out)                                                       [facility]                                1.2.840.114

350.1.13.10

4.2.7.2.686

[phone]

019                       [phone]                 [facility]

 

                                                    2024 

00:00:00                                2024 

00:00:00            Telephone                               Andria

, Rupa 

[facility] 

MEDICAL 

OFFICE 

BUILDING                                1.84114

350.1.13.10

4.2.7.2.686

[phone]

044                       [phone]                 [facility]

 

                                                    2024-02-15 

00:00:00                                2024-02-15 

00:00:00            Refill                                  LindseyBilly                                   [facility] 

[facility]

[facility] 

BUILDING                                1.84.114

350.1.13.10

4.2.7.2.686

[phone]

044                       [phone]                 [facility]

 

                                                    2024 

16:45:00                                2024 

17:03:09            Outpatient          R                   RUPA AYERS     [facility]            [id number]      [facility]

 

                                                    2024 

16:45:00                                2024 

17:03:09                                Technician 

Visit                                               Lab, Rupa Rivera 

[facility] 

OFFICE 

BUILDING                                1.84.114

350.1.13.10

4.2.7.2.686

[phone]

353                       [phone]                 [facility]

 

                                                    2024 

14:20:00                                2024 

14:20:00            Outpatient          R                   RUPA AYERS     [facility]            [id number]      [facility]

 

                                                    2024 

00:00:00                                2024 

00:00:00            Telephone                               Rupa Ayers                                       [facility] 

OFFICE 

BUILDING                                1.84.114

350.1.13.10

4.2.7.2.686

[phone]

044                       [phone]                 [facility]

 

                                                    2024 

00:00:00                                2024 

00:00:00            Refill                                  Rupa Ayers 

[facility] 

WICHO?[facility] 

OFFICE 

BUILDING                                1.84.114

350.1.13.10

4.2.7.2.686

[phone]

044                       [id number]                 [facility]

 

                                                    2024 

00:00:00                                2024 

00:00:00            Telephone                               Rupa Ayers                                       [facility] 

[facility] 

MEDICAL 

OFFICE 

BUILDING                                1.2.840.114

350.1.13.10

4.2.7.2.686

[phone]

044                       [id number]                 [facility]

 

                                                    2024 

00:00:00                                2024 

00:00:00            Refill                                  Kushal Hollingsworth                                  [facility] 

BUILDING                                1.2.840.114

350.1.13.10

4.2.7.2.686

[phone]

134                       [phone]                 [facility]

 

                                                    2024 

00:00:00                                2024 

00:00:00            Telephone                               Rupa Ayers                                       [facility] 

[facility]

[facility] 

MEDICAL 

OFFICE 

BUILDING                                1.2840.114

350.1.13.10

4.2.7.2.686

[phone]

044                       [phone]                 [facility]

 

                                                    2024-01-10 

00:00:00                                2024-01-10 

00:00:00            Telephone                               Rupa Ayers                                       [facility] 

WICHO?[facility] 

MEDICAL 

OFFICE 

BUILDING                                1.2.840.114

350.1.13.10

4.2.7.2.686

[phone]

044                       [id number]                 [facility]

 

                                                    2024-01-10 

00:00:00                                2024-01-10 

00:00:00            Telephone                               Rupa Ayers                                       [facility] 

WICHO?[facility] 

MEDICAL 

OFFICE 

BUILDING                                1.2.840.114

350.1.13.10

4.2.7.2.686

[phone]

044                       [phone]                 [facility]

 

                                                    2024 

00:00:00                                2024 

00:00:00            Telephone                               Kushal Hollingsworth                                  [facility] 

BUILDING                                1.2.840.114

350.1.13.10

4.2.7.2.686

[phone]

098                       [phone]                 [facility]

 

                                                    2024 

00:00:00                                2024 

00:00:00            Telephone                               Rupa Ayers 

BRADY                                       [facility] 

MEDICAL 

OFFICE 

BUILDING                                1.2.840.114

350.1.13.10

4.2.7.2.686

[phone]

044                       [phone]                 [facility]

 

                                                    2023 

00:00:00                                2023 

00:00:00            Telephone                               Kushal Hollingsworth                                  [facility] 

BUILDING                                1..840.114

350.1.13.10

4.2.7.2.686

[phone]

134                       [id number]                 [facility]

 

                                                    2023 

15:00:00                                2023 

16:59:30            Outpatient          R                   [facility]            [id number]      [facility]

 

                                                    2023 

15:00:00                                2023 

16:59:30                                Telemedici

ne Visit                                            Kushal Hollingsworth                                  [facility] 

BUILDING                                1..840.114

350.1.13.10

4.2.7.2.686

[phone]

098                       [phone]                 [facility]

 

                                                    2023 

00:00:00                                2023 

00:00:00            Telephone                               Kushal Hollingsworth                                  [facility] 

BUILDING                                1..840.114

350.1.13.10

4.2.7.2.686

[phone]

098                       [phone]                 [facility]

 

                                                    2023 

00:00:00                                2023 

00:00:00                                Patient 

Secure Msg                                          Rupa Ayers 

BRADY                                       [facility] 

MEDICAL 

OFFICE 

BUILDING                                1..840.114

350.1.13.10

4.2.7.2.686

[phone]

044                       [phone]                 [facility]

 

                                                    2023 

00:00:00                                2023 

00:00:00            Telephone                               Rupa Ayers                                       [facility] 

MEDICAL 

OFFICE 

BUILDING                                1.84.114

350.1.13.10

4.2.7.2.686

[phone]

044                       [phone]                 [facility]

 

                                                    2023 

00:00:00                                2023 

00:00:00            Telephone                               Gunjan Bella SERGIO                                [facility] 

CLEAR 

[place] 

MEDICAL 

OFFICE 

BUILDING                                1..114

350.1.13.10

4.2.7.2.686

[phone]

196                       [phone]                 [facility]

 

                                                    2023-11-15 

09:00:00                                2023-11-15 

10:00:00                                Ancillary 

Visit                                               Cindy Mckinley Peter                                   [facility]

NAL 

BUILDING                                1.114

350.1.13.10

4.2.7.2.686

[phone]

178                       [phone]                 [facility]

 

                                                    2023-11-15 

09:00:00                                2023-11-15 

09:00:00            Outpatient          R                   BILLY PORTILLO           [facility]            [id number]      [facility]

 

                                                    2023-11-15 

00:00:00                                2023-11-15 

00:00:00                                Patient 

Outreach                                            Suzie King                               [facility]

[facility] 

MEDICAL 

OFFICE 

BUILDING                                1.84.114

350.1.13.10

4.2.7.2.686

[phone]

044                       [id number]                 [facility]

 

                                                    2023 

14:40:00                                2023 

14:57:21            Outpatient          R                   RUPA AYERS     [facility]            [id number]      [facility]

 

                                                    2023 

14:40:00                                2023 

14:57:21                                Office 

Visit                                               Rupa Ayers                                       [facility]

[facility] 

MEDICAL 

OFFICE 

BUILDING                                1.84114

350.1.13.10

4.2.7.2.686

[phone]

044                       [phone]                 [facility]

 

                                                    2023 

00:00:00                                2023 

00:00:00            Telephone                               Kushal Hollingsworth                                  [facility]                                1.2.840.114

350.1.13.10

4.2.7.2.686

[phone]

098                       [phone]                 [facility]

 

                                                    2023 

00:00:00                                2023 

00:00:00                                Orders 

Only                                                Doctor 

Unassigned, 

No Name                                 [facility]                                1.2.840.114

350.1.13.10

4.2.7.2.686

[phone]

009                       [id number]                 [facility]

 

                                                    2023 

00:00:00                                2023 

00:00:00            Telephone                               Billy Portillo                                   [facility]                                1.2.840.114

350.1.13.10

4.2.7.2.686

[phone]

044                       [phone]                 [facility]

 

                                                    2023-10-31 

00:00:00                                2023-10-31 

00:00:00                                Patient 

Secure Msg                                          Doctor 

Unassigned, 

No Name                                 [facility]                                1.2.840.114

350.1.13.10

4.2.7.2.686

[phone]

044                       [phone]                 [facility]

 

                                                    2023-10-31 

00:00:00                                2023-10-31 

00:00:00            Telephone                               Billy Portillo                                   [facility]                                1.2.840.114

350.1.13.10

4.2.7.2.686

[phone]

044                       [phone]                 [facility]

 

                                                    2023-10-30 

00:00:00                                2023-10-30 

00:00:00            Telephone                               Billy Portillo                                   [facility]                                1.2.840.114

350.1.13.10

4.2.7.2.686

[phone]

044                       [id number]                 [facility]

 

                                                    2023-10-27 

00:00:00                                2023-10-27 

00:00:00                                Orders 

Only                                                Doctor 

Unassigned, 

No Name                                 [facility]                                1.2.840.114

350.1.13.10

4.2.7.2.686

[phone]

009                       [phone]                 [facility]

 

                                                    2023-10-18 

12:45:00                                2023-10-18 

14:18:04            Outpatient          R                   RUPA AYERS     [facility]            [id number]      [facility]

 

                                                    2023-10-18 

12:45:00                                2023-10-18 

14:18:04                                Technician 

Visit                                               Lab, Rupa Rivera 

[facility] 

OFFICE 

BUILDING                                1.2.840.114

350.1.13.10

4.2.7.2.686

[phone]

353                       [phone]                 [facility]

 

                                                    2023-10-16 

13:45:00                                2023-10-16 

14:00:00                                Office 

Visit                                               Yoni Simmons                                 [facility] 

MEDICAL 

OFFICE 

BUILDING                                1.2.840.114

350.1.13.10

4.2.7.2.686

[phone]

044                       [phone]                 [facility]

 

                                                    2023-10-16 

13:45:00                                2023-10-16 

13:45:00            Outpatient          R                   YONI SIMMONS         [facility]            [id number]      [facility]

 

                                                    2023-10-13 

00:00:00                                2023-10-13 

00:00:00            Telephone                               Billy Portillo                                   [facility] 

BUILDING                                1..840.114

350.1.13.10

4.2.7.2.686

[phone]

044                       [phone]                 [facility]

 

                                                    2023-10-12 

13:45:00                                2023-10-12 

14:22:15            Outpatient          R                   RUPA AYERS     [facility]            [id number]      [facility]

 

                                                    2023-10-12 

13:45:00                                2023-10-12 

14:22:15                                Technician 

Visit                                               Lab, Ang - 

Db

Rupa Ayers                                       [facility] 

MEDICAL 

OFFICE 

BUILDING                                1.2.840.114

350.1.13.10

4.2.7.2.686

[phone]

353                       [phone]                 [facility]

 

                                                    2023-10-12 

13:00:00                                2023-10-12 

13:46:19                                Office 

Visit                                               Rupa Ayers                                       [facility]

[facility] 

MEDICAL 

OFFICE 

BUILDING                                1.2.840.114

350.1.13.10

4.2.7.2.686

[phone]

044                       [phone]                 [facility]

 

                                                    2023-10-11 

11:30:00                                2023-10-11 

11:30:00  Outpatient R                   [facility]      [phone] [facility]

 

                                                    2023-10-09 

00:00:00                                2023-10-09 

00:00:00            Telephone                               Lindsey 

[facility]                                1.2.840.114

350.1.13.10

4.2.7.2.686

[phone]

044                       [phone]                 [facility]

 

                                                    2023-10-02 

00:00:00                                2023-10-02 

00:00:00            Telephone                               Lindsey 

[facility] 

BUILDING                                1.2.840.114

350.1.13.10

4.2.7.2.686

[phone]

044                       [phone]                 [facility]

 

                                                    2023-10-02 

00:00:00                                2023-10-02 

00:00:00            Telephone                               Billy Portillo                                   [facility] 

BUILDING                                1.2.840.114

350.1.13.10

4.2.7.2.686

[phone]

044                       [phone]                 [facility]

 

                                                    2023 

00:00:00                                2023 

00:00:00            Telephone                               Lindsey 

[facility] 

BUILDING                                1.2.840.114

350.1.13.10

4.2.7.2.686

[phone]

044                       [phone]                 [facility]

 

                                                    2023 

00:00:00                                2023 

00:00:00            Telephone                               Billy Portillo                                   [facility] 

NERISSA

[facility] 

BUILDING                                1.2.840.114

350.1.13.10

4.2.7.2.686

[phone]

044                       [id number]                 [facility]

 

                                                    2023 

00:00:00                                2023 

00:00:00            Telephone                               Billy Portillo                                   [facility] 

[facility]                                1.2.840.114

350.1.13.10

4.2.7.2.686

[phone]

044                       [phone]                 [facility]

 

                                                    2023 

00:00:00                                2023 

00:00:00            Telephone                               Billy Portillo                                   [facility] 

[facility]                                1.2.840.114

350.1.13.10

4.2.7.2.686

[phone]

044                       [phone]                 [facility]

 

                                                    2023 

00:00:00                                2023 

00:00:00                                Letter 

(Out)                                               Clinic, 

Wheelchair                              [facility] 

PRIMARY 

CARE 

PAVILLION                               1.2.840.114

350.1.13.10

4.2.7.2.686

[phone]

179                       [phone]                 [facility]

 

                                                    2023 

00:00:00                                2023 

00:00:00            Telephone                               Billy Portillo                                   [facility] 

[facility]                                1.2.840.114

350.1.13.10

4.2.7.2.686

[phone]

044                       [phone]                 [facility]

 

                                                    2023 

16:00:00                                2023 

16:40:00                                Telemedici

ne Visit                                            Billy Portillo                                   [facility] 

[facility]                                1.2.840.114

350.1.13.10

4.2.7.2.686

[phone]

044                       [phone]                 [facility]

 

                                                    2023 

16:00:00                                2023 

16:00:00            Outpatient          R                   BILLY PORTILLO           [facility]            [id number]      [facility]

 

                                                    2023 

00:00:00                                2023 

00:00:00                                Orders 

Only                                                Doctor 

Unassigned, 

No Name                                 [facility]                                1.2.840.114

350.1.13.10

4.2.7.2.686

[phone]

009                       [phone]                 [facility]

 

                                                    2023 

00:00:00                                2023 

00:00:00                                Patient 

Outreach                                            Jennifer Moore                                 [facility] 

BUILDING                                1.2.840.114

350.1.13.10

4.2.7.2.686

[phone]

044                       [phone]                 [facility]

 

                                                    2023 

00:00:00                                2023 

00:00:00            Telephone                               Billy Portillo                                   [facility] 

BUILDING                                1.2.840.114

350.1.13.10

4.2.7.2.686

[phone]

044                       [phone]                 [facility]

 

                                                    2023 

00:00:00                                2023 

00:00:00            Telephone                               Jennifer Moore                                 [facility] 

PRIMARY & 

SPECIALTY 

CARE                                    1.2.840.114

350.1.13.10

4.2.7.2.686

[phone]

365                       [phone]                 [facility]

 

                                                    2023 

00:00:00                                2023 

00:00:00            Telephone                               Billy Portillo                                   [facility] 

BUILDING                                1.2.840.114

350.1.13.10

4.2.7.2.686

[phone]

044                       [phone]                 [facility]

 

                                                    2023 

15:40:00                                2023 

16:20:00                                Telemedici

ne Visit                                            [place], 

[facility] 

BUILDING                                1.2.840.114

350.1.13.10

4.2.7.2.686

[phone]

044                       [phone]                 [facility]

 

                                                    2023 

15:40:00                                2023 

15:40:00            Outpatient          R                   BILLY PORTILLO           [facility]            [id number]      [facility]

 

                                                    2023 

00:00:00                                2023 

00:00:00            Telephone                               Lindsey 

[facility] 

BUILDING                                1.2.840.114

350.1.13.10

4.2.7.2.686

[phone]

044                       [phone]                 [facility]

 

                                                    2023 

00:00:00                                2023 

00:00:00            Telephone                               Lindsey 

[facility] 

BUILDING                                1.2.840.114

350.1.13.10

4.2.7.2.686

[phone]

044                       [phone]                 [facility]

 

                                                    2023 

00:00:00                                2023 

00:00:00            Telephone                               Lindsey 

[facility] 

BUILDING                                1.2.840.114

350.1.13.10

4.2.7.2.686

[phone]

044                       [phone]                 [facility]

 

                                                    2023 

00:00:00                                2023 

00:00:00            Telephone                               Lindsey 

[facility] 

[facility] 

MEDICAL 

OFFICE 

BUILDING                                1.2.840.114

350.1.13.10

4.2.7.2.686

[phone]

044                       [phone]                 [facility]

 

                                                    2023 

00:00:00                                2023 

00:00:00            Telephone                               City, 

Social Work 

[facility] 

PRIMARY & 

SPECIALTY 

CARE                                    1.2.840.114

350.1.13.10

4.2.7.2.686

[phone]

365                       [phone]                 [facility]

 

                                                    2023 

13:16:52                                2023 

23:59:00            Outpatient          R                   BILLY PORTILLO           [facility]            [id number]      [facility]

 

                                                    2023 

13:15:00                                2023 

23:59:00                                Hospital 

Encounter                                           Billy Portillo                                   [facility]                                  1.2.840.114

350.1.13.10

4.2.7.2.686

[phone]

807                       [phone]                 [facility]

 

                                                    2023 

11:40:00                                2023 

13:04:33                                Office 

Visit                                               Billy Portillo                                   [facility]                                1.2.840.114

350.1.13.10

4.2.7.2.686

[phone]

044                       [phone]                 [facility]

 

                                                    2023 

00:00:00                                2023 

00:00:00                                Patient 

Outreach                                            Jennifer Moore                                 [facility]                                1.2.840.114

350.1.13.10

4.2.7.2.686

[phone]

044                       [phone]                 [facility]

 

                                                    2023 

00:00:00                                2023 

00:00:00            Telephone                               Billy Portillo                                   [facility]                                1.2.840.114

350.1.13.10

4.2.7.2.686

[phone]

044                       [phone]                 [facility]

 

                                                    2023 

00:00:00                                2023 

00:00:00                                Orders 

Only                                                Doctor 

Unassigned, 

No Name                                 [facility]                                1.2.840.114

350.1.13.10

4.2.7.2.686

[phone]

009                       [phone]                 [facility]

 

                                                    2023 

15:35:26                                2023 

23:59:00            Outpatient          JOHANA ROY            [facility]            [phone]      [facility]

 

                                                    2023 

15:35:26                                2023 

23:59:00                                Hospital 

Encounter                                           Johana Marin                                    [facility] 

[facility] 

OFFICE 

BUILDING                                1.2.840.114

350.1.13.10

4.2.7.2.686

[phone]

808                       [phone]                 [facility]

 

                                                    2023 

15:20:00                                2023 

15:40:00                                Urgent 

Care                                                Johana Marinnabil, 

[place]

Unknown, 

Attending                               [facility] 

MED[facility] 

OFFICE 

BUILDING                                1.2.840.114

350.1.13.10

4.2.7.2.686

[phone]

370                       [phone]                 [facility]

 

                                                    2023 

15:00:00                                2023 

16:49:13            Outpatient          R                   KUSHAL HOLLINGSWORTH 

[facility]            [id number]      [facility]

 

                                                    2023 

15:00:00                                2023 

16:49:13                                Telemedici

ne Visit                                            Kushal Hollingsworth                                  [facility] 

BUILDING                                1..840.114

350.1.13.10

4.2.7.2.686

[phone]

098                       [phone]                 [facility]

 

                                                    2023 

00:00:00                                2023 

00:00:00                                Orders 

Only                                                Doctor 

Unassigned, 

No Name                                 [facility]                                1..840.114

350.1.13.10

4.2.7.2.686

[phone]

009                       [phone]                 [facility]

 

                                                    2023 

00:00:00                                2023 

00:00:00            Billy Elliott                                   [facility] 

BUILDING                                1.2.840.114

350.1.13.10

4.2.7.2.686

[phone]

044                       [phone]                 [facility]

 

                                                    2023 

00:00:00                                2023 

00:00:00            [facility]illy Elliott                                   [facility] 

BUILDING                                1.2.840.114

350.1.13.10

4.2.7.2.686

[phone]

044                       [phone]                 [facility]

 

                                                    2023 

00:00:00                                2023 

00:00:00            Telephone                               Billy Portillo                                   [facility] 

BUILDING                                1.2.840.114

350.1.13.10

4.2.7.2.686

[phone]

044                       [phone]                 [facility]

 

                                                    2023 

11:39:39                                2023 

23:59:00            Outpatient          R                   LINDSEYBILLY           [facility]            [id number]      [facility]

 

                                                    2023 

11:30:00                                2023 

23:59:00                                Hospital 

Encounter                                           Lindsey 

Billy                                   [facility]                                  1.2.840.114

350.1.13.10

4.2.7.2.686

[phone]

804                       [phone]                 [facility]

 

                                                    2023 

00:00:00                                2023 

00:00:00            Telephone                               Billy Portillo                                   [facility]                                1.2.840.114

350.1.13.10

4.2.7.2.686

[phone]

231                       [phone]                 [facility]

 

                                                    2023 

00:00:00                                2023 

00:00:00            Telephone                               Jennifer Moore                                 [facility] 

BUILDING                                1.2.840.114

350.1.13.10

4.2.7.2.686

[phone]

044                       [phone]                 [facility]

 

                                                    2023 

00:00:00                                2023 

00:00:00                                Patient 

Outreach                                            Jennifer Moore                                 [facility] 

BUILDING                                1.2.840.114

350.1.13.10

4.2.7.2.686

[phone]

044                       [phone]                 [facility]

 

                                                    2023 

09:00:00                                2023 

10:39:55            Outpatient          R                   BILLY PORTILLO           [facility]            [id number]      [facility]

 

                                                    2023 

09:00:00                                2023 

10:39:55                                Office 

Visit                                               Billy Portillo                                   [facility]

NAL 

BUILDING                                1.2.840.114

350.1.13.10

4.2.7.2.686

[phone]

044                       [phone]                 [facility]

 

                                                    2023 

00:00:00                                2023 

00:00:00                                Patient 

Secure Msg                                          Doctor 

Unassigned, 

No Name                                 [facility]                                1.284.114

350.1.13.10

4.2.7.2.686

[phone]

019                       [phone]                 [facility]

 

                                                    2023 

00:00:00                                2023 

00:00:00            Telephone                               Billy Portillo                                   [facility] 

BUILDING                                1.2840.114

350.1.13.10

4.2.7.2.686

[phone]

044                       [phone]                 [facility]

 

                                                    2023 

00:00:00                                2023 

00:00:00  Outpatient R         RADIOLOGY [facility]      [phone] [facility]

 

                                                    2023 

00:00:00                                2023 

00:00:00            Telephone                               Billy Portillo                                   [facility] 

BUILDING                                1.2.840.114

350.1.13.10

4.2.7.2.686

[phone]

044                       [phone]                 [facility]

 

                                                    2023 

00:00:00                                2023 

00:00:00            Telephone                               Billy Portillo                                   [facility] 

[facility] 

MEDICAL 

OFFICE 

BUILDING                                1.2.840.114

350.1.13.10

4.2.7.2.686

[phone]

370                       [phone]                 [facility]

 

                                                    2023 

00:00:00                                2023 

00:00:00            Telephone                               Billy Portillo                                   [facility] 

BUILDING                                1.2.840.114

350.1.13.10

4.2.7.2.686

[phone]

044                       [phone]                 [facility]

 

                                                    2023-08-15 

14:11:51                                2023-08-15 

23:59:00                                Hospital 

Encounter                                           Billy Portillo                                   [facility]                                  1.2.840.114

350.1.13.10

4.2.7.2.686

[phone]

801                       [phone]                 [facility]

 

                                                    2023-08-15 

00:00:00                                2023-08-15 

00:00:00  Outpatient R         RADIOLOGY [facility]      [phone] [facility]

 

                                                    2023 

00:00:00                                2023 

00:00:00            Telephone                               Billy Portillo                                   [facility]                                1.2.840.114

350.1.13.10

4.2.7.2.686

[phone]

044                       [phone]                 [facility]

 

                                                    2023 

14:40:00                                2023 

16:09:01            Outpatient          R                   BILLY PORTILLO           [facility]            [id number]      [facility]

 

                                                    2023 

14:40:00                                2023 

16:09:01                                Office 

Visit                                               Billy Portillo                                   [facility]                                1.2.840.114

350.1.13.10

4.2.7.2.686

[phone]

044                       [phone]                 [facility]

 

                                                    2023 

15:20:00                                2023 

15:40:00                                Office 

Visit                                               Billy Portillo                                   [facility]                                1.2.840.114

350.1.13.10

4.2.7.2.686

[phone]

044                       [phone]                 [facility]

 

                                                    2023 

00:00:00                                2023 

00:00:00            Telephone                               Billy Portillo                                   [facility] 

BUILDING                                1.840.114

350.1.13.10

4.2.7.2.686

[phone]

044                       [phone]                 [facility]

 

                                                    2023 

00:00:00                                2023 

00:00:00            Telephone                               Lindsey 

[facility] 

[facility] 

MEDICAL 

OFFICE 

BUILDING                                1.840.114

350.1.13.10

4.2.7.2.686

[phone]

044                       [phone]                 [facility]

 

                                                    2023 

00:00:00                                2023 

00:00:00                                Patient 

Secure Msg                                          Doctor 

Unassigned, 

No Name                                 [facility]                                1.840.114

350.1.13.10

4.2.7.2.686

[phone]

082                       [phone]                 [facility]

 

                                                    2023 

14:40:00                                2023 

14:40:00            Outpatient          R                   ANDREWDAMIAN 

PETER           [facility]            [id number]      [facility]

 

                                                    2023 

00:00:00                                2023 

00:00:00            Telephone                               Lindsey 

[facility]                                1.840.114

350.1.13.10

4.2.7.2.686

[phone]

044                       [phone]                 [facility]

 

                                                    2023 

00:00:00                                2023 

00:00:00                                Patient 

Secure Msg                                          Doctor 

Unassigned, 

No Name                                 [facility]                                1.20.114

350.1.13.10

4.2.7.2.686

[phone]

019                       [phone]                 [facility]

 

                                                    2023 

00:00:00                                2023 

00:00:00                                Orders 

Only                                                Doctor 

Unassigned, 

No Name                                 [facility]                                1.20.114

350.1.13.10

4.2.7.2.686

[phone]

009                       [phone]                 [facility]

 

                                                    2023 

00:00:00                                2023 

00:00:00            Telephone                               LindseyBilly                                   [facility] 

BUILDING                                1.2.840.114

350.1.13.10

4.2.7.2.686

[phone]

044                       [phone]                 [facility]

 

                                                    2023 

00:00:00                                2023 

00:00:00                                Orders 

Only                                                Doctor 

Unassigned, 

No Name                                 [facility]                                1.2.840.114

350.1.13.10

4.2.7.2.686

[phone]

009                       [phone]                 [facility]

 

                                                    2023 

00:00:00                                2023 

00:00:00                                Orders 

Only                                                Doctor 

Unassigned, 

No Name                                 [facility]                                1.2.840.114

350.1.13.10

4.2.7.2.686

[phone]

009                       [id number]                 [facility]

 

                                                    2023 

00:00:00                                2023 

00:00:00                                Orders 

Only                                                Doctor 

Unassigned, 

No Name                                 [facility]                                1.2.840.114

350.1.13.10

4.2.7.2.686

[phone]

009                       [phone]                 [facility]

 

                                                    2023-07-10 

00:00:00                                2023-07-10 

00:00:00            Telephone                               LindseyBilly                                   [facility]                                1.2.840.114

350.1.13.10

4.2.7.2.686

[phone]

044                       [phone]                 [facility]

 

                                                    2023-07-10 

00:00:00                                2023-07-10 

00:00:00            Telephone                               Lindsey 

Billy                                   [facility] 

BUILDING                                1.2.840.114

350.1.13.10

4.2.7.2.686

[phone]

044                       [id number]                 [facility]

 

                                                    2023 

13:20:00                                2023 

15:33:20            Outpatient          R                   BILLY PORTILLO           [facility]            [id number]      [facility]

 

                                                    2023 

13:20:00                                2023 

14:00:00                                Office 

Visit                                               [facility] Portillo                                   [facility]

[facility] 

BUILDING                                1.2.840.114

350.1.13.10

4.2.7.2.686

[phone]

044                       [phone]                 [facility]

 

                                                    2023 

00:00:00                                2023 

00:00:00            Telephone                               Billy Portillo                                   [facility] 

BUILDING                                1.2.840.114

350.1.13.10

4.2.7.2.686

[phone]

044                       [phone]                 [facility]

 

                                                    2023 

00:00:00                                2023 

00:00:00                                Orders 

Only                                                Doctor 

Unassigned, 

No Name                                 [facility]                                1.2.840.114

350.1.13.10

4.2.7.2.686

[phone]

009                       [phone]                 [facility]

 

                                                    2023 

11:55:18                                2023 

23:59:00                                Hospital 

Encounter                                           [facility] 

Count includes the [facility] 

WICHO?BUNNY

[facility] 

MEDICAL 

OFFICE 

BUILDING                                1.2.840.114

350.1.13.10

4.2.7.2.686

[phone]

808                       [phone]                 [facility]

 

                                                    2023 

11:55:17                                2023 

23:59:00                                Hospital 

Encounter                                           [place]io 

Count includes the [facility] 

[facility] 

[facility] 

OFFICE 

BUILDING                                1.2840.114

350.1.13.10

4.2.7.2.686

[phone]

808                       [phone]                 [facility]

 

                                                    2023 

11:55:17                                2023 

23:59:00                                Hospital 

Encounter                                           Ebrahim, 

[facility] 

[facility] 

MEDICAL 

OFFICE 

BUILDING                                1.2.114

350.1.13.10

4.2.7.2.686

[phone]

808                       [phone]                 [facility]

 

                                                    2023 

11:20:00                                2023 

11:40:00                                Urgent 

Care                                                Luigi Prather

Unknown, 

Attending                               [facility] 

MEDICAL 

OFFICE 

BUILDING                                1.2.114

350.1.13.10

4.2.7.2.686

[phone]

370                       [phone]                 [facility]

 

                                                    2023 

07:30:00                                2023 

07:45:00                                Technician 

Visit                                               Pob, Adc 

Lab Main

Rosina Goodrich                                 [facility]                                1..114

350.1.13.10

4.2.7.2.686

[phone]

353                       [phone]                 [facility]

 

                                                    2023 

07:30:00                                2023 

07:30:00            Outpatient          R                   ROSINA GOODRICH           [facility]            [phone]      [facility]

 

                                                    2023 

00:00:00                                2023 

00:00:00            Telephone                               Billy Portillo                                   [facility]                                1..114

350.1.13.10

4.2.7.2.686

[phone]

044                       [phone]                 [facility]

 

                                                    2023 

00:00:00                                2023 

00:00:00                                Orders 

Only                                                Doctor 

Unassigned, 

No Name                                 [facility]                                1..114

350.1.13.10

4.2.7.2.686

[phone]

009                       [phone]                 [facility]

 

                                                    2023 

00:00:00                                2023 

00:00:00            Telephone                               Billy Portillo                                   [facility] 

BUILDING                                1..114

350.1.13.10

4.2.7.2.686

[phone]

044                       [phone]                 [facility]

 

                                                    2023 

00:00:00                                2023 

00:00:00            Telephone                               Billy Portillo                                   [facility] 

BUILDING                                1.2.840.114

350.1.13.10

4.2.7.2.686

[phone]

044                       [phone]                 [facility]

 

                                                    2023 

00:00:00                                2023 

00:00:00            Telephone                               Billy Portillo                                   [facility] 

BUILDING                                1.2.840.114

350.1.13.10

4.2.7.2.686

[phone]

044                       [phone]                 [facility]

 

                                                    2023 

08:20:00                                2023 

09:20:08            Outpatient          R                   ANDREWTOBYBILLY MOLINA           [facility]            [phone]      [facility]

 

                                                    2023 

08:20:00                                2023 

09:20:08                                Telemedici

ne Visit                                            Billy Portillo                                   [facility]                                1.2.840.114

350.1.13.10

4.2.7.2.686

[phone]

044                       [phone]                 [facility]

 

                                                    2023 

00:00:00                                2023 

00:00:00            Telephone                               Billy Portillo                                   [facility] 

BUILDING                                1.2.840.114

350.1.13.10

4.2.7.2.686

[phone]

044                       [phone]                 [facility]

 

                                                    2023 

00:00:00                                2023 

00:00:00                                Patient 

Outreach                                            Jennifer Moore                                 [facility] 

BUILDING                                1.2.840.114

350.1.13.10

4.2.7.2.686

[phone]

044                       [phone]                 [facility]

 

                                                    2023 

00:00:00                                2023 

00:00:00            Telephone                               Billy Portillo                                   [facility] 

BUILDING                                1.2.840.114

350.1.13.10

4.2.7.2.686

[phone]

044                       [phone]                 [facility]

 

                                                    2023 

00:00:00                                2023 

00:00:00            Telephone                               Billy Portillo                                   [facility] 

BUILDING                                1.2.840.114

350.1.13.10

4.2.7.2.686

[phone]

044                       [phone]                 [facility]

 

                                                    2023 

00:00:00                                2023 

00:00:00            Telephone                               Billy Portillo                                   [facility] 

BUILDING                                1.2.840.114

350.1.13.10

4.2.7.2.686

[phone]

044                       [phone]                 [facility]

 

                                                    2023 

00:00:00                                2023 

00:00:00            Telephone                               [facility] 

MEDICAL 

OFFICE 

BUILDING                                1.2.840.114

350.1.13.10

4.2.7.2.686

[phone]

198                       [phone]                 [facility]

 

                                                    2023 

00:00:00                                2023 

00:00:00            Telephone                               Billy Portillo                                   [facility] 

BUILDING                                1.2.840.114

350.1.13.10

4.2.7.2.686

[phone]

044                       [phone]                 [facility]

 

                                                    2023 

00:00:00                                2023 

00:00:00            Telephone                               Billy Portillo                                   [facility] 

BUILDING                                1.2.840.114

350.1.13.10

4.2.7.2.686

[phone]

231                       [phone]                 [facility]

 

                                                    2023 

00:00:00                                2023 

00:00:00            Telephone                               Ghazal 

[facility] 

MEDICAL 

OFFICE 

BUILDING                                1.840.114

350.1.13.10

4.2.7.2.686

[phone]

198                       [phone]                 [facility]

 

                                                    2023 

00:00:00                                2023 

00:00:00            Refill                                  Ghazal 

[facility] 

MEDICAL 

OFFICE 

BUILDING                                1.84.114

350.1.13.10

4.2.7.2.686

[phone]

198                       [id number]                 [facility]

 

                                                    2023 

00:00:00                                2023 

00:00:00            Refill                                  Ghazal 

[facility] 

[facility]

[facility] 

MEDICAL 

OFFICE 

BUILDING                                1.84.114

350.1.13.10

4.2.7.2.686

[phone]

198                       [phone]                 [facility]

 

                                                    2023 

15:30:00                                2023 

15:30:00  Outpatient R         JUDSON ALEJO [facility]      [phone] [facility]

 

                                                    2023 

00:00:00                                2023 

00:00:00            Refill                                  Kushal Hollingsworth                                  [facility] 

BUILDING                                1.840.114

350.1.13.10

4.2.7.2.686

[phone]

098                       [phone]                 [facility]

 

                                                    2023 

00:00:00                                2023 

00:00:00                                Patient 

Secure Msg                                          Doctor 

Unassigned, 

No Name                                 [facility] 

MEDICAL 

OFFICE 

BUILDING                                1.840.114

350.1.13.10

4.2.7.2.686

[phone]

198                       [phone]                 [facility]

 

                                                    2023 

00:00:00                                2023 

00:00:00            Telephone                               Kushal Hollingsworth                                  [facility] 

BUILDING                                1.84.114

350.1.13.10

4.2.7.2.686

[phone]

098                       [phone]                 [facility]

 

                                                    2023 

00:00:00                                2023 

00:00:00            Telephone                               Keagan Harman [facility] 

GREGORY CLEMONS 

[facility] 

OFFICE 

BUILDING                                1.2.840.114

350.1.13.10

4.2.7.2.686

[phone]

198                       [phone]                 [facility]

 

                                                    2023 

00:00:00                                2023 

00:00:00            Telephone                               Kushal Hollingsworth                                  [facility] 

BUILDING                                1.2.840.114

350.1.13.10

4.2.7.2.686

[phone]

098                       [phone]                 [facility]

 

                                                    2023 

16:30:00                                2023 

17:00:00                                Office 

Visit                                               DarrickInnaha                                  [facility] 

BUILDING                                1.2.840.114

350.1.13.10

4.2.7.2.686

[phone]

098                       [phone]                 [facility]

 

                                                    2023 

16:30:00                                2023 

16:30:00            Outpatient          [facility]            [id number]      [facility]

 

                                                    2023 

13:30:00                                2023 

13:30:00            Outpatient          [facility]            [phone]      [facility]

 

                                                    2023-06-15 

00:00:00                                2023-06-15 

00:00:00            Telephone                               [facility] 

[facility]                                1.2.840.114

350.1.13.10

4.2.7.2.686

[phone]

098                       [phone]                 [facility]

 

                                                    2023 

13:50:00                                2023 

23:59:00            Outpatient          KEAGAN BLANK           [facility]            [id number]      [facility]

 

                                                    2023 

13:15:00                                2023 

13:45:00                                Office 

Visit                                               Keagan Harman                                 [facility] 

MEDICAL 

OFFICE 

BUILDING                                1.2.840.114

350.1.13.10

4.2.7.2.686

[phone]

198                       [phone]                 [facility]

 

                                                    2023 

00:00:00                                2023 

00:00:00            Telephone                               LindseyBilly                                   [facility] 

BUILDING                                1.2.840.114

350.1.13.10

4.2.7.2.686

[phone]

044                       [phone]                 [facility]

 

                                                    2023 

00:00:00                                2023 

00:00:00            Telephone                               Kushal Hollingsworth                                  [facility] 

BUILDING                                1.2.840.114

350.1.13.10

4.2.7.2.686

[phone]

098                       [phone]                 [facility]

 

                                                    2023 

11:00:00                                2023 

11:00:00            Outpatient          R                   ELIANA DUNCAN OGECHUKWU       [facility]            [id number]      [facility]

 

                                                    2023 

14:41:33                                2023 

23:59:00            Outpatient          R                   ANDREWDAMIANBILLY           [facility]            [phone]      [facility]

 

                                                    2023 

14:41:33                                2023 

23:59:00                                Hospital 

Encounter                                           Billy Portillo                                   [facility]                                  1.2840.114

350.1.13.10

4.2.7.2.686

[phone]

806                       [phone]                 [facility]

 

                                                    2023-05-15 

00:00:00                                2023-05-15 

00:00:00            Telephone                               Billy Portillo                                   [facility] 

BUILDING                                1.2.840.114

350.1.13.10

4.2.7.2.686

[phone]

044                       [phone]                 [facility]

 

                                                    2023 

13:30:00                                2023 

13:30:00  Outpatient R         SAPNA JUDSON [facility]      [id number] [facility]

 

                                                    2023 

00:00:00                                2023 

00:00:00            Telephone                               Sapna Judson [facility] 

[facility] 

[facility]                                  1.2840.114

350.1.13.10

4.2.7.2.686

[phone]

059                       [id number]                 [facility]

 

                                                    2023 

00:00:00                                2023 

00:00:00            Telephone                               Sapna Judson 

RITU                                       [facility] 

MEDICAL 

OFFICE 

BUILDING                                1.2840.114

350.1.13.10

4.2.7.2.686

[phone]

059                       [id number]                 [facility]

 

                                                    2023 

00:00:00                                2023 

00:00:00                                Orders 

Only                                                Doctor 

Unassigned, 

No Name                                 [facility]                                1.2840.114

350.1.13.10

4.2.7.2.686

[phone]

009                       [phone]                 [facility]

 

                                                    2023 

00:00:00                                2023 

00:00:00                                Letter 

(Out)                                               SapnaJudson                                       [facility] 

MEDICAL 

OFFICE 

BUILDING                                1.2840.114

350.1.13.10

4.2.7.2.686

[phone]

059                       [phone]                 [facility]

 

                                                    2023 

00:00:00                                2023 

00:00:00                                Letter 

(Out)                                               SapnaJudson 

[facility] 

MEDICAL 

OFFICE 

BUILDING                                1.2840.114

350.1.13.10

4.2.7.2.686

[phone]

059                       [phone]                 [facility]

 

                                                    2023 

00:00:00                                2023 

00:00:00            Billy Elliott                                   [facility] 

BUILDING                                1.2840.114

350.1.13.10

4.2.7.2.686

[phone]

044                       [phone]                 [facility]

 

                                                    2023 

16:00:00                                2023 

17:13:24            Outpatient          R                   BILLY PORTILLO           [facility]            [phone]      [facility]

 

                                                    2023 

16:00:00                                2023 

17:13:24                                Office 

Visit                                               Billy Portillo                                   [facility] 

BUILDING                                1.2.840.114

350.1.13.10

4.2.7.2.686

[phone]

044                       [phone]                 [facility]

 

                                                    2023 

00:00:00                                2023 

00:00:00                                Patient 

Secure Msg                                          Judson Alejo                                       [facility] 

MEDICAL 

[facility] 

BUILDING                                1.2.840.114

350.1.13.10

4.2.7.2.686

[phone]

059                       [id number]                 [facility]

 

                                                    2023 

00:00:00                                2023 

00:00:00            Telephone                               Judson Alejo                                       [facility] 

MEDICAL 

[facility] 

BUILDING                                1.2.840.114

350.1.13.10

4.2.7.2.686

[phone]

059                       [id number]                 [facility]

 

                                                    2023 

00:00:00                                2023 

00:00:00                                Letter 

(Out)                                               Judson Alejo                                       [facility] 

MEDICAL 

[facility] 

BUILDING                                1.2840.114

350.1.13.10

4.2.7.2.686

[phone]

059                       [id number]                 [facility]

 

                                                    2023 

00:00:00                                2023 

00:00:00            Telephone                               Judson Alejo 

PEDIATRIC

S AND 

ADULT 

PRIMARY 

CARE 

CLINIC                                  1.2840.114

350.1.13.10

4.2.7.2.686

[phone]

059                       [id number]                 [facility]

 

                                                    2023 

00:00:00                                2023 

00:00:00                                Patient 

Secure Msg                                          Sapna Judson CHANEY                                       [facility] 

OFFICE 

BUILDING                                1..840.114

350.1.13.10

4.2.7.2.686

[phone]

059                       [id number]                 [facility]

 

                                                    2023 

08:00:00                                2023 

13:24:00  Outpatient R         JUDSON ALEJO [facility]      [phone] [facility]

 

                                                    2023 

16:30:00                                2023 

16:30:00  Outpatient R         SAPNA JUDSON [facility]      [id number] [facility]

 

                                                    2023 

16:30:00                                2023 

16:30:00  Outpatient R         JUDSON ALEJO [facility]      [id number] [facility]

 

                                                    2023 

00:00:00                                2023 

00:00:00            Telephone                               Judson Alejo 

PEDIATRIC

S AND 

ADULT 

PRIMARY 

CARE 

CLINIC                                  1.840.114

350.1.13.10

4.2.7.2.686

[phone]

059                       [id number]                 [facility]

 

                                                    2023 

00:00:00                                2023 

00:00:00            Telephone                               Judson Alejo 

PEDIATRIC

S AND 

ADULT 

PRIMARY 

CARE 

CLINIC                                  1..840.114

350.1.13.10

4.2.7.2.686

[phone]

059                       [id number]                 [facility]

 

                                                    2023 

16:00:00                                2023 

16:30:00                                Telemedici

ne Visit                                            Kushal Hollingsworth                                  [facility]                                1..840.114

350.1.13.10

4.2.7.2.686

[phone]

098                       [id number]                 [facility]

 

                                                    2023 

16:00:00                                2023 

16:00:00            Outpatient          R                   KUSHAL HOLLINGSWORTH          [facility]            [id number]      [facility]

 

                                                    2023 

15:30:00                                2023 

16:37:48  Outpatient R         SAPNAMESERETY [facility]      [phone] [facility]

 

                                                    2023 

15:30:00                                2023 

16:37:48                                Office 

Visit                                               SapnaMeseretpenelope JOINER 

PEDIATRIC

S AND 

ADULT 

PRIMARY 

CARE 

CLINIC                                  1.114

350.1.13.10

4.2.7.2.686

[phone]

059                       [id number]                 [facility]

 

                                                    2023 

16:00:00                                2023 

16:30:00                                Telemedici

ne Visit                                            Kushal Hollingsworth                                  [facility]

[facility] 

BUILD[facility]                                1..114

350.1.13.10

4.2.7.2.686

[phone]

098                       [id number]                 [facility]

 

                                                    2023 

15:30:00                                2023 

16:24:24            Outpatient          R                   KORTNEY CAREY         [facility]            [id number]      [facility]

 

                                                    2023 

15:30:00                                2023 

16:24:24                                Office 

Visit                                               Kortney Carey                                 [facility] 

PROFESSIO

NAL 

BUILDING                                1..114

350.1.13.10

4.2.7.2.686

[phone]

044                       [id number]                 [facility]

 

                                                    2023 

00:00:00                                2023 

00:00:00                                Orders 

Only                                                Doctor 

Unassigned, 

No Name                                 [facility]                                1.114

350.1.13.10

4.2.7.2.686

[phone]

009                       [id number]                 [facility]

 

                                                    2023 

16:30:00                                2023 

16:30:00            Outpatient          R                   ELIANA DUNCAN OGECHUKWU       [facility]            [id number]      [facility]

 

                                                    2023 

16:00:00                                2023 

16:00:00            Outpatient          R                   ELIANA DUNCAN OGECHUKWU       [facility]            [id number]      [facility]

 

                                                    2023-02-15 

13:09:58                                2023-02-15 

23:59:00                                Hospital 

Encounter                                           Billy Portillo                                   [facility]                                  1.2.840.114

350.1.13.10

4.2.7.2.686

[phone]

800                       [id number]                  [facility]

 

                                                    2023-02-15 

13:09:09                                2023-02-15 

23:59:00            Outpatient          R                   BILLY PORTILLO           [facility]            [phone]      [facility]

 

                                                    2023-02-15 

13:09:09                                2023-02-15 

23:59:00                                Hospital 

Encounter                                           Billy Portillo                                   [facility]                                  1.2840.114

350.1.13.10

4.2.7.2.686

[phone]

800                       [id number]                  [facility]

 

                                                    2023-02-15 

00:00:00                                2023-02-15 

00:00:00                                Orders 

Only                                                Doctor 

Unassigned, 

No Name                                 [facility]                                1.2840.114

350.1.13.10

4.2.7.2.686

[phone]

009                       [id number]                 [facility]

 

                                                    2023 

00:00:00                                2023 

00:00:00                                Patient 

Secure Msg                                          Doctor 

Unassigned, 

No Name                                 [facility] 

OFFICE 

BUILDING                                1.2840.114

350.1.13.10

4.2.7.2.686

[phone]

059                       [id number]                 [facility]

 

                                                    2023 

15:30:00                                2023 

17:00:47            Outpatient          R                   KUSHAL HOLLINGSWORTH          [facility]            [id number]      [facility]

 

                                                    2023 

15:30:00                                2023 

17:00:47                                Office 

Visit                                               Kushal Hollingsworth                                  [facility] 

PROFESSIO

NAL 

BUILDING                                1.2840.114

350.1.13.10

4.2.7.2.686

[phone]

098                       [id number]                  [facility]

 

                                                    2023 

00:00:00                                2023 

00:00:00            Telephone                               Kushal Hollingsworth                                  [facility] 

[facility] 

[facility]                                1.2.840.114

350.1.13.10

4.2.7.2.686

[phone]

098                       [id number]                  [facility]

 

                                                    2023 

00:00:00                                2023 

00:00:00            Telephone                               Inna Hollingsworthha                                  [facility]                                1.2.840.114

350.1.13.10

4.2.7.2.686

[phone]

098                       [id number]                  [facility]

 

                                                    2023 

00:00:00                                2023 

00:00:00            Telephone                               Inna Hollingsworthha                                  [facility]                                1.2.840.114

350.1.13.10

4.2.7.2.686

[phone]

098                       [id number]                  [facility]

 

                                                    2023-01-10 

00:00:00                                2023-01-10 

00:00:00            Telephone                               Team, [facility]                                1.2.840.114

350.1.13.10

4.2.7.2.686

[phone]

082                       [id number]                  [facility]

 

                                                    2023 

00:00:00                                2023 

00:00:00            Outpatient          BILLY RICE           [facility]            [id number]      [facility]

 

                                                    2023 

00:00:00                                2023 

00:00:00            RefiBlly Stevens                                   [facility]                                1.2.840.114

350.1.13.10

4.2.7.2.686

[phone]

044                       [id number]                  [facility]

 

                                                    2022 

00:00:00                                2022 

00:00:00            Telephone                               Darrick 

Kushal                                  [facility]                                1.2.840.114

350.1.13.10

4.2.7.2.686

[phone]

098                       [id number]                  [facility]

 

                                                    2022 

14:00:00                                2022 

15:03:22            Outpatient          R                   INNA HOLLINGSWORTHHA          [facility]            [phone]      [facility]

 

                                                    2022 

14:00:00                                2022 

15:03:22                                Office 

Visit                                               Kushal Hollingsworth                                  [facility] 

BUILDING                                1.2840.114

350.1.13.10

4.2.7.2.686

[phone]

098                       [id number]                  [facility]

 

                                                    2022 

00:00:00                                2022 

00:00:00            Telephone                               Team, [facility]                                1.2840.114

350.1.13.10

4.2.7.2.686

[phone]

082                       [id number]                  [facility]

 

                                                    2022 

00:00:00                                2022 

00:00:00            Telephone                               Billy Portillo                                   [facility] 

BUILDING                                1.2.840.114

350.1.13.10

4.2.7.2.686

[phone]

044                       [id number]                  [facility]

 

                                                    2022 

00:00:00                                2022 

00:00:00            Telephone                               Billy Portillo                                   [facility] 

BUILDING                                1.2840.114

350.1.13.10

4.2.7.2.686

[phone]

044                       [id number]                  [facility]

 

                                                    2022 

14:30:00                                2022 

14:45:00                                Office 

Visit                                               Keagan Harman                                 [facility] 

WICHO?BUNNY CLEMONS 

MEDICAL 

OFFICE 

BUILDING                                1.2.840.114

350.1.13.10

4.2.7.2.686

[phone]

198                       [id number]                  [facility]

 

                                                    2022 

14:30:00                                2022 

14:30:00            Outpatient          R                   KEAGAN HARMAN           [facility]            [id number]      [facility]

 

                                                    2022 

00:00:00                                2022 

00:00:00            Telephone                               Ghazal 

[facility] 

WICHO?BUNNY CLEMONS 

MEDICAL 

OFFICE 

BUILDING                                1.2.840.114

350.1.13.10

4.2.7.2.686

[phone]

198                       [id number]                  [facility]

 

                                                    2022 

00:00:00                                2022 

00:00:00            Refill                                  [facility]illy Portillo                                   [facility]

[facility] 

BUILD[facility]                                1.2.840.114

350.1.13.10

4.2.7.2.686

[phone]

044                       [id number]                  [facility]

 

                                                    2022 

00:00:00                                2022 

00:00:00            Refill                                  Lindsey 

[facility] 

BUILDING                                1.2.840.114

350.1.13.10

4.2.7.2.686

[phone]

044                       [id number]                  [facility]

 

                                                    2022 

13:55:00                                2022 

14:27:00            Outpatient          NATE                   GHAZAL 

KEAGAN           [facility]            [id number]      [facility]

 

                                                    2022 

13:30:00                                2022 

14:00:00                                Office 

Visit                                               Ghazal 

[facility] 

WICHO?BUNNY CLEMONS 

MEDICAL 

OFFICE 

BUILDING                                1.2.840.114

350.1.13.10

4.2.7.2.686

[phone]

198                       [id number]                  [facility]

 

                                                    2022 

00:00:00                                2022 

00:00:00            Refill                                  Lindsey 

[facility] 

BUILDING                                1.2.840.114

350.1.13.10

4.2.7.2.686

[phone]

044                       [id number]                  [facility]

 

                                                    2022 

00:00:00                                2022 

00:00:00            Telephone                               Billy Portillo                                   [facility] 

JACEK MULTANI

[facility] 

BUILDING                                1.2.840.114

350.1.13.10

4.2.7.2.686

[phone]

231                       [id number]                  [facility]

 

                                                    2022 

00:00:00                                2022 

00:00:00            Telephone                               Billy Portillo                                   [facility] 

[facility] 

BUILDING                                1.2.840.114

350.1.13.10

4.2.7.2.686

[phone]

044                       [id number]                  [facility]

 

                                                    2022 

14:00:00                                2022 

14:15:00                                Office 

Visit                                               Brain Escamilla, 

[facility] 

CENTER 

[facility] [place] 

[facility] 

CLINIC                                  1.2.840.114

350.1.13.10

4.2.7.2.686

[phone]

027                       [id number]                  [facility]

 

                                                    2022 

14:00:00                                2022 

14:00:00            Outpatient          R                   APRYL LOPEZ        [facility]            [id number]      [facility]

 

                                                    2022 

00:00:00                                2022 

00:00:00            Telephone                               Billy Portillo                                   [facility]                                1.2.840.114

350.1.13.10

4.2.7.2.686

[phone]

044                       [id number]                  [facility]

 

                                                    2022 

00:00:00                                2022 

00:00:00            Telephone                               Billy Portillo                                   [facility]                                1.2.840.114

350.1.13.10

4.2.7.2.686

[phone]

044                       [phone]                  [facility]

 

                                                    2022 

13:00:00                                2022 

15:07:38            Outpatient          R                   KUSHAL HOLLINGSWORTH          [facility]            [id number]      [facility]

 

                                                    2022 

13:00:00                                2022 

15:07:38                                Office 

Visit                                               Kushal Hollingsworth                                  [facility] 

[facility]

[facility] 

BUILDING                                1.840.114

350.1.13.10

4.2.7.2.686

[phone]

098                       [id number]                  [facility]

 

                                                    2022 

00:00:00                                2022 

00:00:00                                Orders 

Only                                                Doctor 

Unassigned, 

No Name                                 [facility]                                1..114

350.1.13.10

4.2.7.2.686

[phone]

009                       [id number]                  [facility]

 

                                                    2022 

10:45:00                                2022 

10:45:00            Outpatient          R                   EVA DISLA           [facility]            [id number]      [facility]

 

                                                    2022 

14:00:00                                2022 

14:00:00            Outpatient          R                   KUSHAL HOLLINGSWORTH          [facility]            [phone]      [facility]

 

                                                    2022 

15:20:00                                2022 

15:20:00            Outpatient          R                   BILLY PORTILLO           [facility]            [phone]      [facility]

 

                                                    2022 

00:00:00                                2022 

00:00:00            Telephone                               Eliana Duncan                               [facility] 

BUILDING                                1..840.114

350.1.13.10

4.2.7.2.686

[phone]

134                       [id number]                  [facility]

 

                                                    2022 

16:00:00                                2022 

17:55:11            Outpatient          R                   ELIANA DUNCAN OGECHUKWU       [facility]            [id number]      [facility]

 

                                                    2022 

16:00:00                                2022 

17:55:11                                Office 

Visit                                               Pasha Duncancelena                               [facility] 

BUILDING                                1.840.114

350.1.13.10

4.2.7.2.686

[phone]

044                       [phone]                  [facility] [facility]

 

                                                    2022 

00:00:00                                2022 

00:00:00                                Orders 

Only                                                Doctor 

Unassigned, 

No Name                                 [facility]                                1.2.840.114

350.1.13.10

4.2.7.2.686

[phone]

009                       [id number]                  [facility]

 

                                                    2022-10-25 

00:00:00                                2022-10-25 

00:00:00            Refill                                  Billy Portillo                                   [facility]                                1.2.840.114

350.1.13.10

4.2.7.2.686

[phone]

044                       [id number]                  [facility]

 

                                                    2022-10-24 

00:00:00                                2022-10-24 

00:00:00            Refill                                  Billy Portillo                                   [facility]                                1.2.840.114

350.1.13.10

4.2.7.2.686

[phone]

044                       [id number]                  [facility]

 

                                                    2022-10-18 

00:00:00                                2022-10-18 

00:00:00            Telephone                               Billy Portillo                                   [facility]                                1.2.840.114

350.1.13.10

4.2.7.2.686

[phone]

044                       [id number]                  [facility]

 

                                                    2022-10-10 

00:00:00                                2022-10-10 

00:00:00            Telephone                               Billy oPrtillo                                   [facility]                                1.2.840.114

350.1.13.10

4.2.7.2.686

[phone]

044                       [id number]                  [facility]

 

                                                    2022-10-10 

00:00:00                                2022-10-10 

00:00:00                                Orders 

Only                                                Doctor 

Unassigned, 

No Name                                 [facility]                                1.2.840.114

350.1.13.10

4.2.7.2.686

[phone]

009                       [id number]                  [facility]

 

                                                    2022-10-07 

08:40:00                                2022-10-07 

09:20:00                                Telemedici

ne Visit                                            Andrewdamian 

Billy                                   [facility]                                1.2.840.114

350.1.13.10

4.2.7.2.686

[phone]

044                       [phone]                  [facility]

 

                                                    2022-10-07 

08:40:00                                2022-10-07 

08:40:00            Outpatient          R                   ANDREWTOBYBILLY MOLINA           [facility]            [id number]      [facility]

 

                                                    2022-10-07 

00:00:00                                2022-10-07 

00:00:00            Telephone                               Lindsey 

Billy                                   [facility]                                1.2.840.114

350.1.13.10

4.2.7.2.686

[phone]

044                       [id number]                  [facility]

 

                                                    2022 

00:00:00                                2022 

00:00:00                                Orders 

Only                                                Doctor 

Unassigned, 

No Name                                 [facility]                                1.2.840.114

350.1.13.10

4.2.7.2.686

[phone]

009                       [id number]                  [facility]

 

                                                    2022 

00:00:00                                2022 

00:00:00            Telephone                               Billy Portillo                                   [facility]                                1.2.840.114

350.1.13.10

4.2.7.2.686

[phone]

044                       [id number]                  [facility]

 

                                                    2022 

00:00:00                                2022 

00:00:00            Telephone                               Billy Portillo                                   [facility] 

BUILDING                                1.2.840.114

350.1.13.10

4.2.7.2.686

[phone]

044                       [id number]                  [facility]

 

                                                    2022 

15:00:00                                2022 

15:20:00                                Nurse 

Visit                                               Nurse, Markus Tipton Eddamian 

Billy                                   [facility] 

BUILDING                                1.2.840.114

350.1.13.10

4.2.7.2.686

[phone]

044                       [id number]                  [facility]

 

                                                    2022 

15:00:00                                2022 

15:00:00            Outpatient          R                   BILLY PORTILLO           [facility]            [id number]      [facility]

 

                                                    2022-09-15 

00:00:00                                2022-09-15 

00:00:00            Telephone                               Billy Portillo                                   [facility] 

PROFESSIO

NAL 

BUILDING                                1.2.840.114

350.1.13.10

4.2.7.2.686

[phone]

044                       [id number]                  [facility]

 

                                                    2022-09-15 

00:00:00                                2022-09-15 

00:00:00            Telephone                               Billy Portillo                                   [facility]

[facility] 

[facility]                                1.2.840.114

350.1.13.10

4.2.7.2.686

[phone]

044                       [id number]                  [facility]

 

                                                    2022 

15:30:00                                2022 

16:19:43                                Office 

Visit                                               Eliana Duncan                               [facility] 

BUILDING                                1.2.840.114

350.1.13.10

4.2.7.2.686

[phone]

044                       [id number]                  [facility]

 

                                                    2022 

15:30:00                                2022 

16:19:43            Outpatient          R                   ELIANA DUNCAN OGECHUKWU       [facility]            [id number]      [facility]

 

                                                    2022 

15:30:00                                2022 

15:30:00            Outpatient          R                   ELIANA DUNCAN OGECHUKWU       [facility]            [id number]      [facility]

 

                                                    2022 

14:00:00                                2022 

14:49:52  Outpatient R         NELLIE WILDE [facility]      [id number] [facility]

 

                                                    2022 

14:00:00                                2022 

14:49:52                                Office 

Visit                                               James Howard Lindy Skye                                    [facility] 

[facility] [place] 

[facility]                                  1.2840.114

350.1.13.10

4.2.7.2.686

[phone]

027                       [id number]                  [facility]

 

                                                    2022 

14:00:00                                2022 

14:49:52  Outpatient R         NELLIE WILDE [facility]      [id number] [facility]

 

                                                    2022 

00:00:00                                2022 

00:00:00            Telephone                               Lindsey 

[facility]                                1.2.840.114

350.1.13.10

4.2.7.2.686

[phone]

044                       [id number]                  [facility]

 

                                                    2022 

14:26:25                                2022 

23:59:00                                Hospital 

Encounter                                           Lindsey 

[facility]                                  1.2.840.114

350.1.13.10

4.2.7.2.686

[phone]

807                       [id number]                  [facility]

 

                                                    2022 

14:25:07                                2022 

14:25:07            Outpatient          R                   LINDSEY 

PETER           [facility]            [id number]      [facility]

 

                                                    2022 

14:25:07                                2022 

14:25:07                                Hospital 

Encounter                                           Lindsey 

[facility]                                  1.2.840.114

350.1.13.10

4.2.7.2.686

[phone]

800                       [id number]                  [facility]

 

                                                    2022 

00:00:00                                2022 

00:00:00            Refill                                  [facility]sey 

[facility] 

BUILDING                                1.2.840.114

350.1.13.10

4.2.7.2.686

[phone]

044                       [id number]                  [facility]

 

                                                    2022 

00:00:00                                2022 

00:00:00            Telephone                               Billy Portillo                                   [facility]

[facility] 

BUILDING                                1.2.840.114

350.1.13.10

4.2.7.2.686

[phone]

044                       [id number]                  [facility]

 

                                                    2022 

16:00:00                                2022 

16:58:23            Outpatient          R                   BILLY PORTILLO           [facility]            [id number]      [facility]

 

                                                    2022 

16:00:00                                2022 

16:58:23                                Office 

Visit                                               Billy Portillo                                   [facility] 

BUILDING                                1.2.840.114

350.1.13.10

4.2.7.2.686

[phone]

044                       [id number]                  [facility]

 

                                                    2022 

16:00:00                                2022 

16:58:23            Outpatient          R                   BILLY PORTILLO           [facility]            [id number]      [facility]

 

                                                    2022 

16:00:00                                2022 

16:00:00            Outpatient          R                   BILLY PORTILLO           [facility]            [id number]      [facility]

 

                                                    2022 

15:40:00                                2022 

16:00:00                                Office 

Visit                                               Billy Portillo                                   [facility]                                1.2.840.114

350.1.13.10

4.2.7.2.686

[phone]

044                       [id number]                  [facility]

 

                                                    2022 

15:40:00                                2022 

15:40:00            Outpatient          R                   BILLY PORTILLO           [facility]            [id number]      [facility]

 

                                                    2022 

00:00:00                                2022 

00:00:00                                Orders 

Only                                                Doctor 

Unassigned, 

No Name                                 [facility]                                1.2.840.114

350.1.13.10

4.2.7.2.686

[phone]

009                       [phone]                  [facility]

 

                                                    2022 

13:20:00                                2022 

13:20:00            Outpatient          R                   ANDREWTOBYBILLY MOLINA           [facility]            [id number]      [facility]

 

                                                    2022 

00:00:00                                2022 

00:00:00            Telephone                               Billy Portillo                                   [facility] 

JACEK BAKERIO

NAL 

BUILDING                                1.2.840.114

350.1.13.10

4.2.7.2.686

[phone]

044                       [id number]                  [facility]

 

                                                    2022 

00:00:00                                2022 

00:00:00            Telephone                               Billy Portillo                                   [facility] 

JACEK JEAN-BAPTISTE 

[facility]

[facility] 

BUILDING                                1.2.840.114

350.1.13.10

4.2.7.2.686

[phone]

044                       [id number]                  [facility]

 

                                                    2022 

00:00:00                                2022 

00:00:00            Telephone                               Billy Portillo                                   [facility] 

[facility] 

JERILYN 

[facility] 

BUILDING                                1.2.840.114

350.1.13.10

4.2.7.2.686

[phone]

044                       [phone]                  [facility]

 

                                                    2022 

00:00:00                                2022 

00:00:00            Telephone                               Billy Portillo                                   [facility] 

[facility] 

JERILYN 

[facility] 

BUILDING                                1.2.840.114

350.1.13.10

4.2.7.2.686

[phone]

044                       [phone]                  [facility]

 

                                                    2022 

10:30:00                                2022 

12:22:52                                Office 

Visit                                               James Howard Michael G                               [facility] 

[facility] [place] 

[facility]                                  1.2.840.114

350.1.13.10

4.2.7.2.686

[phone]

027                       [id number]                  [facility]

 

                                                    2022 

10:30:00                                2022 

12:22:52            Outpatient          IMTIAZ DODD         [facility]            [id number]      [facility]

 

                                                    2022 

10:30:00                                2022 

10:30:00            Outpatient          IMTIAZ DODD         [facility]            [id number]      [facility]

 

                                                    2022 

00:00:00                                2022 

00:00:00            Telephone                               Billy Portillo                                   [facility]

[facility] 

BUILDING                                1.2.840.114

350.1.13.10

4.2.7.2.686

[phone]

231                       [id number]                  [facility]

 

                                                    2022 

00:00:00                                2022 

00:00:00            Telephone                               Billy Portillo                                   [facility]                                1.2.840.114

350.1.13.10

4.2.7.2.686

[phone]

044                       [id number]                  [facility]

 

                                                    2022 

15:20:00                                2022 

16:39:15            Outpatient          R                   ANDREWBILLY SALGADO           [facility]            [id number]      [facility]

 

                                                    2022 

15:20:00                                2022 

16:39:15                                Office 

Visit                                               Billy Portillo                                   [facility]                                1.2.840.114

350.1.13.10

4.2.7.2.686

[phone]

044                       [id number]                  [facility]

 

                                                    2022 

15:20:00                                2022 

16:39:15            Outpatient          R                   BILLY PORTILLO           [facility]            [id number]      [facility]

 

                                                    2022 

15:00:00                                2022 

15:00:00            Outpatient          YONI BOO         [facility]            [id number]      [facility]

 

                                                    2022 

15:00:00                                2022 

15:00:00            Outpatient          YONI BOO         [facility]            [id number]      [facility]

 

                                                    2022 

00:00:00                                2022 

00:00:00            Telephone                               [place], 

Kathrine 

[facility] 

WICHO?BUNNY CLEMONS 

MEDICAL 

OFFICE 

BUILDING                                1.2.840.114

350.1.13.10

4.2.7.2.686

[phone]

044                       [id number]                  [facility]

 

                                                    2022 

00:00:00                                2022 

00:00:00            Telephone                               Kathrine Mccloud 

[facility] 

WICHO?BUNNY CLEMONS 

MEDICAL 

OFFICE 

BUILDING                                1.2.840.114

350.1.13.10

4.2.7.2.686

[phone]

044                       [id number]                  [facility]

 

                                                    2022 

00:00:00                                2022 

00:00:00            Telephone                               Kathrine Mccloud 

[facility] 

WICHO?BUNNY CLEMONS 

MEDICAL 

OFFICE 

BUILDING                                1.2.840.114

350.1.13.10

4.2.7.2.686

[phone]

044                       [id number]                  [facility]

 

                                                    2022 

00:00:00                                2022 

00:00:00            Refill                                  Kathrine Mccloud 

[facility] 

WICHO?BUNNY CLEMONS 

MEDICAL 

OFFICE 

BUILDING                                1.2.840.114

350.1.13.10

4.2.7.2.686

[phone]

044                       [id number]                  [facility]

 

                                                    2021 

15:31:17                                2021 

16:10:23                                Office 

Visit                                               Kathrine Mccloud 

[facility] 

WICHO?BUNNY CLEMONS 

MEDICAL 

OFFICE 

BUILDING                                1.2.840.114

350.1.13.10

4.2.7.2.686

[phone]

044                       [phone]                  [facility]

 

                                                    2021 

15:30:00                                2021 

16:10:23            Outpatient          KATHRINE CLAUDIO           [facility]            [id number]      [facility]

 

                                                    2021-10-25 

15:12:37                                2021-10-25 

23:59:00            Outpatient          KATHRINE CLAUDIO           [facility]             [id number]      [facility]

 

                                                    2021-10-25 

15:00:00                                2021-10-25 

23:59:00                                Hospital 

Encounter                                           Kathrine Mccloud 

[facility]                                  1.2.840.114

350.1.13.10

4.2.7.2.686

[phone]

800                       [id number]                  [facility]

 

                                                    2021-10-13 

00:00:00                                2021-10-13 

00:00:00            Telephone                               [facility] 

[facility] 

[facility] 

Office 

Building                                1.2.840.114

350.1.13.10

4.2.7.2.686

[phone]

044                       [id number]                  [facility]

 

                                                    2021-10-12 

00:00:00                                2021-10-12 

00:00:00            Telephone                               [facility] 

[facility] 

Medical 

Office 

Building                                1.2.840.114

350.1.13.10

4.2.7.2.686

[phone]

044                       [id number]                  [facility]

 

                                                    2021-10-11 

00:00:00                                2021-10-11 

00:00:00            Telephone                               Rogers 

[facility]unny joseph 

Medical 

Office 

Building                                1.2.840.114

350.1.13.10

4.2.7.2.686

[phone]

044                       [id number]                  [facility]

 

                                                    2021-10-04 

09:22:17                                2021-10-04 

09:22:26                                Imm/Inj 

Visit                                               Nurse, Markus Hernandez 

ImmunizatiChristiano Sánchez                                  [facility] 

Professio

nal 

Building                                1.2.840.114

350.1.13.10

4.2.7.2.686

[phone]

421                       [id number]                  [facility]

 

                                                    2021-10-04 

09:10:00                                2021-10-04 

09:10:00            Outpatient          CHRISTIANO FLYNN          [facility]            [id number]      [facility]

 

                                                    2021 

13:30:00                                2021 

13:30:00            Outpatient          CHRISTIANO FLYNN          [facility]            [id number]      [facility]

 

                                                    2021 

00:00:00                                2021 

00:00:00            Kathrine Milton 

[facility] 

[place] 

Medical 

Office 

Building                                1.2.840.114

350.1.13.10

4.2.7.2.686

[phone]

044                       [id number]                  [facility]

 

                                                    2021 

00:00:00                                2021 

00:00:00            Kathrine Milton 

[facility] 

[place] 

Medical 

Office 

Building                                1.2840.114

350.1.13.10

4.2.7.2.686

[phone]

044                       [phone]                  [facility]

 

                                                    2021 

08:03:00                                2021 

10:47:00                                Hospital 

Encounter                                           [facility] 

Chris [facility] 

Surgical 

[place]                                  1.2.840.114

350.1.13.10

4.2.7.2.686

[phone]

071                       [id number]                  [facility]

 

                                                    2021 

08:03:00                                2021 

10:47:00                                Hospital 

Encounter                                           [place]                               [facility] 

Surgical 

[place]                                  1.2.840.114

350.1.13.10

4.2.7.2.686

[phone]

071                       [id number]                  [facility]

 

                                                    2021 

09:29:00                                2021 

10:08:00            Surgery                                 [place] [facility] 

[facility] 

[place]                                  1.2.840.114

350.1.13.10

4.2.7.2.686

[phone]

020                       [phone]                  [facility]

 

                                                    2021 

11:25:25                                2021 

11:40:25                                Laboratory 

Only                                                Only, Adc 

Test

[facility]                                  1.2.840.114

350.1.13.10

4.2.7.2.686

[phone]

353                       [id number]                  [facility]

 

                                                    2021 

11:25:25                                2021 

11:40:25                                Laboratory 

Only                                                Only, Adc 

Test

Chris Maldonado                               [facility]                                  1..114

350.1.13.10

4.2.7.2.686

[phone]

353                       [phone]                  [facility]

 

                                                    2021 

11:24:22                                2021 

11:39:22                                Technician 

Visit                                               Pob, Adc 

Lab Main

Chris Maldonado                               [facility] 

Building                                1.114

350.1.13.10

4.2.7.2.686

[phone]

353                       [id number]                  [facility]

 

                                                    2021 

10:45:00                                2021 

10:45:00            Outpatient          R                   CHRIS MALDONADO         [facility]            [phone]      [facility]

 

                                                    2021 

00:00:00                                2021 

00:00:00                                Orders 

Only                                                Doctor 

Unassigned, 

No Name                                 [facility]                                1.0.114

350.1.13.10

4.2.7.2.686

[phone]

009                       [id number]                  [facility]

 

                                                    2021 

00:00:00                                2021 

00:00:00                                Orders 

Only                                                Doctor 

Unassigned, 

No Name                                 [facility]                                1.0.114

350.1.13.10

4.2.7.2.686

[phone]

009                       [id number]                  [facility]

 

                                                    2021 

00:00:00                                2021 

00:00:00            Kathrine Milton 

[facility] 

Office 

Building 

One                                     .114

350.1.13.10

4.2.7.2.686

[phone]

044                       [phone]                  [facility]

 

                                                    2021 

00:00:00                                2021 

00:00:00            Kathrine Milton 

[facility] 

Office 

Building 

One                                     1.114

350.1.13.10

4.2.7.2.686

[phone]

044                       [id number]                  [facility]

 

                                                    2021 

00:00:00                                2021 

00:00:00            Refill                                  [facility] 

Office 

Building 

One                                     1.0.114

350.1.13.10

4.2.7.2.686

[phone]

044                       [phone]                  [facility]

 

                                                    2021 

00:00:00                                2021 

00:00:00            Refill                                  [facility] 

Office 

Building 

One                                     1.114

350.1.13.10

4.2.7.2.686

[phone]

044                       [phone]                  [facility]

 

                                                    2021-07-15 

00:00:00                                2021-07-15 

00:00:00                                Orders 

Only                                                Doctor 

Unassigned, 

No Name                                 [facility]                                1..114

350.1.13.10

4.2.7.2.686

[phone]

009                       [id number]                  [facility]

 

                                                    2021 

00:00:00                                2021 

00:00:00            Telephone                               [facility] 

Office 

Building 

One                                     1..114

350.1.13.10

4.2.7.2.686

[phone]

044                       [phone]                  [facility]

 

                                                    2021 

00:00:00                                2021 

00:00:00            Telephone                               [facility] 

[facility] 

[facility] 

One                                     .0.114

350.1.13.10

4.2.7.2.686

[phone]

044                       [phone]                  [facility]

 

                                                    2021 

00:00:00                                2021 

00:00:00                                Orders 

Only                                                Doctor 

Unassigned, 

No Name                                 [facility]                                1.0.114

350.1.13.10

4.2.7.2.686

[phone]

009                       [id number]                  [facility]

 

                                                    2021 

00:00:00                                2021 

00:00:00            Telephone                               Kathrine Mccloud 

[facility] 

Office 

Building 

One                                     1..114

350.1.13.10

4.2.7.2.686

[phone]

044                       [id number]                  [facility]

 

                                                    2021 

00:00:00                                2021 

00:00:00            Refill                                  Kathrine Mccloud 

[facility] 

Office 

Building 

One                                     1..114

350.1.13.10

4.2.7.2.686

[phone]

044                       [phone]                  [facility]

 

                                                    2021 

00:00:00                                2021 

00:00:00            Telephone                               Kathrine Mccloud 

[facility] 

Office 

Building 

One                                     1.114

350.1.13.10

4.2.7.2.686

[phone]

044                       [phone]                  [facility]

 

                                                    2021 

10:00:00                                2021 

10:00:00            Outpatient          R                   CHRIS MALDONADO         [facility]            [phone]      [facility]

 

                                                    2021 

10:00:00                                2021 

10:00:00  Outpatient R                   [facility]      [phone] [facility]

 

                                                    2021 

08:30:27                                2021 

09:00:27                                Office 

Visit                                               Kathrine Mccloud 

[facility] 

Office 

Building 

One                                     ..114

350.1.13.10

4.2.7.2.686

[phone]

044                       [phone]                  

 

                                                    2021 

08:30:27                                2021 

09:00:27                                Office 

Visit                                               Kathrine Mccloud 

[facility] 

Office 

Building 

One                                     1..114

350.1.13.10

4.2.7.2.686

[phone]

044                       [id number]                  [facility]

 

                                                    2021 

08:45:00                                2021 

08:45:00            Outpatient          KATHRINE CLAUDIO           [facility]            [id number]      [facility]

 

                                                    2021 

00:00:00                                2021 

00:00:00            RefKathrine Moffett 

[facility] 

Office 

Building 

One                                     1.2840.114

350.1.13.10

4.2.7.2.686

[phone]

044                       [id number]                  [facility]

 

                                                    2021 

07:17:00                                2021 

10:23:00                                Hospital 

Encounter                                           Chris Maldonado                               [facility]                                  1.2840.114

350.1.13.10

4.2.7.2.686

[phone]

071                       [phone]                  [facility]

 

                                                    2021 

08:47:00                                2021 

09:28:00            Surgery                                 [place] 

Chris PREMA                               [facility]                                  1.2840.114

350.1.13.10

4.2.7.2.686

[phone]

020                       [id number]                  [facility]

 

                                                    2021 

12:19:16                                2021 

12:34:16                                Laboratory 

Only                                                Only, Adc 

Test

[place] 

Chris LLOYD                               [facility]                                  1.2840.114

350.1.13.10

4.2.7.2.686

[phone]

353                       [id number]                  [facility]

 

                                                    2021 

11:15:00                                2021 

11:15:00            Outpatient          R                   CHRIS MALDONADO         [facility]            [phone]      [facility]

 

                                                    2021 

00:00:00                                2021 

00:00:00                                Orders 

Only                                                Doctor 

Unassigned, 

No Name                                 [facility]                                1.0.114

350.1.13.10

4.2.7.2.686

[phone]

009                       [id number]                  [facility]

 

                                                    2021 

00:00:00                                2021 

00:00:00            Telephone                               Kathrine Mccloud 

[facility] 

Office 

Building 

One                                     1.114

350.1.13.10

4.2.7.2.686

[phone]

044                       [id number]                  [facility]

 

                                                    2021 

00:00:00                                2021 

00:00:00            Telephone                               Kathrine Mccloud 

[facility] 

Office 

Building 

One                                     1.114

350.1.13.10

4.2.7.2.686

[phone]

044                       [id number]                  [facility]

 

                                                    2021 

14:15:00                                2021 

14:15:00            Outpatient          KATHRINE CLAUDIO           [facility]            [phone]      [facility]

 

                                                    2021 

16:52:09                                2021 

17:07:09                                Technician 

Visit                                               Pob, Adc 

Lab Chris Rhoades                               [facility] 

Building                                1.114

350.1.13.10

4.2.7.2.686

[phone]

353                       [phone]                  [facility]

 

                                                    2021 

16:36:15                                2021 

16:51:15                                Office 

Visit                                               Kathrine Mccloud 

[facility] 

Office 

Building 

One                                     1.114

350.1.13.10

4.2.7.2.686

[phone]

044                       [id number]                  [facility]

 

                                                    2021 

15:45:00                                2021 

15:45:00            Outpatient          CHRIS HAMMER         [facility]            [phone]      [facility]

 

                                                    2021 

00:00:00                                2021 

00:00:00                                Orders 

Only                                                Doctor 

Unassigned, 

No Name                                 [facility]                                1.114

350.1.13.10

4.2.7.2.686

[phone]

009                       [id number]                  [facility]

 

                                                    2021 

00:00:00                                2021 

00:00:00            Refill                                  Kathrine Mccloud 

[facility] 

Office 

Building 

One                                     1.114

350.1.13.10

4.2.7.2.686

[phone]

044                       [id number]                  [facility]

 

                                                    2021 

11:29:14                                2021 

11:51:37                                Technician 

Visit                                               Lab, Adc 

Fam Jamieb TONIE

Ashley Hunter                                 [facility] 

Office 

Building 

One                                     1.114

350.1.13.10

4.2.7.2.686

[phone]

044                       [id number]                  [facility]

 

                                                    2021 

11:20:00                                2021 

11:20:00  Outpatient R                   [facility]      [phone] [facility]

 

                                                    2021 

08:20:00                                2021 

08:20:00            Outpatient          R                   KATHRINE MCCLOUD           [facility]            [phone]      [facility]

 

                                                    2021 

11:18:53                                2021 

11:44:13                                Office 

Visit                                               Kathrine Mccloud 

[facility] 

Office 

Building 

One                                     .114

350.1.13.10

4.2.7.2.686

[phone]

044                       [phone]                  [facility]

 

                                                    2021 

11:00:00                                2021 

11:00:00            Outpatient          R                   KATHRINE MCCLOUD           [facility]            [id number]      [facility]

 

                                                    2021 

00:00:00                                2021 

00:00:00                                Letter 

(Out)                                               Doctor 

Unassigned, 

No Name                                 [facility]                                1.114

350.1.13.10

4.2.7.2.686

[phone]

044                       [phone]                  [facility]

 

                                                    2021 

13:45:00                                2021 

13:45:00            Outpatient          NATE                   JOHNMERVATREYNALDO 

KATHRINE           [facility]            [phone]      [facility]

 

                                                    2021 

00:00:00                                2021 

00:00:00            Da Mccloud 

[facility] 

Office 

Building 

One                                     1.84.114

350.1.13.10

4.2.7.2.686

[phone]

044                       [phone]                  [facility]

 

                                                    2021 

00:00:00                                2021 

00:00:00            Telephone                               Kathrine Mccloud 

[facility] 

Office 

Building 

One                                     1.84.114

350.1.13.10

4.2.7.2.686

[phone]

044                       [id number]                  [facility]

 

                                                    2021 

13:00:00                                2021 

13:00:00            Outpatient          CORRY PROCTOR             [facility]            [phone]      [facility]

 

                                                    2021 

00:00:00                                2021 

00:00:00            Da Mccloud 

[facility] 

Office 

Building 

One                                     1..114

350.1.13.10

4.2.7.2.686

[phone]

044                       [id number]                  [facility]

 

                                                    2021 

00:00:00                                2021 

00:00:00            Telephone                               Kathrine Mccloud 

[facility] 

Office 

Building 

One                                     1.84.114

350.1.13.10

4.2.7.2.686

[phone]

044                       [id number]                  [facility]

 

                                                    2021 

12:00:00                                2021 

12:00:00            Outpatient          CORRY PROCTOR             [facility]            [id number]      [facility]

 

                                                    2021 

00:00:00                                2021 

00:00:00                                Orders 

Only                                                Doctor 

Unassigned, 

No Name                                 [facility]                                1.2.840.114

350.1.13.10

4.2.7.2.686

[phone]

009                       [phone]                  [facility]

 

                                                    2020 

13:50:36                                2020 

14:05:36                                Office 

Visit                                               Kathrine Mccloud 

[facility] 

Office 

Building 

One                                     1.114

350.1.13.10

4.2.7.2.686

[phone]

044                       [id number]                  [facility]

 

                                                    2020 

13:45:00                                2020 

13:45:00            Outpatient          R                   BHARGAVREYNALDOKATHRINE           [facility]            [phone]      [facility]

 

                                                    2020 

00:00:00                                2020 

00:00:00            Reflouisa Mccloud 

[facility] 

Office 

Building 

One                                     1.114

350.1.13.10

4.2.7.2.686

[phone]

044                       [id number]                  [facility]

 

                                                    2020 

00:00:00                                2020 

00:00:00            Refill                                  [facility] 

[facility] 

Office 

Building 

One                                     1.114

[id number]

[id number]

[phone]

044                       [id number]                  [facility]

 

                                                    2020-10-28 

16:00:00                                2020-10-28 

16:00:00            Outpatient          R                   KATHRINE MCCLOUD           [facility]            [id number]      [facility]

 

                                                    2020-10-19 

00:00:00                                2020-10-19 

00:00:00            Refill                                  [facility] 

[facility] 

Office 

Building 

One                                     1.114

[id number]

[id number]

[phone]

044                       [id number]                  [facility]

 

                                                    2020 

15:20:00                                2020 

23:59:00                                Hospital 

Encounter                                           RogersKathrine 

[facility]                                  [id number]

[id number]

[id number]

[phone]

800                       [id number]                  [facility]

 

                                                    2020 

00:00:00                                2020 

00:00:00            Outpatient          NATE                   ROGERS 

KATHRINE           [facility]            [phone]      [facility]

 

                                                    2020 

00:00:00                                2020 

00:00:00            Telephone                               Rogers 

[facility] 

Office 

Building 

One                                     1.114

350.1.13.10

[id number]

[phone]

044                       [id number]                  [facility]

 

                                                    2020-08-10 

00:00:00                                2020-08-10 

00:00:00            Telephone                               Rogers 

[facility] 

Office 

Building 

One                                     1.114

[id number]

[id number]

[phone]

044                       [phone]                  [facility]

 

                                                    2020 

00:00:00                                2020 

00:00:00            Refill                                  Rogers 

[facility] 

Office 

Building 

One                                     1.114

[id number]

[id number]

[phone]

044                       [phone]                  [facility]

 

                                                    2020 

14:15:00                                2020 

14:15:00            Outpatient          KATHRINE CLAUDIO           [facility]            [id number]      [facility]

 

                                                    2020 

00:00:00                                2020 

00:00:00            Telephone                               [facility] 

[facility] 

Office 

Building 

One                                     1.114

350.1.13.10

[id number]

[phone]

044                       [phone]                  [facility]

 

                                                    2020 

00:00:00                                2020 

00:00:00                                Orders 

Only                                                Doctor 

Unassigned, 

No Name                                 [facility]                                1.114

[id number]

[id number]

[phone]

009                       [phone]                  [facility]

 

                                                    2020 

00:00:00                                2020 

00:00:00            Telephone                               Rogers 

[facility] 

Office 

Building 

One                                     1.2840.114

[id number]

4.2.7.2.686

[phone]

044                       [phone]                  [facility]

 

                                                    2020-06-10 

00:00:00                                2020-06-10 

00:00:00            Telephone                               [facility] 

Building 

One                                     1.2840.114

[id number]

4.2.7.2.686

[phone]

044                       [id number]                  [facility]

 

                                                    2020 

00:00:00                                2020 

00:00:00            [facility]                                  [facility] 

[facility] 

Building 

One                                     1.2840.114

350.1.13.10

4.2.7.2.686

[phone]

044                       [phone]                  [facility]

 

                                                    2020-02-10 

00:00:00                                2020-02-10 

00:00:00                                Orders 

Only                                                Doctor 

Unassigned, 

No Name                                 [facility]                                [id number]

[id number]

[id number]

[phone]

009                       [id number]                  [facility]

 

                                                    2020 

00:00:00                                2020 

00:00:00                                Orders 

Only                                                Doctor 

Unassigned, 

No Name                                 [facility]                                [id number]

[id number]

[id number]

[phone]

009                       [id number]                  [facility]

 

                                                    2020 

00:00:00                                2020 

00:00:00            Telephone                               [facility] 

[facility] 

Building 

One                                     1.2840.114

350.1.13.10

4.2.7.2.686

[phone]

044                       [id number]                  [facility]

 

                                                    2020 

00:00:00                                2020 

00:00:00            Telephone                               Kathrine Mccloud 

[facility] 

Office 

Building 

One                                     [id number]

350.1.13.10

4.2.7.2.686

[phone]

044                       [id number]                  [facility]

 

                                                    2019 

00:00:00                                2019 

00:00:00                                Orders 

Only                                                Doctor 

Unassigned, 

No Name                                 [facility]                                [id number]

350.1.13.10

4.2.7.2.686

[phone]

009                       [phone]                  [facility]

 

                                                    2019 

00:00:00                                2019 

00:00:00            Telephone                               Kathrine Mccloud 

[facility] 

Office 

Building 

One                                     1.2.840.114

350.1.13.10

4.2.7.2.686

[phone]

044                       [phone]                  [facility]

 

                                                    2019 

13:30:00                                2019 

23:59:00                                Hospital 

Encounter                                           Kathrine Mccloud 

[facility]                                  1.2.840.114

350.1.13.10

4.2.7.2.686

[phone]

806                       [phone]                  [facility]

 

                                                    2019 

14:46:45                                2019 

15:44:27                                Office 

Visit                                               Kathrine Mccloud 

[facility] 

Office 

Building 

One                                     1.2.840.114

350.1.13.10

4.2.7.2.686

[phone]

044                       [phone]                  [facility]

 

                                                    2019 

13:00:00                                2019 

13:29:00                                Hospital 

Encounter                                           Kathrine Mccloud 

[facility]                                  [id number]

350.1.13.10

4.2.7.2.686

[phone]

800                       [phone]                  [facility]

 

                                                    2019 

13:30:00                                2019 

23:59:00                                Hospital 

Encounter                                           Kathrine Mccloud                                  [facility]                                  1..114

350.1.13.10

4.2.7.2.686

[phone]

800                       [id number]                  [facility]

 

                                                    2019 

00:00:00                                2019 

00:00:00                                Orders 

Only                                                Doctor 

Unassigned, 

No Name                                 [facility]                                1.840.114

350.1.13.10

4.2.7.2.686

[phone]

009                       [phone]                  [facility]







Results





                    Test Description Test Time Test Comments Results   Result Co

mments Source







                                        

 

                                        

 

                                        

 

                                        

 

                                        

 

                                        

 

                                        

 

                                        

 

                                        





[facility] [id number]
 21:26:00* 



                      Test Item  Value      Reference Range Interpretation Comme

nts

 

                      POCT PH U (test code = 3254) 5 mg/dl    5-8               

    

 

                                                    POCT U LEUK EST (test code =

 

3263)           trace           Negative - Negative                 

 

                      POCT U NIT (test code = 3262) negative   Negative - Negati

ve            

 

                      POCT U PROT (test code = 3259) negative   Negative - Negat

ella            

 

                      POCT U GLU (test code = 3256) normal     Negative - Negati

ve            

 

                      POCT U KETONE (test code = 3258) negative   Negative - Neg

ative            

 

                      POCT U BLD (test code = 3257) negative   Negative - Negati

ve            





[facility] Urinalysis, [id number] 21:28:00
  * 



                      Test Item  Value      Reference Range Interpretation Comme

nts

 

                                                    POCT U SP GRAV (test code = 

3255)           1.025 mg/dl     1.005-1.025                     

 

                      POCT PH U (test code = 3254) 7.0 mg/dl  5-8               

    

 

                                                    POCT U LEUK EST (test code =

 

3263)           Negative        Negative - Negative                 

 

                      POCT U NIT (test code = 3262) Negative   Negative - Negati

ve            

 

                                                    POCT U PROT (test code = 

3259)           30              Negative - Negative                 

 

                      POCT U GLU (test code = 3256) Negative   Negative - Negati

ve            

 

                                                    POCT U KETONE (test code = 

3258)           Negative        Negative - Negative                 

 

                                                    POCT U UROBILI (test code = 

3260)           0.2 mg/dl       0.2-1                           

 

                                                    POCT U BILI (test code = 

3261)           Negative        Negative - Negative                 

 

                      POCT U BLD (test code = 3257) Negative   Negative - Negati

ve            

 

                                                    POCT U COLOR (test code = 

3266)           yellow                                          

 

                                                    POCT U APPEAR (test code = 

3267)           clear                                           





[facility] Urinalysis, [id number] 21:28:00
  * 



                      Test Item  Value      Reference Range Interpretation Comme

nts

 

                                                    POCT U SP GRAV (test code = 

3255)           1.025 mg/dl     1.005-1.025                     

 

                      POCT PH U (test code = 3254) 7.0 mg/dl  5-8               

    

 

                                                    POCT U LEUK EST (test code =

 

3263)           Negative        Negative - Negative                 

 

                      POCT U NIT (test code = 3262) Negative   Negative - Negati

ve            

 

                                                    POCT U PROT (test code = 

3259)           30              Negative - Negative                 

 

                      POCT U GLU (test code = 3256) Negative   Negative - Negati

ve            

 

                                                    POCT U KETONE (test code = 

3258)           Negative        Negative - Negative                 

 

                                                    POCT U UROBILI (test code = 

3260)           0.2 mg/dl       0.2-1                           

 

                                                    POCT U BILI (test code = 

3261)           Negative        Negative - Negative                 

 

                      POCT U BLD (test code = 3257) Negative   Negative - Negati

ve            

 

                                                    POCT U COLOR (test code = 

3266)           yellow                                          

 

                                                    POCT U APPEAR (test code = 

3267)           clear                                           





[facility] Urinalysis, [id number] 21:28:00
  * 



                      Test Item  Value      Reference Range Interpretation Comme

nts

 

                                                    POCT U SP GRAV (test code = 

3255)           1.025 mg/dl     1.005-1.025                     

 

                      POCT PH U (test code = 3254) 7.0 mg/dl  5-8               

    

 

                                                    POCT U LEUK EST (test code =

 

3263)           Negative        Negative - Negative                 

 

                      POCT U NIT (test code = 3262) Negative   Negative - Negati

ve            

 

                                                    POCT U PROT (test code = 

3259)           30              Negative - Negative                 

 

                      POCT U GLU (test code = 3256) Negative   Negative - Negati

ve            

 

                                                    POCT U KETONE (test code = 

3258)           Negative        Negative - Negative                 

 

                                                    POCT U UROBILI (test code = 

3260)           0.2 mg/dl       0.2-1                           

 

                                                    POCT U BILI (test code = 

3261)           Negative        Negative - Negative                 

 

                      POCT U BLD (test code = 3257) Negative   Negative - Negati

ve            

 

                                                    POCT U COLOR (test code = 

3266)           yellow                                          

 

                                                    POCT U APPEAR (test code = 

3267)           clear                                           





[facility], [id number] 21:28:00
  * 



                      Test Item  Value      Reference Range Interpretation Comme

nts

 

                                                    POCT U SP GRAV (test code = 

3255)           1.025 mg/dl     1.005-1.025                     

 

                      POCT PH U (test code = 3254) 7.0 mg/dl  5-8               

    

 

                                                    POCT U LEUK EST (test code =

 

3263)           Negative        Negative - Negative                 

 

                      POCT U NIT (test code = 3262) Negative   Negative - Negati

ve            

 

                                                    POCT U PROT (test code = 

3259)           30              Negative - Negative                 

 

                      POCT U GLU (test code = 3256) Negative   Negative - Negati

ve            

 

                                                    POCT U KETONE (test code = 

3258)           Negative        Negative - Negative                 

 

                                                    POCT U UROBILI (test code = 

3260)           0.2 mg/dl       0.2-1                           

 

                                                    POCT U BILI (test code = 

3261)           Negative        Negative - Negative                 

 

                      POCT U BLD (test code = 3257) Negative   Negative - Negati

ve            

 

                                                    POCT U COLOR (test code = 

3266)           yellow                                          

 

                                                    POCT U APPEAR (test code = 

3267)           clear                                           





[facility] RETROPERITONEAL [id number] 20:18:17
ORDERING PHYSICIAN: RICHARDSON HOLLINGSWORTH HISTORY: renal insufficiency . TECHNIQUE: 
Gray scale and color Doppler ultrasound images of the kidneysand urinary bladder
 were obtained. COMPARISON: None. FINDINGS: Right kidney measures 4.7 x 4.7 x 
10.8 cm in size. There is nohydronephrosis. ? There is normal cortical thickness
 and echotexture. Asmall renal cortical cyst of the lower pole measures 1.9 cm 
longaxis. ? ? Left kidney measures 5.1 x 4.8 x 9.6 cm in size. There is 
nohydronephrosis. ? There is normal cortical thickness and echotexture. Asmall 
renal cortical cysts of the lower pole measures 2 cmlong axis.Immediately 
adjacent to it is a small 7 mm calculus. Another cyst ofsimilar size is incomp
letely imaged, partially shadowed on the transversecine. Bladder is normal. 
?Bilateral ureteral jets are seen. Visualized abdominal aorta is unremarkable.
[facility]+ VW [id number] 21:06:18ORDERING 
PHYSICIAN: JUNE SILVA. HISTORY: left hand pain s/p fall four days ago 
TECHNIQUE: 3 views COMPARISON: None. FINDINGS: There is mild negative ulnar 
variance. There is severe first metacarpaljoint space narrowing and spurring. 
Small ossified loose bodies are seenwithin the joint. Mild ulnocarpal 
chondrocalcinosis is present.[facility] CLAVICLE COMP 
[id number] 21:05:29ORDERING PHYSICIAN: JUNE SILVA. HISTORY: 
clavicle pain bilaterally fell four days ago. TECHNIQUE: 2 views each clavicle 
COMPARISON: None. FINDINGS: On the right, alignment is anatomic. A metallic
anchor is seen at theundersurface of the acromion. Mild acromioclavicular 
spurring is present.No fracture is identified. On the left, alignment is 
anatomic. There is mild acromioclavicularspurring. No fracture is identified. 
Small foci of calcific tendinopathyare seen at the distal supraspinatus.
[facility] SHOULDER 2+ VW [id number] 21:04:15
ORDERING PHYSICIAN: JUNE SILVA. HISTORY: c/o pain bilateral shoulder s/p 
fall four days ago TECHNIQUE: 2 views each shoulder COMPARISON: None. FINDINGS: 
Right shoulder alignment is anatomic. A metallic anchor is seen at 
theundersurface of the acromion. Remodeling of the superolateral humeral headis 
noted. No acute fracture is identified. There is mild acromioclavicularspurring.
 Left shoulder alignment is anatomic. No fracture is identified. There ismild 
acromioclavicular spurring. Small foci of calcific tendinopathy areseen at the 
distal supraspinatusUnAdventHealth Central TexasMEAS,POST-VOID 
RES,US,[id number] 19:43:00* 



                      Test Item  Value      Reference Range Interpretation Comme

nts

 

                      PVR (URINE VOLUME) (test code = 5193) 0 ml       0-100    

             





[facility],POST-VOID RES,US,[id number] 
19:43:00* 



                      Test Item  Value      Reference Range Interpretation Comme

nts

 

                      PVR (URINE VOLUME) (test code = 5193) 0 ml       0-100    

             





[facility],POST-VOID RES,US,[id number] 
19:43:00* 



                      Test Item  Value      Reference Range Interpretation Comme

nts

 

                      PVR (URINE VOLUME) (test code = 5193) 0 ml       0-100    

             





[facility],POST-VOID RES,US,[id number] 
19:43:00* 



                      Test Item  Value      Reference Range Interpretation Comme

nts

 

                      PVR (URINE VOLUME) (test code = 5193) 0 ml       0-100    

             





[facility] w/o Specific [id number]
 19:42:00* 



                      Test Item  Value      Reference Range Interpretation Comme

nts

 

                      POCT PH U (test code = 3254) 6 mg/dl    5-8               

    

 

                                                    POCT U LEUK EST (test code =

 

3263)           negative        Negative - Negative                 

 

                      POCT U NIT (test code = 3262) negative   Negative - Negati

ve            

 

                      POCT U PROT (test code = 3259) trace      Negative - Negat

elal            

 

                      POCT U GLU (test code = 3256) negative   Negative - Negati

ve            

 

                      POCT U KETONE (test code = 3258) negative   Negative - Neg

ative            

 

                      POCT U BLD (test code = 3257) negative   Negative - Negati

ve            





[facility] w/o Specific [id number]
 19:42:00* 



                      Test Item  Value      Reference Range Interpretation Comme

nts

 

                      POCT PH U (test code = 3254) 6 mg/dl    5-8               

    

 

                                                    POCT U LEUK EST (test code =

 

3263)           negative        Negative - Negative                 

 

                      POCT U NIT (test code = 3262) negative   Negative - Negati

ve            

 

                      POCT U PROT (test code = 3259) trace      Negative - Negat

ella            

 

                      POCT U GLU (test code = 3256) negative   Negative - Negati

ve            

 

                      POCT U KETONE (test code = 3258) negative   Negative - Neg

ative            

 

                      POCT U BLD (test code = 3257) negative   Negative - Negati

ve            





[facility] [id number]
 19:42:00* 



                      Test Item  Value      Reference Range Interpretation Comme

nts

 

                      POCT PH U (test code = 3254) 6 mg/dl    5-8               

    

 

                                                    POCT U LEUK EST (test code =

 

3263)           negative        Negative - Negative                 

 

                      POCT U NIT (test code = 3262) negative   Negative - Negati

ve            

 

                      POCT U PROT (test code = 3259) trace      Negative - Negat

ella            

 

                      POCT U GLU (test code = 3256) negative   Negative - Negati

ve            

 

                      POCT U KETONE (test code = 3258) negative   Negative - Neg

ative            

 

                      POCT U BLD (test code = 3257) negative   Negative - Negati

ve            





[facility] Urinalysis w/o Specific [id number]
 19:42:00* 



                      Test Item  Value      Reference Range Interpretation Comme

nts

 

                      POCT PH U (test code = 3254) 6 mg/dl    5-8               

    

 

                                                    POCT U LEUK EST (test code =

 

3263)           negative        Negative - Negative                 

 

                      POCT U NIT (test code = 3262) negative   Negative - Negati

ve            

 

                      POCT U PROT (test code = 3259) trace      Negative - Negat

ella            

 

                      POCT U GLU (test code = 3256) negative   Negative - Negati

ve            

 

                      POCT U KETONE (test code = 3258) negative   Negative - Neg

ative            

 

                      POCT U BLD (test code = 3257) negative   Negative - Negati

ve            





[facility] URINALYSIS W/O SPECIFIC [id number]
 21:53:00* 



                      Test Item  Value      Reference Range Interpretation Comme

nts

 

                                                    POCT PH U (test code 

= 3254)         5 mg/dl         5-8                             

 

                                                    POCT U LEUK EST 

(test code = 3263) negative        Negative - Negative                 

 

                                                    POCT U NIT (test 

code = 3262)    negative        Negative - Negative                 

 

                                                    POCT U PROT (test 

code = 3259)                    Negative - Negative                 

 

                                                    POCT U GLU (test 

code = 3256)    negative        Negative - Negative                 

 

                                                    POCT U KETONE (test 

code = 3258)    negative        Negative - Negative                 

 

                                                    POCT U BLD (test 

code = 3257)    negative        Negative - Negative                 

 

                                                    LORY (test code = 

LORY)                                    Per order PVR by 

bladder scan = ?16 

ml. Results reported 

to provider.                                                





[facility] URINALYSIS W/O SPECIFIC [id number]
 21:53:00* 



                      Test Item  Value      Reference Range Interpretation Comme

nts

 

                                                    POCT PH U (test code 

= 3254)         5 mg/dl         5-8                             

 

                                                    POCT U LEUK EST 

(test code = 3263) negative        Negative - Negative                 

 

                                                    POCT U NIT (test 

code = 3262)    negative        Negative - Negative                 

 

                                                    POCT U PROT (test 

code = 3259)                    Negative - Negative                 

 

                                                    POCT U GLU (test 

code = 3256)    negative        Negative - Negative                 

 

                                                    POCT U KETONE (test 

code = 3258)    negative        Negative - Negative                 

 

                                                    POCT U BLD (test 

code = 3257)    negative        Negative - Negative                 

 

                                                    LORY (test code = 

LORY)                                    Per order PVR by 

bladder scan = ?16 

ml. Results reported 

to provider.                                                





[facility] URINALYSIS W/O SPECIFIC [id number]
 21:53:00* 



                      Test Item  Value      Reference Range Interpretation Comme

nts

 

                                                    POCT PH U (test code 

= 3254)         5 mg/dl         5-8                             

 

                                                    POCT U LEUK EST 

(test code = 3263) negative        Negative - Negative                 

 

                                                    POCT U NIT (test 

code = 3262)    negative        Negative - Negative                 

 

                                                    POCT U PROT (test 

code = 3259)                    Negative - Negative                 

 

                                                    POCT U GLU (test 

code = 3256)    negative        Negative - Negative                 

 

                                                    POCT U KETONE (test 

code = 3258)    negative        Negative - Negative                 

 

                                                    POCT U BLD (test 

code = 3257)    negative        Negative - Negative                 

 

                                                    LORY (test code = 

LORY)                                    Per order PVR by 

bladder scan = ?16 

ml. Results reported 

to provider.                                                





[facility] URINALYSIS W/O SPECIFIC [id number]
 20:21:00* 



                      Test Item  Value      Reference Range Interpretation Comme

nts

 

                                                    POCT PH U (test code 

= 3254)         5 mg/dl         5-8                             

 

                                                    POCT U LEUK EST 

(test code = 3263) negative        Negative - Negative                 

 

                                                    POCT U NIT (test 

code = 3262)    negative        Negative - Negative                 

 

                                                    POCT U PROT (test 

code = 3259)                                                    

 

                                                    POCT U GLU (test 

code = 3256)    negative        Negative - Negative                 

 

                                                    POCT U KETONE (test 

code = 3258)    negative        Negative - Negative                 

 

                                                    POCT U BLD (test 

code = 3257)    negative        Negative - Negative                 

 

                                                    LORY (test code = 

LORY)                                    Per order PVR by 

bladder scan = ?30 

ml. Results reported 

to provider.                                                





[facility] URINALYSIS W/O SPECIFIC [id number]
 20:21:00* 



                      Test Item  Value      Reference Range Interpretation Comme

nts

 

                                                    POCT PH U (test code 

= 3254)         5 mg/dl         5-8                             

 

                                                    POCT U LEUK EST 

(test code = 3263) negative        Negative - Negative                 

 

                                                    POCT U NIT (test 

code = 3262)    negative        Negative - Negative                 

 

                                                    POCT U PROT (test 

code = 3259)                                                    

 

                                                    POCT U GLU (test 

code = 3256)    negative        Negative - Negative                 

 

                                                    POCT U KETONE (test 

code = 3258)    negative        Negative - Negative                 

 

                                                    POCT U BLD (test 

code = 3257)    negative        Negative - Negative                 

 

                                                    LORY (test code = 

LORY)                                    Per order PVR by 

bladder scan = ?30 

ml. Results reported 

to provider.                                                





[facility] URINALYSIS W/O SPECIFIC [id number]
 19:55:00* 



                      Test Item  Value      Reference Range Interpretation Comme

nts

 

                      POCT PH U (test code = 3254) 5 mg/dl    5-8               

    

 

                                                    POCT U LEUK EST (test code =

 

3263)           neg             Negative - Negative                 

 

                      POCT U NIT (test code = 3262) neg        Negative - Negati

ve            

 

                      POCT U PROT (test code = 3259) ++         Negative - Negat

ella            

 

                      POCT U GLU (test code = 3256) neg        Negative - Negati

ve            

 

                      POCT U KETONE (test code = 3258) neg        Negative - Neg

ative            

 

                      POCT U BLD (test code = 3257) neg        Negative - Negati

ve            





[facility] URINALYSIS W/O SPECIFIC [id number]
 19:55:00* 



                      Test Item  Value      Reference Range Interpretation Comme

nts

 

                      POCT PH U (test code = 3254) 5 mg/dl    5-8               

    

 

                                                    POCT U LEUK EST (test code =

 

3263)           neg             Negative - Negative                 

 

                      POCT U NIT (test code = 3262) neg        Negative - Negati

ve            

 

                      POCT U PROT (test code = 3259) ++         Negative - Negat

ella            

 

                      POCT U GLU (test code = 3256) neg        Negative - Negati

ve            

 

                      POCT U KETONE (test code = 3258) neg        Negative - Neg

ative            

 

                      POCT U BLD (test code = 3257) neg        Negative - Negati

ve            





[facility] URINALYSIS W/O SPECIFIC [id number]
 19:55:00* 



                      Test Item  Value      Reference Range Interpretation Comme

nts

 

                      POCT PH U (test code = 3254) 5 mg/dl    5-8               

    

 

                                                    POCT U LEUK EST (test code =

 

3263)           neg             Negative - Negative                 

 

                      POCT U NIT (test code = 3262) neg        Negative - Negati

ve            

 

                      POCT U PROT (test code = 3259) ++         Negative - Negat

ella            

 

                      POCT U GLU (test code = 3256) neg        Negative - Negati

ve            

 

                      POCT U KETONE (test code = 3258) neg        Negative - Neg

ative            

 

                      POCT U BLD (test code = 3257) neg        Negative - Negati

ve            





[facility]



Notes





                          Date/Time    Note         Provider     Source

 

                                        2024 13:17:30 





Formatting of this note 

might be different from the 

original.

Patient notified of all and 

verbalized understanding to 

all.

Electronically signed by 

Lena Howard LVN at 

2024 1:19 PM T

                                                    [facility]

 

                                        2024 13:14:40 





Formatting of this note 

might be different from the 

original.

I sent antibiotic eye drops

Must be seen in office or 

[facility] if symptoms not improved 

or worsen in next 1-2

days





Rupa Ayers MD





Electronically signed by 

Rupa Ayers MD at 2024 1:15 PM 

T

                                                    [facility]

 

                                        2024 12:55:14 





Formatting of this note 

might be different from the 

original.

Patient reports right eye 

was pink and crusty this am 

and now left eye is pink

with some blurring to 

vision. OV was offered, 

patient declined stating is 

is a

taxing effort to drive self 

and she requires extra 

planning to come in. Please

review and advise.





Electronically signed by 

Lena Howard LVN at 

2024 1:03 PM [facility]

 

                                        2024 10:27:13 





Formatting of this note 

might be different from the 

original.

Patient is returning call 

she missed from the clinic. 

Please advise.

Electronically signed by 

Tab Pelayo at 

2024 10:27 AM JACKIE Pelayo          [facility]

 

                                        2024 09:59:44 





Formatting of this note 

might be different from the 

original.

Attempted to contact 

patient, no answer, Unable 

to leave message.

Electronically signed by 

Jordyn Paredes RN at 

2024 10:00 AM LARAT

                          Jordyn Paredes RN          [facility]

 

                                        2024 09:27:00 





Formatting of this note 

might be different from the 

original.

Patient states she has pink 

eye and is requesting a 

prescription for eye drops.

Electronically signed by 

Tab Pelayo at 

2024 9:27 AM [facility]

 

                                        2024 09:33:24 





Formatting of this note is 

different from the 

original.

Patient reporting pain 

10/10 r/t sciatica to BLE 

and is having trouble

walking/resting. She denied 

falls/injuries, stated pain 

is chronic, but

worsened yesterday. F/U 

appt for today was 

cancelled d/t taxing effort 

to leave

her home. Please review and 

advise.





Please review and sign if 

appropriate:





Last office visit: 24





Requested Prescriptions





Pending Prescriptions Disp 

Refills

tiZANidine 2 mg tablet 60 

tablet 1

Sig: Take 1 tablet by mouth 

every 6 (six) hours as 

needed for Pain (scale 

7-10)

(muscle spasm).





Last refill date: 3/27/24





Notes:

Lumbar radiculopathy, 

chronic - Primary





Chronic pain syndrome







Electronically signed by 

Lena Howard LVN at 

2024 9:37 AM T

                                                    [facility]

 

                                        2024 08:07:35 





Formatting of this note 

might be different from the 

original.

Rosio Mcnair is a 73 year 

old female is calling 

stating having muscle spasm

and has to sleep on 

stomach. Cancelled appt for 

today, walking is a issue 

due

to muscle spasm. Please 

call the patient

Electronically signed by 

Jing Gutierrez at 

2024 8:09 AM JACKIE Gutierrez   [facility] [employer]

 

                                        2024 08:06:01 





Formatting of this note 

might be different from the 

original.

Rosio Mcnair is a 73 year 

old female is calling 

needing refill Tizanidine 

2mg





[id number] 

- [place] - [address] 

AT [employer] [address]

[address] [place] [address]

Phone: [phone] Fax: 

[phone]







Electronically signed by 

Jing Gutierrez at 

2024 8:07 AM T

                                                    [facility]

 

                                        2024 11:39:13 





Formatting of this note is 

different from the 

original.

Images from the original 

note were not included.





Notes: discontinued 3/27/24





Last Refilled:

[id number] 

- [place], [place] - [address] 

AT [facility] &

[address]

Phone: [phone] Fax: 

[phone]





Recent Visits

Date Type Provider Dept

24 Office Visit 

Rupa Ayers MD [facility]

24 Office Visit 

Mary Mora PA [facility]

24 Office Visit 

Rupa Ayers MD [facility]

23 Office Visit 

Rupa Ayers MD [facility]

10/12/23 Office Visit 

Rupa Ayers MD [facility]

23 Office Visit 

Billy Portillo MD [facility] 

Family Medicine

23 Office Visit 

Billy Portillo MD [facility] 

Family Medicine

23 Office Visit 

Billy Portillo MD [facility] 

Family Medicine

23 Office Visit 

Billy Portillo MD [facility] 

Family Medicine

23 Office Visit 

Billy Portillo MD [facility]

Showing recent visits 

within past 540 days with a 

meds authorizing provider 

and

meeting all other 

requirements

Future Appointments

Date Type Provider Dept

24 Appointment 

Rupa Ayers MD [facility]

10/25/24 Appointment Amelia Dukes MD [facility]

Showing future appointments 

within next 150 days with a 

meds authorizing

provider and meeting all 

other requirements

Name from pharmacy: 

TIZANIDINE 2MG TABLETS





Will file in chart as: 

TIZANIDINE 2 mg tablet

The original prescription 

was discontinued on 

3/27/2024 by Rupa Ayers MD for the 

following reason: Reorder. 

Renewing this prescription

may not be appropriate.

Sig: TAKE 1 TABLET BY MOUTH 

EVERY 6 HOURS AS NEEDED FOR 

SEVERE PAIN OR MUSCLE

SPASM

Disp: 60 tablet Refills: 1 

(Pharmacy requested: Not 

specified)

Start: 2024

Class: eRX

Non-formulary For: Lumbar 

radiculopathy, chronic; 

Chronic pain syndrome

Last ordered: 4 months ago 

(3/27/2024) by Rupa Ayers MD

Last refill: 2024

Rx #: [id number]|1

Provider Review Required 

[id number] 07:38 AM

Protocol Details This 

refill cannot be delegated

Valid encounter within last 

12 months





To be filled at: [id number] - [place] 

[place] - [address] AT

[facility] [email or web address]





Electronically signed by 

Cantu, Eileen L, MA at 

2024 11:40 AM CDT

                          Eileen L Cantu MA         [facility]

 

                                        2024 15:03:32 





Formatting of this note 

might be different from the 

original.

Rosio Mcnair is a 73 year 

old female





Pt returning call to 

clinic. No recent or open 

TE. Did confirm appt on 

24.

Electronically signed by 

Miracle Curry at 

2024 3:04 PM CDT

                          Miracle Curry           [facility]

 

                                        2024 20:44:38 





Formatting of this note 

might be different from the 

original.

Rerouting for review and 

close

Electronically signed by 

Opal Rendon at 

2024 8:44 PM CDT

                          Opal Rendon          [facility]

 

                                        2024 08:46:59 





Formatting of this note 

might be different from the 

original.

Please review, complete, 

and sign if appropriate.

Electronically signed by 

Lena Howard LVN at 

2024 8:47 AM CDT

                                                    [facility]

 

                                        2024 08:40:41 





Formatting of this note 

might be different from the 

original.

Rosio Mcnair is a 73 year 

old female





Kirsten called because the 

pt has an appt with a 

nephrologist today and they

need a referral due to the 

pt HMO plan. She said its 

just a follow up appt.

Please advise





Dr. Rosina Goodrich

NPI: [id number]

Fax: [phone]

[address] [place] [place] 

[address]

Electronically signed by 

Miracle Curry at 

2024 8:43 AM CDT

                          Miracle Curry           [facility]

 

                                        2024 18:10:20 





Formatting of this note 

might be different from the 

original.

Rerouting for review and 

close

Electronically signed by 

Opal Rendon at 

2024 6:10 PM CDT

                                                    [facility]

 

                                        2024 16:00:00 





Formatting of this note is 

different from the 

original.

Images from the original 

note were not included.

Venipuncture collection 

performed by clean 

technique on the right 

hand. Total

of 1 attempts were made. 

Slight pressure and a 

bandage/dressing were 

applied to

the site(s). The patient 

experienced no 

complications. The 

following specimens

were processed according to 

instructions and sent to 

[facility] per lab

order on 2024

:





LT BLUE

1 SST

RED

2 LAV

PPT

DK GREEN (LiHep)

DK GREEN (SodH)

GRAY

DK BLUE (K2)

DK BLUE (S)

ACD

Blood Culture

NIPT/NTD





Patient has been identified 

by  and name and was 

provided with cup,

antiseptic towelette, and 

clean catch instructions. 3 

urine specimen(s) sent.





Unpreserved

Urine Culture

Aptima tube

Other urine







Electronically signed by 

Tawnya Ward at 

2024 4:34 PM CDT

                                                    [facility]

 

                                        2024 15:36:00 





Formatting of this note 

might be different from the 

original.

Regarding: Pt states she is 

feeling depressed and 

having memory loss

----- Message from Moe Delgado sent at 2024 

3:33 PM CDT -----

Pt states she is feeling 

depressed and having memory 

loss





Electronically signed by 

Carley Almeida, RN at 

2024 3:36 PM CDT

                          Carley Almeida RN        [facility]

 

                                        2024 15:36:00 





Formatting of this note 

might be different from the 

original.

Nurse's Note: 1540 Rosio Mcnair is a 73 year old 

female. Received call from

patient. Patient states 

she's been having 

depression and memory loss. 

She's at

the hospital now to have 

blood work done. She's due 

to see Dr. Goodrich. She's

concerned about the 

depression and memory loss. 

Patient states she's been

feeling anxious for about 2 

months. She denies thoughts 

of self harm or wanting

to harm someone else. Has 

been taking prevagen for 

years. The memory loss has

been off and on for the 

last 2 months as well. She 

states she forgets

appointments and the days 

of the weeks but not 

peoples names. She lacks 

energy

and does not feel like 

cleaning and sometimes does 

not feel like getting out 

of

bed but does shower and 

take care of herself.





Adult Triage Assessment

Last Clinic Visit: 24

Primary Symptom: depression

Onset / Duration: 2 months

Location / Description: 

generalized

Pain / Severity: sciatic 

nerve pain for over a year, 

5/10

Associated Symptoms: loss 

of appetite, "not wanting 

to get out of bed", memory

loss

Fever / Method: denies

Hydration: no changes

Treatment so far: prevagen

Effect on ADL's: lack of 

energy, does minimum 

activities

LMP: n/a

Pre-existing condition / 

Immunocompromised: htn, 

asthma, prediabetes





Reason for Disposition

[1] Depression AND [2] 

getting worse (e.g., 

sleeping poorly, less able 

to do

activities of daily living)





Protocols used: 

Depression-ADULT-





After assessment and 

triage, patient advised to 

see provider within 24 

hours

and recommended she go to 

the ER for worsening 

symptoms. Patient given 

strong

911 warnings for any 

thoughts of self harm.





Carley Almeida RN 

2024 4:25 PM





Electronically signed by 

Carley Almeida RN at 

2024 4:32 PM CDT

                                                    [facility]

 

                                        2024 16:01:28 





Formatting of this note 

might be different from the 

original.

Name and  verified. Pt 

wanted Dr. Hollingsworth to be 

her PC as Andria is

leaving. Explained to pt 

that Dr. Hollingsworth is only a 

UroGyn specialist. Pt

verbalized understanding. 

Wants a recommendation from 

Dr. Hollingsworth.

LYNDA MOCK RN 

7/3/2024 4:04 PM





Electronically signed by 

Lynda Mock RN 

at 2024 4:04 PM CDT

                          Lynda Mock RN  [facility]

 

                                        2024 16:25:26 





Formatting of this note 

might be different from the 

original.

Patient calling states she 

needs to speak to Dr. Kushal Hollingsworth in regards 

to

follow-up from her botox 

injections a few weeks 

back. Declined to give any

additional details.

Electronically signed by 

Opal Rendon at 

2024 4:27 PM CDT

                                                    [facility]

 

                                        2024 15:05:04 





Formatting of this note 

might be different from the 

original.

Spoke with patient and 

relayed that she would need 

to be seen in clinic.

Patient has an appointment 

with Dr. Simmons tomorrow 

7/3. Advised patient to go

to urgent care or Ed if 

symptoms worsen. Patient 

voiced understanding.

Electronically signed by 

Jordyn Paredes RN at 

2024 3:06 PM CDT

                          Jordyn Paredes RN          [facility]

 

                                        2024 14:59:45 





Formatting of this note 

might be different from the 

original.

OV required thanks

Electronically signed by 

Rupa Ayers MD at 2024 2:59 PM 

CDT

                                                    [facility]

 

                                        2024 14:32:21 





Formatting of this note 

might be different from the 

original.

Spoke with patient she 

stated that she had spicy 

sausage about a week age 

and

has had diarrhea ever 

since. She stated that she 

is staying hydrated with

Pedialyte. She reports that 

she she has tried Imodium 

and it has not helped.

Please review and advise





Patient has an appointment 

tomorrow with .

Electronically signed by 

Jordyn Paredes RN at 

2024 2:36 PM CDT

                                                    [facility]

 

                                        2024 14:21:31 





Formatting of this note 

might be different from the 

original.

Rosio Mcnair is a 73 year 

old female calling in 

requesting medication to

assist with diarrhea x1 

week.Please advise

Electronically signed by 

Josue Almeida at 

2024 2:22 PM CDT

                          Josue Almeida      [facility]

 

                                        2024 09:59:29 





Formatting of this note is 

different from the 

original.





Outpatient Medication 

Detail





Disp Refills Start End SUZANNE

ergocalciferol, vitamin d2, 

1,250 mcg (50,000 unit) 

capsule 26 capsule 1

2023 -- No

Sig: Take 1 capsule by 

mouth weekly.

Sent to pharmacy as: 

ergocalciferol (vitamin D2) 

1,250 mcg (50,000 unit)

capsule (CALCIFEROL)

Class: eRX

Route: Oral

Order: [id number]

Date/Time Signed: 2023 

13:00

E-Prescribing Status: 

Receipt confirmed by 

pharmacy (2023 1:00 PM 

CDT)





Associated Diagnoses





Senile osteopenia







Order Associated Providers





Name NPI

Ordering Provider 

Billy Portillo MD 

[[id number]] [id number]

Authorizing Provider 

Billy Portillo MD 

[[id number]] [id number]





Pharmacy





[id number] 

- [place], [place] - [address] 

AT [facility] [address] &

[address]





Recent Visits

Date Type Provider Dept

24 Office Visit 

Mary Mora PA [facility]

24 Office Visit 

Rupa Ayers MD [facility]

23 Office Visit 

Rupa Ayers MD [facility]

10/12/23 Office Visit 

Rupa Ayers MD [facility]

23 Office Visit 

Billy Portillo MD [facility] 

Family Medicine

23 Office Visit 

Billy Portillo MD [facility] 

Family Medicine

23 Office Visit 

Billy Portillo MD [facility] 

Family Medicine

23 Office Visit 

Billy Portillo MD [facility] 

Family Medicine

23 Office Visit 

Billy Portillo MD [facility] Medicine

23 Office Visit 

Billy Portillo MD [facility]

Showing recent visits 

within past 540 days with a 

meds authorizing provider 

and

meeting all other 

requirements

Future Appointments

Date Type Provider Dept

24 Appointment 

Rupa Ayers MD [facility]

Showing future appointments 

within next 150 days with a 

meds authorizing

provider and meeting all 

other requirements







Electronically signed by 

Elva Mercedes LVN at 

2024 9:59 AM CDT

                                                    [facility]

 

                                        2024 09:52:27 





Formatting of this note 

might be different from the 

original.

Pt is currently established 

with , no 

longer  pt,

message forwarded to 

[facility].

Electronically signed by 

Johanna Almeida RN at 

2024 9:53 AM CDT

                          Johanna BLEVINS RN        [facility]

 

                                        2024 08:07:59 





Formatting of this note 

might be different from the 

original.

Images from the original 

note were not included.





Electronically signed by 

Kay Henriquez at 

2024 8:08 AM CDT

                          Kay Henriquez             [facility]

 

                                        2024 07:09:16 





Formatting of this note is 

different from the 

original.







Outpatient Medication 

Detail





Disp Refills Start End SUZANNE

Diclofenac Sodium 

(VOLTAREN) 1 % gel 100 g 4 

2023 -- No

Sig: Apply to area(s) 4 

(four) times daily. Apply 4 

g qid

Sent to pharmacy as: 

diclofenac 1 % topical gel

Class: eRX

Route: Topical

Order: [id number]

Date/Time Signed: 2023 

14:27

E-Prescribing Status: 

Receipt confirmed by 

pharmacy (2023 2:28 PM 

CDT)





Recent Visits

Date Type Provider Dept

24 Office Visit 

Mary Mora PA [facility]

24 Office Visit 

Rupa Ayers MD [facility]

23 Office Visit 

Rupa Ayers MD [facility]

10/12/23 Office Visit 

Rupa Ayers MD [facility]

23 Office Visit 

Billy Portillo MD [facility] 

Family Medicine

23 Office Visit 

Billy Portillo MD [facility] 

Family Medicine

23 Office Visit 

Billy Portillo MD [facility] 

Family Medicine

23 Office Visit 

Billy Portillo MD [facility]

23 Office Visit 

Billy Portillo MD [facility]

23 Office Visit 

Billy Portillo MD [facility]

Showing recent visits 

within past 540 days with a 

meds authorizing provider 

and

meeting all other 

requirements

Future Appointments

Date Type Provider Dept

24 Appointment 

Rupa Ayers MD [facility]

Showing future appointments 

within next 150 days with a 

meds authorizing

provider and meeting all 

other requirements







Electronically signed by 

Elva Mercedes LVN at 

2024 7:10 AM CDT

                                                    [facility]

 

                                        2024 17:20:57 





Formatting of this note is 

different from the 

original.

Rosio Mcnair is a 73 year 

old female





Pt is requesting refill for 

rx





Diclofenac Sodium 

(VOLTAREN) 1 % gel





Thank you

Electronically signed by 

Fritz Perkins at 2024 

5:21 PM CDT

                          Fritz Perkins              [facility]

 

                                        2024 13:00:00 





Addended by: ANGELIKA CURIEL LVN on: 2024 

04:56 PM





Modules accepted: Orders







Electronically signed by 

Angelika Curiel LVN at 

2024 4:56 PM CDT

                                                    [facility]

 

                                        2024 13:00:00 





Addended by: KUSHAL HOLLINGSWORTH MD on: 2024 05:59 

PM





Modules accepted: Orders







Electronically signed by 

Kushal Hollingsworth MD at 

2024 5:59 PM T

                                                    [facility]

 

                                        2024 14:00:00 





Formatting of this note is 

different from the 

original.

Images from the original 

note were not included.

Venipuncture collection 

performed by clean 

technique on the back of 

right hand.

Total of 1 attempts were 

made. Slight pressure and a 

bandage/dressing were

applied to the site(s). The 

patient experienced no 

complications. The 

following

specimens were processed 

according to instructions 

and sent to [facility]

laboratories per lab order 

on TODAY:





LT BLUE

SST

RED

LAV

PPT

LT GREEN (LiHep) 1

DK GREEN (SodH)

GRAY

DK BLUE (K2)

DK BLUE (S)

ACD

Blood Culture

NIPT/NTD







Electronically signed by 

Diane Whaley at 

2024 4:39 PM T

                                                    [facility]

 

                                        2024 12:31:41 





Addended by: KUSHAL HOLLINGSWORTH MD on: 2024 12:31 

PM





Modules accepted: Orders







Electronically signed by 

Kushal Hollingsworth MD at 

2024 12:31 PM T

                                                    [facility]

 

                                        2024 10:58:54 





Formatting of this note 

might be different from the 

original.

Per patient, she has lost 

her medication trimethoprim 

100 mg tablet. She is

asking for a refill.

Electronically signed by 

Susan Valencia MA at 

2024 11:02 AM CDT

                          Susan Valencia MA         [facility]

 

                                        2024 15:15:00 





Addended by: ANGELIKA CURIEL LVN on: 2024 

04:51 PM





Modules accepted: Orders







Electronically signed by 

Angelika Curiel LVN at 

2024 4:51 PM CDT

                                                    [facility]

 

                                        2024 13:45:09 





Formatting of this note 

might be different from the 

original.

PA was submitted for Botox. 

Approved. EOC# [id number].

LYNDA MOCK RN 

2024 1:46 PM





Electronically signed by 

Lynda Mock RN 

at 2024 1:46 PM CDT

                          Lynda Mock RN  [facility]

 

                                        2024 15:48:03 





Formatting of this note 

might be different from the 

original.

Pt informed no fracture, 

degenerative changes noted. 

Pt verbalized

understanding. Follow up 

with PCP as needed

Electronically signed by 

Harmony Brown RN at 

2024 3:48 PM CDT

                          Harmony Brown RN        [facility]

 

                                        2024 15:24:04 





Formatting of this note 

might be different from the 

original.

Copied from CRM #[id number]. 

Topic: Clinical - Results

>> May 14, 2024 3:22 PM 

Patient Access Specialist 

wrote:

Rosio Mcnair is a 73 year 

old female calling in 

requesting xray 

results.Please

adivse

Electronically signed by 

Josue Almeida at 

2024 3:25 PM CDT

                          Josue Almeida      [facility]

 

                                        2024-05-10 14:26:54 





Formatting of this note is 

different from the 

original.

Rosio Mcnair is a 73 year 

old female and said she 

will go to  as a walk in.

Pt also made an appt for 

Monday in the clinic.





Future Appointments

Date Time Provider 

Department Center

2024 1:30 PM Yoni Simmons MD [facility]







Electronically signed by 

Jude Dahl at 

05/10/2024 2:34 PM CDT

                          Jude Dahl         [facility]

 

                                        2024-05-10 13:09:16 





Formatting of this note is 

different from the 

original.

Images from the original 

note were not included.

Routed to provider for 

review. Unable to refill 

per ambulatory refill

guidelines.

Notes:

gabapentin 300 mg capsule





Sig: Take 1 capsule by 

mouth in the morning and 1 

capsule at noon and 1 

capsule

in the evening.

Disp: 270 capsule Refills: 

1

Start: 2024

Class: eRX

For: Lumbar radiculopathy, 

chronic

Last ordered: 5 months ago 

(2023) by Rupa Ayers MD

Neuropathic Pain 

[id number] 03:32 PM

Protocol Details Manual 

Review: Verify no changes 

in dose in the last 3 

months

Valid encounter within last 

12 months





To be filled at: [facility] 

[facility] #[id number] - [place], 

[place] - [address] AT

[facility] [address]





Last Refilled: 24





Recent Visits

Date Type Provider Dept

24 Office Visit 

Mary Mora PA [facility]

24 Office Visit 

Rupa Ayers MD [facility]

23 Office Visit 

Rupa Ayers MD [facility]

10/12/23 Office Visit 

Rupa Ayers MD [facility]

23 Office Visit 

Billy Portillo MD [facility] 

Family Medicine

23 Office Visit 

Billy Portillo MD [facility] 

Family Medicine

23 Office Visit 

Billy Portillo MD [facility] 

Family Medicine

23 Office Visit 

Billy Portillo MD [facility] 

Family Medicine

23 Office Visit 

Billy Portillo MD [facility] 

Family Medicine

23 Office Visit 

Billy Portillo MD [facility] 

Family [facility]

Showing recent visits 

within past 540 days with a 

meds authorizing provider 

and

meeting all other 

requirements

Future Appointments

Date Type Provider Dept

24 Appointment 

Rupa Ayers MD [facility]

Showing future appointments 

within next 150 days with a 

meds authorizing

provider and meeting all 

other requirements

Electronically signed by 

Kortney Smith MA at 

05/10/2024 1:10 PM CDT

                          Kortney Smith MA     [facility]

 

                                        2024-05-10 08:57:51 





Formatting of this note 

might be different from the 

original.

LMTCB including directions 

from Provider Lena Howard LVN 5/10/2024

Electronically signed by 

Lena Howard LVN at 

05/10/2024 8:58 AM T

                                                    [facility]

 

                                        2024-05-10 08:40:04 





Formatting of this note 

might be different from the 

original.

UC or ER now for BP check, 

schedule with me soonest 

available thanks

Electronically signed by 

Rupa Ayers MD at 05/10/2024 8:40 AM 

T

                                                    [facility]

 

                                        2024-05-10 08:05:02 





Formatting of this note 

might be different from the 

original.

Please review and advise.





NOV 24

Electronically signed by 

Lena Howard LVN at 

05/10/2024 8:05 AM T

                                                    [facility]

 

                                        2024 18:26:00 





Formatting of this note 

might be different from the 

original.

Regardinyof blood 

pressure drop / fainted 

fell out chair / shoulder 

and

arm pain

----- Message from Darcy Moreno sent at 2024 

6:24 PM CDT -----

Rosio Mcnair is a 73 year 

old female





blood pressure drop / 

fainted fell out chair / 

shoulder and arm pain





Electronically signed by 

Mari Whitlock RN at 

2024 6:26 PM CDT

                          Mari Whitlock RN          [facility]

 

                                        2024 18:26:00 





Formatting of this note 

might be different from the 

original.

Attempted to return pt call 

x2. Both attempts went to 

. Message left to

remove any blocks and 

attempted again. Second 

call went to . Message 

left

with AC phone number to 

call back if assistance is 

still needed.

Mari Whitlock RN

Reason for Disposition

Message left on identified 

voice mail





Protocols used: No Contact 

or Duplicate Contact 

Call-ADULT-





Electronically signed by 

Mari Whitlock RN at 

2024 6:35 PM CDT

                                                    [facility]

 

                                        2024 18:12:26 





Formatting of this note 

might be different from the 

original.

Rosio Mcnair is a 73 year 

old female





Wanted to notify that her 

blood pressure drop and 

fainted fell out of chair

.She is having Shoulder 

pain left wanting an x ray 

referral please assist .





Offered to speak with  

nurses accepted

Electronically signed by 

Darcy Moreno at 

2024 6:23 PM CDT

                          Darcy Moreno         [facility]

 

                                        2024 15:31:56 





Formatting of this note 

might be different from the 

original.

Images from the original 

note were not included.





Electronically signed by 

Kay Henriquez at 

2024 3:32 PM CDT

                                                    [facility]

 

                                        2024 13:52:30 





Formatting of this note is 

different from the 

original.

Images from the original 

note were not included.

Kushal Hollingsworth MD P [facility] 

[facility] Nurse

Please inform patient that 

creatinine is still 

elevated and may be too 

high for

use to proceed with 

elective botox procedure.





I recommend she speak with 

Nephrology for clearance. 

She will need to complete

this prior to schedule 

procedure.





Thank you!





Contacted patient. Patient 

notified of results. 

Patient states she will 

reach

out to nephrologist. Lab 

orders and meds sent to 

nephrologist via fax.

Johnny Abreu RN 

2024 1:54 PM





Electronically signed by 

Johnny Abreu RN at 

2024 1:54 PM CDT

                          Johnny Abreu RN        [facility]

 

                                        2024 16:54:13 





Formatting of this note 

might be different from the 

original.

Name and  verified. Pt 

advised of change of 

medication. Verbalized

understanding.

LYNDA MOCK RN 

5/3/2024 4:55 PM





Electronically signed by 

Lynda Mock RN 

at 2024 4:56 PM CDT

                          Lynda Mock RN  [facility]

 

                                        2024 14:21:56 





Formatting of this note 

might be different from the 

original.

Will prescribe a different 

antibiotic.

Please inform pt

Thank you!





Electronically signed by 

Kushal Hollingsworth MD at 

2024 2:24 PM CDT

                                                    [facility]

 

                                        2024 12:28:40 





Formatting of this note is 

different from the 

original.





Notes: Please Review





Last Refilled:

6 months ago (10/12/2023)

traMADoL 50 mg tablet

Take 1 tablet by mouth 

every 6 (six) hours as 

needed.

Dispense: Not specified

By: Doctor Unassigned, No 

Name





Recent Visits

Date Type Provider Dept

24 Office Visit 

Mary Mora PA [facility]

24 Office Visit 

Rupa Ayers MD [facility]

23 Office Visit 

Rupa Ayers MD [facility]

10/12/23 Office Visit 

Rupa Ayers MD [facility]

23 Office Visit 

Billy Portillo MD [facility] Medicine

23 Office Visit 

Billy Portillo MD [facility]

23 Office Visit 

Billy Portillo MD [facility]

23 Office Visit 

Billy Portillo MD [facility]

23 Office Visit 

Billy Portillo MD [facility] Medicine

23 Office Visit 

Billy Portillo MD [facility]

Showing recent visits 

within past 540 days with a 

meds authorizing provider 

and

meeting all other 

requirements

Future Appointments

Date Type Provider Dept

24 Appointment 

Rupa Ayers MD [facility]

Showing future appointments 

within next 150 days with a 

meds authorizing

provider and meeting all 

other requirements





Electronically signed by 

Debby Swift at 2024 

12:29 PM CDT

                          Debby Swift              [facility]

 

                                        2024 11:49:18 





Formatting of this note 

might be different from the 

original.

Copied from CRM #[id number]. 

Topic: Clinical - Medical 

Advice

>> May 3, 2024 11:48 AM 

Patient Access Specialist 

wrote:

Patient is requesting a 

refill of traMADoL 50 mg 

tablet. Patient has 6 pills

left.

Electronically signed by 

Tab Pelayo at 

2024 11:52 AM CDT

                          Tab Pelayo          [facility]

 

                                        2024 16:15:00 





Formatting of this note is 

different from the 

original.

Images from the original 

note were not included.

Venipuncture collection 

performed by clean 

technique on the left hand. 

Total of

1 attempts were made. 

Slight pressure and a 

bandage/dressing were 

applied to

the site(s). The patient 

experienced no 

complications. The 

following specimens

were processed according to 

instructions and sent to 

[facility] laboratories per lab

order on 2024:





LT BLUE

SST 1

RED

LAV

PPT

DK GREEN (LiHep)

DK GREEN (SodH)

GRAY

DK BLUE (K2)

DK BLUE (S)

ACD

Blood Culture

NIPT/NTD







Electronically signed by 

Lana Marshall at 

2024 4:21 PM CDT

                                                    [facility]

 

                                        2024 15:04:24 





Formatting of this note is 

different from the 

original.

Images from the original 

note were not included.

Debby Jeniffer

2024 9:18 AM CDT





Contacted patient. Patient 

notified of results per 

provider. Verbalized

understanding.

ADALGISA Gallegos

2024 9:47 AM CDT





Suspect fracture of humeral 

head, age indeterminate. No 

dislocation. Recommend

wearing arm sling. Do not 

recommend going to 

chiropractor at this time.

Recommend seeing ortho. I 

will refer.





ADALGISA Gallegos

2024 9:34 PM CDT





Xray for chronic knee pain. 

Did fall on knees. Has 

osteoarthritis. Swelling

present and suspected joint 

effusion. Sent in medrol 

aga for knee pain and

sciatica. Follow-up with 

ortho. Avoid nsaids. Take 

with food.





Electronically signed by 

Elva Mercdees LVN at 

2024 3:04 PM CDT

                          Elva Mercedes LVN      [facility]

 

                                        2024 13:19:50 





Formatting of this note 

might be different from the 

original.

Please review and advise.





LOV 24





Pt reports B shoulder pain 

and completed Xray to right 

shoulder at last OV. She

is requesting Xray be 

completed to Left shoulder 

prior to Dr. Tavera 

visit

on . Please review and 

advise.

Electronically signed by 

Lena Howard LVN at 

2024 1:24 PM CDT

                                                    [facility]

 

                                        2024 13:17:57 





Formatting of this note 

might be different from the 

original.

Rosio Mcnair is a 73 year 

old female





Patient is calling stating 

prescription have 

penicillin in it and she is

allergic. Needing 

alternative. Please advise





[id number] 

- [place], [place] [address] 

AT [facility] &

[address] [facility] [place]

[address] [place] [address]

Phone: [phone] Fax: 

[phone]







Electronically signed by 

Eva Saxena at 2024 

1:20 PM CDT

                          Eva Saxena              [facility]

 

                                        2024 13:05:43 





Formatting of this note 

might be different from the 

original.

Rosio Mcnair is a 73 year 

old female





Pt said her Left shoulder 

is in pain and is 

requesting an XR order for 

her left

shoulder. Please advise.

Electronically signed by 

Miracle Curry at 

2024 1:07 PM CDT

                          Miracle Curry           [facility]

 

                                        2024 13:45:47 





Formatting of this note 

might be different from the 

original.

Left detailed message on 

personal voicemail to hold 

PT until seen by Ortho. To

call clinic if she had any 

other question.

Electronically signed by 

Jordyn Paredes RN at 

2024 1:46 PM CDT

                          Jordyn Paredes RN          [facility]

 

                                        2024 13:30:00 





Addended by: KIMBERLY PRICE on: 2024 04:33 PM





Modules accepted: Orders







Electronically signed by 

Kimberly Price MA at 

2024 4:33 PM CDT

                                                    [facility]

 

                                        2024 13:30:00 





Addended by: KUSHAL HOLLINGSWOTRH MD on: 5/3/2024 02:28 

PM





Modules accepted: Orders







Electronically signed by 

Kushal Hollingsworth MD at 

2024 2:28 PM CDT

                                                    [facility]

 

                                        2024 09:34:43 





Formatting of this note 

might be different from the 

original.

Copied from CRM #[id number]. 

Topic: Clinical - Order

>> 2024 9:33 AM 

Patient Access Specialist 

wrote:

Rosio Mcnair is a 73 year 

old female





Pt calling in wanting to 

know if she has to hold off 

on pt until she sees

ortho.Please advise and 

contact pt.





[phone] (home)

Electronically signed by 

Tana Carey at 

2024 1:46 PM CDT

                          Tana Carey           [facility]

 

                                        2024 07:57:14 





Formatting of this note 

might be different from the 

original.

Patient schedule 24

Electronically signed by 

Caroline Palumbo at 

2024 7:57 AM CDT

                          Caroline Palumbo          [facility]

 

                                        2024 21:19:23 





Formatting of this note 

might be different from the 

original.

Rosio Mcnair is a 73 year 

old female





Pt is calling to schedule 

her appt with the 

orthopedic per referral her 

appt

should on a STAT aapt.she 

is requesting to schedule 

her with any orthopedic

provider tomorrow afternoon 

since she has an appt with 

uro/gyn in the

afternoon. Please call pt 

to give her an earlier 

appt.@ [phone] (home)





Electronically signed by 

Elenita Mercado at 2024 

9:25 PM CDT

                          Elenita Mercado               [facility]

 

                                        2024 11:22:08 





Formatting of this note 

might be different from the 

original.

Rosio Mcnair is a 73 year 

old female





Pt would like to discus her 

xray results

Electronically signed by 

Diya Gee at 

2024 11:22 AM CDT

                          Diay Gee         [facility]

 

                                        2024 11:04:43 





Formatting of this note 

might be different from the 

original.

Pt scheduled for 24.

Electronically signed by 

Corrine Grady at 

2024 11:05 AM CDT

                          Corrine Grady        [facility]

 

                                        2024 16:19:50 





Formatting of this note 

might be different from the 

original.

Rosio Mcnair is a 73 year 

old female

Pt calling stating she 

would like to schedule 

botox injection

[phone] (home)





Electronically signed by 

Kurt Castle at 

2024 4:21 PM CDT

                          Kurt Castle            [facility]

 

                                        2024 08:58:34 





Formatting of this note 

might be different from the 

original.

Left a detailed message on 

personal Voice mail. Will 

also send a [employer]Vistat

message.

Electronically signed by 

Jordyn Paredes RN at 

2024 8:59 AM CDT

                          Jordyn Paredes RN          [facility]

 

                                        2024 17:23:53 





Formatting of this note 

might be different from the 

original.

Referral ordered





No we do not need to do 

that us at this time





Rupa Ayers MD





Electronically signed by 

Rupa Ayers MD at 2024 5:24 PM 

CDT

                                                    [facility]

 

                                        2024 16:36:37 





Formatting of this note 

might be different from the 

original.

Patient is requesting new 

order be placed for 

colonoscopy-never 

completed.





She also states never 

completed doppler study 

ordered per Dr. Portillo 

last

year and is questioning the 

need to complete. If 

recommended per Dr. Ayers, please reorder 

if previous order is 

.





Please review, complete, 

and sign if appropriate.





Patient notified to contact 

medical records for release 

of MRI/US results

completed last year and 

verbalized understanding.







Electronically signed by 

Lena Howard LVN at 

2024 4:43 PM CDT

                                                    [facility]

 

                                        2024 16:28:55 





Formatting of this note 

might be different from the 

original.

Patient is returning call 

from the clinic

Electronically signed by 

Rosalia Rodríguez at 

2024 4:29 PM CDT

                          Rosalia Rodríguez            [facility]

 

                                        2024 16:26:25 





Formatting of this note 

might be different from the 

original.

LMTCB Lena Howard LVN 

2024







Electronically signed by 

Lena Howard LVN at 

2024 4:26 PM T

                                                    [facility]

 

                                        2024 10:54:34 





Formatting of this note 

might be different from the 

original.

Rosio Mcnair

Would like to request a 

colonoscopy and vascular 

lab order for lower

extremities.





She is also scheduled to 

see external provider for 

spine on 24 and would

like the results of the MRI 

and US done last year sent 

to him before her

appointment





Patient was also given the 

contact number to schedule 

mammogram.





Please call patient back

Electronically signed by 

Melanie Healy at 

2024 10:57 AM CDT

                          Melanie Healy          [facility]

 

                                        2024 10:36:16 





Formatting of this note 

might be different from the 

original.

Yes the patient has stopped 

methocarbamol due to side 

effects. She should find

a safe way to discard that 

medication





Rupa Ayers MD





Electronically signed by 

Rupa Ayers MD at 2024 10:36 AM

T

                                                    [facility]

 

                                        2024 08:54:40 





Formatting of this note 

might be different from the 

original.

Please review and advise.

Electronically signed by 

Lena Howard LVN at 

2024 8:54 AM [facility]

 

                                        2024 17:02:15 





Formatting of this note 

might be different from the 

original.

Copied from CRM #[id number]. 

Topic: Clinical - Medical 

Advice

>> Mar 27, 2024 5:01 PM 

Patient Access Specialist 

wrote:

[facility] 

pharmacy is calling in 

regards tiZANidine 2 mg 

tablet

(ZANAFLEX) to verify if Dr Ayers is aware that 

the patient has

methocarbemol at home

Please advise





[id number] 

- [place] -  [address] 

AT [facility] [address]

 [place] [address]

Phone: [phone] Fax: 

[phone]







Electronically signed by 

Isaura Johnson at 

2024 5:03 PM CDT

                          Isaura Johnson         [facility]

 

                                        2024 16:26:18 





Formatting of this note 

might be different from the 

original.

Please re-send transmission 

failed earlier

Electronically signed by 

Elva Mercedes LVN at 

2024 4:26 PM T

                                                    [facility]

 

                                        2024 16:24:46 





Formatting of this note 

might be different from the 

original.

Rosio Mcnair is a 73 year 

old female





Pt was seen today and a 

prescription for tizanidine 

was sent to the pharmacy

but they have not received 

it. The patient is 

requesting that it be sent 

again.

Electronically signed by 

Kaylynn Cuevas at 

2024 4:25 PM CDT

                          Kaylynn Cuevas          [facility]

 

                                        2024 08:07:05 





Formatting of this note is 

different from the 

original.

Medication refilled per 

policy:





Last office visit: 23

Next office visit: 3/27/24





Requested Prescriptions





Pending Prescriptions Disp 

Refills

ALLOPURINOL 100 mg tablet 

[Pharmacy Med Name: 

ALLOPURINOL 100MG TABLETS] 

90

tablet 1

Sig: TAKE 1 TABLET BY MOUTH 

EVERY MORNING





Last fill date: 10/12/23





Labs:

Recent Labs

24

1659

URICACID 6.8*





Notes:





Idiopathic chronic gout 

without tophus, unspecified 

site





Electronically signed by 

Lena Howard LVN at 

2024 8:09 AM [facility]

 

                                        2024 08:23:13 





Formatting of this note 

might be different from the 

original.

Patient notified of all and 

will f/u in office on .

Electronically signed by 

Lena Howard LVN at 

2024 8:26 AM [facility]

 

                                        2024 17:23:25 





Formatting of this note 

might be different from the 

original.

OV is required for 

controlled substances





Rupa Ayers MD





Electronically signed by 

Rupa Ayers MD at 2024 5:23 PM 

[facility]

 

                                        2024 14:50:57 





Formatting of this note 

might be different from the 

original.

Please review and advise.

Electronically signed by 

Elva Mercedes LVN at 

2024 2:51 PM [facility]

                          Elva Mercedes LVN      [facility]

 

                                        2024 14:35:58 





Formatting of this note 

might be different from the 

original.

Rosio Mcnair is a 73 year 

old female that is still in 

a great deal of pain due

to not seeing pain mgmt 

because they're not covered 

by insurance. The pt states

that she called The pt is 

requesting the followin) tramadol refill

2) robaxin (or something 

else because she has to cut 

in half)





[id number] 

- [place] - [address] 

AT [facility] [address]

[address] [address]

Phone: [phone] Fax: 

[phone]







Electronically signed by 

Nafisa Sandoval at 2024 

2:48 PM CDT

                          Nafisa Jaime                 [facility]

 

                                        2024-03-15 16:10:53 





Formatting of this note is 

different from the 

original.

Please review and sign if 

appropriate:

*Listed as historical med





Last office visit: 23

Next office visit: 24





Requested Prescriptions





Pending Prescriptions Disp 

Refills

traMADoL 50 mg tablet

Sig: Take 1 tablet by mouth 

every 6 (six) hours as 

needed.





Notes:

Lumbar radiculopathy, 

chronic

Electronically signed by 

Lena Howard LVN at 

03/15/2024 4:11 PM CDT

                                                    [facility]

 

                                        2024-03-15 15:58:02 





Formatting of this note 

might be different from the 

original.

Rosio Mcnair is a 73 year 

old female is requesting a 

refill of tramadol.





Please advise.





[id number] 

- [place] - [address] 

AT [facility]

[address] [place] [address]

Phone: [phone] Fax: 

[phone]

Electronically signed by 

Nafisa Sandoval at 03/15/2024 

4:00 PM CDT

                          Nafisa Sandoval                 [facility]

 

                                        2024-03-15 15:55:00 





Formatting of this note 

might be different from the 

original.

Regarding: ankle/feet edema 

x 3-4 weeks

----- Message from Nafisa Sandoval sent at 3/15/2024 3:55 

PM CDT -----

Rosio Mcnair is a 73 year 

old female





Electronically signed by 

Renetta Patton, BRONSON 

at 03/15/2024 3:56 PM CDT

                          Renetta Patton RN   [facility]

 

                                        2024-03-15 15:55:00 





Formatting of this note 

might be different from the 

original.

Adult Triage Assessment

Last Clinic Visit: 24 

Nephrology

Primary Symptom: 2+ pitting 

edema lower legs

Onset / Duration: 3 weeks

Location / Description: 

Ankles and feet

Pain / Severity: 0/10 If 

they get really swollen, 

they have some pain, but 

none

now

Associated Symptoms: 

Sciatic nerve pain , no 

difficulty breathing, or 

other

edema

Fever / Method: None

Hydration: Drinking fluids 

well, voiding as normal

Treatment so far: Took 

Lasix 20 mg 1 hr ago

Effect on ADL's: Has 

progressed to a cane from 

being immobile

LMP: NA

Pre-existing condition / 

Immunocompromised: Chronic 

Kidney disease

Reason for Disposition

[1] MILD swelling of both 

ankles (i.e., pedal edema) 

AND [2] new onset or

worsening





Protocols used: Leg 

Swelling and Edema-ADULT-





Patient calling concerned 

of new edema of bilateral 

feet and ankle edema. She

is asking if it is ok to 

take Lasix. Informed that I 

can not advise, but will

forward an encounter to Dr. Ayers for review. She 

has placed a call to

her nephrologist, but they 

are on vacation this week. 

Encouraged to present to

 for any worsening 

symptoms.

Electronically signed by 

Renetta Patton RN 

at 03/15/2024 4:39 PM [facility]

 

                                        2024-03-15 15:55:00 





Formatting of this note 

might be different from the 

original.

I see lasix 80 mg on her 

medication list, is this 

prescribed by her

nephrologist? How often 

does she take it

Ok to offer OV





Rupa Ayers MD





Electronically signed by 

Rupa Ayers MD at 2024 8:09 AM 

[facility]

 

                                        2024-03-15 15:55:00 





Formatting of this note 

might be different from the 

original.

Contacted patient who 

reports she is unsure which 

medications she is taking 

at

this time as he reports the 

Nephrologist changed her 

medications. She has a

follow-up on Thursday 

3/21/24 and will verify 

which medications she is 

taking.

She will bring a list to 

her upcoming visit with Dr. Ayers on 3/27/24.





Elva Mercedes LVN 

3/20/2024 10:20 AM







Electronically signed by 

Elva Mercedes LVN at 

2024 10:20 AM [facility]

                          Elva Mercedes [facility]

 

                                        2024 16:52:28 





Formatting of this note 

might be different from the 

original.

Please review and sign if 

appropriate

Electronically signed by 

Lena Howard LVN at 

2024 4:52 PM [facility]

 

                                        2024 16:29:35 





Formatting of this note 

might be different from the 

original.

Copied from CRM #[id number]. 

Topic: Clinical - Referral

>> 2024 4:25 PM 

Kerry Team wrote:

Patient is requesting a 

referral to:





Dept: Pain Management





Reason for referral: 

Sciatic Nerve Pain





Duration of problem: 9 

months





Internal / External 

referral: Internal/External





Name of provider / location 

patient requesting: Dr. Kathrine Holm





Phone number: [phone]





Fax number: n/a





Appt already scheduled?: No





If yes, date of appt.: n/a





Electronically signed by 

Tab Pelayo at 

2024 4:38 PM CST

                          Tab Pelayo          [facility]

 

                                        2024 14:09:59 





Formatting of this note 

might be different from the 

original.

Please review and sign if 

appropriate





LOV 23

NOV 24

Electronically signed by 

Lena Howard LVN at 

2024 2:11 PM [facility]

 

                                        2024 12:37:54 





Formatting of this note 

might be different from the 

original.

Rosio Mcnair is a 73 year 

old female





Kitty with Dr Kp PRUITT 

is calling for referral for 

nephrology. Kitty states

the referral they had from 

provider is  and 

need it to be updated.

NPI-[id number] fax 

[phone]





Patient has scheduled 

appointment for 24

Electronically signed by 

Qing Moore at 2024 

12:41 PM NADYA Moore              [facility]

 

                                        2024-02-15 10:33:31 





Formatting of this note is 

different from the 

original.

Images from the original 

note were not included.

Requested Renewals





ezetimibe (ZETIA) 10 mg 

tablet





Sig: Take 1 tablet by mouth 

in the morning.

Disp: 90 tablet Refills: 1

Start: 2/15/2024

Class: eRX

For: Dyslipidemia

Last ordered: 7 months ago 

(2023) by Billy Portillo MD

Antilipid: Sterol Transport 

Inhibitors Ukscto60/15/2024 

08:46 AM

Protocol Details Valid 

encounter within last 12 

months

AST in normal range and 

within 360 days

ALT in normal range and 

within 360 days

HDL within 360 days

LDL within 360 days

Total Cholesterol within 

360 days

Triglycerides within 360 

days





To be filled at: [id number] - [place], 

[place] - [address] AT

[facility] [address] [facility]







Recent Visits

Date Type Provider Dept

23 Office Visit 

Rupa Ayers MD [facility]

10/12/23 Office Visit 

Rupa Ayers MD [facility]

23 Office Visit 

Billy Portillo MD [facility] 

Family Medicine

23 Office Visit 

Billy Portillo MD [facility] 

Family Medicine

23 Office Visit 

Billy Portillo MD [facility] 

Family Medicine

23 Office Visit 

Billy Portillo MD [facility] Medicine

23 Office Visit 

Billy Portillo MD [facility] 

Family Medicine

23 Office Visit 

Billy Portillo MD [facility] 

Family Medicine

23 Office Visit 

Kortney Carey FNP [facility] 

Family Medicine

22 Office Visit 

Eliana Duncan NP [facility]

Showing recent visits 

within past 540 days with a 

meds authorizing provider 

and

meeting all other 

requirements

Future Appointments

Date Type Provider Dept

24 Appointment 

Rupa Ayers MD [facility]

Showing future appointments 

within next 150 days with a 

meds authorizing

provider and meeting all 

other requirements







Electronically signed by 

Elva Mercedes LVN at 

02/15/2024 10:33 AM NADYA Mercedes LVN      [facility]

 

                                        2024-02-15 08:46:17 





Formatting of this note 

might be different from the 

original.

Images from the original 

note were not included.





Electronically signed by 

Kay Henriquez at 

02/15/2024 8:46 AM CST

                                                    [facility]

 

                                        2024 16:45:00 





Formatting of this note is 

different from the 

original.

Images from the original 

note were not included.

Venipuncture collection 

performed by clean 

technique on the right 

hand. Total

of 1 attempts were made. 

Slight pressure and a 

bandage/dressing were 

applied to

the site(s). The patient 

experienced no 

complications. The 

following specimens

were processed according to 

instructions and sent to 

[facility] laboratories per lab

order on 2024:





LT BLUE

SST RST-3

RED

LAV 2

PPT

DK GREEN (LiHep) LIGHT 

GREEN-1

DK GREEN (SodH)

GRAY

DK BLUE (K2)

DK BLUE (S)

ACD

Blood Culture

NIPT/NTD





Patient has been identified 

by  and name and was 

provided with cup,

antiseptic towelette, and 

clean catch instructions. 4 

urine specimen(s) sent.





Unpreserved 2

Urine Culture 2

Aptima tube

Other urine







Electronically signed by 

Lana Marshall at 

2024 5:16 PM [facility]

 

                                        2024 17:40:13 





Formatting of this note 

might be different from the 

original.

Orders for Mobility device 

received from [employer].

Electronically signed by 

Lena Howard LVN at 

2024 5:40 PM [facility]

 

                                        2024 13:15:51 





Formatting of this note is 

different from the 

original.







Recent Visits

Date Type Provider Dept

23 Office Visit 

Rupa Ayers MD [facility]

10/12/23 Office Visit 

Rupa Ayers MD [facility]

23 Office Visit 

Billy Portillo MD [facility] 

Family Medicine

23 Office Visit 

Billy Portillo MD [facility] 

Family Medicine

23 Office Visit 

Billy Portillo MD [facility] 

Family Medicine

23 Office Visit 

Billy Portillo MD [facility] 

Family Medicine

23 Office Visit 

Billy Portillo MD [facility] 

Family Medicine

23 Office Visit 

Billy Portillo MD [facility] 

Family [facility]

Showing recent visits 

within past 540 days with a 

meds authorizing provider 

and

meeting all other 

requirements

Future Appointments

Date Type Provider Dept

24 Appointment 

Rupa Ayers MD [facility]

Showing future appointments 

within next 150 days with a 

meds authorizing

provider and meeting all 

other requirements







Electronically signed by 

Sumi Fu MA at 

2024 1:16 PM [facility]

 

                                        2024 12:41:47 





Formatting of this note 

might be different from the 

original.

Rosio Mcnair is a 73 year 

old female is calling for 

the following:





DME--

[facility]

[phone] office #

No fax number

wheelchair cushion





[facility]

[phone] office #

[phone] fax

Wheelchair





[employer]

[phone] office #

Anything cpap related

Electronically signed by 

Nafisa Sandoval at 2024 

12:46 PM NADYA Burrowsah Jaime                 [facility]

 

                                        2024 12:08:05 





Formatting of this note 

might be different from the 

original.

Rosio Mcnair is a 73 year 

old female





Would like to have 

Cyclobenzaprine 5 mg 

prescribed

Please contact pt if 

necessary [phone] 

(home)





[id number] 

- [place], [place] - [address] 

AT [employer]

Phone: [phone] Fax: 

[phone]







Electronically signed by 

Isaura Johnson at 

2024 12:13 PM NADYA Johnson         [facility]

 

                                        2024 12:05:11 





Formatting of this note 

might be different from the 

original.

Rosio Mcnair is a 73 year 

old female





Would like orders for wheel 

chair sent to :





[facility]

p [phone]

f





patient will call back with 

fax number

Electronically signed by 

Isaura Johnson at 

2024 12:06 PM NADYA Johnson         [facility]

 

                                        2024 11:41:00 





Formatting of this note 

might be different from the 

original.

Forms not recd and to be re 

faxed per Evette.

Electronically signed by 

Lena Howard LVN at 

2024 11:41 AM [facility]

 

                                        2024 13:36:41 





Formatting of this note 

might be different from the 

original.

Rosio Mcnair is a 73 year 

old female Evette with 

[employer] is requesting

clarification that the 

order for a wheelchair was 

received by the clinic.

Please advise. [phone]

Electronically signed by 

Tab Pelayo at 

2024 1:38 PM NADYA Pelayo          [facility]

 

                                        2024 14:15:35 





Formatting of this note 

might be different from the 

original.

Patient provided with 

update regarding coverage 

and notified denial has 

been

received per [employer] 

patient, [employer] [facility], 

[employer], and [employer]. Patient 

encouraged to contact 

insurance to provide with 

name of

covering DME and verbalized 

understanding. Patient to 

callback with requested

information.

Electronically signed by 

Lena Howard LVN at 

2024 2:19 PM [facility]

 

                                        2024 09:19:56 





Formatting of this note 

might be different from the 

original.

[place] order initiated 

on 2024

Electronically signed by 

Lena Howard LVN at 

2024 9:21 AM [facility]

 

                                        2024 11:51:53 





Formatting of this note 

might be different from the 

original.

Please send order via 

parachut to DME for 

wheelchair cushion, dx: 

M54.16, R

26.81, M17.0

Electronically signed by 

Rupa Ayers MD at 2024 11:57 AM

[facility]

 

                                        2024 10:13:05 





Formatting of this note 

might be different from the 

original.

Please review and advise.

Electronically signed by 

Lena Howard LVN at 

2024 10:13 AM [facility]

 

                                        2024 08:59:02 





Formatting of this note 

might be different from the 

original.

Rosio Mcnair is a 73 year 

old female requesting for 

you to order a good

wheelchair cushion. Pls 

give her a call, thanks. 

She is expecting a call 

back.

Electronically signed by 

Priscilla Almeida at 

2024 9:00 AM [facility]

                          Priscilla Almeida         [facility]

 

                                        2024-01-10 17:50:56 





Formatting of this note 

might be different from the 

original.

Order submitted via 

[place] to [employer] 

on 1/10/2024.

Electronically signed by 

Lena Howard LVN at 

01/10/2024 5:51 PM [facility]

 

                                        2024-01-10 12:38:56 





Formatting of this note 

might be different from the 

original.

Please send DME as 

requested thank you

Electronically signed by 

Rupa Ayers MD at 01/10/2024 12:39 PM

[facility]

 

                                        2024-01-10 12:24:21 





Formatting of this note 

might be different from the 

original.

Please review and advise.

Electronically signed by 

Elva Mercedes LVN at 

01/10/2024 12:24 PM NADYA Mercedes LVN      [facility]

 

                                        2024-01-10 12:02:12 





Formatting of this note 

might be different from the 

original.

Noted, will complete 

through suture sign.

Electronically signed by 

Nery Kemp MA at 

01/10/2024 12:02 PM NADYA Kemp MA       [facility]

 

                                        2024-01-10 11:53:42 





Formatting of this note 

might be different from the 

original.

Rosio Mcnair is a 73 year 

old female





Pt is requesting for new 

standard wheel chair order 

to go through a different

equipment company.





Please send to [employer] 

([phone]





Pt can be reached at [phone]





Electronically signed by 

Tana Carey at 

01/10/2024 11:56 AM NADYA Carey           [facility]

 

                                        2024-01-10 11:41:45 





Formatting of this note 

might be different from the 

original.

Rosio Mcnair is a 73 year 

old female





Jada with a [facility] 

home health calling stating 

suture sign was rejected

for home health orders for 

pt not being a pt of Dr. Rene. System is

showing she was a pt but 

this is back from sep/ 

October when Pt was with 

him.





Please advise

Electronically signed by 

Tana Carey at 

01/10/2024 11:47 AM NADYA Carey           [facility]

 

                                        2024 09:13:46 





Formatting of this note 

might be different from the 

original.

Spoke with patient. Patient 

states she does NOT have 

glaucoma. Patient advised

that an alternative has 

been sent to her pharmacy 

as requested. Patient

scheduled for 6 week 

telehealth appointment.

Johnny Abreu RN 

2024 9:14 AM





Electronically signed by 

Johnny Abreu RN at 

2024 9:14 AM NADYA Abreu RN        [facility]

 

                                        2024 09:00:12 





Formatting of this note 

might be different from the 

original.

Images from the original 

note were not included.





Electronically signed by 

Shweta Ferreira at 

2024 9:00 AM [facility]

                          Shweta Ferreira          [facility]

 

                                        2024 13:32:14 





Formatting of this note 

might be different from the 

original.

Please review and advise.

Electronically signed by 

Lena Howard LVN at 

2024 1:32 PM [facility]

 

                                        2024 11:10:39 





Formatting of this note 

might be different from the 

original.

Due to elevated bps I do 

not recommend Myrbetriq

We can try Trospium

Please confirm that pt does 

not have glaucoma (elevated 

pressures in eye)





Please schedule telehealth 

visit (in the 430 slot) in 

6 weeks





Thank you!

Electronically signed by 

Kushal Hollingsworth MD at 

2024 11:12 AM [facility]

 

                                        2024 17:02:49 





Formatting of this note 

might be different from the 

original.

Rosio Mcnair is a 73 year 

old female





Pt is requesting re for new 

standard wheel chair





Pt can be reached at [phone]





Thank you

Electronically signed by 

Fritz Perkins at 2024 

5:04 PM [facility]

                          Fritz Perkins              [facility]

 

                                        2024 16:57:25 





Formatting of this note is 

different from the 

original.

Rosio Mcnair is a 73 year 

old female





Pt is requesting alternate 

for

vibegron (GEMTESA) 75 mg 

Tab,states she is 

experiencing trouble 

receiving rx

without coupon due to cost





Thank you

Electronically signed by 

Fritz Perkins at 2024 

5:01 PM [facility]

                          Fritz Perkins              [facility]

 

                                        2023 12:20:43 





Formatting of this note 

might be different from the 

original.

Please use AZO for symptoms 

relief.

If no improvement, she can 

start Macrobid, 

prescription sent to 

pharmacy





Due to mobility issues, 

will defer urine culture.





Thank you!





Electronically signed by 

Kushal Hollingsworth MD at 

2023 12:24 PM [facility]

 

                                        2023 15:09:13 





Formatting of this note 

might be different from the 

original.

Pt says she has uti burning 

sensation since the  of 

this week

Electronically signed by 

Marya Rico at 

2023 3:10 PM CST

                          Marya Rico           [facility]

 

                                        2023 10:04:11 Formatting of this n

ote 

might be different from the 

original.

Phone call concerns 

reviewed with Dr. Portillo. He would like to 

have telehealth visit with 

patient today. Scheduled 

for 4 pm. Informed pt; she 

verbalizes understanding 

and will be available for 

the visit.

Electronically signed by 

Ashley Dempsey RN at 

2023 10:05 AM CDT

                          Ashley Dempsey RN       [facility]

 

                                        2023 15:40:27 Formatting of this n

ote 

might be different from the 

original.

Pt calling back to inform 

us that [employer] will deliver 

to her home for DME. She 

also has the reference 

number and agent number for 

the [employer] rep who 

stated she qualified for 

the power wheelchair so 

that we can reach out to 

them if needed for 

anything. Agent ID: 

[id number], reference number: 

[id number].

Electronically signed by 

Ashley Dempsey RN at 

2023 3:42 PM CDT

                                                    [facility]

 

                                        2023 14:36:23 Formatting of this n

ote 

might be different from the 

original.

1150 phone call to patient 

to discuss concerns 

regarding DME. Pt is very 

upset and frustrated that 

it has taken so long to get 

the equipment and meds that 

she needs for her 

comfort/pain relief. She 

has been in a lot of pain 

recently, pain "20 out of 

10" and feels that although 

the physician cares a lot, 

she is not being listened 

to.



Equipment needs:

Standard power wheelchair 

([employer] just let her 

know she qualifies for 

one), will need order

Shower chair-FERNANDA Kemp MA 

submitted order via 

[place] to [employer]. She 

confirmed this company 

accepts [employer]



Regarding her pain:

Methocarbamol-she has been 

taking 2 tabs at a time 

every 6 hours and this dose 

has given her better 

relief. She is requesting 

new rx

Pt is requesting lidocaine 

patch-her sister had some 

left and she tried and this 

is also giving her better 

relief than the 

licocaine-prilocaine cream 

which wipes off too easy



I apologized for delay in 

responding to her needs. 

Provided my name and direct 

office number for further 

issues ([phone], 

Ashley Alejo, nurse 

manager).



Routed call to Dr. Portillo for review.

Electronically signed by 

Ashley Dempsey RN at 

2023 3:00 PM CDT

                                                    [facility]

 

                                        2023 12:03:29 Formatting of this n

ote 

might be different from the 

original.

DME sent [facility], sent 

to [facility].



Shower chair.

Electronically signed by 

Nery Kemp MA at 

2023 12:05 PM CDT

                          Nery Kemp MA       [facility]

 

                                        2023 10:35:07 Formatting of this n

ote 

might be different from the 

original.

DME sent to [place], sent 

to [facility] in 

[place].

Phone #[phone]

Shower Chair



Called patient to inform 

her DME for shower chair 

has been sent. She was very 

upset when I told her it 

was sent to [facility] and that she is 

still waiting for her 

wheelchair from [facility]. Patient 

states [facility] does not send any 

supply's due to her 

insurance. When doing her 

DME and was choosing a 

supplier [facility] was covered by her 

insurance and that's why 

DME was done with them. 

Patient was being very ugly 

on the phone and told 

patient multiply times if 

she wants another supplier 

she will need to call to 

find out what will expect 

her insurance, patient 

wanted me to give her a 

list but I don't have a 

list and she would have to 

call herself and then give 

us a call once she find 

one. Patient was stated "we 

needed better communication 

within one another and 

accuracy staff, since we 

don't know what we were 

doing". Informed patient I 

was the only staff in the 

clinic and was trying to 

get everyone taken care of 

in a timely manner. Put the 

patient on hold and patient 

had hung up the phone.

Electronically signed by 

Nery Kemp MA at 

2023 11:01 AM CDT

                                                    [facility]

 

                                        2023 09:48:16 Formatting of this n

ote 

might be different from the 

original.

Patient would like to speak 

with a nurse regarding the 

orders for wheelchair and a 

shower chair. She's very 

upset and in pain.

Electronically signed by 

Virgie Maki at 

2023 9:49 AM CDT

                          Virgie Maki           [facility]

 

                                        2023 15:11:56 Formatting of this n

ote 

might be different from the 

original.

Pt calling the clinic to 

let the dr know that she 

called thai and they said 

the will approve the orders 

for the home physical 

therapy 5 days a week

Electronically signed by 

James Bustillos at 

2023 3:13 PM CDT

                          James Bustillos            [facility]

 

                                        2023 14:18:35 Formatting of this n

ote 

might be different from the 

original.

Pt calling to make sure 

orders were put in for a 

shower chair and the 

physical therapy. Pt would 

also like an order for a 

floor mat to be put in 

because of her fall 

history. As well as a 

handle for her to get in 

and out of the bath

Electronically signed by 

James Bustillos at 

2023 2:23 PM CDT

                                                    [facility]

 

                                        2023 16:37:50 Formatting of this n

ote 

might be different from the 

original.

Pt has appt for telemed 

with Dr Portillo today.



Electronically signed by 

Jennifer Haas RN at 

2023 4:40 PM CDT

                          Jennifer Haas RN           [facility]

 

                                        2023 16:08:24 Formatting of this n

ote 

might be different from the 

original.

Fall noted. STAT ER advised 

for proper care and 

evaluation. On LOV, had 

discussed need for 

in-patient rehab for 

patient due to poor 

endurance and recurrent 

falls. At the time patient 

reported she would consider 

rehab and discuss with her 

family.



Okay to add to Telehealth 

schedule today, but ER 

visit is highly encouraged 

due to unknown nature of 

fall.

Electronically signed by 

Billy Portillo MD at 

2023 4:11 PM CDT

                                                    [facility]

 

                                        2023 14:26:47 Formatting of this n

ote 

might be different from the 

original.

Pt calling back saying is 

not going to make an 

appointment. She will not 

be doing anything but a 

telehealth visit. They only 

thing she said he can do 

for her is prescribe her 

more pain medicine

Electronically signed by 

James Bustillos at 

2023 2:30 PM CDT

                          James Bustillos            [facility]

 

                                        2023 09:10:29 Formatting of this n

ote 

might be different from the 

original.

Patient was informed, per 

Dr. TAM patient need to be 

evaluated by pain 

management.

Electronically signed by 

Nery Kemp MA at 

2023 9:11 AM CDT

                          Nery Kemp MA       [facility]

 

                                        2023 09:09:47 Formatting of this n

ote 

might be different from the 

original.

Noted, please view pervious 

encounter. Provider was 

informed.

Electronically signed by 

Nery Kemp MA at 

2023 9:10 AM CDT

                          Nery Kemp MA       [facility]

 

                                        2023 09:03:32 Formatting of this n

ote 

might be different from the 

original.

Spoke with patient, while 

on the phone with the 

patient. Patient informed 

me "she had a fall and was 

still on the floor, unable 

to get up". Pain scale rate 

per patient was 20 instead 

1-10. Talked to Dr. TAM and 

per Dr. TAM he informed me to 

tell patient to call EMS or 

to come into office for a 

visit to get a shot. No 

medication will be sent, 

Patient was referred to 

pain management for further 

treatment for pain. Call 

was then transferred to 

Jazzmine Eubanks RN to take 

further action with the 

call and to asset the 

patient.

Electronically signed by 

Nery Kemp MA at 

2023 9:09 AM CDT

                          Nery Kemp MA       [facility]

 

                                        2023 08:49:32 Formatting of this n

ote 

might be different from the 

original.

Spoke with patient stated " 

I slid out the bed my 

sciatic nerve and I can put 

on panties and it hurts to 

get on my knees because my 

bottom is inflamed and I 

sleep on my stomach and I 

woke up this morning every 

time I move my left leg so 

I call and find out. 

Patient. I called ya'll to 

order something not to help 

me get up off the floor. 

You are not going to call 

EMS ill get someone else to 

help me. I just want him to 

order me something for 

pain."



Recommendations: advised 

patient she needed to go to 

ER for evaluation and 

treatment. Patient refused 

ER advise, EMS, and 

appointment for today. 

Patient is wanting Dr. Portillo to order 

something for pain. Routing 

encounter to provider for 

further review and advise.

Electronically signed by 

Jazzmine Eubanks RN at 

2023 9:05 AM CDT

                          Jazzmine Eubanks RN          [facility]

 

                                        2023 08:21:00 Formatting of this n

ote 

might be different from the 

original.

Pt called and states that 

she is so much pain. She 

couldn't sleep last night 

because she is in so much 

pain. She states that she 

is needing something for 

her pain. She cannot even 

get out of her bed. She is 

requesting to speak with a 

nurse. Please advise.

Electronically signed by 

Donna Guerra at 

2023 8:27 AM CDT

                          Donna Guerra       [facility]

 

                                        2023 17:34:51 Formatting of this n

ote 

might be different from the 

original.

Patient is to see Pain 

clinic for proper 

evaluation and care. 

Colchicine is only 

indicated for gout.

Electronically signed by 

Billy Portillo MD at 

2023 5:36 PM CDT

                                                    [facility]

 

                                        2023 18:04:45 Formatting of this n

ote 

might be different from the 

original.

Rosio Mcnair is a 73 year 

old female



Guillermina is calling from 

[facility] wanted to provide 

notification pt has 

experienced fall 9-10-23



Guillermina can be reached at [phone]



Thank you

Electronically signed by 

Frtiz Perkins at 2023 

6:07 PM CDT

                          Fritz Perkins              [facility]

 

                                        2023 10:28:08 Formatting of this n

ote 

might be different from the 

original.

Spoke with patient, patient 

informed me she had a fall 

last week and hurt her Hip 

again, informed her of lab 

results.



Patient wants to know if 

she can take Colchicine 0.6 

mg for pain since she is 

not allowed to take any 

pain medication per Dr. TAM 

in LOV.



Patient does not need a 

refill on Lidocaine, 

patient picked up 

prescription from pharmacy 

at [facility].

Electronically signed by 

Nery Kemp MA at 

2023 10:41 AM CDT

                                                    [facility]

 

                                        2023 10:06:29 Formatting of this n

ote 

might be different from the 

original.

Attempted to call Guillermina no 

answer, Will inform 

provider. No further action 

need to be done.

Electronically signed by 

Nery Kemp MA at 

2023 10:07 AM CDT

                          Nery Kemp MA       [facility]

 

                                        2023 08:21:33 Formatting of this n

ote 

might be different from the 

original.

Re-routing to correct 

clinic.



Elva Mercedes LVN 

2023 8:21 AM



Electronically signed by 

Elva Mercedes LVN at 

2023 8:21 AM CDT

                          Elva Mercedes ANDRESSA      [facility]

 

                                        2023 09:20:34 Formatting of this n

ote is 

different from the 

original.

Rx was sent as noted, let 

patient know, and to 

follow-up as scheduled. 

STAT ER guidelines if 

worsening symptoms.



methocarbamoL 500 mg tablet 

180 tablet 1 2023 No

Sig: Take 1 tablet by mouth 

3 (three) times daily as 

needed (Chronic Pain and 

muscle spasms).

Sent to pharmacy as: 

methocarbamoL 500 mg tablet 

(ROBAXIN)

Class: eRX

Route: Oral

Order: [id number]

Date/Time Signed: 2023 

12:38

E-Prescribing Status: 

Receipt confirmed by 

pharmacy (2023 12:38 PM 

CDT)





Electronically signed by 

Billy Portillo MD at 

2023 9:21 AM CDT

                                                    [facility]

 

                                        2023 18:28:53 Formatting of this n

ote is 

different from the 

original.

Rosio Mcnair is a 73 year 

old female

Requesting LIDOCAINE 5% OIN 

in larger tubes. Please 

call



[facility] Mail 

Delivery - [facility] [address] Phone: 

[phone]

Fax: [phone]





Electronically signed by 

Qing Moore at 2023 

6:32 PM CDT

                          Qing Moore              [facility]

 

                                        2023 14:32:19 Formatting of this n

ote 

might be different from the 

original.

Guillermina Iglesias, physical 

therapist, called to inform 

clinic that she missed 

appointment today due to 

having to go to pain doctor 

appointment. Please advise. 

Call back number: 

[phone]

Electronically signed by 

Donna Guerra at 

2023 2:33 PM CDT

                          Donna Guerra       [facility]

 

                                        2023 10:28:32 Formatting of this n

ote 

might be different from the 

original.

SW attempted to contact 

Patient (Rosio Mcnair p: 

[phone]) by phone 

regarding order/consult for 

[facility]facility 

CHOICE; recording received 

"vm full/cannot accept 

messages". (X3).



Jennifer Moore LCSW, [facility]

- Ambulatory 

Care Management

[facility] 

Clinics

[facility] 

,and [place]

Ph: 

Pager: [phone]

Fax: [phone]

Em: [email or web address]





Electronically signed by 

Jennifer Moore LCSW at 

2023 10:29 AM CDT

                          Jennifer Moore LCSW        [facility]

 

                                        2023 09:31:28 Formatting of this n

ote 

might be different from the 

original.

Rosio Mcnair is a 73 year 

old female



Pt returning call from the 

 Jennifer Moore. 

Please advise.

Electronically signed by 

Miracle Curry at 

2023 9:32 AM CDT

                          Miracle Curry           [facility]

 

                                        2023 15:33:31 Formatting of this n

ote 

might be different from the 

original.

Rosio Mcnair is a 73 year 

old female



Pt is returning a miss 

call.

Electronically signed by 

Diya Gee at 

2023 3:35 PM CDT

                          Diya Gee         [facility]

 

                                        2023 12:57:24 Formatting of this n

ote 

might be different from the 

original.

Seen in clinic today, per 

Dr. Portillo there is a 

plan of care in place.

Electronically signed by 

Susana Hightower RN at 

2023 12:57 PM CDT

                          Susana Hightower RN          [facility]

 

                                        2023 13:35:43 Formatting of this n

ote 

might be different from the 

original.

Called patient, unable to 

leave  as mailbox is 

full.

Electronically signed by 

Susana Hightower RN at 

2023 1:36 PM CDT

                          Susana Hightower RN          [facility]

 

                                        2023 12:57:08 Formatting of this n

ote is 

different from the 

original.

Medication Concerns/Fall



Chronic pain syndrome

- Complicated by Chronic 

midline low back pain with 

bilateral sciatica, Spasm 

of paraspinal muscle, 

Arthritis, multiple joint 

involvement, Chronic 

midline thoracic back pain, 

Chronic pain of both lower 

extremities, Chronic pain 

of both knees, Right hand 

pain, Complex regional pain 

syndrome type 1 affecting 

right hand

- Chronic and stable. 

Methocarbamol was 

discontinued

- Advised to stay active as 

tolerated and consider 

water exercise.

- Avoid sitting for 

prolonged periods of time.

- Advised to maintain good 

hydration, at least 64 

ounces daily.

- Anticipatory guidance and 

patient education 

discussed.

- Patient is established 

with Pain medicine, follow 

up as scheduled.

- TENS unit and electrodes 

Cmpk; Take 1 Units in the 

morning. M54.41. use daily 

on affected areas. Brand as 

covered by insurance 

Dispense: 1 Each; Refill: 0

- tens unit electrodes 

(TENS UNITS ELECTRODES) 2X2 

" Pads; Use as directed 

Dispense: 1 Kit; Refill: 1

- Continue on 

Cyclobenzaprine 5mg TID, 

Gabapentin 300mg BID, 

Voltaren Gel PRN, Lidocaine 

Gel PRN



Please clarify if patient 

is taking cyclobenzaprine 

or Tizanidine [cannot take 

both].



Advise ER/Urgent care STAT 

if worsening symptoms. 

Patient is to complete PT 

via .



Billy Portillo MD, MPH

, 

Department of Family 

Medicine

[facility]



2023 12:59 PM



Future Appointments





In 2 months Billy Portillo MD [facility] 

and [facility], 

[facility]









Electronically signed by 

Billy Portillo MD at 

2023 1:00 PM CDT

                                                    [facility]

 

                                        2023 09:18:37 Formatting of this n

ote is 

different from the 

original.

methocarbamoL 500 mg tablet 

(Discontinued) 360 tablet 1 

2023 No

Sig: Take 1 tablet by mouth 

4 (four) times daily

Fell off pt med list. 

Routing to provider for 

refill. Thea Esteban LVN 2023 9:19 AM



Electronically signed by 

Thea Esteban LVN at 

2023 9:19 AM CDT

                          Thea Esteban [facility]

 

                                        2023 09:04:46 Formatting of this n

ote is 

different from the 

original.

Pt requesting refill on

methocarbamoL 500 mg tablet 

(Discontinued) 360 tablet 1 

2023 No

Sig: Take 1 tablet by mouth 

4 (four) times daily



Stated that she is in pain 

and unable to get pharmacy 

to fill. Pt had fall on 9/3 

and went to .

[id number] 

- [place] - [address] 

AT "[employer]"

Phone: [phone] Fax: 

[phone]



Also faxing MRI results to 

pain management MD Dr Liz. Faxed [phone], 

confirmation received. 

Thea Esteban LVN 

2023 9:15 AM



Electronically signed by 

Thea Esteban LVN at 

2023 9:17 AM CDT

                          Thea Esteban [facility]

 

                                        2023 10:48:59 Formatting of this n

ote 

might be different from the 

original.

Rosio Mcnair is a 73 year 

old female [facility] 

[place] calling to inform 

physician of a fall 23 

and let physician know that 

patient is running low on 

pain medication. Please 

call



[id number] 

- [place] - [address] 

AT [employer] [facility] [employer]

[address] [place] [place] [address]

Phone: [phone] Fax: 

[phone]





Electronically signed by 

Jennifer Tate at 

2023 10:51 AM CDT

                          Jennifer Tate            [facility]

 

                                        2023 10:57:48 Formatting of this n

ote 

might be different from the 

original.

Pt says she had one MRI 

done want to make sure this 

is going to be sent to her 

pain specialist and want to 

discuss the 2ND MRI she 

needs

Electronically signed by 

Marya Rico at 

2023 10:59 AM CDT

                          Marya Rico           [facility]

 

                                        2023 15:03:19 Formatting of this n

ote 

might be different from the 

original.

Pt is checking the status. 

Contact pt before to 

confirm dosage. Contact pt 

when it has been processed.

Electronically signed by 

Leigh Whatley at 

2023 3:05 PM CDT

                          Leigh Whatley          [facility]

 

                                        2023 11:30:00 Formatting of this n

ote 

might be different from the 

original.

Spondylosis/spondyloarthrop

athy probably more 

significant at L3-L5, where 

there is mild to moderate 

spinal canal stenosis and 

mild neural foraminal 

narrowing.



Continue with plan of care 

as discussed, will discuss 

in detail at follow-up.

Electronically signed by 

Billy Portillo MD at 

2023 1:34 PM CDT

                                                    [facility]

 

                                        2023 10:09:59 Formatting of this n

ote 

might be different from the 

original.

Pt returning call to clinic

Electronically signed by 

James Bustillos at 

2023 10:10 AM CDT

                          James HILL Bustillos            [facility]

 

                                        2023 10:04:57 Formatting of this n

ote 

might be different from the 

original.

Pt calling to get refill of 

medication methocarbamoL 

500 mg tablet

Electronically signed by 

James Bustillos at 

2023 10:09 AM CDT

                          James Lopeziggs            [facility]

 

                                        2023 15:03:50 Formatting of this n

ote 

might be different from the 

original.

Attempted to call patient, 

no answer, unable to LVM

Electronically signed by 

Nery Kemp MA at 

2023 3:04 PM CDT

                          Nery Kemp MA       [facility]

 

                                        2023 14:45:09 Formatting of this n

ote 

might be different from the 

original.



Patient (Rosio Mcnair p: 

[phone]) requesting 

order for "Vascular 

Ultrasound" to be sent to 

Radiology.



Patient requesting to BOTH 

Ultrasound and MRI to be 

completed same day 

(23) -only have 

transportation on that day.



Patient requesting call for 

assistance.



Thank you,

Jennifer Moore LCSW, [facility]

- Ambulatory 

Care Management

[facility] Based 

[facility], [place] [place] 

,and [place]

Ph: [phone]

Pager: [phone]

Fax: [phone]

[email or web address]: [email or web address]





Electronically signed by 

Jennifer Moore [facility] at 

2023 2:46 PM CDT

                          Jennifer Moore [facility]

 

                                        2023 11:11:43 Formatting of this n

ote 

might be different from the 

original.

Spoke with patient 

verbalized understanding 

the order is placed. 

Currently waiting on SW and 

insurance approval.

Electronically signed by 

Susana Hightower RN at 

2023 11:12 AM CDT

                          Susana Hightower RN          [facility]

 

                                        2023 09:06:40 Formatting of this n

ote 

might be different from the 

original.

Patient returned call to 

nurse Susana.

Electronically signed by 

Virgie Maki at 

2023 9:07 AM CDT

                          Virgie Maki           [facility]

 

                                        2023 08:26:26 Formatting of this n

ote 

might be different from the 

original.

Called patient, unable to 

leave , mailbox full

Electronically signed by 

Susana Hightower RN at 

2023 8:27 AM [facility]

 

                                        2023 16:24:54 Formatting of this n

ote 

might be different from the 

original.

Please let patient know 

that HH order completed on 

prior visit 2023, 

pending insurance approval 

and patient's choice of 

vendor. Consult/Referral to 

 completed.

Electronically signed by 

Billy Portillo MD at 

2023 4:28 PM [facility]

 

                                        2023 16:10:20 Formatting of this n

ote 

might be different from the 

original.

Routing to provider, Please 

advise if orders can be 

placed.

NANCY PERSON MA 

2023 4:11 PM



Electronically signed by 

Nancy Person MA at 

2023 4:11 PM CDT

                          Nancy Person MA       [facility]

 

                                        2023 16:08:02 Formatting of this n

ote 

might be different from the 

original.

Attempted to call pt, N/A, 

left vm to call clinic 

back. Last results in her 

chart were given to her in 

OV per .

NANCY PERSON MA 

2023 4:09 PM



Electronically signed by 

Nancy Person MA at 

2023 4:09 PM CDT

                          Nancy Person MA       [facility]

 

                                        2023 11:02:06 Formatting of this n

ote 

might be different from the 

original.

Patient is requesting an 

order for a home health 

provider to help her at 

home. She is requesting a 

call back to let her know 

if he can order. She was 

seen today and forgot to 

mention to Dr. Portillo.

Electronically signed by 

Emily Delgado at 

2023 11:03 AM CDT

                          Emily Delgado         [facility]

 

                                        2023 15:11:42 Formatting of this n

ote 

might be different from the 

original.

Called patient to get her 

on the schedule--she will 

call us back once she finds 

out her daughter's 

availability to bring her 

to the appointment.

Electronically signed by 

Starr Jacob at 

2023 3:12 PM CDT

                          Starr Jacob            [facility]

 

                                        2023 09:21:06 Formatting of this n

ote 

might be different from the 

original.

Pt called needing her 

results. Pt asked that if 

your planning on calling 

her on , that's 

her birthday and she 

doesn't want any bad news 

that day.



Please advise

Electronically signed by 

Stefanie Colunga at 

2023 9:30 AM CDT

                          Stefanie Colunga            [facility]

 

                                        2023 13:36:30 Formatting of this n

ote 

might be different from the 

original.

IMPRESSION

1. There is a large 

hypodense mass within the 

central liver at the 

junction

of the left and right 

hepatic lobes which 

demonstrates patchy 

internal

calcification. This mass 

measures 9.2 x 7.7 x 6.47 

m. While this may result

from a large hepatic 

hemangioma, mucinous 

metastatic adenocarcinoma 

would

be in the differential 

diagnosis. Dynamic contrast 

enhanced MRI is

recommended for further 

characterization.

2. A right adrenal adenoma 

is present.

3. Bilateral renal cysts 

are present.

4. Hysterectomy has been 

performed.



I have placed orders for 

MRI of abdomen.

Based on CT recommendations



Please see if patient 

prefers to make appointment 

to discuss

Electronically signed by 

Kortney Carey FNP at 

2023 1:37 PM CDT

                                                    [facility]

 

                                        2023 11:22:44 Formatting of this n

ote 

might be different from the 

original.

Can patient come in to 

appointment to discuss 

results and reasoning for 

MRI needed?

Electronically signed by 

Kortney Carey FNP at 

2023 11:23 AM CDT

                                        [facility] 

PROVIDER                                [facility]

 

                                        2023 08:46:22 Formatting of this n

ote 

might be different from the 

original.

Re-routing to correct 

clinic. Elva Mercedes LVN 2023 8:46 AM



Electronically signed by 

Elva Mercedes LVN at 

2023 8:46 AM CDT

                          Elva Mercedes LVN      [facility]

 

                                        2023 17:25:09 Formatting of this n

ote 

might be different from the 

original.

Rosio Mcnair is a 72 year 

old female



Patient is calling to see 

if she needs to come in and 

discuss results from CT 

scan. Please advise. 

Patient also she states she 

drove 20 miles for an Xray 

and "they" said they didn't 

have them and she doesn't 

want to do another XRAY. 

Patient states she doesn't 

know why they cant find 

them. Please advise.

Electronically signed by 

Margy Mcmillan at 2023 

5:33 PM CDT

                          Margy Mcmillan              [facility]

 

                                        2023 15:06:34 Formatting of this n

ote 

might be different from the 

original.

Called patient no answer, 

unable to LVM. Referral has 

been placed for patient on 

2023, here is the 

number for our referral 

department [phone] to 

be scheduled.

Electronically signed by 

Nery Kemp MA at 

2023 3:09 PM CDT

                          Nery Kemp MA       [facility]

 

                                        2023 12:09:12 Formatting of this n

ote 

might be different from the 

original.

Patient calling for status 

of referral. She said she 

is in so much pain and 

can't even function. Please 

call as soon as possible.

Electronically signed by 

Virgie Maki at 

2023 12:10 PM CDT

                          Virgie Maki           [facility]

 

                                        2023 11:09:56 Formatting of this n

ote 

might be different from the 

original.

Pt called following up on 

referral request. She 

states that she is in much 

pain and cannot function 

and do her daily acts of 

living. Please advise. Call 

back number: [phone]

Electronically signed by 

Donna Guerra at 

2023 11:11 AM CDT

                          Donna Guerra       [facility]

 

                                        2023 10:08:55 Formatting of this n

ote 

might be different from the 

original.

Pt is calling want referral 

to pain specialist

Dr. RANDI Liz 

#[phone]

Sciatic nerve pain



Electronically signed by 

Marya Rico at 

2023 10:10 AM CDT

                          Marya Rico           [facility]

 

                                        2023 08:40:33 Formatting of this n

ote 

might be different from the 

original.

Printed and faxed referral 

to [facility]

Dr.Charles Maldonado

NPI-[id number]

Address: [address] [place] [address] Dr 

[address], [place] [address]

Phone: [phone]

Ctf-[phone]

[facility] [id number]

Electronically signed by 

Susana Hightower RN at 

2023 8:41 AM CDT

                          Susana Hightower RN          [facility]

 

                                        2023 22:32:09 Formatting of this n

ote 

might be different from the 

original.

Consult/Referral



Routine eye exam

- CONSULT/REFERRAL 

OPHTHALMOLOGY

Electronically signed by 

Billy Portillo MD at 

2023 10:32 PM CDT

                                                    [facility]

 

                                        2023 14:40:00 Addended by: BILLY MOMIN on: 2023 05:55 PM



Modules accepted: Orders





Electronically signed by 

Billy Portillo MD at 

2023 5:55 PM CDT

                                                    [facility]

 

                                        2023 15:16:26 Formatting of this n

ote 

might be different from the 

original.

Routing to [facility] and Dr. Portillo for further 

assistance

Electronically signed by 

Roberta Trujillo MA at 

2023 3:18 PM CDT

                          Roberta Trujillo MA       [facility]

 

                                        2023 09:49:40 Formatting of this n

ote 

might be different from the 

original.

Zee with [facility] in [place] is 

requesting a new referral 

for patient to see

Dr.Charles Maldonado

NPI-[id number]

Address: [address] [place] [address] Dr 

[address], [place] [address]

Phone: [phone]

Dny-[phone]

[facility] [id number]



Electronically signed by 

Josue Almeida at 

2023 9:51 AM CDT

                          Josue Almeida      [facility]

 

                                        2023 11:24:44 Formatting of this n

ote 

might be different from the 

original.

Called patient, No answer, 

Unable to LVM. Voice 

mailbox is full.



Electronically signed by 

Jazzmine Eubanks, RN at 

2023 11:25 AM CDT

                          Jazzmine Eubanks RN          [facility]

 

                                        2023 10:01:41 Formatting of this n

ote 

might be different from the 

original.

Re-routing to correct 

clinic.



Elva Mercedes LVN 

2023 10:01 AM



Electronically signed by 

Elva Mercedes LVN at 

2023 10:01 AM CDT

                          Elva Mercedes LVN      [facility]

 

                                        2023 10:00:09 Formatting of this n

ote 

might be different from the 

original.

Rosio Mcnair is a 72 year 

old female

Patient needing a call back 

in regards o vascular US 

questions. Please advise



Electronically signed by 

Amanda Johnson at 

2023 10:01 AM CDT

                          Amanda Johnson            [facility]

 

                                        2023 11:50:01 Formatting of this n

ote 

might be different from the 

original.

[facility] vital sign 

alert

Electronically signed by 

Susana Hightower RN at 

2023 11:50 AM CDT

                          Susana Hightower RN          [facility]

 

                                        2023 16:09:59 Formatting of this n

ote 

might be different from the 

original.

Acknowledged. Continue with 

PT/OT to increase stamina 

and improve endurance. RTC 

if worsening symptoms. ER 

STAT if clinic closed. Will 

monitor

Electronically signed by 

Billy Portillo MD at 

2023 4:12 PM CDT

                                                    [facility]

 

                                        2023 14:45:35 Formatting of this n

ote 

might be different from the 

original.

Germain with [facility] is calling says pt 

has lost 10lbs in the last 

2 weeks. Pt stopped taken 

her Robaxin because she 

thinks this caused her last 

fall

Electronically signed by 

Marya Rico at 

2023 2:47 PM CDT

                          Marya Rico           [facility]

## 2024-09-04 NOTE — RAD REPORT
EXAM DESCRIPTION:  CT - Head Brain Wo Cont - 9/4/2024 3:39 pm

 

CLINICAL HISTORY:  Seizure

 

COMPARISON:  None

 

TECHNIQUE:  Computed axial tomography of the head was obtained. IV contrast was not requested.

 

All CT scans are performed using dose optimization technique as appropriate and may include automated
 exposure control or mA/KV adjustment according to patient size.

 

FINDINGS:  16 millimeter mass along right frontal convexity. A portion is calcified. There is surroun
ding vasogenic edema.

 

Additional 10 millimeter density along the right frontal convexity anteriorly. No surrounding edema.

 

Mild to moderate dilatation third ventricle. Moderate dilatation lateral ventricles. Mild to moderate
 low-density within the periventricular white matter. This could be secondary to a edema from the hyd
rocephalus or ischemic changes secondary to small vessel disease

 

An intracranial  bleed is not seen

 

Empty sella turcica

 

No extra-axial fluid collection is noted.

 

Fluid within the sinuses/ mastoids is not seen.

 

IMPRESSION:  Moderate hydrocephalus.

 

16 millimeter mass along the right frontal convexity with surrounding vasogenic edema. Additional sma
ller density along the right frontal convexity. These may represent meningiomas. It is recommended th
at patient have an MRI of the brain with contrast for further evaluation

## 2024-09-06 NOTE — EKG
Test Date:    2024-09-04               Test Time:    14:52:07

Technician:   PH                                     

                                                     

MEASUREMENT RESULTS:                                       

Intervals:                                           

Rate:         116                                    

LA:           160                                    

QRSD:         86                                     

QT:           366                                    

QTc:          508                                    

Axis:                                                

P:            71                                     

LA:           160                                    

QRS:          59                                     

T:            101                                    

                                                     

INTERPRETIVE STATEMENTS:                                       

                                                     

Sinus tachycardia

ST & T wave abnormality, consider lateral ischemia

Abnormal ECG

Compared to ECG 06/28/2021 11:31:34

ST (T wave) deviation now present

Possible ischemia now present

Sinus rhythm no longer present



Electronically Signed On 09-06-24 14:08:03 CDT by William aGray